# Patient Record
Sex: MALE | Race: WHITE | Employment: OTHER | URBAN - METROPOLITAN AREA
[De-identification: names, ages, dates, MRNs, and addresses within clinical notes are randomized per-mention and may not be internally consistent; named-entity substitution may affect disease eponyms.]

---

## 2017-01-09 ENCOUNTER — TELEPHONE (OUTPATIENT)
Dept: PALLIATIVE MEDICINE | Facility: CLINIC | Age: 52
End: 2017-01-09

## 2017-01-09 NOTE — TELEPHONE ENCOUNTER
Reminded patient that they need to be off steroids, antibiotics and blood thinners for 7 days prior to the procedure. Reminded patient that they also need a .    Syl Conde [facility] [place]

## 2017-01-10 ENCOUNTER — RADIOLOGY INJECTION OFFICE VISIT (OUTPATIENT)
Dept: PALLIATIVE MEDICINE | Facility: CLINIC | Age: 52
End: 2017-01-10
Payer: OTHER GOVERNMENT

## 2017-01-10 ENCOUNTER — RADIANT APPOINTMENT (OUTPATIENT)
Dept: GENERAL RADIOLOGY | Facility: CLINIC | Age: 52
End: 2017-01-10
Attending: PHYSICAL MEDICINE & REHABILITATION

## 2017-01-10 VITALS — DIASTOLIC BLOOD PRESSURE: 90 MMHG | SYSTOLIC BLOOD PRESSURE: 141 MMHG | OXYGEN SATURATION: 96 % | HEART RATE: 89 BPM

## 2017-01-10 DIAGNOSIS — M47.812 CERVICAL SPONDYLOSIS WITHOUT MYELOPATHY: ICD-10-CM

## 2017-01-10 DIAGNOSIS — R26.89 BALANCE PROBLEMS: Primary | ICD-10-CM

## 2017-01-10 DIAGNOSIS — M47.812 FACET ARTHROPATHY, CERVICAL: ICD-10-CM

## 2017-01-10 PROCEDURE — 64491 INJ PARAVERT F JNT C/T 2 LEV: CPT | Mod: RT | Performed by: PHYSICAL MEDICINE & REHABILITATION

## 2017-01-10 PROCEDURE — 64490 INJ PARAVERT F JNT C/T 1 LEV: CPT | Mod: RT | Performed by: PHYSICAL MEDICINE & REHABILITATION

## 2017-01-10 ASSESSMENT — PAIN SCALES - GENERAL: PAINLEVEL: MODERATE PAIN (5)

## 2017-01-10 NOTE — NURSING NOTE
Discharge Information    IV Discontiued Time:  NA    Amount of Fluid Infused:  NA    Discharge Criteria = When patient returns to baseline or as per MD order    Consciousness:  Pt is fully awake    Circulation:  BP +/- 20% of pre-procedure level    Respiration:  Patient is able to breathe deeply    O2 Sat:  Patient is able to maintain O2 Sat >92% on room air    Activity:  Moves 4 extremities on command    Ambulation:  Patient is able to stand and walk or stand and pivot into wheelchair    Dressing:  Clean/dry or No Dressing    Notes:   Discharge instructions and AVS given to patient    Patient meets criteria for discharge?  YES    Admitted to PCU?  No    Responsible adult present to accompany patient home?  Yes    Signature/Title:    Ynes Ramos  RN Care Coordinator  [facility] [place]

## 2017-01-10 NOTE — MR AVS SNAPSHOT
After Visit Summary   1/10/2017    Naif Howell Jr.    MRN: [id number]           Patient Information     Date Of Birth          1965        Visit Information        Provider Department      1/10/2017 7:45 AM Yue Mckeon DO [facility]        Today's Diagnoses     Balance problems    -  1       Care Instructions    [facility] [place]   Medial Branch Block Discharge Instructions  Nurse line:  [phone]  Appointment line:  [phone]    Your procedure was performed by:   Dr. Yue Mckeon    Medications used:  bupivacaine      You will need to complete the Pain Scale Log form and return it to us as soon as possible.  Once we have received the form, we will review it and call you to determine the next steps.     The form can be faxed to [phone] or mailed to:   [facility] - [address], [facility] [address], [place] [address]      You may resume your regular activity after the injection.    Be cautious with walking since you may have numbness and/or weakness in the lower extremities for up to 6-8 hours after the procedure due to the effects of the local anesthetic.    Avoid driving for 6 hours. The local anesthetic could slow your reflexes    You may shower, however no swimming or tub baths or hot tubs for 24 hours following your procedure.    Your pain will return after the numbing medications have worn off.  You may use your current pain medications as needed.    You may use anti-inflammatory medications (such as ibuprofen, aleve, or advil) or Tylenol for pain control if necessary.  Some people find it helpful to alternate ibuprofen and Tylenol every 3 hours for a couple of days.    You may use ice packs 10-15 minutes three to four times a day at the injection site for comfort.     Do not use heat to painful areas for 6 to 8 hours. This will give the local anesthetic time to wear off and prevent you  from accidentally burning your skin.     If you experience any of the following, call the pain center nursing line during work hours at [phone] or the on-call after hours physician line is [phone]:  -Fever over 100 degree F  -Swelling, bleeding, redness, drainage, warmth at the injection site  -Progressive weakness or numbness on your legs or arms  -If lumbar, call if you have a loss of bowel or bladder function  -If cervical, call if you have any unusual headache that is not relieved by Tylenol  -Unusual new onset of pain that is not improving            Follow-ups after your visit        Additional Services     NEUROLOGY ADULT REFERRAL       Your provider has referred you to: [facility] [place] [phone]   [email or web address]    Reason for Referral: Consult    Please be aware that coverage of these services is subject to the terms and limitations of your health insurance plan.  Call member services at your health plan with any benefit or coverage questions.      Please bring the following with you to your appointment:    (1) Any X-Rays, CTs or MRIs which have been performed.  Contact the facility where they were done to arrange for  prior to your scheduled appointment.    (2) List of current medications  (3) This referral request   (4) Any documents/labs given to you for this referral                  Who to contact     If you have questions or need follow up information about today's clinic visit or your schedule please contact [facility] directly at [phone].  Normal or non-critical lab and imaging results will be communicated to you by MyChart, letter or phone within 4 business days after the clinic has received the results. If you do not hear from us within 7 days, please contact the clinic through MyChart or phone. If you have a critical or abnormal lab result, we will notify you by phone as soon as possible.  Submit  "refill requests through [employer] or call your pharmacy and they will forward the refill request to us. Please allow 3 business days for your refill to be completed.          Additional Information About Your Visit        [employer] Information     P[employer]Tab lets you send messages to your doctor, view your test results, renew your prescriptions, schedule appointments and more. To sign up, go to [email or web address] . Click on \"Log in\" on the left side of the screen, which will take you to the Welcome page. Then click on \"Sign up Now\" on the right side of the page.     You will be asked to enter the access code listed below, as well as some personal information. Please follow the directions to create your username and password.     Your access code is: [email or web address]  Expires: 2017  3:33 PM     Your access code will  in 90 days. If you need help or a new code, please call your [facility] clinic or [phone].        Care EveryWhere ID     This is your Care EveryWhere ID. This could be used by other organizations to access your [facility] medical records  [id number]        Your Vitals Were     Pulse Pulse Oximetry                90 95%           Blood Pressure from Last 3 Encounters:   01/10/17 138/95   16 144/82   16 150/88    Weight from Last 3 Encounters:   16 89.812 kg (198 lb)   16 90.719 kg (200 lb)   16 90.719 kg (200 lb)              We Performed the Following     NEUROLOGY ADULT REFERRAL        Primary Care Provider Office Phone # Fax #    Josefina Rogers [phone]       [facility] [address] [address] [address]        Thank you!     Thank you for choosing [facility]  for your care. Our goal is always to provide you with excellent care. Hearing back from our patients is one way we can continue to improve our services. Please take a few minutes to complete the written survey that you may receive in the mail after " your visit with us. Thank you!             Your Updated Medication List - Protect others around you: Learn how to safely use, store and throw away your medicines at www.disposemymeds.org.          This list is accurate as of: 1/10/17  9:02 AM.  Always use your most recent med list.                   Brand Name Dispense Instructions for use    hydrochlorothiazide 25 MG tablet    HYDRODIURIL    90 tablet    Take 1 tablet (25 mg) by mouth daily       ibuprofen 600 MG tablet    ADVIL/MOTRIN    30 tablet    Take 1 tablet (600 mg) by mouth every 6 hours as needed for moderate pain       methocarbamol 500 MG tablet    ROBAXIN    20 tablet    Take 1 tablet (500 mg) by mouth 4 times daily as needed for muscle spasms       oxyCODONE 5 MG IR tablet    ROXICODONE    10 tablet    Take 1 tablet (5 mg) by mouth every 6 hours as needed for pain

## 2017-01-10 NOTE — PROGRESS NOTES
Injection intake:    If this procedure is requiring IV sedation has patient been NPO for 6  Hours? NA    Is patient on coumadin, plavix or other prescribed blood thinner?   No    If patient is on coumadin was it held for 5 days?   NA    If patient is on plavix was it held for 7 days?    NA     Does patient take aspirin?  No    If this is for a cervical procedure and patient is on aspirin has it been held for 6 days?   NA    Any allergies to contrast dye, iodine, steroid and/or numbing medications?  NO    Is patient currently taking antibiotics or have an active infection?  NO    Does patient have a ? Yes       Is patient pregnant or breastfeeding?  Not Applicable    Are the vital signs normal?  No: 147/101      Syl Conde [facility] [place]

## 2017-01-10 NOTE — PROGRESS NOTES
[facility] - Procedure Note    Date of Service: 1/10/2017    Procedure performed: right TON, C3, C4 medial branch blocks  Diagnosis: Cervical spondylosis; Cervical facet arthropathy  : Yue Mckeon DO     Indications: Naif Howell Jr. is a 51 year old male who is seen at the request of Dr. Mckeon for cervical medial branch blocks. The patient describes pain with neck extension/rotation. The patient reports minimal improvement with conservative treatment, including physical therapy and pain medication.    MRI was done on 4/28/16 which showed   FINDINGS: Sagittal images demonstrate minimal lower cervical kyphosis  centered on C5-C6, otherwise normal posterior alignment. There is no  evidence for craniovertebral or cervical medullary junction  abnormality. The cervical cord is normal in morphology and signal  characteristics. Disc space narrowing is present at C3-C4, C4-C5, and  C5-C6. Bone marrow signal intensity is normal.     C2-C3: There is a right-sided uncinate spur and right paramedian  osteophyte and mild right-sided facet hypertrophy causing mild  right-sided foraminal stenosis, but no central canal stenosis.     C3-C4: There is a small right paramedian posterior osteophyte and/or  disc protrusion along with some right-sided facet hypertrophy.  Findings are causing some mild-to-moderate right-sided foraminal  stenosis and borderline to mild central canal stenosis.     C4-C5: Normal.     C5-C6: There is a small to moderate size right paramedian  posterolateral disc protrusion causing mild-to-moderate central canal  stenosis and moderate right-sided foraminal stenosis. Mild right-sided  facet hypertrophy is also present.     C6-C7: Normal.     C7-T1: Normal.     Paraspinal soft tissues: Unremarkable as visualized.         IMPRESSION:  1. Small to moderate size right paramedian to posterolateral C5-C6  disc protrusion with secondary mild-to-moderate central canal stenosis  and moderate  right-sided foraminal stenosis.  2. Small right paramedian posterior osteophyte and disc protrusion at  C3-C4 with some right-sided facet hypertrophy resulting in  mild-to-moderate right-sided foraminal stenosis.    Allergies:      Allergies   Allergen Reactions     Omnicef [Cefdinir] Diarrhea     Cefuroxime Rash        Vitals:  /95 mmHg  Pulse 90  SpO2 95%    Review of Systems: The patient denies recent fever, chills, illness, use of antibiotics or anticoagulants. All other 10-point review of systems negative.     Procedure:   Options/alternatives, benefits and risks were discussed with the patient including bleeding, infection, tissue trauma, exposure to radiation, reaction to medications, spinal cord injury, weakness, numbness and paralysis.  Questions were answered to his satisfaction and he agrees to proceed. Voluntary informed consent was obtained and signed.     After getting informed consent, patient was brought into the procedure suite and was placed in a side-lying position opposite the side of the procedure on the procedure table. A Pause for the Cause was performed. Patient was prepped and draped in sterile fashion.     Under AP fluoroscopic guidance the Right  C2, C3, C4 articular pillars were identified. The C-arm was rotated to afford optimal visualization. Under intermittent fluoroscopy, a 22 gauge 1.5 inch needle was advanced slowly until it had contact on the mid portion of the articular pillar. Then, the two other needles of the same size and gauge were placed under fluorsoscopic guidance using the same technique. The needle positions were verified and optimized from the AP and lateral views.    The anatomic targets for the right TON and C3 and C4 medial nerves were the C2/C3 joint line, and the C3 and C4 articular pillars, resulting in blockade of the right C2/C3 and C3/C4 facet joints.    After negative aspiration, bupivacaine 0.5% 0.5 ml was injected at each location.  The needles were  removed. Hemostasis was achieved, the area was cleaned, and bandaids were placed when appropriate. The patient tolerated the procedure well, and was taken to the recovery room. Images were saved to PACS.    Assessment/Plan: Naif Howell Jr. is a 51 year old male s/p right TON, C3, C4 medial branch blocks today for cervical spondylosis, facet arthropathy.     Pre procecedure pain score: 5/10   Post procedure pain score: 4/10.   The patient will continue to monitor progress, and they were given a pain diary to complete at home.  They will either fax or mail this back to us or bring it to their next appointment. We will determine the treatment plan after we review the diary.      1. The patient was advised to contact the [facility] for any of the following:   Fever, chills, or night sweats   New onset of pain, numbness, or weakness   Any questions/concerns regarding the procedure  If unable to contact the Pain Center, the patient was instructed to go to a local Emergency Room for any complications.   2. The patient will receive a follow-up call in 1 week.  3. We will await the pain diary to determent next step of the treatment plan and call patient to schedule.    Yue Mckeon DO  [facility]  [facility]

## 2017-01-10 NOTE — PATIENT INSTRUCTIONS
[facility] [place]   Medial Branch Block Discharge Instructions  Nurse line:  [phone]  Appointment line:  [phone]    Your procedure was performed by:   Dr. Yue Mckeon    Medications used:  bupivacaine      You will need to complete the Pain Scale Log form and return it to us as soon as possible.  Once we have received the form, we will review it and call you to determine the next steps.     The form can be faxed to [phone] or mailed to:   [facility] - [facility]    [address] [facility] [address] [id number]      You may resume your regular activity after the injection.    Be cautious with walking since you may have numbness and/or weakness in the lower extremities for up to 6-8 hours after the procedure due to the effects of the local anesthetic.    Avoid driving for 6 hours. The local anesthetic could slow your reflexes    You may shower, however no swimming or tub baths or hot tubs for 24 hours following your procedure.    Your pain will return after the numbing medications have worn off.  You may use your current pain medications as needed.    You may use anti-inflammatory medications (such as ibuprofen, aleve, or advil) or Tylenol for pain control if necessary.  Some people find it helpful to alternate ibuprofen and Tylenol every 3 hours for a couple of days.    You may use ice packs 10-15 minutes three to four times a day at the injection site for comfort.     Do not use heat to painful areas for 6 to 8 hours. This will give the local anesthetic time to wear off and prevent you from accidentally burning your skin.     If you experience any of the following, call the pain center nursing line during work hours at [phone] or the on-call after hours physician line is [phone]:  -Fever over 100 degree F  -Swelling, bleeding, redness, drainage, warmth at the injection site  -Progressive weakness or numbness on your legs or  arms  -If lumbar, call if you have a loss of bowel or bladder function  -If cervical, call if you have any unusual headache that is not relieved by Tylenol  -Unusual new onset of pain that is not improving

## 2017-01-16 ENCOUNTER — TELEPHONE (OUTPATIENT)
Dept: PALLIATIVE MEDICINE | Facility: CLINIC | Age: 52
End: 2017-01-16

## 2017-01-16 DIAGNOSIS — M47.812 FACET ARTHROPATHY, CERVICAL: Primary | ICD-10-CM

## 2017-01-16 DIAGNOSIS — M47.812 CERVICAL SPONDYLOSIS WITHOUT MYELOPATHY: ICD-10-CM

## 2017-01-16 NOTE — TELEPHONE ENCOUNTER
Routed to  for scheduling.    Nany Arenas, MSN, RN-BC  Care Coordinator  [facility] [place]

## 2017-01-16 NOTE — TELEPHONE ENCOUNTER
Routing to provider for review. Patient had approximately 70% pain reduction on targeted right facet load.    Order for CMBB2 maria fernanda'd up for signature if indicated.    Nany Arenas, MSN, RN-BC  Care Coordinator  [facility] [place]

## 2017-01-16 NOTE — TELEPHONE ENCOUNTER
Received PAIN EVALUATION AFTER INJECTION form from [facility] MBB #1 performed on 1/10/17.    Post-procedure pain log data :    Pre-procedure: 5/10 rest, 6/10 left facet load, 7/10 right facet load, 4/10 activity    10 minutes: 4/10 rest, 4/10 left facet load, 4/10 right facet load, 4/10 activity    1 hour: 4/10 rest, 4/10 left facet load, 4/10 right facet load, 4/10 activity; comment -     2 hour: 3/10 rest, 3/10 left facet load, 3/10 right facet load, 4/10 activity; comment -     3 hour: 3/10 rest, 2/10 left facet load, 2/10 right facet load, 3/10 activity; comment -     4 hour: 2/10 rest, 2/10 left facet load, 2/10 right facet load, 3/10 activity; comment -     5 hour: 2/10 rest, 2/10 left facet load, 2/10 right facet load, 2/10 activity; comment -     6 hour: 2/10 rest, 2/10 left facet load, 2/10 right facet load, 3/10 activity; comment -     7 hour: 3/10 rest, 3/10 left facet load, 3/10 right facet load, 4/10 activity; comment -     8 hour: 3/10 rest, 4/10 left facet load, 4/10 right facet load, 4/10 activity; comment -     Day after: 3/10 rest, 5/10 left facet load, 5/10 right facet load, 4/10 activity; comment -     Does the patient want to proceed with MBB #2? YES    Does the patient was to proceed with an RFA? Not applicable    Route to nurse pool as well.   Nany Valerio, [facility]

## 2017-01-16 NOTE — TELEPHONE ENCOUNTER
Ordered signed for medial branch block #2.    Yue Mckeon [facility]

## 2017-01-17 ENCOUNTER — TELEPHONE (OUTPATIENT)
Dept: PALLIATIVE MEDICINE | Facility: CLINIC | Age: 52
End: 2017-01-17

## 2017-01-17 NOTE — TELEPHONE ENCOUNTER
Left VM for patient to call and schedule CMBB.        Lashell HERNDON    [facility]

## 2017-01-17 NOTE — TELEPHONE ENCOUNTER
Patient had a cervical medial branch block # 1 on 01/10/17.     Left message that we were calling for an update about how s/he was doing after the injection.  LM that if s/he has any problems or questions to call the nurse line at [phone].    [place] MELLO)

## 2017-01-20 NOTE — TELEPHONE ENCOUNTER
Pre-screening questions for Radiology Injections:    Injection to be done at which interventional clinic site? [facility]    Procedure ordered by Dr. OWEN    Procedure ordered? Cervical Medial Branch Block    What insurance would patient like us to bill for this procedure? [employer]        [employer]- Any injection DO NOT SCHEDULE and route to Lashell Ramos.        [employer] insurance - If scheduling an SI joint injection DO NOT SCHEDULE and route to Lashell Ramos.     [employer]- MBB's must be scheduled at LEAST two weeks apart        [employer] - Any injection besides hip/shoulder/knee joint DO NOT SCHEDULE and route to Lashell Ramos. She will obtain PA and call pt back to schedule procedure or notify pt of denial.    Is an  needed? No     Patient has a drive home? (mandatory) Yes     Is patient taking any blood thinners (plavix, coumadin, jantoven, warfarin, heparin, pradaxa or dabigatran )? No    (If so, do not schedule, contact RN and/or MD)     Is patient taking any aspirin products? No    (If more than 325mg/day do not schedule; Contact RN/MD. For all non-cervical interventional procedures if patient is taking MORE than 325mg/day, limit aspirin to 81-325mg/day x 1 week. No hold required day of procedure.  For CERVICAL procedures, hold all aspirin products for 6 days.)      Does the patient have a bleeding or clotting disorder? No    (If yes, okay to schedule, but contact RN/MD).  **For any patients with platelet count <100, must be forwarded to provider**    Is patient diabetic? no If YES, have them bring their glucometer.    Does patient have an active infection or treated for one within the past week? No    Is patient currently taking any antibiotics?  No  For patients on chronic, preventative, or prophylactic antibiotics, procedures can be scheduled.    For patients on antibiotics for active or recent infection:  Linda Colon, Juan-antibiotic course must have been  completed for 4 days  Marcelino Hawkins-antibiotic course must have been completed for 7 days    Is patient currently taking any steroid medications? (i.e. Prednisone, Medrol)  No   For patients on steroid medications:  Linda Colon [place]-steroid course must have been completed for 4 days  Marcelino Hawkins-steroid course must have been completed for 7 days    Review with patient:  If you are started on any steroids or antibiotics between now and your appointment, you must contact us because it may affect our ability to perform your procedure informed    Is patient actively being treated for cancer or immunocompromised, including the spleen having been removed? No  **For Dr. Story patients without spleens should have the chart sent to her**  (If YES, do NOT schedule and route to RN)    Are you able to get on and off an exam table with minimal or no assistance? Yes  (If NO, do NOT schedule and route to RN)  Are you able to roll over and lay on your stomach with minimal or no assistance? Yes  (If NO, do NOT schedule and route to RN)         Any allergies to contrast dye, iodine, shellfish, or numbing and steroid medications? No  (If so, inform nursing and note in scheduling comments.)    Allergies: Omnicef and Cefuroxime      Any chance of pregnancy? No    Has the patient had a flu shot or any other vaccinations within 7 days before or after the procedure.    No       Does patient have an MRI/CT? yes  (SI joint, hip injections, lumbar sympathetic blocks, and stellate ganglion blocks do not require an MRI)    If so, was it done at [place]? Yes       If not, where was it done? na    Was the MRI done w/in the last 3 years? Yes      If MRI was not done at [place], [facility] or [facility] do NOT schedule. Route to nursing.  (If pt has disc the injection can be scheduled but pt has to bring disc to appt. If they show up w/out disc the injection cannot be done)      **Must be scheduled with elapsed time  interval of at least 2 weeks and not more than 6 months between the First MBB and the Second MBB**       Medial Branch Block Pre-Procedure Instructions    It is okay to take long acting pain medications (if you are on them) the day of the procedure but try not to take any short acting medications unless absolutely necessary. informed        Long acting meds would include: Gabapentin (Neurontin), MS Contin, Oxycontin        Short acting meds would include:  Percocet, Oxycodone, Vicodin, Ibuprofen     The day of the procedure, you should try to do things that provoke your pain, since the injection is being done to see if it will relieve your pain . informed    If your pain level is a 4 out of 10 or less on the day of the procedure, please call [phone] to reschedule.  na        The latest appt available for cervical MBB is 2:30pm. Do Not schedule any later.       Reminders (please tell patient if applicable):          Instructed pt to arrive 30 minutes early for IV start if this is for a cervical procedure, ALL sympathetic (stellate ganglion, hypogastric, or lumbar sympathetic block) and all sedation procedures (RFA, spinal cord stimulation trials). na        -IVs are not routinely placed for Mckeon and [place] cervical cases       If NPO for sedation, it is okay to take medications with sips of water (except if they are to hold blood thinners). na              *DO take blood pressure medication if it is prescribed*        If this is for a cervical MBB aspirin needs to be held for 6 days.  na        Do not schedule procedures requiring IV placement in the first appointment after lunch na          For patients 85 or older we recommend having an adult stay w/ them for the remainder of the day.             Does the patient have any questions? no

## 2017-01-23 ENCOUNTER — HOSPITAL ENCOUNTER (EMERGENCY)
Facility: CLINIC | Age: 52
Discharge: HOME | End: 2017-01-23
Attending: EMERGENCY MEDICINE | Admitting: EMERGENCY MEDICINE
Payer: OTHER GOVERNMENT

## 2017-01-23 VITALS
OXYGEN SATURATION: 94 % | HEART RATE: 79 BPM | DIASTOLIC BLOOD PRESSURE: 105 MMHG | TEMPERATURE: 98.7 F | RESPIRATION RATE: 18 BRPM | SYSTOLIC BLOOD PRESSURE: 152 MMHG

## 2017-01-23 DIAGNOSIS — G89.29 CHRONIC MIDLINE THORACIC BACK PAIN: ICD-10-CM

## 2017-01-23 DIAGNOSIS — M54.6 CHRONIC MIDLINE THORACIC BACK PAIN: ICD-10-CM

## 2017-01-23 PROCEDURE — 99282 EMERGENCY DEPT VISIT SF MDM: CPT

## 2017-01-23 RX ORDER — METHOCARBAMOL 500 MG/1
1000 TABLET, FILM COATED ORAL 3 TIMES DAILY PRN
Qty: 30 TABLET | Refills: 1 | Status: ON HOLD | OUTPATIENT
Start: 2017-01-23 | End: 2017-03-29

## 2017-01-23 RX ORDER — OXYCODONE AND ACETAMINOPHEN 5; 325 MG/1; MG/1
1-2 TABLET ORAL EVERY 4 HOURS PRN
Qty: 15 TABLET | Refills: 0 | Status: ON HOLD | OUTPATIENT
Start: 2017-01-23 | End: 2017-03-29

## 2017-01-23 ASSESSMENT — ENCOUNTER SYMPTOMS
FREQUENCY: 0
BACK PAIN: 1
DIFFICULTY URINATING: 0
DYSURIA: 0

## 2017-01-23 NOTE — ED PROVIDER NOTES
History     Chief Complaint:  Back pain    HPI   Naif Howell Jr. is a 51 year old male who presents with back pain. The patient reports a long-standing history of mid-thoracic back pain flare ups, for which he has an appointment with Dr. Good in 2 weeks for a steroid injection. He notes having a herniated disc in his neck but that this is not bothering him. Today his back pain flared up, prompting him to come here for evaluation. He denies any urinary symptoms or having a pain contract with his pain clinic.    Allergies:  Omnicef - diarrhea  Cefuroxime - rash      Medications:    Ibuprofen  Hydrodiruil     Past Medical History:    Anxiety  PTSD  Hypertension  Basal cell carcinoma  Aortic regurgitation  GERD     Past Surgical History:    Resection of basal cell carcinoma    Family History:    Chest tumor - father  Hypertension - mother     Social History:  Smoking status: current every day - 1 pack/day  Alcohol use: no   Marital Status:  Legally      Review of Systems   Genitourinary: Negative for dysuria, urgency, frequency and difficulty urinating.   Musculoskeletal: Positive for back pain.   All other systems reviewed and are negative.      Physical Exam     Patient Vitals for the past 24 hrs:   BP Temp Temp src Pulse Heart Rate Resp SpO2   01/23/17 0420 - - - - - - 94 %   01/23/17 0412 - - - - - - 96 %   01/23/17 0402 - - - - - - 97 %   01/23/17 0401 (!) 152/105 mmHg - - - - - -   01/23/17 0303 (!) 135/105 mmHg - - - - - -   01/23/17 0301 - 98.7  F (37.1  C) Temporal 79 79 18 99 %        Physical Exam   Constitutional: He appears well-developed and well-nourished.   Cardiovascular: Normal rate, regular rhythm, normal heart sounds and intact distal pulses.  Exam reveals no gallop and no friction rub.    No murmur heard.  Pulmonary/Chest: Effort normal and breath sounds normal. No respiratory distress. He has no wheezes. He has no rales.   Abdominal: Soft. Bowel sounds are normal. He exhibits no  distension and no mass. There is no tenderness.   Musculoskeletal: He exhibits tenderness. He exhibits no edema.   Mid T spine TTP without evidence of trauma or swelling or redness   Neurological: He is alert.   Speech clear  5/5 strength x 4  No sensory deficits to light touch in extremities   Skin: Skin is warm and dry. No rash noted.   Psychiatric: He has a normal mood and affect.       Emergency Department Course     Emergency Department Course:  Past medical records, nursing notes, and vitals reviewed.  0408: I performed an exam of the patient and obtained history, as documented above.  0411: I rechecked the patient. Findings and plan explained to the Patient. Patient discharged home with instructions regarding supportive care, medications, and reasons to return. The importance of close follow-up was reviewed.        Impression & Plan      Medical Decision Making:  Naif Howell Jr. is a 51 year old male who presents with flare up of his midline thoracic pain. He has been followed by Dr. Good at [facility] and is due for an injection in about 2 weeks. He said for whatever reason it flared up tonight and for the last 2 days and is becoming unbearable. He denies any trauma. He tells him he does have a pain contract. I looked at the [facility] records and it appears he does not have any prescriptions recently. Patient does have a follow up with Dr. Mckeon that he can get into, so I did give him limited quantities of pain medication and muscle relaxant. He is then directed to follow up with her and to make sure she is aware he has been in the ER and there is no refills on these medications. He is given narcotics.    Diagnosis:    ICD-10-CM    1. Chronic midline thoracic back pain M54.6     G89.29        Disposition:  discharged to home    Discharge Medications:  Discharge Medication List as of 1/23/2017  4:18 AM      START taking these medications    Details   methocarbamol (ROBAXIN) 500 MG tablet Take 2  tablets (1,000 mg) by mouth 3 times daily as needed for muscle spasms, Disp-30 tablet, R-1, Local Print      oxyCODONE-acetaminophen (PERCOCET) 5-325 MG per tablet Take 1-2 tablets by mouth every 4 hours as needed for pain, Disp-15 tablet, R-0, Local Print               Yon Heredia  1/23/2017   [facility] EMERGENCY DEPARTMENT    I, Yon Heredia, am serving as a scribe at 4:21 AM on 1/23/2017 to document services personally performed by Roshni Dickson MD based on my observations and the provider's statements to me.       Roshni Dickson MD  01/23/17 0608

## 2017-01-23 NOTE — ED AVS SNAPSHOT
[facility] Emergency Department    [address]    [facility] [address]    Phone:  [phone]    Fax:  [phone]                                       Naif Howell Jr.   MRN: [id number]    Department:  [facility] Emergency Department   Date of Visit:  1/23/2017           Patient Information     Date Of Birth          1965        Your diagnoses for this visit were:     Chronic midline thoracic back pain        You were seen by Roshni Dickson MD.      Follow-up Information     Follow up with Josefina Rogers MD. Go in 2 days.    Specialty:  Family Practice    Contact information:    [facility]  [address]  [facility] [address] [phone]          Discharge Instructions       Opioid Medication Information    You have been given a prescription for an opioid (narcotic) pain medicine and/or have received a pain medicine while here in the Emergency Department. These medicines can make you drowsy or impaired. You must not drive, operate dangerous equipment, or engage in any other dangerous activities while taking these medications. If you drive while taking these medications, you could be arrested for DUI, or driving under the influence. Do not drink any alcohol while you are taking these medications.   Opioid pain medications can cause addiction. If you have a history of chemical dependency of any type, you are at a higher risk of becoming addicted to pain medications.  Only take these prescribed medications to treat your pain when all other options have been tried. Take it for as short a time and as few doses as possible. Store your pain pills in a secure place, as they are frequently stolen and provide a dangerous opportunity for children or visitors in your house to start abusing these powerful medications. We will not replace any lost or stolen medicine.  As soon as your pain is better, you should flush all your remaining medication.   Many  prescription pain medications contain Tylenol  (acetaminophen), including Vicodin , Tylenol #3 , Norco , Lortab , and Percocet .  You should not take any extra pills of Tylenol  if you are using these prescription medications or you can get very sick.  Do not ever take more than 4000 mg of acetaminophen in any 24 hour period.  All opioids tend to cause constipation. Drink plenty of water and eat foods that have a lot of fiber, such as fruits, vegetables, prune juice, apple juice and high fiber cereal.  Take a laxative if you don t move your bowels at least every other day. Miralax , Milk of Magnesia, Colace , or Senna  can be used to keep you regular.            Future Appointments        Provider Department Dept Phone Center    2/6/2017 7:45 AM Yue Mckeon DO [facility] [phone]  PAIN MGMT      24 Hour Appointment Hotline       To make an appointment at any [facility] [place], call [phone] ([phone]). If you don't have a family doctor or clinic, we will help you find one. [facility] clinics are conveniently located to serve the needs of you and your family.             Review of your medicines      START taking        Dose / Directions Last dose taken    methocarbamol 500 MG tablet   Commonly known as:  ROBAXIN   Dose:  1000 mg   Quantity:  30 tablet        Take 2 tablets (1,000 mg) by mouth 3 times daily as needed for muscle spasms   Refills:  1        oxyCODONE-acetaminophen 5-325 MG per tablet   Commonly known as:  PERCOCET   Dose:  1-2 tablet   Quantity:  15 tablet        Take 1-2 tablets by mouth every 4 hours as needed for pain   Refills:  0          Our records show that you are taking the medicines listed below. If these are incorrect, please call your family doctor or clinic.        Dose / Directions Last dose taken    hydrochlorothiazide 25 MG tablet   Commonly known as:  HYDRODIURIL   Dose:  25 mg   Quantity:  90 tablet        Take 1 tablet (25 mg) by mouth daily    Refills:  3        ibuprofen 600 MG tablet   Commonly known as:  ADVIL/MOTRIN   Dose:  600 mg   Quantity:  30 tablet        Take 1 tablet (600 mg) by mouth every 6 hours as needed for moderate pain   Refills:  1                Prescriptions were sent or printed at these locations (2 Prescriptions)                   Other Prescriptions                Printed at Department/Unit printer (2 of 2)         methocarbamol (ROBAXIN) 500 MG tablet               oxyCODONE-acetaminophen (PERCOCET) 5-325 MG per tablet                Orders Needing Specimen Collection     None      Pending Results     No orders found from 1/22/2017 to 1/24/2017.            Pending Culture Results     No orders found from 1/22/2017 to 1/24/2017.             Test Results from your hospital stay            Clinical Quality Measure: Blood Pressure Screening     Your blood pressure was checked while you were in the emergency department today. The last reading we obtained was  BP: (!) 135/105 mmHg . Please read the guidelines below about what these numbers mean and what you should do about them.  If your systolic blood pressure (the top number) is less than 120 and your diastolic blood pressure (the bottom number) is less than 80, then your blood pressure is normal. There is nothing more that you need to do about it.  If your systolic blood pressure (the top number) is 120-139 or your diastolic blood pressure (the bottom number) is 80-89, your blood pressure may be higher than it should be. You should have your blood pressure rechecked within a year by a primary care provider.  If your systolic blood pressure (the top number) is 140 or greater or your diastolic blood pressure (the bottom number) is 90 or greater, you may have high blood pressure. High blood pressure is treatable, but if left untreated over time it can put you at risk for heart attack, stroke, or kidney failure. You should have your blood pressure rechecked by a primary care provider  "within the next 4 weeks.  If your provider in the emergency department today gave you specific instructions to follow-up with your doctor or provider even sooner than that, you should follow that instruction and not wait for up to 4 weeks for your follow-up visit.        Thank you for choosing [facility]       Thank you for choosing [facility] for your care. Our goal is always to provide you with excellent care. Hearing back from our patients is one way we can continue to improve our services. Please take a few minutes to complete the written survey that you may receive in the mail after you visit with us. Thank you!        [employer] Information     [employer] lets you send messages to your doctor, view your test results, renew your prescriptions, schedule appointments and more. To sign up, go to [email or web address] . Click on \"Log in\" on the left side of the screen, which will take you to the Welcome page. Then click on \"Sign up Now\" on the right side of the page.     You will be asked to enter the access code listed below, as well as some personal information. Please follow the directions to create your username and password.     Your access code is: [email or web address]  Expires: 2017  3:33 PM     Your access code will  in 90 days. If you need help or a new code, please call your [facility] clinic or [phone].        Care EveryWhere ID     This is your Care EveryWhere ID. This could be used by other organizations to access your [facility] medical records  [id number]        After Visit Summary       This is your record. Keep this with you and show to your community pharmacist(s) and doctor(s) at your next visit.                  "

## 2017-01-23 NOTE — ED AVS SNAPSHOT
[facility] Emergency Department    [address]    [facility] [address]    Phone:  [phone]    Fax:  [phone]                                       Naif Howell Jr.   MRN: [id number]    Department:  [facility] Emergency Department   Date of Visit:  1/23/2017           After Visit Summary Signature Page     I have received my discharge instructions, and my questions have been answered. I have discussed any challenges I see with this plan with the nurse or doctor.    ..........................................................................................................................................  Patient/Patient Representative Signature      ..........................................................................................................................................  Patient Representative Print Name and Relationship to Patient    ..................................................               ................................................  Date                                            Time    ..........................................................................................................................................  Reviewed by Signature/Title    ...................................................              ..............................................  Date                                                            Time

## 2017-01-23 NOTE — DISCHARGE INSTRUCTIONS
Opioid Medication Information    You have been given a prescription for an opioid (narcotic) pain medicine and/or have received a pain medicine while here in the Emergency Department. These medicines can make you drowsy or impaired. You must not drive, operate dangerous equipment, or engage in any other dangerous activities while taking these medications. If you drive while taking these medications, you could be arrested for DUI, or driving under the influence. Do not drink any alcohol while you are taking these medications.   Opioid pain medications can cause addiction. If you have a history of chemical dependency of any type, you are at a higher risk of becoming addicted to pain medications.  Only take these prescribed medications to treat your pain when all other options have been tried. Take it for as short a time and as few doses as possible. Store your pain pills in a secure place, as they are frequently stolen and provide a dangerous opportunity for children or visitors in your house to start abusing these powerful medications. We will not replace any lost or stolen medicine.  As soon as your pain is better, you should flush all your remaining medication.   Many prescription pain medications contain Tylenol  (acetaminophen), including Vicodin , Tylenol #3 , Norco , Lortab , and Percocet .  You should not take any extra pills of Tylenol  if you are using these prescription medications or you can get very sick.  Do not ever take more than 4000 mg of acetaminophen in any 24 hour period.  All opioids tend to cause constipation. Drink plenty of water and eat foods that have a lot of fiber, such as fruits, vegetables, prune juice, apple juice and high fiber cereal.  Take a laxative if you don t move your bowels at least every other day. Miralax , Milk of Magnesia, Colace , or Senna  can be used to keep you regular.

## 2017-02-06 ENCOUNTER — RADIANT APPOINTMENT (OUTPATIENT)
Dept: GENERAL RADIOLOGY | Facility: CLINIC | Age: 52
End: 2017-02-06
Attending: PHYSICAL MEDICINE & REHABILITATION
Payer: OTHER GOVERNMENT

## 2017-02-06 ENCOUNTER — RADIOLOGY INJECTION OFFICE VISIT (OUTPATIENT)
Dept: PALLIATIVE MEDICINE | Facility: CLINIC | Age: 52
End: 2017-02-06

## 2017-02-06 VITALS — DIASTOLIC BLOOD PRESSURE: 95 MMHG | HEART RATE: 85 BPM | OXYGEN SATURATION: 99 % | SYSTOLIC BLOOD PRESSURE: 132 MMHG

## 2017-02-06 DIAGNOSIS — M47.812 CERVICAL SPONDYLOSIS WITHOUT MYELOPATHY: Primary | ICD-10-CM

## 2017-02-06 DIAGNOSIS — M47.812 FACET ARTHROPATHY, CERVICAL: ICD-10-CM

## 2017-02-06 PROCEDURE — 64490 INJ PARAVERT F JNT C/T 1 LEV: CPT | Mod: RT

## 2017-02-06 PROCEDURE — 64491 INJ PARAVERT F JNT C/T 2 LEV: CPT | Mod: RT

## 2017-02-06 NOTE — PROGRESS NOTES
[facility] - Procedure Note    Date of Service: 2/6/2017    Procedure performed: right TON, C3, C4 medial branch blocks  Diagnosis: Cervical spondylosis; Cervical facet arthropathy  : Yue Mckeon DO     Indications: Naif Howell Jr. is a 51 year old male who is seen at the request of Dr. Mckeon for cervical medial branch blocks. The patient describes pain with neck extension/rotation. The patient reports minimal improvement with conservative treatment, including physical therapy and pain medication.    MRI was done on 4/28/16 which showed   FINDINGS: Sagittal images demonstrate minimal lower cervical kyphosis  centered on C5-C6, otherwise normal posterior alignment. There is no  evidence for craniovertebral or cervical medullary junction  abnormality. The cervical cord is normal in morphology and signal  characteristics. Disc space narrowing is present at C3-C4, C4-C5, and  C5-C6. Bone marrow signal intensity is normal.     C2-C3: There is a right-sided uncinate spur and right paramedian  osteophyte and mild right-sided facet hypertrophy causing mild  right-sided foraminal stenosis, but no central canal stenosis.     C3-C4: There is a small right paramedian posterior osteophyte and/or  disc protrusion along with some right-sided facet hypertrophy.  Findings are causing some mild-to-moderate right-sided foraminal  stenosis and borderline to mild central canal stenosis.     C4-C5: Normal.     C5-C6: There is a small to moderate size right paramedian  posterolateral disc protrusion causing mild-to-moderate central canal  stenosis and moderate right-sided foraminal stenosis. Mild right-sided  facet hypertrophy is also present.     C6-C7: Normal.     C7-T1: Normal.     Paraspinal soft tissues: Unremarkable as visualized.         IMPRESSION:  1. Small to moderate size right paramedian to posterolateral C5-C6  disc protrusion with secondary mild-to-moderate central canal stenosis  and moderate  right-sided foraminal stenosis.  2. Small right paramedian posterior osteophyte and disc protrusion at  C3-C4 with some right-sided facet hypertrophy resulting in  mild-to-moderate right-sided foraminal stenosis.    Allergies:      Allergies   Allergen Reactions     Omnicef [Cefdinir] Diarrhea     Cefuroxime Rash        Vitals:  /90 mmHg  Pulse 83  SpO2 98%    Review of Systems: The patient denies recent fever, chills, illness, use of antibiotics or anticoagulants. All other 10-point review of systems negative.     Procedure:   Options/alternatives, benefits and risks were discussed with the patient including bleeding, infection, tissue trauma, exposure to radiation, reaction to medications, spinal cord injury, weakness, numbness and paralysis.  Questions were answered to his satisfaction and he agrees to proceed. Voluntary informed consent was obtained and signed.     After getting informed consent, patient was brought into the procedure suite and was placed in a side-lying position opposite the side of the procedure on the procedure table. A Pause for the Cause was performed. Patient was prepped and draped in sterile fashion.     Under AP fluoroscopic guidance the Right  C2, C3, C4 articular pillars were identified. The C-arm was rotated to afford optimal visualization. Under intermittent fluoroscopy, a 22 gauge 1.5 inch needle was advanced slowly until it had contact on the mid portion of the articular pillar. Then, the two other needles of the same size and gauge were placed under fluorsoscopic guidance using the same technique. The needle positions were verified and optimized from the AP and lateral views.    The anatomic targets for the right TON and C3 and C4 medial nerves were the C2/C3 joint line, and the C3 and C4 articular pillars, resulting in blockade of the right C2/C3 and C3/C4 facet joints.    After negative aspiration, lidocaine 2% 0.5 ml was injected at each location.  The needles were  removed. Hemostasis was achieved, the area was cleaned, and bandaids were placed when appropriate. The patient tolerated the procedure well, and was taken to the recovery room. Images were saved to PACS.    Assessment/Plan: Naif Howell Jr. is a 51 year old male s/p right TON, C3, C4 medial branch blocks today for cervical spondylosis, facet arthropathy.     Pre procecedure pain score: 4/10   Post procedure pain score: 2/10.   The patient will continue to monitor progress, and they were given a pain diary to complete at home.  They will either fax or mail this back to us or bring it to their next appointment. We will determine the treatment plan after we review the diary.      1. The patient was advised to contact the [facility] for any of the following:   Fever, chills, or night sweats   New onset of pain, numbness, or weakness   Any questions/concerns regarding the procedure  If unable to contact the Pain Center, the patient was instructed to go to a local Emergency Room for any complications.   2. The patient will receive a follow-up call in 1 week.  3. We will await the pain diary to determent next step of the treatment plan and call patient to schedule.    Yue Mckeon DO  [facility]  [facility]

## 2017-02-06 NOTE — NURSING NOTE
Injection intake:    If this procedure is requiring IV sedation has patient been NPO for 6  Hours? NA    Is patient on coumadin, plavix or other prescribed blood thinner?   No    If patient is on coumadin was it held for 5 days?   NA    If patient is on plavix was it held for 7 days?    NA     Does patient take aspirin?  No    If this is for a cervical procedure and patient is on aspirin has it been held for 6 days?   No doesnot take    Any allergies to contrast dye, iodine, steroid and/or numbing medications?  NO    Is patient currently taking antibiotics or have an active infection?  NO    Does patient have a ? Yes       Is patient pregnant or breastfeeding?  Not Applicable    Are the vital signs normal?  Yes

## 2017-02-06 NOTE — PATIENT INSTRUCTIONS
[facility]   Medial Branch Block Discharge Instructions  Nurse line:  [phone]  Appointment line:  [phone]    Your procedure was performed by:   Dr. Yue Mckeon    Medications used: lidocaine     You will need to complete the Pain Scale Log form and return it to us as soon as possible.  Once we have received the form, we will review it and call you to determine the next steps.     The form can be faxed to [phone] or mailed to:   [facility] - [facility]    [address] [address], [facility] [address] [id number]      You may resume your regular activity after the injection.    Be cautious with walking since you may have numbness and/or weakness in the lower extremities for up to 6-8 hours after the procedure due to the effects of the local anesthetic.    Avoid driving for 6 hours. The local anesthetic could slow your reflexes    You may shower, however no swimming or tub baths or hot tubs for 24 hours following your procedure.    Your pain will return after the numbing medications have worn off.  You may use your current pain medications as needed.    You may use anti-inflammatory medications (such as ibuprofen, aleve, or advil) or Tylenol for pain control if necessary.  Some people find it helpful to alternate ibuprofen and Tylenol every 3 hours for a couple of days.    You may use ice packs 10-15 minutes three to four times a day at the injection site for comfort.     Do not use heat to painful areas for 6 to 8 hours. This will give the local anesthetic time to wear off and prevent you from accidentally burning your skin.     If you experience any of the following, call the pain center nursing line during work hours at [phone] or the on-call after hours physician line is [phone]:  -Fever over 100 degree F  -Swelling, bleeding, redness, drainage, warmth at the injection site  -Progressive weakness or numbness on your legs or  arms  -If lumbar, call if you have a loss of bowel or bladder function  -If cervical, call if you have any unusual headache that is not relieved by Tylenol  -Unusual new onset of pain that is not improving

## 2017-02-06 NOTE — NURSING NOTE
Discharge Information    IV Discontiued Time:  NA    Amount of Fluid Infused:  NA    Discharge Criteria = When patient returns to baseline or as per MD order    Consciousness:  Pt is fully awake    Circulation:  BP +/- 20% of pre-procedure level    Respiration:  Patient is able to breathe deeply    O2 Sat:  Patient is able to maintain O2 Sat >92% on room air    Activity:  Moves 4 extremities on command    Ambulation:  Patient is able to stand and walk or stand and pivot into wheelchair    Dressing:  Clean/dry or No Dressing    Notes:   Discharge instructions and AVS given to patient    Patient meets criteria for discharge?  YES    Admitted to PCU?  No    Responsible adult present to accompany patient home?  Yes    Signature/Title:    Ynes Ramos  RN Care Coordinator  [facility]

## 2017-02-06 NOTE — MR AVS SNAPSHOT
After Visit Summary   2/6/2017    Naif Howell Jr.    MRN: [id number]           Patient Information     Date Of Birth          1965        Visit Information        Provider Department      2/6/2017 7:45 AM Yue Mckeon DO [facility]        Care Instructions    [facility]   Medial Branch Block Discharge Instructions  Nurse line:  [phone]  Appointment line:  [phone]    Your procedure was performed by:   Dr. Yue Mckeon    Medications used: lidocaine     You will need to complete the Pain Scale Log form and return it to us as soon as possible.  Once we have received the form, we will review it and call you to determine the next steps.     The form can be faxed to [phone] or mailed to:   [facility] - [address], [facility] [address], [place] [address]      You may resume your regular activity after the injection.    Be cautious with walking since you may have numbness and/or weakness in the lower extremities for up to 6-8 hours after the procedure due to the effects of the local anesthetic.    Avoid driving for 6 hours. The local anesthetic could slow your reflexes    You may shower, however no swimming or tub baths or hot tubs for 24 hours following your procedure.    Your pain will return after the numbing medications have worn off.  You may use your current pain medications as needed.    You may use anti-inflammatory medications (such as ibuprofen, aleve, or advil) or Tylenol for pain control if necessary.  Some people find it helpful to alternate ibuprofen and Tylenol every 3 hours for a couple of days.    You may use ice packs 10-15 minutes three to four times a day at the injection site for comfort.     Do not use heat to painful areas for 6 to 8 hours. This will give the local anesthetic time to wear off and prevent you from accidentally burning your skin.     If you experience  "any of the following, call the pain center nursing line during work hours at [phone] or the on-call after hours physician line is [phone]:  -Fever over 100 degree F  -Swelling, bleeding, redness, drainage, warmth at the injection site  -Progressive weakness or numbness on your legs or arms  -If lumbar, call if you have a loss of bowel or bladder function  -If cervical, call if you have any unusual headache that is not relieved by Tylenol  -Unusual new onset of pain that is not improving              Follow-ups after your visit        Who to contact     If you have questions or need follow up information about today's clinic visit or your schedule please contact [facility] directly at [phone].  Normal or non-critical lab and imaging results will be communicated to you by [employer], letter or phone within 4 business days after the clinic has received the results. If you do not hear from us within 7 days, please contact the clinic through [employer] or phone. If you have a critical or abnormal lab result, we will notify you by phone as soon as possible.  Submit refill requests through [facility] or call your pharmacy and they will forward the refill request to us. Please allow 3 business days for your refill to be completed.          Additional Information About Your Visit        [facility] Information     [facility] lets you send messages to your doctor, view your test results, renew your prescriptions, schedule appointments and more. To sign up, go to [email or web address] . Click on \"Log in\" on the left side of the screen, which will take you to the Welcome page. Then click on \"Sign up Now\" on the right side of the page.     You will be asked to enter the access code listed below, as well as some personal information. Please follow the directions to create your username and password.     Your access code is: [email or web address]  Expires: 2017  3:33 PM     Your access code will  in 90 days. If " you need help or a new code, please call your [facility] clinic or [phone].        Care EveryWhere ID     This is your Care EveryWhere ID. This could be used by other organizations to access your [facility] medical records  [id number]        Your Vitals Were     Pulse Pulse Oximetry                83 98%           Blood Pressure from Last 3 Encounters:   02/06/17 136/90   01/23/17 152/105   01/10/17 141/90    Weight from Last 3 Encounters:   11/23/16 89.812 kg (198 lb)   11/22/16 90.719 kg (200 lb)   11/06/16 90.719 kg (200 lb)              Today, you had the following     No orders found for display       Primary Care Provider Office Phone # Fax #    Josefina Lester Rogers [phone]       [facility] [address] [address]        Thank you!     Thank you for choosing [facility]  for your care. Our goal is always to provide you with excellent care. Hearing back from our patients is one way we can continue to improve our services. Please take a few minutes to complete the written survey that you may receive in the mail after your visit with us. Thank you!             Your Updated Medication List - Protect others around you: Learn how to safely use, store and throw away your medicines at www.disposemymeds.org.          This list is accurate as of: 2/6/17  8:24 AM.  Always use your most recent med list.                   Brand Name Dispense Instructions for use    hydrochlorothiazide 25 MG tablet    HYDRODIURIL    90 tablet    Take 1 tablet (25 mg) by mouth daily       ibuprofen 600 MG tablet    ADVIL/MOTRIN    30 tablet    Take 1 tablet (600 mg) by mouth every 6 hours as needed for moderate pain       methocarbamol 500 MG tablet    ROBAXIN    30 tablet    Take 2 tablets (1,000 mg) by mouth 3 times daily as needed for muscle spasms       oxyCODONE-acetaminophen 5-325 MG per tablet    PERCOCET    15 tablet    Take 1-2 tablets by mouth every 4 hours as  needed for pain

## 2017-02-10 ENCOUNTER — TRANSFERRED RECORDS (OUTPATIENT)
Dept: HEALTH INFORMATION MANAGEMENT | Facility: CLINIC | Age: 52
End: 2017-02-10

## 2017-02-13 ENCOUNTER — MEDICAL CORRESPONDENCE (OUTPATIENT)
Dept: HEALTH INFORMATION MANAGEMENT | Facility: CLINIC | Age: 52
End: 2017-02-13

## 2017-02-13 ENCOUNTER — TELEPHONE (OUTPATIENT)
Dept: PALLIATIVE MEDICINE | Facility: CLINIC | Age: 52
End: 2017-02-13

## 2017-02-13 DIAGNOSIS — M47.812 FACET ARTHROPATHY, CERVICAL: Primary | ICD-10-CM

## 2017-02-13 NOTE — TELEPHONE ENCOUNTER
Received PAIN EVALUATION AFTER INJECTION form from [facility] MBB #2 performed on 2/6/17.    Post-procedure pain log data :    Pre-procedure: 3/10 rest,  3/10 right facet load, 3/10 activity    10 minutes: 1/10 rest, 2/10 right facet load, 2/10 activity    1 hour: 1/10 rest, 2/10 right facet load, 1/10 activity; comment -     2 hour: 1/10 rest, 2/10 right facet load, 1/10 activity; comment -     3 hour: 1/10 rest,  2/10 right facet load, 1/10 activity; comment -     4 hour: 1/10 rest, 2/10 right facet load, 1/10 activity; comment -     5 hour: 1/10 rest, 3/10 right facet load, 1/10 activity; comment -     6 hour: 1/10 rest,  3/10 right facet load, 1/10 activity; comment -     7 hour: 2/10 rest, 3/10 right facet load, 3/10 activity; comment -     8 hour: 2/10 rest,  4/10 right facet load, 3/10 activity; comment -     Day after: 2/10 rest, 4/10 right facet load, 4/10 activity; comment -     Does the patient want to proceed with MBB #2? Not applicable    Does the patient was to proceed with an RFA? YES    Route to nurse pool as well.     Nany Valerio, [facility]

## 2017-02-13 NOTE — TELEPHONE ENCOUNTER
Routing to LANDY Ramos for PA processing to proceed with RFA.     Called patient to advise that PA process has begun and we will be contact to schedule when we hear back.     Ynes Ramos  BSN-RN Care Coordinator  [facility]

## 2017-02-13 NOTE — TELEPHONE ENCOUNTER
Prepping order for review. Routing to provider to review results below.     Ynes Ramos  BSN-RN Care Coordinator  [facility]

## 2017-02-13 NOTE — TELEPHONE ENCOUNTER
Patient had a cervical medial branch block # 2 on 02/06/17.     Left message that we were calling for an update about how s/he was doing after the injection.  LM that if s/he has any problems or questions to call the nurse line at [phone].    [place] MELLO)

## 2017-02-13 NOTE — TELEPHONE ENCOUNTER
Ok to proceed with radiofrequency ablation.    Yue Mckeon DO  [facility]

## 2017-02-15 NOTE — TELEPHONE ENCOUNTER
Faxed completed PA form and supporting documentation for CRFA to [facility]. Right fax confirmation 2/15 at 10:30 am.      Lashell HERNDON    [facility]

## 2017-02-16 ENCOUNTER — TRANSFERRED RECORDS (OUTPATIENT)
Dept: HEALTH INFORMATION MANAGEMENT | Facility: CLINIC | Age: 52
End: 2017-02-16

## 2017-02-21 ENCOUNTER — TRANSFERRED RECORDS (OUTPATIENT)
Dept: HEALTH INFORMATION MANAGEMENT | Facility: CLINIC | Age: 52
End: 2017-02-21

## 2017-02-21 NOTE — TELEPHONE ENCOUNTER
PA approved.  Effective date: 2/15/17  PA reference #: [id number]  Pt. notified:   Yes   Pt. informed directly.      Lashell HERNDON    [facility]

## 2017-02-21 NOTE — TELEPHONE ENCOUNTER
Pre-screening Questions for RFA Procedure      Procedure ordered? CRFA    What insurance are we billing for this procedure?      Has Pt had this injection before? NO  Is  Needed?: No  Will Pt have a ?  Yes if pt is given sedation meds, no driving for 24 hours.  Is pt taking a cab or transportation service?   No   If so will need to be accompanied by an adult too (friend/family member) in order for IV sedation to be given.    Per [facility] Policy:  Outpatients are to have responsible adult or family member to accompany them at discharge and drive them home. A service providing medically trained drivers or attendants would be acceptable. Public transportation would not be acceptable unless the patient is accompanied by a responsible adult or family member.    Is Pt aware to be NPO for 6 hours before procedure?  YES   Is patient taking any blood thinners (plavix, coumadin, jantoven, warfarin, heparin, pradaxa or dabigatran )? No   (If so, do not schedule, contact RN and/or MD)     Is patient taking any aspirin products? No   (If more than 325mg/day do not schedule; Contact RN/MD. For all non-cervical interventional procedures if patient is taking MORE than 325mg/day, limit aspirin to 81-325mg/day x 1 week. No hold required day of procedure.  For CERVICAL procedures, hold all aspirin products for 6 days.)      Take normal medications with sips of water, except for blood thinners, especially blood pressure medications.  Yes  Does Pt have an allergy to contrast dye, iodine or shellfish?  No  Does the patient have a bleeding or clotting disorder? No   (If yes, okay to schedule, but contact RN/MD).  **For any patients with platelet count <100, must be forwarded to provider**    Does patient have an active infection or treated for one within the past week? No  (If YES, do NOT schedule and route to RN)     Is patient currently taking any antibiotics?  No  (If YES, do NOT schedule and route to RN)  For  patients on chronic, preventative, or prophylactic antibiotics, procedures can be scheduled.   For patients on antibiotics for active or recent infection:  Linda Colon [place]-antibiotic course must have been completed for 4 days  Marcelino Hawkins-antibiotic course must have been completed for 7 days    Is patient currently taking any steroid medications? (i.e. Prednisone, Medrol)  No   For patients on steroid medications:  Linda Colon [place]-steroid course must have been completed for 4 days  Marcelino Hawkins-steroid course must have been completed for 7 days    Review with patient:  If you are started on any steroids or antibiotics between now and your appointment, you must contact us because it may affect our ability to perform your procedure     Is patient actively being treated for cancer or immunocompromised, including the spleen having been removed? No  **For Dr. Story patients without spleens should have the chart sent to her**  (If YES, do NOT schedule and route to RN)    Are you able to get on and off an exam table with minimal or no assistance? Yes  (If NO, do NOT schedule and route to RN)  Are you able to roll over and lay on your stomach with minimal or no assistance? Yes  (If NO, do NOT schedule and route to RN)        Any chance of Pregnancy?  N/A  Pt needs to arrive 30 minutes before procedure time to have an IV inserted.  reviewed  Do NOT schedule at 0745, 0815 or 1245.  reviewed   All radiofrequency ablations are in a 90 minute time slot except genicular radiofrequency ablation those are 60 minute time slot.  reviewed   Any hx of complications with sedation medications?   NO  Hx of sleep apnea?  NO  Cardiac hx? NO      Pt to expect increased pain for up to 2 weeks after procedure.    Relief from this procedure can take up to 6 weeks.      When you schedule an RFA for [place], e-mail Fernando Caballero the date, time, and who they are seeing. Also Cc Africa Cornell  [place], and the provider that they are scheduled with.     In [place] there are only 6 probes for each area (lumbar and cervical) with a 72 hour autoclave. Make sure that there are at least 3 days between LRFA s and 3 days between CRFA s.

## 2017-03-03 ENCOUNTER — TRANSFERRED RECORDS (OUTPATIENT)
Dept: HEALTH INFORMATION MANAGEMENT | Facility: CLINIC | Age: 52
End: 2017-03-03

## 2017-03-10 ENCOUNTER — TRANSFERRED RECORDS (OUTPATIENT)
Dept: HEALTH INFORMATION MANAGEMENT | Facility: CLINIC | Age: 52
End: 2017-03-10

## 2017-03-17 ENCOUNTER — TRANSFERRED RECORDS (OUTPATIENT)
Dept: HEALTH INFORMATION MANAGEMENT | Facility: CLINIC | Age: 52
End: 2017-03-17

## 2017-03-21 ENCOUNTER — TELEPHONE (OUTPATIENT)
Dept: FAMILY MEDICINE | Facility: CLINIC | Age: 52
End: 2017-03-21

## 2017-03-21 ENCOUNTER — RADIANT APPOINTMENT (OUTPATIENT)
Dept: GENERAL RADIOLOGY | Facility: CLINIC | Age: 52
End: 2017-03-21
Attending: PHYSICAL MEDICINE & REHABILITATION

## 2017-03-21 ENCOUNTER — RADIOLOGY INJECTION OFFICE VISIT (OUTPATIENT)
Dept: PALLIATIVE MEDICINE | Facility: CLINIC | Age: 52
End: 2017-03-21
Payer: OTHER GOVERNMENT

## 2017-03-21 VITALS
HEART RATE: 84 BPM | DIASTOLIC BLOOD PRESSURE: 86 MMHG | SYSTOLIC BLOOD PRESSURE: 138 MMHG | RESPIRATION RATE: 17 BRPM | OXYGEN SATURATION: 98 %

## 2017-03-21 DIAGNOSIS — M47.812 CERVICAL SPONDYLOSIS WITHOUT MYELOPATHY: ICD-10-CM

## 2017-03-21 DIAGNOSIS — M47.812 FACET ARTHROPATHY, CERVICAL: ICD-10-CM

## 2017-03-21 DIAGNOSIS — G89.18 POST PROCEDURE DISCOMFORT: Primary | ICD-10-CM

## 2017-03-21 PROCEDURE — 64634 DESTROY C/TH FACET JNT ADDL: CPT | Mod: RT | Performed by: PHYSICAL MEDICINE & REHABILITATION

## 2017-03-21 PROCEDURE — 64633 DESTROY CERV/THOR FACET JNT: CPT | Mod: RT | Performed by: PHYSICAL MEDICINE & REHABILITATION

## 2017-03-21 RX ORDER — HYDROCODONE BITARTRATE AND ACETAMINOPHEN 5; 325 MG/1; MG/1
1 TABLET ORAL EVERY 4 HOURS PRN
Qty: 28 TABLET | Refills: 0 | Status: SHIPPED | OUTPATIENT
Start: 2017-03-21 | End: 2017-04-20

## 2017-03-21 ASSESSMENT — PAIN SCALES - GENERAL: PAINLEVEL: SEVERE PAIN (6)

## 2017-03-21 NOTE — MR AVS SNAPSHOT
After Visit Summary   3/21/2017    Naif Howell Jr.    MRN: [id number]           Patient Information     Date Of Birth          1965        Visit Information        Provider Department      3/21/2017 9:15 AM Yue Mckeon DO [facility]        Today's Diagnoses     Post procedure discomfort    -  1      Care Instructions    [facility] Procedure Discharge Instructions       Today you saw:   Dr. Yue Mckeon           Your procedure:  Radiofrequency Nerve Ablation     Procedural Medications:  Lidocaine (anesthetic)     Dexamethasone (steroid)       Sedation medications:  Fentanyl  - pain     Versed - relaxation    Take home medication - - Norco, 1 tablet every 4 hours for post-procedure pain. Maximum 4 pills/day    You have received sedation during your procedure; for the next 24 hours you should not:   -Drive   -Operate machinery   -Drink alcohol   -Sign any legal documents     You may resume your normal diet and medications.   Avoid strenuous activity for the first 24 hours and resume regular activities after that.   Be cautious with walking as unsteadiness may occur in the lower extremities up to 6-8 hours   You may shower, however no swimming or tub baths or hot tubs for 24 hours following your procedure   Anticipate pain for up to 2 weeks after this procedure.  You may use ice packs 10-15 minutes three to four times a day at the injection site for comfort   You may use anti-inflammatory medications (such as Ibuprofen or Aleve or Advil) or Tylenol for pain control if necessary  It may take up to 4-6 weeks to receive relief from the radiofrequency ablation.    If you experience any of the following, call the pain center line during work hours at [phone] or on call physician after hours at [phone]:  -Fever over 100 degree F   -Swelling, bleeding, redness, drainage, warmth at the injection site   -Progressive weakness or numbness in your arms   -Unusual  "headache that is not relieved by Tylenol   -Unusual new onset of pain that is not improving    Nurse triage line for general questions: [phone]                Follow-ups after your visit        Who to contact     If you have questions or need follow up information about today's clinic visit or your schedule please contact [facility] directly at [phone].  Normal or non-critical lab and imaging results will be communicated to you by [employer], letter or phone within 4 business days after the clinic has received the results. If you do not hear from us within 7 days, please contact the clinic through [employer] or phone. If you have a critical or abnormal lab result, we will notify you by phone as soon as possible.  Submit refill requests through CSR or call your pharmacy and they will forward the refill request to us. Please allow 3 business days for your refill to be completed.          Additional Information About Your Visit        CSR Information     CSR lets you send messages to your doctor, view your test results, renew your prescriptions, schedule appointments and more. To sign up, go to [email or web address] . Click on \"Log in\" on the left side of the screen, which will take you to the Welcome page. Then click on \"Sign up Now\" on the right side of the page.     You will be asked to enter the access code listed below, as well as some personal information. Please follow the directions to create your username and password.     Your access code is: [email or web address]  Expires: 2017 11:11 AM     Your access code will  in 90 days. If you need help or a new code, please call your [place] clinic or [phone].        Care EveryWhere ID     This is your Care EveryWhere ID. This could be used by other organizations to access your [place] medical records  [id number]        Your Vitals Were     Pulse Respirations Pulse Oximetry             83 17 96%          Blood Pressure from " Last 3 Encounters:   03/21/17 (!) 135/101   02/06/17 (!) 132/95   01/23/17 (!) 152/105    Weight from Last 3 Encounters:   11/23/16 89.8 kg (198 lb)   11/22/16 90.7 kg (200 lb)   11/06/16 90.7 kg (200 lb)              Today, you had the following     No orders found for display         Today's Medication Changes          These changes are accurate as of: 3/21/17 11:11 AM.  If you have any questions, ask your nurse or doctor.               Start taking these medicines.        Dose/Directions    HYDROcodone-acetaminophen 5-325 MG per tablet   Commonly known as:  NORCO   Used for:  Post procedure discomfort   Started by:  Yue Mckeon,         Dose:  1 tablet   Take 1 tablet by mouth every 4 hours as needed for moderate to severe pain maximum 4 tablet(s) per day   Quantity:  28 tablet   Refills:  0            Where to get your medicines      Some of these will need a paper prescription and others can be bought over the counter.  Ask your nurse if you have questions.     Bring a paper prescription for each of these medications     HYDROcodone-acetaminophen 5-325 MG per tablet                Primary Care Provider Office Phone # Fax #    Josefina Lester Rogers [phone]       [facility] [address] [address] [facility] [address]        Thank you!     Thank you for choosing [facility]  for your care. Our goal is always to provide you with excellent care. Hearing back from our patients is one way we can continue to improve our services. Please take a few minutes to complete the written survey that you may receive in the mail after your visit with us. Thank you!             Your Updated Medication List - Protect others around you: Learn how to safely use, store and throw away your medicines at www.disposemymeds.org.          This list is accurate as of: 3/21/17 11:11 AM.  Always use your most recent med list.                   Brand Name Dispense Instructions for use     hydrochlorothiazide 25 MG tablet    HYDRODIURIL    90 tablet    Take 1 tablet (25 mg) by mouth daily       HYDROcodone-acetaminophen 5-325 MG per tablet    NORCO    28 tablet    Take 1 tablet by mouth every 4 hours as needed for moderate to severe pain maximum 4 tablet(s) per day       ibuprofen 600 MG tablet    ADVIL/MOTRIN    30 tablet    Take 1 tablet (600 mg) by mouth every 6 hours as needed for moderate pain       methocarbamol 500 MG tablet    ROBAXIN    30 tablet    Take 2 tablets (1,000 mg) by mouth 3 times daily as needed for muscle spasms       oxyCODONE-acetaminophen 5-325 MG per tablet    PERCOCET    15 tablet    Take 1-2 tablets by mouth every 4 hours as needed for pain

## 2017-03-21 NOTE — PROGRESS NOTES
Pre procedure Diagnosis: facet arthropathy   Post procedure Diagnosis: Same  Procedure performed: right TON, C3, C4 radiofrequency ablation   Anesthesia: moderate sedation with fentanyl 50 mcg and versed 0.25 mg  Complications: none  Operators: Yue Mckeon DO and Johnny Mederos MD    Indications:   Naif Howell Jr. is a 52 year old male was sent by Dr. Mckeon for radiofrequency ablation.  They have a history of right-sided neck pain.  Exam shows increased discomfort with extension and rotation and they have tried conservative treatment including physical therapy.    Naif Howell Jr. had medial branch blocks showing appropriate pain relief, therefore radiofrequency ablation will be done.     Options/alternatives, benefits and risks were discussed with the patient including bleeding, infection, no pain relief, tissue trauma, exposure to radiation, reaction to medications including seizure, spinal cord injury,increased pain after the procedure, weakness, numbness or sensory changes and headache.   We also discussed risks of sedation, including reaction to medications and cardiovascular collapse.    Questions were answered to his satisfaction and he agrees to proceed. Voluntary informed consent was obtained and signed.     Vitals were reviewed: Yes  Allergies were reviewed:  Yes   Medications were reviewed:  Yes   Pre-procedure pain score: 6/10    Procedure:  After getting informed consent, patient was brought into the procedure suite and was placed in a prone position on the procedure table.   A Pause for the Cause was performed.  Patient was prepped and draped in sterile fashion.     Naif Howell Jr. had an IV line placed prior the procedure.  The C-arm was positioned in the oblique view to afford optimal view of the L4-L5 vertebral bodies. Lidocaine 1% was used to anesthetize the skin at each level.  At each level, a 150mm, 20G curved radiofrequency cannula with a 10mm active tip was positioned, overlying the  intersection of the transverse process and pedicle at L4 & L5, and was advanced under intermittent fluoroscopy until it contacted the transverse process and notch, and the tip slightly overran that process, just lateral to the mamillary process.  The position of each cannula was verified and optimized in the oblique view and AP views.    In the AP view, another cannula was placed at the sacral alar notch.      Each position was tested for motor and sensory stimulation, and was positioned so that stimulation was negative for stimuli outside the immediate area of the desired lesion.  Sensory stimulation was completed at 50 Hz, with max stimulation up to 0.8V.  Motor stimulation was completed at 2Hz, up to 2.5V.  Lidocaine 1% 0.5 ml with 5 mg of dexamethasone was injected at each level, and a 90 second, 80 degree Centigrade lesion was generated.    The needles were then rotated within the pathway of the medial branch, and locations were evaluated with repeat imaging.  Motor testing was again completed, and showed appropriate stimulation.  A second lesion was then generated at each location.    The needles were withdrawn. Hemostasis was achieved, the area was cleaned, and bandaids were placed when appropriate.  The patient tolerated the procedure well, and was taken to the recovery room.    Images were saved to PACS.    Start sedation time: 0958  End sedation time: 1050    Post-procedure pain score: 2/10  Follow-up includes:   -f/u phone call in one week  -post-procedure pain medications: norco 5-325 mg po q4h prn x 7 days, max #4/day  -f/u with Dr. Mckeon in 4 weeks    Yue Mckeon DO  [facility]

## 2017-03-21 NOTE — TELEPHONE ENCOUNTER
Panel Management Review      Patient has the following on his problem list:     Hypertension   Last three blood pressure readings:  BP Readings from Last 3 Encounters:   03/21/17 138/86   02/06/17 (!) 132/95   01/23/17 (!) 152/105     Blood pressure: Passed    HTN Guidelines:  Age 18-59 BP range:  Less than 140/90  Age 60-85 with Diabetes:  Less than 140/90  Age 60-85 without Diabetes:  less than 150/90      Composite cancer screening  Chart review shows that this patient is due/due soon for the following None  Summary:    Patient is due/failing the following:   None      Action needed:   None at this time BP under goal    Type of outreach:    none    Questions for provider review:    None                                                                                                                                    Danis Sanders [facility]           Chart routed  .

## 2017-03-21 NOTE — NURSING NOTE
IV Start:    Size: 22 gauge     Location: right hand     Number of Attempts: 2    IV angiocath secured with Tegaderm and tape.    Nany Arenas MSN-RN  Care Coordinator  [facility]

## 2017-03-21 NOTE — PATIENT INSTRUCTIONS
[facility] Procedure Discharge Instructions       Today you saw:   Dr. Yue Mckeon           Your procedure:  Radiofrequency Nerve Ablation     Procedural Medications:  Lidocaine (anesthetic)     Dexamethasone (steroid)       Sedation medications:  Fentanyl  - pain     Versed - relaxation    Take home medication - - Norco, 1 tablet every 4 hours for post-procedure pain. Maximum 4 pills/day    You have received sedation during your procedure; for the next 24 hours you should not:   -Drive   -Operate machinery   -Drink alcohol   -Sign any legal documents     You may resume your normal diet and medications.   Avoid strenuous activity for the first 24 hours and resume regular activities after that.   Be cautious with walking as unsteadiness may occur in the lower extremities up to 6-8 hours   You may shower, however no swimming or tub baths or hot tubs for 24 hours following your procedure   Anticipate pain for up to 2 weeks after this procedure.  You may use ice packs 10-15 minutes three to four times a day at the injection site for comfort   You may use anti-inflammatory medications (such as Ibuprofen or Aleve or Advil) or Tylenol for pain control if necessary  It may take up to 4-6 weeks to receive relief from the radiofrequency ablation.    If you experience any of the following, call the pain center line during work hours at [phone] or on call physician after hours at [phone]:  -Fever over 100 degree F   -Swelling, bleeding, redness, drainage, warmth at the injection site   -Progressive weakness or numbness in your arms   -Unusual headache that is not relieved by Tylenol   -Unusual new onset of pain that is not improving    Nurse triage line for general questions: [phone]

## 2017-03-21 NOTE — NURSING NOTE
MD Time IN: 0955  Time out completed  0956  Sedation start time:  0958  Sedation end time: 1050  MD Time out of room: 1041    Medications given: fentanyl 50 mcg IV; versed .25 mg IV  Intravenous fluids were administered, normal saline 200 cc's.  Sedation Level Achieved:  Minimal sedation    JOHN Gordon, RN-BC  Care Coordinator  [facility] [place]

## 2017-03-21 NOTE — NURSING NOTE
Discharge Information    IV Discontiued Time:  1100    Amount of Fluid Infused:  200ml NaCl    Discharge Criteria = When patient returns to baseline or as per MD order    Consciousness:  Pt is fully awake    Circulation:  BP +/- 20% of pre-procedure level    Respiration:  Patient is able to breathe deeply    O2 Sat:  Patient is able to maintain O2 Sat >92% on room air    Activity:  Moves 4 extremities on command    Ambulation:  Patient is able to stand and walk     Dressing:  Clean/dry or No Dressing    Notes:   Discharge instructions and AVS given to patient    Patient meets criteria for discharge?  YES    Admitted to PCU?  No    Responsible adult present to accompany patient home?  Yes    Signature/Title:    Syl Arenas RN Care Coordinator  [facility] [place]

## 2017-03-21 NOTE — NURSING NOTE
Injection intake:    If this procedure is requiring IV sedation has patient been NPO for 6  Hours? Yes    Is patient on coumadin, plavix or other prescribed blood thinner?   No    If patient is on coumadin was it held for 5 days?   NA    If patient is on plavix was it held for 7 days?    NA     Does patient take aspirin?  No    If this is for a cervical procedure and patient is on aspirin has it been held for 6 days?   NA    Any allergies to contrast dye, iodine, steroid and/or numbing medications?  NO    Is patient currently taking antibiotics or have an active infection?  NO    Does patient have a ? Yes   Ubur    Is patient pregnant or breastfeeding?  Not Applicable    Are the vital signs normal?  Yes      Syl Conde [facility] [place]

## 2017-03-24 ENCOUNTER — TRANSFERRED RECORDS (OUTPATIENT)
Dept: HEALTH INFORMATION MANAGEMENT | Facility: CLINIC | Age: 52
End: 2017-03-24

## 2017-03-28 ENCOUNTER — HOSPITAL ENCOUNTER (OUTPATIENT)
Facility: CLINIC | Age: 52
Setting detail: OBSERVATION
Discharge: NURSING, REHAB OR LONG-TERM CARE | End: 2017-03-29
Attending: EMERGENCY MEDICINE | Admitting: INTERNAL MEDICINE
Payer: OTHER GOVERNMENT

## 2017-03-28 ENCOUNTER — TELEPHONE (OUTPATIENT)
Dept: PALLIATIVE MEDICINE | Facility: CLINIC | Age: 52
End: 2017-03-28

## 2017-03-28 DIAGNOSIS — R10.84 ABDOMINAL PAIN, GENERALIZED: ICD-10-CM

## 2017-03-28 DIAGNOSIS — R42 VERTIGO: ICD-10-CM

## 2017-03-28 DIAGNOSIS — G43.811 OTHER MIGRAINE WITH STATUS MIGRAINOSUS, INTRACTABLE: ICD-10-CM

## 2017-03-28 LAB
ALBUMIN SERPL-MCNC: 3.5 G/DL (ref 3.4–5)
ALP SERPL-CCNC: 114 U/L (ref 40–150)
ALT SERPL W P-5'-P-CCNC: 21 U/L (ref 0–70)
ANION GAP SERPL CALCULATED.3IONS-SCNC: 11 MMOL/L (ref 3–14)
AST SERPL W P-5'-P-CCNC: 14 U/L (ref 0–45)
BASOPHILS # BLD AUTO: 0.1 10E9/L (ref 0–0.2)
BASOPHILS NFR BLD AUTO: 0.8 %
BILIRUB SERPL-MCNC: 0.4 MG/DL (ref 0.2–1.3)
BUN SERPL-MCNC: 16 MG/DL (ref 7–30)
CALCIUM SERPL-MCNC: 8.8 MG/DL (ref 8.5–10.1)
CHLORIDE SERPL-SCNC: 103 MMOL/L (ref 94–109)
CO2 SERPL-SCNC: 27 MMOL/L (ref 20–32)
CREAT SERPL-MCNC: 0.94 MG/DL (ref 0.66–1.25)
DIFFERENTIAL METHOD BLD: ABNORMAL
EOSINOPHIL # BLD AUTO: 0.3 10E9/L (ref 0–0.7)
EOSINOPHIL NFR BLD AUTO: 3.2 %
ERYTHROCYTE [DISTWIDTH] IN BLOOD BY AUTOMATED COUNT: 12.5 % (ref 10–15)
GFR SERPL CREATININE-BSD FRML MDRD: 83 ML/MIN/1.7M2
GLUCOSE SERPL-MCNC: 137 MG/DL (ref 70–99)
HCT VFR BLD AUTO: 50.8 % (ref 40–53)
HGB BLD-MCNC: 17.4 G/DL (ref 13.3–17.7)
IMM GRANULOCYTES # BLD: 0 10E9/L (ref 0–0.4)
IMM GRANULOCYTES NFR BLD: 0.4 %
LYMPHOCYTES # BLD AUTO: 3 10E9/L (ref 0.8–5.3)
LYMPHOCYTES NFR BLD AUTO: 33 %
MCH RBC QN AUTO: 28.5 PG (ref 26.5–33)
MCHC RBC AUTO-ENTMCNC: 34.3 G/DL (ref 31.5–36.5)
MCV RBC AUTO: 83 FL (ref 78–100)
MONOCYTES # BLD AUTO: 0.5 10E9/L (ref 0–1.3)
MONOCYTES NFR BLD AUTO: 5.7 %
NEUTROPHILS # BLD AUTO: 5.2 10E9/L (ref 1.6–8.3)
NEUTROPHILS NFR BLD AUTO: 56.9 %
NRBC # BLD AUTO: 0 10*3/UL
NRBC BLD AUTO-RTO: 0 /100
PLATELET # BLD AUTO: 264 10E9/L (ref 150–450)
POTASSIUM SERPL-SCNC: 3.3 MMOL/L (ref 3.4–5.3)
PROT SERPL-MCNC: 6.7 G/DL (ref 6.8–8.8)
RBC # BLD AUTO: 6.11 10E12/L (ref 4.4–5.9)
SODIUM SERPL-SCNC: 141 MMOL/L (ref 133–144)
TROPONIN I SERPL-MCNC: NORMAL UG/L (ref 0–0.04)
WBC # BLD AUTO: 9.1 10E9/L (ref 4–11)

## 2017-03-28 PROCEDURE — 84484 ASSAY OF TROPONIN QUANT: CPT | Performed by: EMERGENCY MEDICINE

## 2017-03-28 PROCEDURE — 93005 ELECTROCARDIOGRAM TRACING: CPT

## 2017-03-28 PROCEDURE — 25000128 H RX IP 250 OP 636: Performed by: EMERGENCY MEDICINE

## 2017-03-28 PROCEDURE — 96375 TX/PRO/DX INJ NEW DRUG ADDON: CPT | Mod: 59

## 2017-03-28 PROCEDURE — 99285 EMERGENCY DEPT VISIT HI MDM: CPT | Mod: 25

## 2017-03-28 PROCEDURE — 85025 COMPLETE CBC W/AUTO DIFF WBC: CPT | Performed by: EMERGENCY MEDICINE

## 2017-03-28 PROCEDURE — 25000125 ZZHC RX 250: Performed by: EMERGENCY MEDICINE

## 2017-03-28 PROCEDURE — 80053 COMPREHEN METABOLIC PANEL: CPT | Performed by: EMERGENCY MEDICINE

## 2017-03-28 PROCEDURE — 96361 HYDRATE IV INFUSION ADD-ON: CPT

## 2017-03-28 PROCEDURE — 25900017 H RX MED GY IP 259 OP 259 PS 637: Performed by: EMERGENCY MEDICINE

## 2017-03-28 PROCEDURE — 96374 THER/PROPH/DIAG INJ IV PUSH: CPT

## 2017-03-28 PROCEDURE — 25000132 ZZH RX MED GY IP 250 OP 250 PS 637: Performed by: EMERGENCY MEDICINE

## 2017-03-28 RX ORDER — DEXAMETHASONE SODIUM PHOSPHATE 10 MG/ML
20 INJECTION, SOLUTION INTRAMUSCULAR; INTRAVENOUS ONCE
Status: COMPLETED | OUTPATIENT
Start: 2017-03-28 | End: 2017-03-28

## 2017-03-28 RX ORDER — DIPHENHYDRAMINE HYDROCHLORIDE 50 MG/ML
50 INJECTION INTRAMUSCULAR; INTRAVENOUS ONCE
Status: COMPLETED | OUTPATIENT
Start: 2017-03-28 | End: 2017-03-28

## 2017-03-28 RX ORDER — MECLIZINE HYDROCHLORIDE 25 MG/1
25 TABLET ORAL EVERY 6 HOURS PRN
Status: DISCONTINUED | OUTPATIENT
Start: 2017-03-28 | End: 2017-03-29 | Stop reason: HOSPADM

## 2017-03-28 RX ORDER — DIAZEPAM 5 MG
5 TABLET ORAL ONCE
Status: COMPLETED | OUTPATIENT
Start: 2017-03-28 | End: 2017-03-28

## 2017-03-28 RX ADMIN — SODIUM CHLORIDE 1000 ML: 9 INJECTION, SOLUTION INTRAVENOUS at 23:46

## 2017-03-28 RX ADMIN — PROCHLORPERAZINE EDISYLATE 10 MG: 5 INJECTION INTRAMUSCULAR; INTRAVENOUS at 23:37

## 2017-03-28 RX ADMIN — DEXAMETHASONE SODIUM PHOSPHATE 20 MG: 10 INJECTION, SOLUTION INTRAMUSCULAR; INTRAVENOUS at 23:37

## 2017-03-28 RX ADMIN — MECLIZINE HYDROCHLORIDE 25 MG: 25 TABLET ORAL at 23:37

## 2017-03-28 RX ADMIN — DIPHENHYDRAMINE HYDROCHLORIDE 50 MG: 50 INJECTION, SOLUTION INTRAMUSCULAR; INTRAVENOUS at 23:38

## 2017-03-28 RX ADMIN — DIAZEPAM 5 MG: 5 TABLET ORAL at 23:37

## 2017-03-28 NOTE — IP AVS SNAPSHOT
[facility] Observation Department    [address]    [facility] [address]    Phone:  [phone]                                       After Visit Summary   3/28/2017    Naif Howell Jr.    MRN: [id number]           After Visit Summary Signature Page     I have received my discharge instructions, and my questions have been answered. I have discussed any challenges I see with this plan with the nurse or doctor.    ..........................................................................................................................................  Patient/Patient Representative Signature      ..........................................................................................................................................  Patient Representative Print Name and Relationship to Patient    ..................................................               ................................................  Date                                            Time    ..........................................................................................................................................  Reviewed by Signature/Title    ...................................................              ..............................................  Date                                                            Time

## 2017-03-28 NOTE — IP AVS SNAPSHOT
MRN:[id number]                      After Visit Summary   3/28/2017    Naif Howell Jr.    MRN: [id number]           Thank you!     Thank you for choosing [facility] for your care. Our goal is always to provide you with excellent care. Hearing back from our patients is one way we can continue to improve our services. Please take a few minutes to complete the written survey that you may receive in the mail after you visit. If you would like to speak to someone directly about your visit please contact Patient Relations at [phone]. Thank you!          Patient Information     Date Of Birth          1965        About your hospital stay     You were admitted on:  March 29, 2017 You last received care in the:  [facility] Observation Department    You were discharged on:  March 29, 2017        Reason for your hospital stay       You were admitted due to concerns of migraine with associated vertigo. You received multimodal pain management in the ED and your symptoms have resolved. You were able to ambulate without recurrence of your symptoms. You are safe to discharge home.                  Who to Call     For medical emergencies, please call 911.  For non-urgent questions about your medical care, please call your primary care provider or clinic, [phone]          Attending Provider     Provider Specialty    Lei Novoa MD Emergency Medicine    Bekah Suggs MD Internal Medicine       Primary Care Provider Office Phone # Fax #    Josefina Lester Rogers [phone]       [facility] [address] [address] [facility] [address]        After Care Instructions     Activity       Your activity upon discharge: activity as tolerated            Diet       Follow this diet upon discharge: Orders Placed This Encounter      Regular Diet Adult            Discharge Instructions       1. Continue to take Meclizine as prescribed previously for  "prophylaxis of vertigo and discuss symptom treatment at follow up with [facility]  2. Complete scheduled appointments with outpatient vestibular PT.                  Follow-up Appointments     Follow-up and recommended labs and tests        Follow up with primary care provider, Josefina Rogers, within 7 days for hospital follow- up.  No follow up labs or test are needed.  Follow up with the [facility] about continuing treatment of vertigo.                  Your next 10 appointments already scheduled     May 17, 2017  8:30 AM CDT   Return Visit with Yue Mckeon DO   [facility] ([facility])    [address]  [address]  [facility] [address]   [phone]                         Pending Results     No orders found for last 3 day(s).            Statement of Approval     Ordered          03/29/17 1106  I have reviewed and agree with all the recommendations and orders detailed in this document.  EFFECTIVE NOW     Approved and electronically signed by:  Elda Adrian PA-C             Admission Information     Date & Time Department Dept. Phone    3/28/2017 [facility] Observation Department [phone]      Your Vitals Were     Blood Pressure Pulse Temperature Respirations Height Weight    121/78 (BP Location: Right arm) 93 95.6  F (35.3  C) (Oral) 16 1.88 m (6' 2\") 90.7 kg (200 lb)    Pulse Oximetry BMI (Body Mass Index)                94% 25.68 kg/m2          MyChart Information     [employer] lets you send messages to your doctor, view your test results, renew your prescriptions, schedule appointments and more. To sign up, go to [email or web address] . Click on \"Log in\" on the left side of the screen, which will take you to the Welcome page. Then click on \"Sign up Now\" on the right side of the page.     You will be asked to enter the access code listed below, as well as some personal " information. Please follow the directions to create your username and password.     Your access code is: [email or web address]  Expires: 2017 11:11 AM     Your access code will  in 90 days. If you need help or a new code, please call your [place] clinic or [phone].        Care EveryWhere ID     This is your Care EveryWhere ID. This could be used by other organizations to access your [place] medical records  [id number]           Review of your medicines      CONTINUE these medicines which have NOT CHANGED        Dose / Directions    hydrochlorothiazide 25 MG tablet   Commonly known as:  HYDRODIURIL   Used for:  Benign essential hypertension        Dose:  25 mg   Take 1 tablet (25 mg) by mouth daily   Quantity:  90 tablet   Refills:  3       HYDROcodone-acetaminophen 5-325 MG per tablet   Commonly known as:  NORCO   Used for:  Post procedure discomfort        Dose:  1 tablet   Take 1 tablet by mouth every 4 hours as needed for moderate to severe pain maximum 4 tablet(s) per day   Quantity:  28 tablet   Refills:  0       ibuprofen 600 MG tablet   Commonly known as:  ADVIL/MOTRIN        Dose:  600 mg   Take 1 tablet (600 mg) by mouth every 6 hours as needed for moderate pain   Quantity:  120 tablet   Refills:  0       methocarbamol 500 MG tablet   Commonly known as:  ROBAXIN        Dose:  1000 mg   Take 2 tablets (1,000 mg) by mouth 3 times daily as needed for muscle spasms   Quantity:  240 tablet   Refills:  0       multivitamin, therapeutic with minerals Tabs tablet        Dose:  1 tablet   Take 1 tablet by mouth daily   Refills:  0                Protect others around you: Learn how to safely use, store and throw away your medicines at www.disposemymeds.org.             Medication List: This is a list of all your medications and when to take them. Check marks below indicate your daily home schedule. Keep this list as a reference.      Medications           Morning Afternoon Evening Bedtime As Needed     hydrochlorothiazide 25 MG tablet   Commonly known as:  HYDRODIURIL   Take 1 tablet (25 mg) by mouth daily                                HYDROcodone-acetaminophen 5-325 MG per tablet   Commonly known as:  NORCO   Take 1 tablet by mouth every 4 hours as needed for moderate to severe pain maximum 4 tablet(s) per day                                ibuprofen 600 MG tablet   Commonly known as:  ADVIL/MOTRIN   Take 1 tablet (600 mg) by mouth every 6 hours as needed for moderate pain                                methocarbamol 500 MG tablet   Commonly known as:  ROBAXIN   Take 2 tablets (1,000 mg) by mouth 3 times daily as needed for muscle spasms                                multivitamin, therapeutic with minerals Tabs tablet   Take 1 tablet by mouth daily

## 2017-03-29 ENCOUNTER — APPOINTMENT (OUTPATIENT)
Dept: MRI IMAGING | Facility: CLINIC | Age: 52
End: 2017-03-29
Attending: EMERGENCY MEDICINE
Payer: OTHER GOVERNMENT

## 2017-03-29 ENCOUNTER — APPOINTMENT (OUTPATIENT)
Dept: GENERAL RADIOLOGY | Facility: CLINIC | Age: 52
End: 2017-03-29
Attending: EMERGENCY MEDICINE
Payer: OTHER GOVERNMENT

## 2017-03-29 VITALS
OXYGEN SATURATION: 95 % | HEART RATE: 93 BPM | SYSTOLIC BLOOD PRESSURE: 145 MMHG | BODY MASS INDEX: 25.67 KG/M2 | RESPIRATION RATE: 16 BRPM | HEIGHT: 74 IN | DIASTOLIC BLOOD PRESSURE: 88 MMHG | TEMPERATURE: 96 F | WEIGHT: 200 LBS

## 2017-03-29 PROBLEM — R42 VERTIGO: Status: RESOLVED | Noted: 2017-03-29 | Resolved: 2017-03-29

## 2017-03-29 PROBLEM — R42 VERTIGO: Status: ACTIVE | Noted: 2017-03-29

## 2017-03-29 LAB — INTERPRETATION ECG - MUSE: NORMAL

## 2017-03-29 PROCEDURE — G0378 HOSPITAL OBSERVATION PER HR: HCPCS

## 2017-03-29 PROCEDURE — 70553 MRI BRAIN STEM W/O & W/DYE: CPT

## 2017-03-29 PROCEDURE — 71020 XR CHEST 2 VW: CPT

## 2017-03-29 PROCEDURE — 70549 MR ANGIOGRAPH NECK W/O&W/DYE: CPT

## 2017-03-29 PROCEDURE — 70544 MR ANGIOGRAPHY HEAD W/O DYE: CPT | Mod: XS

## 2017-03-29 PROCEDURE — 25500064 ZZH RX 255 OP 636: Performed by: RADIOLOGY

## 2017-03-29 PROCEDURE — 99235 HOSP IP/OBS SAME DATE MOD 70: CPT

## 2017-03-29 PROCEDURE — A9585 GADOBUTROL INJECTION: HCPCS | Performed by: RADIOLOGY

## 2017-03-29 RX ORDER — ACETAMINOPHEN 325 MG/1
650 TABLET ORAL EVERY 4 HOURS PRN
Status: DISCONTINUED | OUTPATIENT
Start: 2017-03-29 | End: 2017-03-29 | Stop reason: HOSPADM

## 2017-03-29 RX ORDER — HYDROCODONE BITARTRATE AND ACETAMINOPHEN 5; 325 MG/1; MG/1
1 TABLET ORAL EVERY 4 HOURS PRN
Status: DISCONTINUED | OUTPATIENT
Start: 2017-03-29 | End: 2017-03-29 | Stop reason: HOSPADM

## 2017-03-29 RX ORDER — IBUPROFEN 600 MG/1
600 TABLET, FILM COATED ORAL EVERY 6 HOURS PRN
Status: DISCONTINUED | OUTPATIENT
Start: 2017-03-29 | End: 2017-03-29 | Stop reason: HOSPADM

## 2017-03-29 RX ORDER — GADOBUTROL 604.72 MG/ML
10 INJECTION INTRAVENOUS ONCE
Status: COMPLETED | OUTPATIENT
Start: 2017-03-29 | End: 2017-03-29

## 2017-03-29 RX ORDER — NALOXONE HYDROCHLORIDE 0.4 MG/ML
.1-.4 INJECTION, SOLUTION INTRAMUSCULAR; INTRAVENOUS; SUBCUTANEOUS
Status: DISCONTINUED | OUTPATIENT
Start: 2017-03-29 | End: 2017-03-29 | Stop reason: HOSPADM

## 2017-03-29 RX ORDER — METHOCARBAMOL 500 MG/1
1000 TABLET, FILM COATED ORAL 3 TIMES DAILY PRN
Qty: 240 TABLET | Refills: 0 | COMMUNITY
Start: 2017-03-29 | End: 2017-04-20

## 2017-03-29 RX ORDER — METHOCARBAMOL 500 MG/1
1000 TABLET, FILM COATED ORAL 3 TIMES DAILY PRN
Status: DISCONTINUED | OUTPATIENT
Start: 2017-03-29 | End: 2017-03-29 | Stop reason: HOSPADM

## 2017-03-29 RX ORDER — ONDANSETRON 4 MG/1
4 TABLET, ORALLY DISINTEGRATING ORAL EVERY 6 HOURS PRN
Status: DISCONTINUED | OUTPATIENT
Start: 2017-03-29 | End: 2017-03-29 | Stop reason: HOSPADM

## 2017-03-29 RX ORDER — IBUPROFEN 600 MG/1
600 TABLET, FILM COATED ORAL EVERY 6 HOURS PRN
Qty: 120 TABLET | Refills: 0 | COMMUNITY
Start: 2017-03-29 | End: 2017-10-03

## 2017-03-29 RX ORDER — ONDANSETRON 2 MG/ML
4 INJECTION INTRAMUSCULAR; INTRAVENOUS EVERY 6 HOURS PRN
Status: DISCONTINUED | OUTPATIENT
Start: 2017-03-29 | End: 2017-03-29 | Stop reason: HOSPADM

## 2017-03-29 RX ORDER — MULTIPLE VITAMINS W/ MINERALS TAB 9MG-400MCG
1 TAB ORAL DAILY
COMMUNITY
End: 2019-10-08

## 2017-03-29 RX ADMIN — GADOBUTROL 10 ML: 604.72 INJECTION INTRAVENOUS at 01:32

## 2017-03-29 ASSESSMENT — ENCOUNTER SYMPTOMS
NECK PAIN: 1
LIGHT-HEADEDNESS: 1
DIZZINESS: 1
PHOTOPHOBIA: 1
HEADACHES: 1

## 2017-03-29 NOTE — H&P
[facility]  Hospitalist Admission Note  Name: Naif Howell Jr.    MRN: [id number]  YOB: 1965    Age: 52 year old  Date of admission: 3/28/2017  Primary care provider: Josefina Rogers    Chief Complaint:  Vertigo, HA    Assessment and Plan:     Naif Howell Jr. is a 52 year old male with PMH including vestibular migraines, HTN and GERD who was admitted 03/29/17 with vertiginous symptoms and HA which was slightly different than his typical migraines.    1. Vertiginous Migraine: Developed HA and vertigo after work yesterday.  His typical HA are located in the back of the head and neck, today it was frontal and temporal.  He did have MRI brain/angiography (results pending at this time) due to change in his typical migraine symptoms.  Headache has almost resolved since treatment in the ER but he has persistent vertigo and was unable to discharge home.  He does already have PT set up to continue to work on vertigo through [facility].  - Meclizine PRN  - Continue outpatient pain regimen: PRN Norco, Robaxin, Ibuprofen  - Follow up MRI results    2. HTN: Hold HCTZ for now as this may be adding to his vertigo.    DVT Prophylaxis: Low Risk/Ambulatory with no VTE prophylaxis indicated  Code Status: Full Code  Discharge Dispo: Admit to observation status  Estimated Disch Date / # of Days until Disch: expect less than 24 hour stay      History of Present Illness:  Naif Howell Jr. is a 52 year old male with PMH including vestibular migraines, HTN and GERD who was admitted 03/29/17 with vertiginous symptoms and HA which was slightly different than his typical migraines.  History was obtained through patient interview, chart review and discussion with Dr. Novoa in the ER.    The patient states that he got off work around 6 PM last night and started to have a little dizziness and HA.  The symptoms progressed through the evening but were different than his typical migraines.   Normally he gets pain up the back of his neck and head but today the pain was located in the frontal and temporal regions.  He also had some central abdominal pain.  He denies fevers, chills, CP, SOB, nausea, emesis, diarrhea, constipation.  Has not had vision changes.      Since receiving medications in the ER (Benadryl, compazine, decadron, valium, IVF) his headache has nearly resolved but he still had significant balance issues when he tried to ambulate.  At rest he denies any dizziness or lightheadedness.  He was unable to safely discharge due to his balance.  He has been following with the [facility] and working with PT for his vertiginous migraines.     Past Medical History:  Past Medical History:   Diagnosis Date     anxiety     resolved     Aortic regurgitation      Basal cell carcinoma     left forehead     GERD (gastroesophageal reflux disease)      Hypertension      PTSD (post-traumatic stress disorder)      Vestibular migraine      Past Surgical History:  Past Surgical History:   Procedure Laterality Date     Resection of basal cell carcinoma      Left forehead     Social History:  Social History   Substance Use Topics     Smoking status: Current Every Day Smoker     Packs/day: 1.00     Types: Cigarettes     Smokeless tobacco: Former User      Comment: quit 2013     Alcohol use No     Social History     Social History Narrative     Family History:  Family History   Problem Relation Age of Onset     CANCER Father      chest tumor-not sure about details,  at age of 71     Hypertension Mother      alive     Family History Negative Sister      2     Family History Negative Brother      1     Family History Negative Maternal Grandmother      Family History Negative Maternal Grandfather      Family History Negative Paternal Grandmother      Family History Negative Paternal Grandfather      Cancer - colorectal No family hx of      Prostate Cancer No family hx of       Allergies:  Allergies   Allergen Reactions     Omnicef [Cefdinir] Diarrhea     Cefuroxime Rash     Medications:  Prescriptions Prior to Admission   Medication Sig Dispense Refill Last Dose     HYDROcodone-acetaminophen (NORCO) 5-325 MG per tablet Take 1 tablet by mouth every 4 hours as needed for moderate to severe pain maximum 4 tablet(s) per day 28 tablet 0      methocarbamol (ROBAXIN) 500 MG tablet Take 2 tablets (1,000 mg) by mouth 3 times daily as needed for muscle spasms 30 tablet 1 Not Taking     oxyCODONE-acetaminophen (PERCOCET) 5-325 MG per tablet Take 1-2 tablets by mouth every 4 hours as needed for pain 15 tablet 0 Taking     ibuprofen (ADVIL,MOTRIN) 600 MG tablet Take 1 tablet (600 mg) by mouth every 6 hours as needed for moderate pain 30 tablet 1 Taking     hydrochlorothiazide (HYDRODIURIL) 25 MG tablet Take 1 tablet (25 mg) by mouth daily 90 tablet 3 Taking     Review of Systems:  A Comprehensive greater than 10 system review of systems was carried out.  Pertinent positives and negatives are noted above.  Otherwise negative for contributory information.     Physical Exam:  Blood pressure 140/81, pulse 95, temperature 97.4  F (36.3  C), temperature source Temporal, resp. rate 18, weight 90.7 kg (200 lb), SpO2 96 %.  Wt Readings from Last 1 Encounters:   03/28/17 90.7 kg (200 lb)     Exam:   General: Alert, awake, no acute distress.  HEENT: NC/AT, eyes anicteric and without injection, EOMI, face symmetric.  Dentition WNL, MMM.  Cardiac: RRR, normal S1, S2.  No murmurs/g/r.  No LE edema  Pulmonary: Normal chest rise, normal work of breathing.  Lungs CTAB  Abdomen: soft, non-tender, non-distended.  Normoactive BS.  No guarding or rebound tenderness.  Extremities: no deformities.  Warm, well perfused.  Skin: no rashes or lesions noted.  Warm and Dry.  Neuro: No focal deficits noted.  Speech clear.  Coordination and strength grossly normal.  Psych: Appropriate affect. Alert and oriented x3    Data  Reviewed Today:  Imaging:  Results for orders placed or performed during the hospital encounter of 03/28/17   Chest XR,  PA & LAT    Narrative    CHEST 2 VIEWS  3/29/2017 1:44 AM     HISTORY: Chest pain.    COMPARISON: 6/16/2016.    FINDINGS: A small band-like opacity in the lateral aspect of the left  lung base. The lungs are otherwise clear. Normal-sized cardiac  silhouette. Tortuous or ectatic thoracic aorta.      Impression    IMPRESSION:   1. A small band-like opacity in the left lung base could represent  atelectasis or less likely pneumonia.  2. No other findings suspicious for active cardiopulmonary disease.    DIPTI GARCIA MD     Labs:    Recent Labs  Lab 03/28/17  2147   WBC 9.1   HGB 17.4   HCT 50.8   MCV 83          Recent Labs  Lab 03/28/17  2147      POTASSIUM 3.3*   CHLORIDE 103   CO2 27   ANIONGAP 11   *   BUN 16   CR 0.94   GFRESTIMATED 83   GFRESTBLACK >90African American GFR Calc   ADELSO 8.8   PROTTOTAL 6.7*   ALBUMIN 3.5   BILITOTAL 0.4   ALKPHOS 114   AST 14   ALT 21       Bekah Suggs MD  Hospitalist  [facility]

## 2017-03-29 NOTE — ED PROVIDER NOTES
"  History     Chief Complaint:  Dizziness    HPI   Naif Howell Jr. is a 52 year old male with a history of vestibular migraines and anxiety who presents to the emergency department today for evaluation of dizziness and a headache. The patient reports that since 1800 tonight he has been experiencing a headache consistent with his prior \"vestibular migraines\" and is having symptoms of dizziness and characterizes his headache as bilateral which is different from his usually occipitally localized migraines. Furthermore, he states that he is quite photophobic at this time and is also experiencing bilateral chest wall tenderness under his anterior lower ribs. He denies any abdominal pain, nausea, vomiting, syncope, vision changes, shortness of breath, or other symptoms at this time. He does admit to cigarette use, but no heavy alcohol use or elicit drug use. Of note, he did have a cervical ablation in his neck last week and notes some discomfort in his neck, but nothing escalating or worsening. No other concerns reported at this time.     Allergies:  Omnicef - Diarrhea  Cefuroxime - Rash    Medications:    Norco  Robaxin  Percocet  Hydrodiuril    Past Medical History:    Anxiety  Basal cell carcinoma  GERD  Hypertension  PTSD  Vestibular migraine    Past Surgical History:    Resection of basal cell carcinoma, left forehead  Cervical ablation    Family History:    History reviewed. No pertinent family history.     Social History:  The patient was unaccompanied to the ED,  Smoking Status: Current Every Day Smoker  Smokeless Tobacco: Former User  Alcohol Use: Negative  Marital Status:  Legally  [3]    Review of Systems   Eyes: Positive for photophobia.   Musculoskeletal: Positive for neck pain.   Neurological: Positive for dizziness, light-headedness and headaches. Negative for syncope.   All other systems reviewed and are negative.    Physical Exam   Vitals:  Patient Vitals for the past 24 hrs:   BP Temp Temp src " Pulse Heart Rate Resp SpO2 Weight   03/29/17 0300 134/89 - - - - - 96 % -   03/29/17 0245 144/87 - - - - - 95 % -   03/29/17 0230 140/81 - - - - - 96 % -   03/29/17 0215 130/87 - - - - - 96 % -   03/29/17 0202 - - - - - - 96 % -   03/29/17 0201 (!) 137/91 - - - - - - -   03/29/17 0030 121/81 - - - 74 - 96 % -   03/29/17 0015 130/82 - - - - - 96 % -   03/29/17 0000 (!) 144/94 - - - - - 96 % -   03/28/17 2330 140/85 - - - - - 96 % -   03/28/17 2315 137/86 - - - - - - -   03/28/17 2300 (!) 142/107 - - - - - 97 % -   03/28/17 2245 153/88 - - - - - 95 % -   03/28/17 2230 152/87 - - - - - 94 % -   03/28/17 2215 (!) 152/94 - - - - - - -   03/28/17 2201 - - - - - - 95 % -   03/28/17 2200 (!) 151/91 - - - - - - -   03/28/17 2134 (!) 160/102 97.4  F (36.3  C) Temporal 95 - 18 97 % -   03/28/17 2133 - - - - - - - 90.7 kg (200 lb)     Physical Exam  Constitutional:  Oriented to person, place, and time. Appears in mild distress.  HENT:    TMs clear bilaterally.  Head:    Normocephalic.   Mouth/Throat:   Oropharynx is clear and moist.   Eyes:    Pupils are equal, round, and reactive to light.      Vertical and horizontal nystagmus is noted with tracking.  Neck:    Neck supple.   Cardiovascular:  Normal rate, regular rhythm and normal heart sounds.      Exam reveals no gallop and no friction rub.       No murmur heard.  Pulmonary/Chest:  Effort normal and breath sounds normal.      No respiratory distress. No wheezes. No rales.      No reproducible chest wall pain.  Abdominal:   Soft. No distension. No tenderness. No rebound and no guarding.   Musculoskeletal:  Normal range of motion.   Neurological:   Alert and oriented to person, place, and time.           Moves all 4 extremities spontaneously       Cranial nerves 2-12 grossly normal. No meningeal signs. No ataxia. 5/5 strength and sensation intact to light touch in all 4 extremities.   Skin:    No rash noted. No pallor.     Emergency Department Course     ECG:  ECG taken at  2149, ECG read at 2149  Normal sinus rhythm  Cannot rule out inferior infarct, age undetermined  Abnormal ECG  Rate 86 bpm. RI interval 174. QRS duration 106. QT/QTc 370/442. P-R-T axes 54 -4 5.    Imaging:  Radiology findings were communicated with the patient who voiced understanding of the findings.    Head MRA w/o contrast:  Unremarkable head MRA  Reading per radiology    Neck MRA w/o & with contrast:  Unremarkable neck MRA  Reading per radiology    Brain MRI w/o & with contrast:  1. No recent infarct, intracranial mass, abnormal enhancement, or evidence of intracranial hemorrhage.  2. The intracranial contents are unremarkable for age.  3. Trace fluid or inflammation in the mastoid air cells.  Reading per radiology    Chest x-ray:   1. A small band-like opacity in the left lung base could represent  atelectasis or less likely pneumonia.  2. No other findings suspicious for active cardiopulmonary disease.  Reading per radiology    Laboratory:  Laboratory findings were communicated with the patient who voiced understanding of the findings.    CBC: WNL. (WBC 9.1, HGB 17.4, )   CMP: Potassium: 3.3 (L), Glucose: 137 (H) (Creatinine 0.94)  Troponin (Collected 2147): <0.015    Interventions:  2337 Compazine 10 mg IV  2337 Valium 5 mg PO  2337 Decadron 20 mg IV  2337 Antivert 25 mg PO  2338 Benadryl 50 mg IV   2346 ns 1000 mL IV    Emergency Department Course:  Nursing notes and vitals reviewed.  I performed an exam of the patient as documented above.   IV was inserted and blood was drawn for laboratory testing, results above.  The patient was sent for a head MRA w/o contrast, neck MRA w/o & with contrast, brain MRI w/o & with contrast, and a chest x-ray while in the emergency department, results above.   At 0240 the patient was rechecked and was updated on the results of his laboratory and imaging studies. The patient reports that he feels improved at rest, but patient is very dizzy with trial of ambulation and  "unsteady on his feet.   0247: I spoke with Dr. Suggs of the Hospitalist service regarding patient's presentation, findings, and plan of care.  I discussed the treatment plan with the patient. They expressed understanding of this plan and consented to admission. I discussed the patient with Dr. Suggs, who will admit the patient to a monitored bed for further evaluation and treatment.  I discussed the treatment plan with the patient. He expressed understanding of this plan and consented to placement in the observation unit. I discussed the patient with Dr. Suggs, who will admit the patient to a monitored bed for further evaluation and treatment.  I personally reviewed the laboratory and imaging results with the patient and answered all related questions prior to placement in the observation unit.    Impression & Plan      Medical Decision Making:  Naif Howell Jr. is a 52 year old male who presents to the emergency department today for evaluation of dizziness and a \"different kind of headache.\" Differential includes vertebral dissection, vertebral basilar insufficiency, mass, peripheral vertigo, or other causes. With his severity and different type of symptoms I did end up getting an MRI which shows minimal fluid in the mastoid cells bilaterally. He has no ear pain, no fever, to suggest acute mastoiditis. Otherwise, MRA of the head and neck are negative. With MRI ruling out stroke/dissection I do believe that this is likely migrainous. He was also complaining of pain just below his ribs, but with a completely benign abdomen I highly doubt surgical process. EKG, troponin, and chest x-ray are otherwise non-specific. I would doubt ACS equivalent and/or pulmonary embolism. He is PERC negative and this pain is resolved after interventions; I suspect that this is also a component of his migraine. Patient is feeling improved at rest, but with ambulation he is grossly symptomatic and has difficulty ambulating. I do not feel that he " is safe for discharge and will be admitted for observation.     Diagnosis:    ICD-10-CM    1. Vertigo R42    2. Other migraine with status migrainosus, intractable G43.811    3. Abdominal pain, generalized R10.84        Scribe Disclosure:  I, Ricky Ambriz, am serving as a scribe at 11:05 PM on 3/28/2017 to document services personally performed by Lei Novoa MD, based on my observations and the provider's statements to me.\    3/28/2017   [facility] EMERGENCY DEPARTMENT       Lei Novoa MD  03/29/17 0417

## 2017-03-29 NOTE — ED NOTES
Pt ambulated with standby assist x1. Pt appears very unsteady and off balance and he states he feels very dizzy and unable to drive or care for himself at home in this condition. MD notified

## 2017-03-29 NOTE — PLAN OF CARE
Problem: Discharge Planning  Goal: Discharge Planning (Adult, OB, Behavioral, Peds)  Outcome: Improving  PRIMARY DIAGNOSIS: VERTIGO  OUTPATIENT/OBSERVATION GOALS TO BE MET BEFORE DISCHARGE:     1. Tolerating PO medications Yes  2. Cleared for discharge by consultants (if involved) No  3. ADLs back to baseline?  No- SBA  4. Activity and level of assistance: Up with standby assistance.  5. Pain status: Improved-controlled with oral pain medications.  6. Barriers to discharge noted No     Patient complains of head pain of 1/10. Declines intervention since meds in ER and thinks lack of sleep has effect. No nausea. States he takes at home excedrin for migranes. 1 Home meds in lockbox. Also states he has a script for meclizine at home but never filled. States he is currently seeing PT for verstibular migranes. Is mildly dizzy in bed but denies dizziness getting worse when ambulating. MRI in process. Vitals stable.

## 2017-03-29 NOTE — DISCHARGE SUMMARY
[facility] / Observation Unit  Discharge Summary        Naif Howell Jr. MRN# [id number]   YOB: 1965 Age: 52 year old     Date of Admission:  3/28/2017  Date of Discharge:  3/29/2017  Admitting Physician:  Bekah Suggs MD  Discharge Physician: Elda Adrian PA-C  Discharging Service: Hospitalist      Primary Provider: Josefina Rogers  Primary Care Physician Phone Number: [phone]         Primary Discharge Diagnoses:    Naif Howell Jr is a 53 y/o male with PMH including vestibular migraines, HTN, and GERD was admitted on 3/28/2017 with complaints of vertiginous symptoms and headache that were slightly different from his typical migraines.     1. Vertiginous migraine: developed frontal and temporal region HA and vertigo after work the day before admission. Headache resolved after intervention in the ED and patient's vertigo symptoms improved with Meclizine. Recommended patient continue Meclizine (already has prescription) at home to prevent return of his vertiginous symptoms and continue his scheduled appointments with outpatient PT at the [facility].         Secondary Discharge Diagnoses:     Past Medical History:   Diagnosis Date     anxiety     resolved     Aortic regurgitation      Basal cell carcinoma     left forehead     GERD (gastroesophageal reflux disease)      Hypertension      PTSD (post-traumatic stress disorder)      Vestibular migraine             Code Status:      Full Code        Brief Hospital Summary:       Reason for your hospital stay      You were admitted due to concerns of migraine with associated vertigo. You received multimodal pain management in the ED and your symptoms have resolved. You were able to ambulate without recurrence of your symptoms. You are safe to discharge home.       Please refer to initial admission history and physical for further details.   Briefly, Naif Howell Jr. was admitted on 3/28/2017 for complaints of  vertiginous symptoms and headache. Initial work up in the ED revealed potassium of 3.3, otherwise remainder of BMP and CBC unremarkable. Imaging was performed and showed: MRI brain/angiography negative. EKG did not reveal evidence of significant cardiac arrhythmias or ischemia.  Pt was registered to the Observation Unit for further evaluation.     Pt was initiated on oral meclizine and IVF. Other supportive measures including anti-emetics, benzodiazepines were offered. On the day of discharge, patient symptoms improved, vitals remained stable and pt was deemed safe for discharge. Medications were reviewed and adjustments made as necessary. Pt is instructed to follow up as below.           Significant Lab During Hospitalization:        Recent Labs  Lab 03/28/17  2147   WBC 9.1   HGB 17.4   HCT 50.8   MCV 83          Recent Labs  Lab 03/28/17  2147      POTASSIUM 3.3*   CHLORIDE 103   CO2 27   ANIONGAP 11   *   BUN 16   CR 0.94   GFRESTIMATED 83   GFRESTBLACK >90African American GFR Calc   ADELSO 8.8              Significant Imaging During Hospitalization:      Recent Results (from the past 24 hour(s))   Chest XR,  PA & LAT    Narrative    CHEST 2 VIEWS  3/29/2017 1:44 AM     HISTORY: Chest pain.    COMPARISON: 6/16/2016.    FINDINGS: A small band-like opacity in the lateral aspect of the left  lung base. The lungs are otherwise clear. Normal-sized cardiac  silhouette. Tortuous or ectatic thoracic aorta.      Impression    IMPRESSION:   1. A small band-like opacity in the left lung base could represent  atelectasis or less likely pneumonia.  2. No other findings suspicious for active cardiopulmonary disease.    DIPTI GARCIA MD   MR Brain w/o & w Contrast    Narrative    MR BRAIN WITH AND WITHOUT CONTRAST March 29, 2017 1:56 AM    HISTORY: Vertigo and headache. Neck pain.    COMPARISON: None.    TECHNIQUE: Sagittal T1, axial T2, axial FLAIR, axial GRE, axial  diffusion scan, coronal FLAIR, axial  post Gd enhanced T1 weighted  images. 10 mL gadolinium.    FINDINGS: No intracranial hemorrhage, mass, or recent infarct.  Ventricles are normal in size and position. There is a tiny cyst in  the right maxillary antrum. There is a small amount of fluid in the  mastoid air cells bilaterally.      Impression    IMPRESSION:  1. Normal MR brain.  2. Minimal mastoid effusions.  3. I agree with the preliminary report that was communicated to the  referring physician.    MAT WISE MD   MR Head w/o Contrast Angiogram    Narrative    MRA ANGIOGRAM HEAD WITHOUT CONTRAST March 29, 2017 1:56 AM    HISTORY: Vertigo with headaches and neck pain.    COMPARISON: Head CT 7/22/2016.    TECHNIQUE: Routine [place] MRA.    FINDINGS: The [place] of [place] is normal. Large-caliber vessels are  patent. No evidence for aneurysm.      Impression    IMPRESSION:  1. Normal [place] MRA.  2. I agree with the preliminary report that was communicated to the  referring physician.    MAT WISE MD   MR Neck w/o & w Contrast Angiogram    Narrative    MRA ANGIOGRAM NECK WITH AND WITHOUT CONTRAST March 29, 2017 1:57 AM     HISTORY: Neck pain. Rule out dissection.    TECHNIQUE: 2D time-of-flight MR angiogram of the neck without contrast  and 3D MR angiogram of the neck with 10 mL gadolinium IV.  Estimates  of carotid stenoses are made relative to the distal internal carotid  artery diameters except as noted.    COMPARISON: None.    FINDINGS:    Right carotid: Normal.    Left carotid: Normal.    Vertebral and basilar: Normal.    Aortic arch and intrathoracic arteries: Normal.    No evidence for dissection.      Impression    IMPRESSION: Normal MR angiogram of the neck. I agree with the  preliminary report that was communicated to the referring physician.    MAT WISE MD              Pending Results:      None        Consultations This Hospital Stay:      No consultations were requested during this admission          "Discharge Instructions and Follow-Up:      Follow-up Appointments      Follow up with primary care provider, Josefina Rogers, within 7 days for hospital follow- up.  No follow up labs or test are needed.  Follow up with the [facility] about continuing treatment of vertigo.           Discharge Disposition:      Discharged to home         Discharge Medications:        Discharge Medication List as of 3/29/2017 11:13 AM      CONTINUE these medications which have CHANGED    Details   ibuprofen (ADVIL/MOTRIN) 600 MG tablet Take 1 tablet (600 mg) by mouth every 6 hours as needed for moderate pain, Disp-120 tablet, R-0, Historical      methocarbamol (ROBAXIN) 500 MG tablet Take 2 tablets (1,000 mg) by mouth 3 times daily as needed for muscle spasms, Disp-240 tablet, R-0, Historical         CONTINUE these medications which have NOT CHANGED    Details   multivitamin, therapeutic with minerals (THERA-VIT-M) TABS tablet Take 1 tablet by mouth daily, Historical      HYDROcodone-acetaminophen (NORCO) 5-325 MG per tablet Take 1 tablet by mouth every 4 hours as needed for moderate to severe pain maximum 4 tablet(s) per day, Disp-28 tablet, R-0, Local Print      hydrochlorothiazide (HYDRODIURIL) 25 MG tablet Take 1 tablet (25 mg) by mouth daily, Disp-90 tablet, R-3, E-Prescribe               Allergies:         Allergies   Allergen Reactions     Omnicef [Cefdinir] Diarrhea     Cefuroxime Rash         Condition and Physical on Discharge:      Discharge condition: Stable   Vitals: Blood pressure 145/88, pulse 93, temperature 96  F (35.6  C), temperature source Oral, resp. rate 16, height 1.88 m (6' 2\"), weight 90.7 kg (200 lb), SpO2 95 %.  200 lbs 0 oz      GENERAL:  Comfortable.  PSYCH: pleasant, oriented, no acute distress.  HEENT:  PERRLA. Normal conjunctiva, normal hearing, nasal mucosa and oropharynx are normal.  NECK:  Supple, no neck vein distention, adenopathy or bruits, normal thyroid.  HEART:  " Normal S1, S2 with no murmur, no pericardial rub, gallops or S3 or S4.  LUNGS:  Clear to auscultation, normal respiratory effort. No wheezing, rales or ronchi.  ABDOMEN:  Soft, no hepatosplenomegaly, normal bowel sounds. Non-tender, non distended.   EXTREMITIES:  No pedal edema, +2 radial pulses bilateral and equal.  SKIN:  Dry to touch, No rash, wound or ulcerations.  NEUROLOGIC:  CN 2-12 intact, BL 5/5 symmetric upper and lower extremity strength, sensation is intact with no focal deficits.     Elda Adrian PA-C

## 2017-03-29 NOTE — PLAN OF CARE
Patient's After Visit Summary was reviewed with patient.   Patient verbalized understanding of After Visit Summary, recommended follow up and was given an opportunity to ask questions.   Discharge medications sent home with patient/family: YES.   Discharged with other:self

## 2017-03-29 NOTE — PHARMACY-ADMISSION MEDICATION HISTORY
Admission medication history interview status for this patient is complete. See [facility] admission navigator for allergy information, prior to admission medications and immunization status.     Medication history interview source(s):Patient  Medication history resources (including written lists, pill bottles, clinic record):None  [facility]    Changes made to PTA medication list:  Added: Multivits  Deleted: Ibuprofen, Robaxin, Percocet  Changed: none    Actions taken by pharmacist (provider contacted, etc):None     Additional medication history information:None    Medication reconciliation/reorder completed by provider prior to medication history? Yes    For patients on insulin therapy: No     Prior to Admission medications    Medication Sig Last Dose Taking? Auth Provider   multivitamin, therapeutic with minerals (THERA-VIT-M) TABS tablet Take 1 tablet by mouth daily 3/28/2017 at Unknown time Yes Unknown, Entered By History   HYDROcodone-acetaminophen (NORCO) 5-325 MG per tablet Take 1 tablet by mouth every 4 hours as needed for moderate to severe pain maximum 4 tablet(s) per day 3/28/2017 at Unknown time Yes Yue Mckeon DO   hydrochlorothiazide (HYDRODIURIL) 25 MG tablet Take 1 tablet (25 mg) by mouth daily 3/28/2017 at Unknown time Yes Josefina Rogers MD

## 2017-03-29 NOTE — PLAN OF CARE
Problem: Discharge Planning  Goal: Discharge Planning (Adult, OB, Behavioral, Peds)  PRIMARY DIAGNOSIS: VERTIGO  OUTPATIENT/OBSERVATION GOALS TO BE MET BEFORE DISCHARGE:      1. Tolerating PO medications Yes  2. Cleared for discharge by consultants (if involved) No  3. ADLs back to baseline? No- SBA  4. Activity and level of assistance: Up with standby assistance.  5. Pain status: Improved-controlled with oral pain medications.  6. Barriers to discharge noted No      Patient states feeling much better this am. No nausea. States he takes at home excedrin for migranes. 1 Home meds in lockbox. Also states he has a script for meclizine at home but never filled. States he is currently seeing PT for vestibular migranes. MRI resulted. Vitals stable.

## 2017-03-29 NOTE — ED NOTES
Pt presents to ED reporting hx of vestibular migraines that he is currently being treated for, reports he is always dizzy but tonight at 6pm the dizziness worsened severely, pt reports he feels like he is going to fall over. ABCs intact. A/Ox3

## 2017-03-29 NOTE — PLAN OF CARE
Problem: Discharge Planning  Goal: Discharge Planning (Adult, OB, Behavioral, Peds)  Outcome: No Change  ROOM #227     Living Situation (if not independent, order SW consult): Ind   Facility name:     Activity level at baseline: Ind  Activity level on admit: SBA     Is patient a falls risk? Yes  Reason for falls risk:  Mobility  Falls armband on? YES  Within Arm's Reach? No.Reason not within arm's reach is: yes  Bed alarm turned on?   No  Personal alarm in place and turned on?   Not applicable- Alert and oriented     Patient registered to observation; given Patient Bill of Rights; given the opportunity to ask questions about observation status and their plan of care.  Patient has been oriented to the observation room, bathroom and call light is in place.     : Luc(Son)

## 2017-04-07 ENCOUNTER — TRANSFERRED RECORDS (OUTPATIENT)
Dept: HEALTH INFORMATION MANAGEMENT | Facility: CLINIC | Age: 52
End: 2017-04-07

## 2017-04-14 ENCOUNTER — TRANSFERRED RECORDS (OUTPATIENT)
Dept: HEALTH INFORMATION MANAGEMENT | Facility: CLINIC | Age: 52
End: 2017-04-14

## 2017-04-20 ENCOUNTER — OFFICE VISIT (OUTPATIENT)
Dept: FAMILY MEDICINE | Facility: CLINIC | Age: 52
End: 2017-04-20
Payer: OTHER GOVERNMENT

## 2017-04-20 VITALS
TEMPERATURE: 97.6 F | BODY MASS INDEX: 25.54 KG/M2 | WEIGHT: 199 LBS | HEIGHT: 74 IN | DIASTOLIC BLOOD PRESSURE: 82 MMHG | HEART RATE: 83 BPM | SYSTOLIC BLOOD PRESSURE: 134 MMHG

## 2017-04-20 DIAGNOSIS — I10 BENIGN ESSENTIAL HYPERTENSION: Primary | ICD-10-CM

## 2017-04-20 DIAGNOSIS — F17.200 SMOKER: ICD-10-CM

## 2017-04-20 DIAGNOSIS — G43.809 VESTIBULAR MIGRAINE: ICD-10-CM

## 2017-04-20 DIAGNOSIS — G89.29 CHRONIC MIDLINE THORACIC BACK PAIN: ICD-10-CM

## 2017-04-20 DIAGNOSIS — M54.6 CHRONIC MIDLINE THORACIC BACK PAIN: ICD-10-CM

## 2017-04-20 DIAGNOSIS — N52.2 DRUG-INDUCED IMPOTENCE: ICD-10-CM

## 2017-04-20 PROCEDURE — 99214 OFFICE O/P EST MOD 30 MIN: CPT | Performed by: FAMILY MEDICINE

## 2017-04-20 RX ORDER — TOPIRAMATE 50 MG/1
TABLET, FILM COATED ORAL
Refills: 1 | Status: ON HOLD | COMMUNITY
Start: 2017-04-14 | End: 2017-05-15

## 2017-04-20 RX ORDER — BUPROPION HYDROCHLORIDE 150 MG/1
TABLET, EXTENDED RELEASE ORAL
Qty: 60 TABLET | Refills: 2 | Status: SHIPPED | OUTPATIENT
Start: 2017-04-20 | End: 2017-11-30

## 2017-04-20 RX ORDER — TOPIRAMATE 25 MG/1
TABLET, FILM COATED ORAL
Refills: 1 | COMMUNITY
Start: 2017-04-14 | End: 2017-05-10

## 2017-04-20 RX ORDER — LOSARTAN POTASSIUM 50 MG/1
50 TABLET ORAL DAILY
Qty: 30 TABLET | Refills: 1 | Status: SHIPPED | OUTPATIENT
Start: 2017-04-20 | End: 2017-06-22

## 2017-04-20 NOTE — NURSING NOTE
"Chief Complaint   Patient presents with     Hospital F/U       Initial /82 (BP Location: Right arm, Patient Position: Chair, Cuff Size: Adult Large)  Pulse 83  Temp 97.6  F (36.4  C) (Oral)  Ht 6' 2\" (1.88 m)  Wt 199 lb (90.3 kg)  BMI 25.55 kg/m2 Estimated body mass index is 25.55 kg/(m^2) as calculated from the following:    Height as of this encounter: 6' 2\" (1.88 m).    Weight as of this encounter: 199 lb (90.3 kg).  Medication Reconciliation: complete     Danis Sanders CMA          "

## 2017-04-20 NOTE — PROGRESS NOTES
SUBJECTIVE:                                                    Naif Howell Jr. is a 52 year old male who presents to clinic today for the following health issues:    Hospital Follow-up Visit:    Hospital/Nursing Home/IP Rehab Facility: [facility]  Date of Admission: 3-28-17  Date of Discharge: 3-29-17  Reason(s) for Admission: vestibular migraine            Problems taking medications regularly:  None       Medication changes since discharge: dizziness center gave him topiramate to take       Problems adhering to non-medication therapy:  Not at this time  Summary of hospitalization:  [facility] discharge summary reviewed  Diagnostic Tests/Treatments reviewed.  Follow up needed: following with [facility]  Other Healthcare Providers Involved in Patient s Care:         Specialist appointment - see above  Update since discharge: improved    Post Discharge Medication Reconciliation: discharge medications reconciled, continue medications without change.  Plan of care communicated with patient     Coding guidelines for this visit:  Type of Medical   Decision Making Face-to-Face Visit       within 7 Days of discharge Face-to-Face Visit        within 14 days of discharge   Moderate Complexity [id number]   High Complexity [id number]            Was suggested he delay his vestibular rehab until he gets better control of his migraine. [facility] put him on topamax. He has not yet started because he didn't understand the instructions.    Notes no neck and back pain since cervical RFA, he is very pleased with this.     Would like to restart Wellbutrin to help with smoking cessation. Previously tolerated well.     Notes trouble maintaining and obtaining erections at times, seems to fall in line with when he started HCTZ. Prior to this, not an issue.       Problem list and histories reviewed & adjusted, as indicated.  Additional history: none    Patient Active Problem List   Diagnosis      "Smoker     PTSD (post-traumatic stress disorder)     Aortic root dilatation (H)     Costochondritis     GERD (gastroesophageal reflux disease)     Aortic regurgitation     Chronic pain     Dyslipidemia     Benign essential hypertension     HTN, goal below 140/90     Chronic midline thoracic back pain     Past Surgical History:   Procedure Laterality Date     Resection of basal cell carcinoma      Left forehead       Social History   Substance Use Topics     Smoking status: Current Every Day Smoker     Packs/day: 1.00     Types: Cigarettes     Smokeless tobacco: Former User      Comment: quit 2013     Alcohol use No     Family History   Problem Relation Age of Onset     Hypertension Mother      alive     CANCER Father      chest tumor-not sure about details,  at age of 71     Family History Negative Sister      2     Family History Negative Brother      1     Family History Negative Maternal Grandmother      Family History Negative Maternal Grandfather      Family History Negative Paternal Grandmother      Family History Negative Paternal Grandfather      Cancer - colorectal No family hx of      Prostate Cancer No family hx of            Reviewed and updated as needed this visit by clinical staff  Allergies       Reviewed and updated as needed this visit by Provider       ROS:  Constitutional, HEENT, cardiovascular, pulmonary, gi and gu systems are negative, except as otherwise noted.    OBJECTIVE:                                                    /82 (BP Location: Right arm, Patient Position: Chair, Cuff Size: Adult Large)  Pulse 83  Temp 97.6  F (36.4  C) (Oral)  Ht 6' 2\" (1.88 m)  Wt 199 lb (90.3 kg)  BMI 25.55 kg/m2  Body mass index is 25.55 kg/(m^2).  GENERAL: healthy, alert and no distress  RESP: lungs clear to auscultation - no rales, rhonchi or wheezes  CV: regular rate and rhythm, normal S1 S2, no S3 or S4, no murmur, click or rub, no peripheral edema and peripheral pulses strong  PSYCH: " mentation appears normal, affect normal/bright    Diagnostic Test Results:  none      ASSESSMENT/PLAN:                                                      1. Benign essential hypertension - discussed switching BP agents as he seems to have some new trouble with impotence   - losartan (COZAAR) 50 MG tablet; Take 1 tablet (50 mg) by mouth daily  Dispense: 30 tablet; Refill: 1    2. Smoker - will restart wellbutrin following initiation of new BP agent to reduce side effect burden  - buPROPion (WELLBUTRIN SR) 150 MG 12 hr tablet; Take 1 tablet once daily and increase to 1 tablet twice daily after 4 to 7 days  Dispense: 60 tablet; Refill: 2    3. Chronic midline thoracic back pain - resolved following RFA    4. Drug-induced impotence - discussed may be side effect of HCTZ, would be reasonable to switch agents    5. Vestibular migraine: taking topamax per [facility], has follow up upcoming with them    Josefina Rogers MD  [facility]

## 2017-04-20 NOTE — PATIENT INSTRUCTIONS
Start with 25 mg tablets ---    Take 1 tab in the PM for 3 days  Then  Take 1 tab twice daily for 3 days  Then  Take 1 in AM and 2 in PM for 3 days  Then take 2 tablets twice daily until run out    THEN, start 50 mg tablets    Following topamax at full dose, switch blood pressure medications    2 weeks into new BP medication, start Wellbutrin    1 month blood pressure follow up

## 2017-04-20 NOTE — MR AVS SNAPSHOT
After Visit Summary   4/20/2017    Naif Howell Jr.    MRN: [id number]           Patient Information     Date Of Birth          1965        Visit Information        Provider Department      4/20/2017 7:30 AM Josefina Rogers MD [facility]        Today's Diagnoses     Benign essential hypertension    -  1    Smoker          Care Instructions    Start with 25 mg tablets ---    Take 1 tab in the PM for 3 days  Then  Take 1 tab twice daily for 3 days  Then  Take 1 in AM and 2 in PM for 3 days  Then take 2 tablets twice daily until run out    THEN, start 50 mg tablets    Following topamax at full dose, switch blood pressure medications    2 weeks into new BP medication, start Wellbutrin    1 month blood pressure follow up        Follow-ups after your visit        Your next 10 appointments already scheduled     May 17, 2017  8:30 AM CDT   Return Visit with Yue Mckeon DO   [facility] ([facility])    [address] [facility]  [address] [place], [place] [id number]   [phone]              Who to contact     If you have questions or need follow up information about today's clinic visit or your schedule please contact [facility] directly at [phone].  Normal or non-critical lab and imaging results will be communicated to you by MyChart, letter or phone within 4 business days after the clinic has received the results. If you do not hear from us within 7 days, please contact the clinic through "[employer]hart or phone. If you have a critical or abnormal lab result, we will notify you by phone as soon as possible.  Submit refill requests through [email or web address] or call your pharmacy and they will forward the refill request to us. Please allow 3 business days for your refill to be completed.          Additional Information About Your Visit        "[employer] Information     [email or web address] lets you send messages to your doctor, view your test results, renew  "your prescriptions, schedule appointments and more. To sign up, go to [email or web address] . Click on \"Log in\" on the left side of the screen, which will take you to the Welcome page. Then click on \"Sign up Now\" on the right side of the page.     You will be asked to enter the access code listed below, as well as some personal information. Please follow the directions to create your username and password.     Your access code is: [email or web address]  Expires: 2017 11:11 AM     Your access code will  in 90 days. If you need help or a new code, please call your [place] clinic or [phone].        Care EveryWhere ID     This is your Care EveryWhere ID. This could be used by other organizations to access your [place] medical records  [id number]        Your Vitals Were     Pulse Temperature Height BMI (Body Mass Index)          83 97.6  F (36.4  C) (Oral) 6' 2\" (1.88 m) 25.55 kg/m2         Blood Pressure from Last 3 Encounters:   17 134/82   17 145/88   17 138/86    Weight from Last 3 Encounters:   17 199 lb (90.3 kg)   17 200 lb (90.7 kg)   16 198 lb (89.8 kg)              Today, you had the following     No orders found for display         Today's Medication Changes          These changes are accurate as of: 17  8:05 AM.  If you have any questions, ask your nurse or doctor.               Start taking these medicines.        Dose/Directions    buPROPion 150 MG 12 hr tablet   Commonly known as:  WELLBUTRIN SR   Used for:  Smoker   Started by:  Josefina Rogers MD        Take 1 tablet once daily and increase to 1 tablet twice daily after 4 to 7 days   Quantity:  60 tablet   Refills:  2       losartan 50 MG tablet   Commonly known as:  COZAAR   Used for:  Benign essential hypertension   Started by:  Josefina Rogers MD        Dose:  50 mg   Take 1 tablet (50 mg) by mouth daily   Quantity:  30 tablet   Refills:  1         Stop taking these medicines if you " haven't already. Please contact your care team if you have questions.     hydrochlorothiazide 25 MG tablet   Commonly known as:  HYDRODIURIL   Stopped by:  Josefina Rogers MD           HYDROcodone-acetaminophen 5-325 MG per tablet   Commonly known as:  NORCO   Stopped by:  Josefina Rogers MD           methocarbamol 500 MG tablet   Commonly known as:  ROBAXIN   Stopped by:  Josefina Rogers MD                Where to get your medicines      These medications were sent to [facility] [address] [place] [address] [address] AT [facility] [place] [address] [facility] [address]     Phone:  [phone]     buPROPion 150 MG 12 hr tablet    losartan 50 MG tablet                Primary Care Provider Office Phone # Fax #    Josefina Rogers [phone]       [facility] [address]  [facility] [address]        Thank you!     Thank you for choosing [facility]  for your care. Our goal is always to provide you with excellent care. Hearing back from our patients is one way we can continue to improve our services. Please take a few minutes to complete the written survey that you may receive in the mail after your visit with us. Thank you!             Your Updated Medication List - Protect others around you: Learn how to safely use, store and throw away your medicines at www.disposemymeds.org.          This list is accurate as of: 4/20/17  8:05 AM.  Always use your most recent med list.                   Brand Name Dispense Instructions for use    buPROPion 150 MG 12 hr tablet    WELLBUTRIN SR    60 tablet    Take 1 tablet once daily and increase to 1 tablet twice daily after 4 to 7 days       ibuprofen 600 MG tablet    ADVIL/MOTRIN    120 tablet    Take 1 tablet (600 mg) by mouth every 6 hours as needed for moderate pain       losartan 50 MG tablet    COZAAR    30 tablet    Take 1 tablet (50 mg) by mouth daily        multivitamin, therapeutic with minerals Tabs tablet      Take 1 tablet by mouth daily       * topiramate 25 MG tablet    TOPAMAX         * topiramate 50 MG tablet    TOPAMAX     TK 1 T PO BID       * Notice:  This list has 2 medication(s) that are the same as other medications prescribed for you. Read the directions carefully, and ask your doctor or other care provider to review them with you.

## 2017-04-25 ENCOUNTER — APPOINTMENT (OUTPATIENT)
Dept: ULTRASOUND IMAGING | Facility: CLINIC | Age: 52
End: 2017-04-25
Attending: EMERGENCY MEDICINE
Payer: OTHER GOVERNMENT

## 2017-04-25 ENCOUNTER — HOSPITAL ENCOUNTER (EMERGENCY)
Facility: CLINIC | Age: 52
Discharge: HOME | End: 2017-04-25
Attending: EMERGENCY MEDICINE | Admitting: EMERGENCY MEDICINE
Payer: OTHER GOVERNMENT

## 2017-04-25 VITALS
RESPIRATION RATE: 18 BRPM | HEART RATE: 76 BPM | OXYGEN SATURATION: 95 % | SYSTOLIC BLOOD PRESSURE: 148 MMHG | DIASTOLIC BLOOD PRESSURE: 93 MMHG | TEMPERATURE: 97 F

## 2017-04-25 DIAGNOSIS — K80.50 BILIARY COLIC: ICD-10-CM

## 2017-04-25 LAB
ALBUMIN SERPL-MCNC: 3.4 G/DL (ref 3.4–5)
ALP SERPL-CCNC: 107 U/L (ref 40–150)
ALT SERPL W P-5'-P-CCNC: 17 U/L (ref 0–70)
ANION GAP SERPL CALCULATED.3IONS-SCNC: 8 MMOL/L (ref 3–14)
AST SERPL W P-5'-P-CCNC: 10 U/L (ref 0–45)
BASOPHILS # BLD AUTO: 0.1 10E9/L (ref 0–0.2)
BASOPHILS NFR BLD AUTO: 0.8 %
BILIRUB SERPL-MCNC: 0.5 MG/DL (ref 0.2–1.3)
BUN SERPL-MCNC: 19 MG/DL (ref 7–30)
CALCIUM SERPL-MCNC: 8.1 MG/DL (ref 8.5–10.1)
CHLORIDE SERPL-SCNC: 105 MMOL/L (ref 94–109)
CO2 SERPL-SCNC: 25 MMOL/L (ref 20–32)
CREAT SERPL-MCNC: 0.82 MG/DL (ref 0.66–1.25)
DIFFERENTIAL METHOD BLD: NORMAL
EOSINOPHIL # BLD AUTO: 0.3 10E9/L (ref 0–0.7)
EOSINOPHIL NFR BLD AUTO: 3.3 %
ERYTHROCYTE [DISTWIDTH] IN BLOOD BY AUTOMATED COUNT: 12.5 % (ref 10–15)
GFR SERPL CREATININE-BSD FRML MDRD: ABNORMAL ML/MIN/1.7M2
GLUCOSE SERPL-MCNC: 116 MG/DL (ref 70–99)
HCT VFR BLD AUTO: 49 % (ref 40–53)
HGB BLD-MCNC: 16.5 G/DL (ref 13.3–17.7)
IMM GRANULOCYTES # BLD: 0 10E9/L (ref 0–0.4)
IMM GRANULOCYTES NFR BLD: 0.3 %
INTERPRETATION ECG - MUSE: NORMAL
LIPASE SERPL-CCNC: 135 U/L (ref 73–393)
LYMPHOCYTES # BLD AUTO: 1.5 10E9/L (ref 0.8–5.3)
LYMPHOCYTES NFR BLD AUTO: 20.4 %
MCH RBC QN AUTO: 28.1 PG (ref 26.5–33)
MCHC RBC AUTO-ENTMCNC: 33.7 G/DL (ref 31.5–36.5)
MCV RBC AUTO: 83 FL (ref 78–100)
MONOCYTES # BLD AUTO: 0.6 10E9/L (ref 0–1.3)
MONOCYTES NFR BLD AUTO: 7.5 %
NEUTROPHILS # BLD AUTO: 5.1 10E9/L (ref 1.6–8.3)
NEUTROPHILS NFR BLD AUTO: 67.7 %
NRBC # BLD AUTO: 0 10*3/UL
NRBC BLD AUTO-RTO: 0 /100
PLATELET # BLD AUTO: 240 10E9/L (ref 150–450)
POTASSIUM SERPL-SCNC: 3.5 MMOL/L (ref 3.4–5.3)
PROT SERPL-MCNC: 6.4 G/DL (ref 6.8–8.8)
RBC # BLD AUTO: 5.88 10E12/L (ref 4.4–5.9)
SODIUM SERPL-SCNC: 138 MMOL/L (ref 133–144)
WBC # BLD AUTO: 7.5 10E9/L (ref 4–11)

## 2017-04-25 PROCEDURE — 96375 TX/PRO/DX INJ NEW DRUG ADDON: CPT

## 2017-04-25 PROCEDURE — 96374 THER/PROPH/DIAG INJ IV PUSH: CPT

## 2017-04-25 PROCEDURE — 96361 HYDRATE IV INFUSION ADD-ON: CPT

## 2017-04-25 PROCEDURE — 25000125 ZZHC RX 250: Performed by: EMERGENCY MEDICINE

## 2017-04-25 PROCEDURE — 80053 COMPREHEN METABOLIC PANEL: CPT | Performed by: EMERGENCY MEDICINE

## 2017-04-25 PROCEDURE — 99285 EMERGENCY DEPT VISIT HI MDM: CPT | Mod: 25

## 2017-04-25 PROCEDURE — 93005 ELECTROCARDIOGRAM TRACING: CPT

## 2017-04-25 PROCEDURE — 25000128 H RX IP 250 OP 636: Performed by: EMERGENCY MEDICINE

## 2017-04-25 PROCEDURE — 76705 ECHO EXAM OF ABDOMEN: CPT

## 2017-04-25 PROCEDURE — 25000132 ZZH RX MED GY IP 250 OP 250 PS 637: Performed by: EMERGENCY MEDICINE

## 2017-04-25 PROCEDURE — S0028 INJECTION, FAMOTIDINE, 20 MG: HCPCS | Performed by: EMERGENCY MEDICINE

## 2017-04-25 PROCEDURE — 83690 ASSAY OF LIPASE: CPT | Performed by: EMERGENCY MEDICINE

## 2017-04-25 PROCEDURE — 85025 COMPLETE CBC W/AUTO DIFF WBC: CPT | Performed by: EMERGENCY MEDICINE

## 2017-04-25 RX ORDER — OXYCODONE AND ACETAMINOPHEN 5; 325 MG/1; MG/1
1-2 TABLET ORAL EVERY 4 HOURS PRN
Qty: 15 TABLET | Refills: 0 | Status: ON HOLD | OUTPATIENT
Start: 2017-04-25 | End: 2017-05-15

## 2017-04-25 RX ORDER — ONDANSETRON 2 MG/ML
4 INJECTION INTRAMUSCULAR; INTRAVENOUS EVERY 30 MIN PRN
Status: DISCONTINUED | OUTPATIENT
Start: 2017-04-25 | End: 2017-04-25 | Stop reason: HOSPADM

## 2017-04-25 RX ORDER — SODIUM CHLORIDE 9 MG/ML
1000 INJECTION, SOLUTION INTRAVENOUS CONTINUOUS
Status: DISCONTINUED | OUTPATIENT
Start: 2017-04-25 | End: 2017-04-25 | Stop reason: HOSPADM

## 2017-04-25 RX ORDER — ONDANSETRON 4 MG/1
4 TABLET, ORALLY DISINTEGRATING ORAL EVERY 8 HOURS PRN
Qty: 10 TABLET | Refills: 0 | Status: SHIPPED | OUTPATIENT
Start: 2017-04-25 | End: 2017-04-28

## 2017-04-25 RX ORDER — LIDOCAINE 40 MG/G
CREAM TOPICAL
Status: DISCONTINUED | OUTPATIENT
Start: 2017-04-25 | End: 2017-04-25 | Stop reason: HOSPADM

## 2017-04-25 RX ADMIN — ONDANSETRON 4 MG: 2 INJECTION INTRAMUSCULAR; INTRAVENOUS at 03:20

## 2017-04-25 RX ADMIN — LIDOCAINE HYDROCHLORIDE 30 ML: 20 SOLUTION ORAL; TOPICAL at 03:21

## 2017-04-25 RX ADMIN — FAMOTIDINE 20 MG: 10 INJECTION, SOLUTION INTRAVENOUS at 03:22

## 2017-04-25 RX ADMIN — SODIUM CHLORIDE 1000 ML: 9 INJECTION, SOLUTION INTRAVENOUS at 03:19

## 2017-04-25 ASSESSMENT — ENCOUNTER SYMPTOMS
ABDOMINAL PAIN: 1
DIARRHEA: 0
VOMITING: 0
HEADACHES: 0
NAUSEA: 1
DIZZINESS: 0

## 2017-04-25 NOTE — ED AVS SNAPSHOT
[facility] Emergency Department    [address]    [facility] [address]    Phone:  [phone]    Fax:  [phone]                                       Naif Howell Jr.   MRN: [id number]    Department:  [facility] Emergency Department   Date of Visit:  4/25/2017           After Visit Summary Signature Page     I have received my discharge instructions, and my questions have been answered. I have discussed any challenges I see with this plan with the nurse or doctor.    ..........................................................................................................................................  Patient/Patient Representative Signature      ..........................................................................................................................................  Patient Representative Print Name and Relationship to Patient    ..................................................               ................................................  Date                                            Time    ..........................................................................................................................................  Reviewed by Signature/Title    ...................................................              ..............................................  Date                                                            Time

## 2017-04-25 NOTE — ED PROVIDER NOTES
History     Chief Complaint:  Abdominal Pain      HPI   Naif Howell Jr. is a 52 year old male with a past medical history of vestibular migraine, hypertension who presents for evaluation of abdominal pain. The patient reports he had sudden onset of epigastric abdominal pain 2-3 hours ago and rates his pain 5/10. He states the pain is constant, but gets more intense intermittently. He also notes feeling nauseous, but denies vomiting, diarrhea, headache, dizziness or past abdominal surgery.    Allergies:  Omnicef  Cefuromine     Medications:    Topamax  Cozaar  Wellbutrin  Multivitamin  Ibuprofen    Past Medical History:    Anxiety  Aortic regurgitation  GERD  Basal cell carcinoma  Hypertension  PTSD  Vestibular migraine    Past Surgical History:    Resection of basal cell carcinoma    Family History:    Mother: Hypertension  Father: Chest tumor    Social History:  Marital Status:  Legally    Smoking status: Current every day smoker  Alcohol use: No      Review of Systems   Gastrointestinal: Positive for abdominal pain and nausea. Negative for diarrhea and vomiting.   Neurological: Negative for dizziness and headaches.   All other systems reviewed and are negative.    Physical Exam   First Vitals:  BP: (!) 164/109  Pulse: 76  Temp: 97  F (36.1  C)  Resp: 18  SpO2: 99 %    Physical Exam  Constitutional:  Oriented to person, place, and time. In distress due to pain.  HENT:   Head:    Normocephalic.   Mouth/Throat:   Oropharynx is clear and moist.   Eyes:    EOM are normal. Pupils are equal, round, and reactive to light.   Neck:    Neck supple.   Cardiovascular:  Normal rate, regular rhythm and normal heart sounds.      Exam reveals no gallop and no friction rub.       No murmur heard.  Pulmonary/Chest:  Effort normal and breath sounds normal.      No respiratory distress. No wheezes. No rales.      No reproducible chest wall pain.  Abdominal:   Soft. No distension.epigastric and RUQ tenderness. Negative  Metcalf's sign. No rebound and no guarding.   Musculoskeletal:  Normal range of motion.   Neurological:   Alert and oriented to person, place, and time.           Moves all 4 extremities spontaneously    Skin:    No rash noted. No pallor.     Emergency Department Course   ECG @ 0305  Indication: Epigastric abdominal pain  Rate 73 bpm.   RI interval 176 ms.   QRS duration 104 ms.   QT/QTc 396/436 ms.   P-R-T axes 11.  Notes: Normal sinus rhythm. STEMI? No.  Time read 0308    Imaging:  Radiographic findings were communicated with the patient who voiced understanding of the findings.    US Abdomen Limited  Preliminary Result  IMPRESSION:  1. Cholelithiasis and findings suspicious for acute cholecystitis.  2. No biliary dilatation.    Laboratory:  CBC: WBC 7.5 (WNL) HGB 16.5 (WNL)  (WNL)   CMP: Cr 0.82 (WNL) Glucose 116 (H) Calcium 8.1 (L) Protein total 6.4 (L) Rest WNL  Lipase: 135 (WNL)    Interventions:  0319: Normal saline, 1000 ml, IV  0320: Zofran, 4 mg, IV  0321: GI cocktail, 30 ml, Oral  0322: Pepcid, 20 mg, IV      ED Course:  Nursing notes and past medical history reviewed.   I performed a physical examination of the patient as documented above.  I explained the plan with the patient who consents to this.   The above workups were undertaken.   049: The patient was updated. He is feeling greatly improved.   I personally reviewed the laboratory and imaging results with the Patient and answered all related questions prior to discharge.   Findings and plan explained to the Patient. Patient discharged home with instructions regarding supportive care, medications, and reasons to return. The importance of close follow-up was reviewed.     Impression & Plan      Medical Decision Making:  Naif Howell Jr. is a 52 year old male who presented with abdominal pain and symptoms consistent with biliary colic.  Ultrasound has confirmed the presence of gallstones.  There is no clinical, laboratory, or ultrasound evidence  of cholecystitis, choledocholithiasis, gallstone pancreatitis, or ascending cholangitis.  There is no chest pain or ACS equivalent symptom to suggest the patient s symptoms are cardiac in nature. There is no lower abdominal tenderness to suggest appendicitis, diverticulitis, or other acute process. Despite ultrasound findings, he has no elevated white count. He is now significantly better withtout any narcotics and his symptoms are not consistent with acute cholelithiasis. The re-exam is benign, pain is controlled, and he is tolerating POs. I recommended avoiding fatty foods, and to return to the ED immediately for uncontrolled pain, vomiting, fever, or any other concerning symptoms. I discussed the natural history of symptomatic cholelithiasis, and I recommended outpatient follow up with general surgery in 3-5 days for further consultation and care.  Analgesics and anti-emetics have been prescribed.      Diagnosis:    ICD-10-CM    1. Biliary colic K80.50          Disposition:   Discharge to home with primary care follow up.     Discharge Medications:     New Prescriptions    ONDANSETRON (ZOFRAN ODT) 4 MG ODT TAB    Take 1 tablet (4 mg) by mouth every 8 hours as needed for nausea    OXYCODONE-ACETAMINOPHEN (PERCOCET) 5-325 MG PER TABLET    Take 1-2 tablets by mouth every 4 hours as needed for moderate to severe pain         ILeon, am serving as a scribe on 4/25/2017 at 3:12 AM to personally document services performed by Lei Novoa MD, based on my observations and the provider's statements to me.       Lei Novoa MD  04/25/17 0545

## 2017-04-25 NOTE — ED AVS SNAPSHOT
[facility] Emergency Department    [address] [place] [address]    Phone:  [phone]    Fax:  [phone]                                       Naif Howell Jr.   MRN: [id number]    Department:  [facility] Emergency Department   Date of Visit:  4/25/2017           Patient Information     Date Of Birth          1965        Your diagnoses for this visit were:     Biliary colic        You were seen by Lei Novoa MD.      Follow-up Information     Follow up with [facility]. Call in 1 day.    Contact information:    [address]  [place] [address]  [phone]          Discharge Instructions         Discharge Instructions  Biliary Colic    You have been seen today for biliary colic. Biliary colic is the pain that happens when gallstones block the normal flow of bile from the gallbladder.  It usually is a steady or crampy pain in the upper abdomen, most often under the right side of the rib cage where the gallbladder is. Sometimes you get pain from the gallbladder in your back or shoulder. It is common to have nausea and vomiting with biliary colic.    Bile is a liquid the body makes to help with digesting fat.  It is made by the liver and stored in the gallbladder and released from the gall bladder when you eat fatty foods. Gallstones can form for a variety of reasons. Risk factors for gallstones include being female, having a family history of gallstones, being older, being pregnant or having been pregnant, having diabetes, having rapid weight loss, and others.      Once gallstones form, surgeons usually tell you to have your gallbladder removed. There is medicine that can dissolve gallstones, but it can be unpleasant to take, and gallstones tend to come back when you quit taking the medicine. Your regular physician can help decide on the right treatment for you, and may refer you to a surgeon to discuss whether surgery  is right in your case.     Complications of gallstones include infection, jaundice, inflammation of the pancreas, and rupture of the gallbladder. One of these complications will happen to about one out of every four patients with gallstones over the next 10-20 years if they are not treated.       Return to the Emergency Department if you develop:    Fever greater than 100.5 degrees Fahrenheit, or 38.1 degrees Celsius.    Persistent nausea and vomiting.    Pain that won t go away with the medicines you were given here.    Yellow skin or eye color (jaundice).    Other new concerning symptoms.    What can I do to help myself?    Eat regular meals at least three times a day, to make the gallbladder empty before it gets too full.    Avoid fatty foods.    Drink plenty of clear fluids.    Take over the counter or prescribed pain medications.    See your doctor within 1 week (or as directed by your doctor today) for follow up.            If you were given a prescription for medicine here today, be sure to read all of the information (including the package insert) that comes with your prescription.  This will include important information about the medicine, its side effects, and any warnings that you need to know about.  The pharmacist who fills the prescription can provide more information and answer questions you may have about the medicine.  If you have questions or concerns that the pharmacist cannot address, please call or return to the Emergency Department.   Opioid Medication Information    Pain medications are among the most commonly prescribed medicines, so we are including this information for all our patients. If you did not receive pain medication or get a prescription for pain medicine, you can ignore it.     You may have been given a prescription for an opioid (narcotic) pain medicine and/or have received a pain medicine while here in the Emergency Department. These medicines can make you drowsy or impaired.  You must not drive, operate dangerous equipment, or engage in any other dangerous activities while taking these medications. If you drive while taking these medications, you could be arrested for DUI, or driving under the influence. Do not drink any alcohol while you are taking these medications.     Opioid pain medications can cause addiction. If you have a history of chemical dependency of any type, you are at a higher risk of becoming addicted to pain medications.  Only take these prescribed medications to treat your pain when all other options have been tried. Take it for as short a time and as few doses as possible. Store your pain pills in a secure place, as they are frequently stolen and provide a dangerous opportunity for children or visitors in your house to start abusing these powerful medications. We will not replace any lost or stolen medicine.  As soon as your pain is better, you should flush all your remaining medication.     Many prescription pain medications contain Tylenol  (acetaminophen), including Vicodin , Tylenol #3 , Norco , Lortab , and Percocet .  You should not take any extra pills of Tylenol  if you are using these prescription medications or you can get very sick.  Do not ever take more than 3000 mg of acetaminophen in any 24 hour period.    All opioids tend to cause constipation. Drink plenty of water and eat foods that have a lot of fiber, such as fruits, vegetables, prune juice, apple juice and high fiber cereal.  Take a laxative if you don t move your bowels at least every other day. Miralax , Milk of Magnesia, Colace , or Senna  can be used to keep you regular.      Remember that you can always come back to the Emergency Department if you are not able to see your regular doctor in the amount of time listed above, if you get any new symptoms, or if there is anything that worries you.        Future Appointments        Provider Department Dept Phone Center    5/17/2017 8:30 AM Yue  Blossom Mckeon DO [facility] [phone] [facility]    5/18/2017 7:30 AM Josefina Rogers MD [facility] [phone] [facility]      24 Hour Appointment Hotline       To make an appointment at any [facility], call [phone] ([phone]). If you don't have a family doctor or clinic, we will help you find one. [facility] are conveniently located to serve the needs of you and your family.             Review of your medicines      START taking        Dose / Directions Last dose taken    ondansetron 4 MG ODT tab   Commonly known as:  ZOFRAN ODT   Dose:  4 mg   Quantity:  10 tablet        Take 1 tablet (4 mg) by mouth every 8 hours as needed for nausea   Refills:  0        oxyCODONE-acetaminophen 5-325 MG per tablet   Commonly known as:  PERCOCET   Dose:  1-2 tablet   Quantity:  15 tablet        Take 1-2 tablets by mouth every 4 hours as needed for moderate to severe pain   Refills:  0          Our records show that you are taking the medicines listed below. If these are incorrect, please call your family doctor or clinic.        Dose / Directions Last dose taken    buPROPion 150 MG 12 hr tablet   Commonly known as:  WELLBUTRIN SR   Quantity:  60 tablet        Take 1 tablet once daily and increase to 1 tablet twice daily after 4 to 7 days   Refills:  2        ibuprofen 600 MG tablet   Commonly known as:  ADVIL/MOTRIN   Dose:  600 mg   Quantity:  120 tablet        Take 1 tablet (600 mg) by mouth every 6 hours as needed for moderate pain   Refills:  0        losartan 50 MG tablet   Commonly known as:  COZAAR   Dose:  50 mg   Quantity:  30 tablet        Take 1 tablet (50 mg) by mouth daily   Refills:  1        multivitamin, therapeutic with minerals Tabs tablet   Dose:  1 tablet        Take 1 tablet by mouth daily   Refills:  0        * topiramate 25 MG tablet   Commonly known as:  TOPAMAX        Refills:  1        * topiramate 50 MG tablet   Commonly known as:  TOPAMAX         TK 1 T PO BID   Refills:  1        * Notice:  This list has 2 medication(s) that are the same as other medications prescribed for you. Read the directions carefully, and ask your doctor or other care provider to review them with you.            Prescriptions were sent or printed at these locations (2 Prescriptions)                   Other Prescriptions                Printed at Department/Unit printer (2 of 2)         oxyCODONE-acetaminophen (PERCOCET) 5-325 MG per tablet               ondansetron (ZOFRAN ODT) 4 MG ODT tab                Procedures and tests performed during your visit     CBC with platelets differential    Comprehensive metabolic panel    EKG 12-lead, tracing only    Lipase    Peripheral IV: Standard    US Abdomen Limited      Orders Needing Specimen Collection     None      Pending Results     Date and Time Order Name Status Description    4/25/2017 0301 US Abdomen Limited Preliminary             Pending Culture Results     No orders found from 4/23/2017 to 4/26/2017.            Test Results From Your Hospital Stay        4/25/2017  3:30 AM      Component Results     Component Value Ref Range & Units Status    WBC 7.5 4.0 - 11.0 10e9/L Final    RBC Count 5.88 4.4 - 5.9 10e12/L Final    Hemoglobin 16.5 13.3 - 17.7 g/dL Final    Hematocrit 49.0 40.0 - 53.0 % Final    MCV 83 78 - 100 fl Final    MCH 28.1 26.5 - 33.0 pg Final    MCHC 33.7 31.5 - 36.5 g/dL Final    RDW 12.5 10.0 - 15.0 % Final    Platelet Count 240 150 - 450 10e9/L Final    Diff Method Automated Method  Final    % Neutrophils 67.7 % Final    % Lymphocytes 20.4 % Final    % Monocytes 7.5 % Final    % Eosinophils 3.3 % Final    % Basophils 0.8 % Final    % Immature Granulocytes 0.3 % Final    Nucleated RBCs 0 0 /100 Final    Absolute Neutrophil 5.1 1.6 - 8.3 10e9/L Final    Absolute Lymphocytes 1.5 0.8 - 5.3 10e9/L Final    Absolute Monocytes 0.6 0.0 - 1.3 10e9/L Final    Absolute Eosinophils 0.3 0.0 - 0.7 10e9/L Final    Absolute  Basophils 0.1 0.0 - 0.2 10e9/L Final    Abs Immature Granulocytes 0.0 0 - 0.4 10e9/L Final    Absolute Nucleated RBC 0.0  Final         4/25/2017  3:48 AM      Component Results     Component Value Ref Range & Units Status    Sodium 138 133 - 144 mmol/L Final    Potassium 3.5 3.4 - 5.3 mmol/L Final    Chloride 105 94 - 109 mmol/L Final    Carbon Dioxide 25 20 - 32 mmol/L Final    Anion Gap 8 3 - 14 mmol/L Final    Glucose 116 (H) 70 - 99 mg/dL Final    Urea Nitrogen 19 7 - 30 mg/dL Final    Creatinine 0.82 0.66 - 1.25 mg/dL Final    GFR Estimate >90  Non  GFR Calc   >60 mL/min/1.7m2 Final    GFR Estimate If Black >90   GFR Calc   >60 mL/min/1.7m2 Final    Calcium 8.1 (L) 8.5 - 10.1 mg/dL Final    Bilirubin Total 0.5 0.2 - 1.3 mg/dL Final    Albumin 3.4 3.4 - 5.0 g/dL Final    Protein Total 6.4 (L) 6.8 - 8.8 g/dL Final    Alkaline Phosphatase 107 40 - 150 U/L Final    ALT 17 0 - 70 U/L Final    AST 10 0 - 45 U/L Final         4/25/2017  3:48 AM      Component Results     Component Value Ref Range & Units Status    Lipase 135 73 - 393 U/L Final         4/25/2017  4:26 AM      Narrative     US ABDOMEN LIMITED  4/25/2017 3:57 AM      HISTORY: Right upper quadrant pain.     COMPARISON: None.    FINDINGS: The liver is normal in size and texture. There are several  cysts measuring up to 3.5 cm. There is no intra or extrahepatic  biliary dilatation. The common hepatic duct measures 0.5 cm. There are  stones in the gallbladder. The gallbladder wall is mildly thickened to  0.4 cm. There is a positive sonographic Metcalf's sign. The gallbladder  is distended measuring 11 cm in length. The pancreas is completely  obscured by bowel gas. The right kidney measures 11.1 cm and is normal  in appearance. The proximal abdominal aorta and IVC appear normal.         Impression     IMPRESSION:  1. Cholelithiasis and findings suspicious for acute cholecystitis.  2. No biliary dilatation.                 Clinical Quality Measure: Blood Pressure Screening     Your blood pressure was checked while you were in the emergency department today. The last reading we obtained was  BP: (!) 148/93 . Please read the guidelines below about what these numbers mean and what you should do about them.  If your systolic blood pressure (the top number) is less than 120 and your diastolic blood pressure (the bottom number) is less than 80, then your blood pressure is normal. There is nothing more that you need to do about it.  If your systolic blood pressure (the top number) is 120-139 or your diastolic blood pressure (the bottom number) is 80-89, your blood pressure may be higher than it should be. You should have your blood pressure rechecked within a year by a primary care provider.  If your systolic blood pressure (the top number) is 140 or greater or your diastolic blood pressure (the bottom number) is 90 or greater, you may have high blood pressure. High blood pressure is treatable, but if left untreated over time it can put you at risk for heart attack, stroke, or kidney failure. You should have your blood pressure rechecked by a primary care provider within the next 4 weeks.  If your provider in the emergency department today gave you specific instructions to follow-up with your doctor or provider even sooner than that, you should follow that instruction and not wait for up to 4 weeks for your follow-up visit.        Thank you for choosing [facility]       Thank you for choosing [facility] for your care. Our goal is always to provide you with excellent care. Hearing back from our patients is one way we can continue to improve our services. Please take a few minutes to complete the written survey that you may receive in the mail after you visit with us. Thank you!        [employer] Information     [employer] lets you send messages to your doctor, view your test results, renew your prescriptions, schedule appointments and more. To sign  "up, go to [email or web address] . Click on \"Log in\" on the left side of the screen, which will take you to the Welcome page. Then click on \"Sign up Now\" on the right side of the page.     You will be asked to enter the access code listed below, as well as some personal information. Please follow the directions to create your username and password.     Your access code is: [email or web address]  Expires: 2017 11:11 AM     Your access code will  in 90 days. If you need help or a new code, please call your [facility] clinic or [phone].        Care EveryWhere ID     This is your Care EveryWhere ID. This could be used by other organizations to access your [facility] medical records  [id number]        After Visit Summary       This is your record. Keep this with you and show to your community pharmacist(s) and doctor(s) at your next visit.                  "

## 2017-05-01 ENCOUNTER — OFFICE VISIT (OUTPATIENT)
Dept: SURGERY | Facility: CLINIC | Age: 52
End: 2017-05-01
Payer: OTHER GOVERNMENT

## 2017-05-01 VITALS
SYSTOLIC BLOOD PRESSURE: 148 MMHG | DIASTOLIC BLOOD PRESSURE: 84 MMHG | HEART RATE: 78 BPM | HEIGHT: 74 IN | BODY MASS INDEX: 25.67 KG/M2 | OXYGEN SATURATION: 98 % | WEIGHT: 200 LBS

## 2017-05-01 DIAGNOSIS — K81.9 CHOLECYSTITIS: Primary | ICD-10-CM

## 2017-05-01 PROCEDURE — 99204 OFFICE O/P NEW MOD 45 MIN: CPT | Performed by: SURGERY

## 2017-05-01 ASSESSMENT — ENCOUNTER SYMPTOMS: ABDOMINAL PAIN: 1

## 2017-05-01 NOTE — PROGRESS NOTES
Chief complaint:  Abdominal pain, epigastric    HPI:  This patient is a 52 year old  male who presents with epigastric region abdominal pain for the past 7 days.  The pain is intermittent.  It occurs with fatty foods.  He has had 4-5 attacks, the most recent being the worst, following a bowl of ice cream     Past Medical History:   has a past medical history of anxiety; Aortic regurgitation; Basal cell carcinoma; GERD (gastroesophageal reflux disease); Hypertension; PTSD (post-traumatic stress disorder); and Vestibular migraine.    Past Surgical History:  Past Surgical History:   Procedure Laterality Date     Resection of basal cell carcinoma      Left forehead        Social History:  Social History     Social History     Marital status: Legally      Spouse name: N/A     Number of children: N/A     Years of education: N/A     Occupational History     Not on file.     Social History Main Topics     Smoking status: Current Every Day Smoker     Packs/day: 1.00     Types: Cigarettes     Smokeless tobacco: Former User      Comment: quit 2013     Alcohol use No     Drug use: No     Sexual activity: Yes     Partners: Female     Birth control/ protection: Pill     Other Topics Concern     Parent/Sibling W/ Cabg, Mi Or Angioplasty Before 65f 55m? No     Social History Narrative        Family History:  Family History   Problem Relation Age of Onset     Hypertension Mother      alive     CANCER Father      chest tumor-not sure about details,  at age of 71     Family History Negative Sister      2     Family History Negative Brother      1     Family History Negative Maternal Grandmother      Family History Negative Maternal Grandfather      Family History Negative Paternal Grandmother      Family History Negative Paternal Grandfather      Cancer - colorectal No family hx of      Prostate Cancer No family hx of        Review of Systems:  The 10 point Review of Systems is negative other than noted in  the HPI and above.    Physical Exam:  General - This is a well developed, well nourished male in no apparent distress.  HEENT - Normocephalic, atraumatic.  NO scleral icterus.  Neck - supple without masses  Lungs - respirations regular and non-labored.    Abdomen:   soft, non-distended with no tenderness noted. no masses palpated. .  Extremities - warm without edema  Neurologic - nonfocal    Relevant labs:  Liver function panel- normal  WBC- normal  Lipase- normal    Imaging:  Ultrasound RUQ: positive cholelithiasis, positive gallbladder wall thickening (0.4 cm), negative ductal dilatation (0.5 cm), negative pericholecystic fluid, positive sonographic Metcalf's sign.    Assessment and Plan:  It is my impression that he has cholelithiasis and possible cholecystitis   I have offered him a Laparoscopic cholecystectomy with cholangiograms.  We have discussed the indication, risks and expected recovery.  Risks discussed included duct/ organ injury, conversion to open surgery with increased recovery, post cholecystectomy syndromes and their treatment.   We also discussed alternatives including dissolution, lithotripsy and low fat diet. Literature was given to review.  We will work on scheduling surgery at the patient s convenience.    Corey Vang MD  [facility] [place]    Please route or send letter to:  Primary Care Provider (PCP) and Include Progress Note

## 2017-05-01 NOTE — MR AVS SNAPSHOT
"              After Visit Summary   5/1/2017    Naif Howell Jr.    MRN: [id number]           Patient Information     Date Of Birth          1965        Visit Information        Provider Department      5/1/2017 10:30 AM Corey Vang MD [facility] General Surgery      Today's Diagnoses     Cholecystitis    -  1       Follow-ups after your visit        Your next 10 appointments already scheduled     May 17, 2017  8:30 AM CDT   Return Visit with Yue Mckeon DO   [facility] ([facility])    [address]  [address]  [facility] [place] [id number] [phone]            May 18, 2017  7:30 AM CDT   SHORT with Josefina Rogers MD   [facility]    [address] [address] [phone]              Who to contact     If you have questions or need follow up information about today's clinic visit or your schedule please contact [facility] directly at [phone].  Normal or non-critical lab and imaging results will be communicated to you by Motionboxhart, letter or phone within 4 business days after the clinic has received the results. If you do not hear from us within 7 days, please contact the clinic through Motionboxhart or phone. If you have a critical or abnormal lab result, we will notify you by phone as soon as possible.  Submit refill requests through Addvocate or call your pharmacy and they will forward the refill request to us. Please allow 3 business days for your refill to be completed.          Additional Information About Your Visit        MyChart Information     Addvocate lets you send messages to your doctor, view your test results, renew your prescriptions, schedule appointments and more. To sign up, go to [email or web address] . Click on \"Log in\" on the left side of the screen, which will take you to the Welcome page. Then click " "on \"Sign up Now\" on the right side of the page.     You will be asked to enter the access code listed below, as well as some personal information. Please follow the directions to create your username and password.     Your access code is: [email or web address]  Expires: 2017 11:11 AM     Your access code will  in 90 days. If you need help or a new code, please call your [facility] or [phone].        Care EveryWhere ID     This is your Care EveryWhere ID. This could be used by other organizations to access your [facility] medical records  [id number]        Your Vitals Were     Pulse Height Pulse Oximetry BMI (Body Mass Index)          78 6' 2\" (1.88 m) 98% 25.68 kg/m2         Blood Pressure from Last 3 Encounters:   17 148/84   17 (!) 148/93   17 134/82    Weight from Last 3 Encounters:   17 200 lb (90.7 kg)   17 199 lb (90.3 kg)   17 200 lb (90.7 kg)              Today, you had the following     No orders found for display       Primary Care Provider Office Phone # Fax #    Josefina Lester Rogers [phone]       [facility] [address] [facility] [address]        Thank you!     Thank you for choosing [facility]  for your care. Our goal is always to provide you with excellent care. Hearing back from our patients is one way we can continue to improve our services. Please take a few minutes to complete the written survey that you may receive in the mail after your visit with us. Thank you!             Your Updated Medication List - Protect others around you: Learn how to safely use, store and throw away your medicines at www.disposemymeds.org.          This list is accurate as of: 17 10:46 AM.  Always use your most recent med list.                   Brand Name Dispense Instructions for use    buPROPion 150 MG 12 hr tablet    WELLBUTRIN SR    60 tablet    Take 1 tablet once daily and increase to 1 tablet twice " daily after 4 to 7 days       ibuprofen 600 MG tablet    ADVIL/MOTRIN    120 tablet    Take 1 tablet (600 mg) by mouth every 6 hours as needed for moderate pain       losartan 50 MG tablet    COZAAR    30 tablet    Take 1 tablet (50 mg) by mouth daily       multivitamin, therapeutic with minerals Tabs tablet      Take 1 tablet by mouth daily       oxyCODONE-acetaminophen 5-325 MG per tablet    PERCOCET    15 tablet    Take 1-2 tablets by mouth every 4 hours as needed for moderate to severe pain       * topiramate 25 MG tablet    TOPAMAX         * topiramate 50 MG tablet    TOPAMAX     TK 1 T PO BID       * Notice:  This list has 2 medication(s) that are the same as other medications prescribed for you. Read the directions carefully, and ask your doctor or other care provider to review them with you.

## 2017-05-01 NOTE — PROGRESS NOTES
HPI      ROS (Review of Systems):     Cardiovascular: Positive for hypertension.   GASTROINTESTINAL: Positive for abdominal pain.   MUSCULOSKELETAL: Positive for back pain.          Physical Exam

## 2017-05-01 NOTE — LETTER
May 1, 2017    RE:  Naif NIXONGuerrero Howell Jr.-:  61    Chief complaint:  Abdominal pain, epigastric     HPI:  This patient is a 52 year old  male who presents with epigastric region abdominal pain for the past 7 days. The pain is intermittent. It occurs with fatty foods. He has had 4-5 attacks, the most recent being the worst, following a bowl of ice cream      Past Medical History:   has a past medical history of anxiety; Aortic regurgitation; Basal cell carcinoma; GERD (gastroesophageal reflux disease); Hypertension; PTSD (post-traumatic stress disorder); and Vestibular migraine.     Review of Systems:  The 10 point Review of Systems is negative other than noted in the HPI and above.     Physical Exam:  General - This is a well developed, well nourished male in no apparent distress.  HEENT - Normocephalic, atraumatic. NO scleral icterus.  Neck - supple without masses  Lungs - respirations regular and non-labored.   Abdomen:  soft, non-distended with no tenderness noted. no masses palpated. .  Extremities - warm without edema  Neurologic - nonfocal     Relevant labs:  Liver function panel- normal  WBC- normal  Lipase- normal     Imaging:  Ultrasound RUQ: positive cholelithiasis, positive gallbladder wall thickening (0.4 cm), negative ductal dilatation (0.5 cm), negative pericholecystic fluid, positive sonographic Metcalf's sign.     Assessment and Plan:  It is my impression that he has cholelithiasis and possible cholecystitis   I have offered him a Laparoscopic cholecystectomy with cholangiograms. We have discussed the indication, risks and expected recovery. Risks discussed included duct/ organ injury, conversion to open surgery with increased recovery, post cholecystectomy syndromes and their treatment.   We also discussed alternatives including dissolution, lithotripsy and low fat diet. Literature was given to review.  We will work on scheduling surgery at the patient s convenience.     Corey ARCHER  MD Taj  [facility] [place]

## 2017-05-11 NOTE — H&P (VIEW-ONLY)
History     Chief Complaint:  Abdominal Pain      HPI   Naif Howell Jr. is a 52 year old male with a past medical history of vestibular migraine, hypertension who presents for evaluation of abdominal pain. The patient reports he had sudden onset of epigastric abdominal pain 2-3 hours ago and rates his pain 5/10. He states the pain is constant, but gets more intense intermittently. He also notes feeling nauseous, but denies vomiting, diarrhea, headache, dizziness or past abdominal surgery.    Allergies:  Omnicef  Cefuromine     Medications:    Topamax  Cozaar  Wellbutrin  Multivitamin  Ibuprofen    Past Medical History:    Anxiety  Aortic regurgitation  GERD  Basal cell carcinoma  Hypertension  PTSD  Vestibular migraine    Past Surgical History:    Resection of basal cell carcinoma    Family History:    Mother: Hypertension  Father: Chest tumor    Social History:  Marital Status:  Legally    Smoking status: Current every day smoker  Alcohol use: No      Review of Systems   Gastrointestinal: Positive for abdominal pain and nausea. Negative for diarrhea and vomiting.   Neurological: Negative for dizziness and headaches.   All other systems reviewed and are negative.    Physical Exam   First Vitals:  BP: (!) 164/109  Pulse: 76  Temp: 97  F (36.1  C)  Resp: 18  SpO2: 99 %    Physical Exam  Constitutional:  Oriented to person, place, and time. In distress due to pain.  HENT:   Head:    Normocephalic.   Mouth/Throat:   Oropharynx is clear and moist.   Eyes:    EOM are normal. Pupils are equal, round, and reactive to light.   Neck:    Neck supple.   Cardiovascular:  Normal rate, regular rhythm and normal heart sounds.      Exam reveals no gallop and no friction rub.       No murmur heard.  Pulmonary/Chest:  Effort normal and breath sounds normal.      No respiratory distress. No wheezes. No rales.      No reproducible chest wall pain.  Abdominal:   Soft. No distension.epigastric and RUQ tenderness. Negative  Metcalf's sign. No rebound and no guarding.   Musculoskeletal:  Normal range of motion.   Neurological:   Alert and oriented to person, place, and time.           Moves all 4 extremities spontaneously    Skin:    No rash noted. No pallor.     Emergency Department Course   ECG @ 0305  Indication: Epigastric abdominal pain  Rate 73 bpm.   UT interval 176 ms.   QRS duration 104 ms.   QT/QTc 396/436 ms.   P-R-T axes 11.  Notes: Normal sinus rhythm. STEMI? No.  Time read 0308    Imaging:  Radiographic findings were communicated with the patient who voiced understanding of the findings.    US Abdomen Limited  Preliminary Result  IMPRESSION:  1. Cholelithiasis and findings suspicious for acute cholecystitis.  2. No biliary dilatation.    Laboratory:  CBC: WBC 7.5 (WNL) HGB 16.5 (WNL)  (WNL)   CMP: Cr 0.82 (WNL) Glucose 116 (H) Calcium 8.1 (L) Protein total 6.4 (L) Rest WNL  Lipase: 135 (WNL)    Interventions:  0319: Normal saline, 1000 ml, IV  0320: Zofran, 4 mg, IV  0321: GI cocktail, 30 ml, Oral  0322: Pepcid, 20 mg, IV      ED Course:  Nursing notes and past medical history reviewed.   I performed a physical examination of the patient as documented above.  I explained the plan with the patient who consents to this.   The above workups were undertaken.   049: The patient was updated. He is feeling greatly improved.   I personally reviewed the laboratory and imaging results with the Patient and answered all related questions prior to discharge.   Findings and plan explained to the Patient. Patient discharged home with instructions regarding supportive care, medications, and reasons to return. The importance of close follow-up was reviewed.     Impression & Plan      Medical Decision Making:  Naif Howell Jr. is a 52 year old male who presented with abdominal pain and symptoms consistent with biliary colic.  Ultrasound has confirmed the presence of gallstones.  There is no clinical, laboratory, or ultrasound evidence  of cholecystitis, choledocholithiasis, gallstone pancreatitis, or ascending cholangitis.  There is no chest pain or ACS equivalent symptom to suggest the patient s symptoms are cardiac in nature. There is no lower abdominal tenderness to suggest appendicitis, diverticulitis, or other acute process. Despite ultrasound findings, he has no elevated white count. He is now significantly better withtout any narcotics and his symptoms are not consistent with acute cholelithiasis. The re-exam is benign, pain is controlled, and he is tolerating POs. I recommended avoiding fatty foods, and to return to the ED immediately for uncontrolled pain, vomiting, fever, or any other concerning symptoms. I discussed the natural history of symptomatic cholelithiasis, and I recommended outpatient follow up with general surgery in 3-5 days for further consultation and care.  Analgesics and anti-emetics have been prescribed.      Diagnosis:    ICD-10-CM    1. Biliary colic K80.50          Disposition:   Discharge to home with primary care follow up.     Discharge Medications:     New Prescriptions    ONDANSETRON (ZOFRAN ODT) 4 MG ODT TAB    Take 1 tablet (4 mg) by mouth every 8 hours as needed for nausea    OXYCODONE-ACETAMINOPHEN (PERCOCET) 5-325 MG PER TABLET    Take 1-2 tablets by mouth every 4 hours as needed for moderate to severe pain         ILeon, am serving as a scribe on 4/25/2017 at 3:12 AM to personally document services performed by Lei Novoa MD, based on my observations and the provider's statements to me.       Lei Novoa MD  04/25/17 0559

## 2017-05-12 ENCOUNTER — HOSPITAL ENCOUNTER (OUTPATIENT)
Facility: CLINIC | Age: 52
Discharge: HOME | End: 2017-05-12
Attending: SURGERY | Admitting: SURGERY
Payer: OTHER GOVERNMENT

## 2017-05-12 ENCOUNTER — APPOINTMENT (OUTPATIENT)
Dept: GENERAL RADIOLOGY | Facility: CLINIC | Age: 52
End: 2017-05-12
Attending: SURGERY
Payer: OTHER GOVERNMENT

## 2017-05-12 ENCOUNTER — APPOINTMENT (OUTPATIENT)
Dept: SURGERY | Facility: PHYSICIAN GROUP | Age: 52
End: 2017-05-12
Payer: OTHER GOVERNMENT

## 2017-05-12 ENCOUNTER — ANESTHESIA EVENT (OUTPATIENT)
Dept: SURGERY | Facility: CLINIC | Age: 52
End: 2017-05-12
Payer: OTHER GOVERNMENT

## 2017-05-12 ENCOUNTER — ANESTHESIA (OUTPATIENT)
Dept: SURGERY | Facility: CLINIC | Age: 52
End: 2017-05-12
Payer: OTHER GOVERNMENT

## 2017-05-12 VITALS
BODY MASS INDEX: 25.67 KG/M2 | WEIGHT: 200 LBS | RESPIRATION RATE: 16 BRPM | SYSTOLIC BLOOD PRESSURE: 145 MMHG | HEIGHT: 74 IN | TEMPERATURE: 95.5 F | DIASTOLIC BLOOD PRESSURE: 88 MMHG | OXYGEN SATURATION: 94 % | HEART RATE: 64 BPM

## 2017-05-12 DIAGNOSIS — K81.9 CHOLECYSTITIS: Primary | ICD-10-CM

## 2017-05-12 PROCEDURE — 25000125 ZZHC RX 250: Performed by: SURGERY

## 2017-05-12 PROCEDURE — 36000060 ZZH SURGERY LEVEL 3 W FLUORO 1ST 30 MIN: Performed by: SURGERY

## 2017-05-12 PROCEDURE — 25000125 ZZHC RX 250: Performed by: NURSE ANESTHETIST, CERTIFIED REGISTERED

## 2017-05-12 PROCEDURE — 27210995 ZZH RX 272: Performed by: SURGERY

## 2017-05-12 PROCEDURE — 71000012 ZZH RECOVERY PHASE 1 LEVEL 1 FIRST HR: Performed by: SURGERY

## 2017-05-12 PROCEDURE — 40000277 XR SURGERY CARM FLUORO LESS THAN 5 MIN W STILLS

## 2017-05-12 PROCEDURE — 40000306 ZZH STATISTIC PRE PROC ASSESS II: Performed by: SURGERY

## 2017-05-12 PROCEDURE — 25000132 ZZH RX MED GY IP 250 OP 250 PS 637: Performed by: SURGERY

## 2017-05-12 PROCEDURE — 25500064 ZZH RX 255 OP 636: Performed by: SURGERY

## 2017-05-12 PROCEDURE — 25000128 H RX IP 250 OP 636: Performed by: SURGERY

## 2017-05-12 PROCEDURE — 88304 TISSUE EXAM BY PATHOLOGIST: CPT | Mod: 26 | Performed by: SURGERY

## 2017-05-12 PROCEDURE — 37000008 ZZH ANESTHESIA TECHNICAL FEE, 1ST 30 MIN: Performed by: SURGERY

## 2017-05-12 PROCEDURE — 47563 LAPARO CHOLECYSTECTOMY/GRAPH: CPT | Mod: AS | Performed by: PHYSICIAN ASSISTANT

## 2017-05-12 PROCEDURE — 25000566 ZZH SEVOFLURANE, EA 15 MIN: Performed by: SURGERY

## 2017-05-12 PROCEDURE — 88304 TISSUE EXAM BY PATHOLOGIST: CPT | Performed by: SURGERY

## 2017-05-12 PROCEDURE — 25000125 ZZHC RX 250: Performed by: ANESTHESIOLOGY

## 2017-05-12 PROCEDURE — 37000009 ZZH ANESTHESIA TECHNICAL FEE, EACH ADDTL 15 MIN: Performed by: SURGERY

## 2017-05-12 PROCEDURE — 25800025 ZZH RX 258: Performed by: ANESTHESIOLOGY

## 2017-05-12 PROCEDURE — 71000027 ZZH RECOVERY PHASE 2 EACH 15 MINS: Performed by: SURGERY

## 2017-05-12 PROCEDURE — 47563 LAPARO CHOLECYSTECTOMY/GRAPH: CPT | Performed by: SURGERY

## 2017-05-12 PROCEDURE — 27210794 ZZH OR GENERAL SUPPLY STERILE: Performed by: SURGERY

## 2017-05-12 PROCEDURE — 25000128 H RX IP 250 OP 636: Performed by: NURSE ANESTHETIST, CERTIFIED REGISTERED

## 2017-05-12 PROCEDURE — 25000128 H RX IP 250 OP 636: Performed by: ANESTHESIOLOGY

## 2017-05-12 PROCEDURE — 36000058 ZZH SURGERY LEVEL 3 EA 15 ADDTL MIN: Performed by: SURGERY

## 2017-05-12 RX ORDER — OXYCODONE HYDROCHLORIDE 5 MG/1
5-10 TABLET ORAL
Qty: 30 TABLET | Refills: 0 | Status: ON HOLD | OUTPATIENT
Start: 2017-05-12 | End: 2017-05-24

## 2017-05-12 RX ORDER — FENTANYL CITRATE 50 UG/ML
25-50 INJECTION, SOLUTION INTRAMUSCULAR; INTRAVENOUS
Status: DISCONTINUED | OUTPATIENT
Start: 2017-05-12 | End: 2017-05-12 | Stop reason: HOSPADM

## 2017-05-12 RX ORDER — DROPERIDOL 2.5 MG/ML
0.62 INJECTION, SOLUTION INTRAMUSCULAR; INTRAVENOUS
Status: DISCONTINUED | OUTPATIENT
Start: 2017-05-12 | End: 2017-05-12 | Stop reason: RX

## 2017-05-12 RX ORDER — DEXAMETHASONE SODIUM PHOSPHATE 4 MG/ML
INJECTION, SOLUTION INTRA-ARTICULAR; INTRALESIONAL; INTRAMUSCULAR; INTRAVENOUS; SOFT TISSUE PRN
Status: DISCONTINUED | OUTPATIENT
Start: 2017-05-12 | End: 2017-05-12

## 2017-05-12 RX ORDER — BUPIVACAINE HYDROCHLORIDE AND EPINEPHRINE 2.5; 5 MG/ML; UG/ML
INJECTION, SOLUTION EPIDURAL; INFILTRATION; INTRACAUDAL; PERINEURAL PRN
Status: DISCONTINUED | OUTPATIENT
Start: 2017-05-12 | End: 2017-05-12 | Stop reason: HOSPADM

## 2017-05-12 RX ORDER — PROPOFOL 10 MG/ML
INJECTION, EMULSION INTRAVENOUS PRN
Status: DISCONTINUED | OUTPATIENT
Start: 2017-05-12 | End: 2017-05-12

## 2017-05-12 RX ORDER — KETOROLAC TROMETHAMINE 30 MG/ML
30 INJECTION, SOLUTION INTRAMUSCULAR; INTRAVENOUS ONCE
Status: COMPLETED | OUTPATIENT
Start: 2017-05-12 | End: 2017-05-12

## 2017-05-12 RX ORDER — ALBUTEROL SULFATE 0.83 MG/ML
2.5 SOLUTION RESPIRATORY (INHALATION) EVERY 4 HOURS PRN
Status: DISCONTINUED | OUTPATIENT
Start: 2017-05-12 | End: 2017-05-12 | Stop reason: HOSPADM

## 2017-05-12 RX ORDER — LEVOFLOXACIN 5 MG/ML
500 INJECTION, SOLUTION INTRAVENOUS EVERY 24 HOURS
Status: DISCONTINUED | OUTPATIENT
Start: 2017-05-12 | End: 2017-05-12 | Stop reason: HOSPADM

## 2017-05-12 RX ORDER — DIAZEPAM 10 MG/2ML
2.5 INJECTION, SOLUTION INTRAMUSCULAR; INTRAVENOUS
Status: DISCONTINUED | OUTPATIENT
Start: 2017-05-12 | End: 2017-05-12 | Stop reason: HOSPADM

## 2017-05-12 RX ORDER — DIMENHYDRINATE 50 MG/ML
25 INJECTION, SOLUTION INTRAMUSCULAR; INTRAVENOUS
Status: DISCONTINUED | OUTPATIENT
Start: 2017-05-12 | End: 2017-05-12 | Stop reason: HOSPADM

## 2017-05-12 RX ORDER — LABETALOL HYDROCHLORIDE 5 MG/ML
INJECTION, SOLUTION INTRAVENOUS PRN
Status: DISCONTINUED | OUTPATIENT
Start: 2017-05-12 | End: 2017-05-12

## 2017-05-12 RX ORDER — ONDANSETRON 2 MG/ML
4 INJECTION INTRAMUSCULAR; INTRAVENOUS EVERY 30 MIN PRN
Status: DISCONTINUED | OUTPATIENT
Start: 2017-05-12 | End: 2017-05-12 | Stop reason: HOSPADM

## 2017-05-12 RX ORDER — FENTANYL CITRATE 50 UG/ML
INJECTION, SOLUTION INTRAMUSCULAR; INTRAVENOUS PRN
Status: DISCONTINUED | OUTPATIENT
Start: 2017-05-12 | End: 2017-05-12

## 2017-05-12 RX ORDER — NEOSTIGMINE METHYLSULFATE 1 MG/ML
VIAL (ML) INJECTION PRN
Status: DISCONTINUED | OUTPATIENT
Start: 2017-05-12 | End: 2017-05-12

## 2017-05-12 RX ORDER — ACETAMINOPHEN 10 MG/ML
1000 INJECTION, SOLUTION INTRAVENOUS ONCE
Status: DISCONTINUED | OUTPATIENT
Start: 2017-05-12 | End: 2017-05-12 | Stop reason: HOSPADM

## 2017-05-12 RX ORDER — LABETALOL HYDROCHLORIDE 5 MG/ML
10 INJECTION, SOLUTION INTRAVENOUS
Status: DISCONTINUED | OUTPATIENT
Start: 2017-05-12 | End: 2017-05-12 | Stop reason: HOSPADM

## 2017-05-12 RX ORDER — MEPERIDINE HYDROCHLORIDE 25 MG/ML
12.5 INJECTION INTRAMUSCULAR; INTRAVENOUS; SUBCUTANEOUS EVERY 5 MIN PRN
Status: DISCONTINUED | OUTPATIENT
Start: 2017-05-12 | End: 2017-05-12 | Stop reason: HOSPADM

## 2017-05-12 RX ORDER — SODIUM CHLORIDE, SODIUM LACTATE, POTASSIUM CHLORIDE, CALCIUM CHLORIDE 600; 310; 30; 20 MG/100ML; MG/100ML; MG/100ML; MG/100ML
INJECTION, SOLUTION INTRAVENOUS CONTINUOUS
Status: DISCONTINUED | OUTPATIENT
Start: 2017-05-12 | End: 2017-05-12 | Stop reason: HOSPADM

## 2017-05-12 RX ORDER — ONDANSETRON 2 MG/ML
INJECTION INTRAMUSCULAR; INTRAVENOUS PRN
Status: DISCONTINUED | OUTPATIENT
Start: 2017-05-12 | End: 2017-05-12

## 2017-05-12 RX ORDER — GLYCOPYRROLATE 0.2 MG/ML
INJECTION, SOLUTION INTRAMUSCULAR; INTRAVENOUS PRN
Status: DISCONTINUED | OUTPATIENT
Start: 2017-05-12 | End: 2017-05-12

## 2017-05-12 RX ORDER — LIDOCAINE HYDROCHLORIDE 10 MG/ML
INJECTION, SOLUTION INFILTRATION; PERINEURAL PRN
Status: DISCONTINUED | OUTPATIENT
Start: 2017-05-12 | End: 2017-05-12

## 2017-05-12 RX ORDER — FENTANYL CITRATE 50 UG/ML
50 INJECTION, SOLUTION INTRAMUSCULAR; INTRAVENOUS ONCE
Status: COMPLETED | OUTPATIENT
Start: 2017-05-12 | End: 2017-05-12

## 2017-05-12 RX ORDER — ONDANSETRON 4 MG/1
4 TABLET, ORALLY DISINTEGRATING ORAL EVERY 30 MIN PRN
Status: DISCONTINUED | OUTPATIENT
Start: 2017-05-12 | End: 2017-05-12 | Stop reason: HOSPADM

## 2017-05-12 RX ORDER — OXYCODONE HYDROCHLORIDE 5 MG/1
5-10 TABLET ORAL
Status: COMPLETED | OUTPATIENT
Start: 2017-05-12 | End: 2017-05-12

## 2017-05-12 RX ADMIN — FENTANYL CITRATE 50 MCG: 50 INJECTION, SOLUTION INTRAMUSCULAR; INTRAVENOUS at 13:59

## 2017-05-12 RX ADMIN — Medication 5 MG: at 14:25

## 2017-05-12 RX ADMIN — PROCHLORPERAZINE EDISYLATE 10 MG: 5 INJECTION INTRAMUSCULAR; INTRAVENOUS at 15:39

## 2017-05-12 RX ADMIN — MIDAZOLAM HYDROCHLORIDE 2 MG: 1 INJECTION, SOLUTION INTRAMUSCULAR; INTRAVENOUS at 13:18

## 2017-05-12 RX ADMIN — SODIUM CHLORIDE, POTASSIUM CHLORIDE, SODIUM LACTATE AND CALCIUM CHLORIDE: 600; 310; 30; 20 INJECTION, SOLUTION INTRAVENOUS at 13:18

## 2017-05-12 RX ADMIN — HYDROMORPHONE HYDROCHLORIDE 1 MG: 1 INJECTION, SOLUTION INTRAMUSCULAR; INTRAVENOUS; SUBCUTANEOUS at 13:25

## 2017-05-12 RX ADMIN — KETOROLAC TROMETHAMINE 30 MG: 30 INJECTION, SOLUTION INTRAMUSCULAR at 13:09

## 2017-05-12 RX ADMIN — ONDANSETRON 4 MG: 2 INJECTION INTRAMUSCULAR; INTRAVENOUS at 15:15

## 2017-05-12 RX ADMIN — LIDOCAINE HYDROCHLORIDE 30 MG: 10 INJECTION, SOLUTION INFILTRATION; PERINEURAL at 13:25

## 2017-05-12 RX ADMIN — PHENYLEPHRINE HYDROCHLORIDE 100 MCG: 10 INJECTION, SOLUTION INTRAMUSCULAR; INTRAVENOUS; SUBCUTANEOUS at 13:40

## 2017-05-12 RX ADMIN — PROPOFOL 200 MG: 10 INJECTION, EMULSION INTRAVENOUS at 13:25

## 2017-05-12 RX ADMIN — ONDANSETRON 4 MG: 2 INJECTION INTRAMUSCULAR; INTRAVENOUS at 13:25

## 2017-05-12 RX ADMIN — FENTANYL CITRATE 50 MCG: 50 INJECTION INTRAMUSCULAR; INTRAVENOUS at 13:16

## 2017-05-12 RX ADMIN — ROCURONIUM BROMIDE 50 MG: 10 INJECTION INTRAVENOUS at 13:25

## 2017-05-12 RX ADMIN — LABETALOL HYDROCHLORIDE 20 MG: 5 INJECTION, SOLUTION INTRAVENOUS at 13:34

## 2017-05-12 RX ADMIN — DEXAMETHASONE SODIUM PHOSPHATE 8 MG: 4 INJECTION, SOLUTION INTRA-ARTICULAR; INTRALESIONAL; INTRAMUSCULAR; INTRAVENOUS; SOFT TISSUE at 13:25

## 2017-05-12 RX ADMIN — FENTANYL CITRATE 100 MCG: 50 INJECTION, SOLUTION INTRAMUSCULAR; INTRAVENOUS at 13:25

## 2017-05-12 RX ADMIN — OXYCODONE HYDROCHLORIDE 5 MG: 5 TABLET ORAL at 15:17

## 2017-05-12 RX ADMIN — LEVOFLOXACIN 500 MG: 5 INJECTION, SOLUTION INTRAVENOUS at 13:18

## 2017-05-12 RX ADMIN — GLYCOPYRROLATE 0.5 MG: 0.2 INJECTION, SOLUTION INTRAMUSCULAR; INTRAVENOUS at 14:25

## 2017-05-12 RX ADMIN — FENTANYL CITRATE 100 MCG: 50 INJECTION INTRAMUSCULAR; INTRAVENOUS at 13:10

## 2017-05-12 RX ADMIN — GLYCOPYRROLATE 0.2 MG: 0.2 INJECTION, SOLUTION INTRAMUSCULAR; INTRAVENOUS at 13:25

## 2017-05-12 NOTE — IP AVS SNAPSHOT
MRN:[id number]                      After Visit Summary   5/12/2017    Naif Howell Jr.    MRN: [id number]           Thank you!     Thank you for choosing [facility] for your care. Our goal is always to provide you with excellent care. Hearing back from our patients is one way we can continue to improve our services. Please take a few minutes to complete the written survey that you may receive in the mail after you visit. If you would like to speak to someone directly about your visit please contact Patient Relations at [phone]. Thank you!          Patient Information     Date Of Birth          1965        About your hospital stay     You were admitted on:  May 12, 2017 You last received care in the:  [facility] PreOP/PostOP    You were discharged on:  May 12, 2017       Who to Call     For medical emergencies, please call 911.  For non-urgent questions about your medical care, please call your primary care provider or clinic, [phone]  For questions related to your surgery, please call your surgery clinic        Attending Provider     Provider Specialty    Corey Vang MD General Surgery       Primary Care Provider Office Phone # Fax #    Josefina Rogers [phone]       [facility] [id number] [place] [id number]        Your next 10 appointments already scheduled     May 17, 2017  8:30 AM CDT   Return Visit with Yue Mckeon DO   [facility] ([facility])    [address] [address] [address] [phone]            May 18, 2017  7:30 AM CDT   SHORT with Josefina Rogers MD   [facility]    [address] [facility] [address] [phone]              Further instructions from your care team           HOME CARE FOLLOWING LAPAROSCOPIC CHOLECYSTECTOMY  LAMIN Mccall E. Gavin, N.  LAMIN Erwin. MISSAEL Melendez      INCISIONAL CARE:  Replace the bandage over your incisions until all drainage stops, or if more comfortable to have in place.  If present, leave the steri-strips (white paper tapes) in place until they fall off.  If Dermabond (a type of skin glue) is present, leave in place until it wears/flakes off.     BATHING:  Avoid baths for 1 week after surgery.  Showers are okay.  You may wash your hair at any time.  Gently pat your incisions dry after bathing.    ACTIVITY:  Light Activity -- you may immediately be up and about as tolerated.  Driving -- you may drive when comfortable and off narcotic pain medications.  Light Work -- resume when comfortable off pain medications.  (If you can drive, you probably can work.)  Strenuous Work/Activity -- limit lifting to 20 pounds for 1 week.  Progressively increase with time.  Active Sports (running, biking, etc.) -- cautiously resume after 2 weeks.    DISCOMFORT:  Use pain medications as prescribed by your surgeon.  Take the pain medication with some food, when possible, to minimize side effects.  Intermittent use of ice packs at the incision sites may help during the first 48 hours.  Expect gradual improvement.  You may experience shoulder pain, which is due to the air placed within your abdomen during the procedure.  This is temporary and usually passes within 2 days.    DIET:  Drink plenty of fluids.  While taking pain medications, increase dietary fiber or add a fiber supplementation like Metamucil or Citrucel to help prevent constipation - a possible side effect of pain medications.  If taking Metamucil or Citrucel, take with plenty of fluids as instructed.  It is not uncommon to experience some bowel changes (loose stools or constipation) after surgery.  Your body has to adapt to you no longer having a gall bladder.  To help minimize this side effect, avoid fatty foods for the first week after surgery.  You may then  slowly increase the amount of fatty foods in your diet.      NAUSEA:  If nauseated from the anesthetic/pain meds; rest in bed, get up cautiously with assistance, and drink clear liquids (juice, tea, broth).    RETURN APPOINTMENT:  Schedule a follow-up visit 1-3 weeks post-op (you may do this any time after surgery is scheduled).  Office Phone:  [phone]              CONTACT US IF THE FOLLOWING DEVELOPS:  1.  A fever that is above 101    2.  If there is a large amount of drainage, bleeding, or swelling.  3.  Severe pain that is not relieved by your prescription.  4.  Drainage that is thick, cloudy, yellow, green or white.  5.  Any other questions not answered by  Frequently Asked Questions  sheet.             GENERAL ANESTHESIA OR SEDATION ADULT DISCHARGE INSTRUCTIONS   SPECIAL PRECAUTIONS FOR 24 HOURS AFTER SURGERY    IT IS NOT UNUSUAL TO FEEL LIGHT-HEADED OR FAINT, UP TO 24 HOURS AFTER SURGERY OR WHILE TAKING PAIN MEDICATION.  IF YOU HAVE THESE SYMPTOMS; SIT FOR A FEW MINUTES BEFORE STANDING AND HAVE SOMEONE ASSIST YOU WHEN YOU GET UP TO WALK OR USE THE BATHROOM.    YOU SHOULD REST AND RELAX FOR THE NEXT 24 HOURS AND YOU MUST MAKE ARRANGEMENTS TO HAVE SOMEONE STAY WITH YOU FOR AT LEAST 24 HOURS AFTER YOUR DISCHARGE.  AVOID HAZARDOUS AND STRENUOUS ACTIVITIES.  DO NOT MAKE IMPORTANT DECISIONS FOR 24 HOURS.    DO NOT DRIVE ANY VEHICLE OR OPERATE MECHANICAL EQUIPMENT FOR 24 HOURS FOLLOWING THE END OF YOUR SURGERY.  EVEN THOUGH YOU MAY FEEL NORMAL, YOUR REACTIONS MAY BE AFFECTED BY THE MEDICATION YOU HAVE RECEIVED.    DO NOT DRINK ALCOHOLIC BEVERAGES FOR 24 HOURS FOLLOWING YOUR SURGERY.    DRINK CLEAR LIQUIDS (APPLE JUICE, GINGER ALE, 7-UP, BROTH, ETC.).  PROGRESS TO YOUR REGULAR DIET AS YOU FEEL ABLE.    YOU MAY HAVE A DRY MOUTH, A SORE THROAT, MUSCLES ACHES OR TROUBLE SLEEPING.  THESE SHOULD GO AWAY AFTER 24 HOURS.    CALL YOUR DOCTOR FOR ANY OF THE FOLLOWING:  SIGNS OF INFECTION (FEVER, GROWING TENDERNESS AT THE  "SURGERY SITE, A LARGE AMOUNT OF DRAINAGE OR BLEEDING, SEVERE PAIN, FOUL-SMELLING DRAINAGE, REDNESS OR SWELLING.    IT HAS BEEN OVER 8 TO 10 HOURS SINCE SURGERY AND YOU ARE STILL NOT ABLE TO URINATE (PASS WATER).       You had one oxyCODONE 5 MG IR tablet at 3:30 PM.   You had a second pain pill at 4:00 pm, you can have one or two more at 7:00 pm.                                Pending Results     Date and Time Order Name Status Description    2017 1411 Surgical pathology exam In process             Admission Information     Date & Time Provider Department Dept. Phone    2017 Corey Vang MD [facility] PreOP/PostOP [phone]      Your Vitals Were     Blood Pressure Pulse Temperature Respirations Height Weight    135/90 (BP Location: Right arm) 64 95.5  F (35.3  C) (Temporal) 20 1.88 m (6' 2\") 90.7 kg (200 lb)    Pulse Oximetry BMI (Body Mass Index)                94% 25.68 kg/m2          [employer] Information     [employer] lets you send messages to your doctor, view your test results, renew your prescriptions, schedule appointments and more. To sign up, go to [email or web address] . Click on \"Log in\" on the left side of the screen, which will take you to the Welcome page. Then click on \"Sign up Now\" on the right side of the page.     You will be asked to enter the access code listed below, as well as some personal information. Please follow the directions to create your username and password.     Your access code is: [email or web address]  Expires: 2017 11:11 AM     Your access code will  in 90 days. If you need help or a new code, please call your [facility] clinic or [phone].        Care EveryWhere ID     This is your Care EveryWhere ID. This could be used by other organizations to access your [facility] medical records  [id number]           Review of your medicines      START taking        Dose / Directions    oxyCODONE 5 MG IR tablet   Commonly known as:  ROXICODONE   Used for:  " Cholecystitis        Dose:  5-10 mg   Take 1-2 tablets (5-10 mg) by mouth every 3 hours as needed for pain or other (Moderate to Severe)   Quantity:  30 tablet   Refills:  0         CONTINUE these medicines which have NOT CHANGED        Dose / Directions    buPROPion 150 MG 12 hr tablet   Commonly known as:  WELLBUTRIN SR   Used for:  Smoker        Take 1 tablet once daily and increase to 1 tablet twice daily after 4 to 7 days   Quantity:  60 tablet   Refills:  2       ibuprofen 600 MG tablet   Commonly known as:  ADVIL/MOTRIN        Dose:  600 mg   Take 1 tablet (600 mg) by mouth every 6 hours as needed for moderate pain   Quantity:  120 tablet   Refills:  0       losartan 50 MG tablet   Commonly known as:  COZAAR   Used for:  Benign essential hypertension        Dose:  50 mg   Take 1 tablet (50 mg) by mouth daily   Quantity:  30 tablet   Refills:  1       multivitamin, therapeutic with minerals Tabs tablet        Dose:  1 tablet   Take 1 tablet by mouth daily   Refills:  0       oxyCODONE-acetaminophen 5-325 MG per tablet   Commonly known as:  PERCOCET        Dose:  1-2 tablet   Take 1-2 tablets by mouth every 4 hours as needed for moderate to severe pain   Quantity:  15 tablet   Refills:  0       topiramate 50 MG tablet   Commonly known as:  TOPAMAX   Used for:  Benign essential hypertension        TK 1 T PO BID   Refills:  1            Where to get your medicines      Some of these will need a paper prescription and others can be bought over the counter. Ask your nurse if you have questions.     Bring a paper prescription for each of these medications     oxyCODONE 5 MG IR tablet                Protect others around you: Learn how to safely use, store and throw away your medicines at www.disposemymeds.org.             Medication List: This is a list of all your medications and when to take them. Check marks below indicate your daily home schedule. Keep this list as a reference.      Medications           Morning  Afternoon Evening Bedtime As Needed    buPROPion 150 MG 12 hr tablet   Commonly known as:  WELLBUTRIN SR   Take 1 tablet once daily and increase to 1 tablet twice daily after 4 to 7 days                                ibuprofen 600 MG tablet   Commonly known as:  ADVIL/MOTRIN   Take 1 tablet (600 mg) by mouth every 6 hours as needed for moderate pain                                losartan 50 MG tablet   Commonly known as:  COZAAR   Take 1 tablet (50 mg) by mouth daily                                multivitamin, therapeutic with minerals Tabs tablet   Take 1 tablet by mouth daily                                oxyCODONE 5 MG IR tablet   Commonly known as:  ROXICODONE   Take 1-2 tablets (5-10 mg) by mouth every 3 hours as needed for pain or other (Moderate to Severe)   Last time this was given:  5 mg on 5/12/2017  3:17 PM                                oxyCODONE-acetaminophen 5-325 MG per tablet   Commonly known as:  PERCOCET   Take 1-2 tablets by mouth every 4 hours as needed for moderate to severe pain                                topiramate 50 MG tablet   Commonly known as:  TOPAMAX   TK 1 T PO BID

## 2017-05-12 NOTE — ANESTHESIA CARE TRANSFER NOTE
Patient: Naif Howell Jr.    Procedure(s):  LAPAROSCOPIC CHOLECYSTECTOMY WITH CHOLANGIOGRAMS  - Wound Class: II-Clean Contaminated    Diagnosis: cholecystitis  Diagnosis Additional Information: No value filed.    Anesthesia Type:   General, ETT     Note:  Airway :Face Mask  Patient transferred to:PACU  Comments: Awake, good spontaneous respirations, vital signs stable, follows commands,suctioned, then extubated with balloon down.  Good Respirations.      Vitals: (Last set prior to Anesthesia Care Transfer)    CRNA VITALS  5/12/2017 1404 - 5/12/2017 1442      5/12/2017             Pulse: 59    SpO2: 99 %    Resp Rate (observed): 9                Electronically Signed By: REBECA Johnson CRNA  May 12, 2017  2:42 PM

## 2017-05-12 NOTE — IP AVS SNAPSHOT
[facility] PreOP/PostOP    [address]    [facility] [address]    Phone:  [phone]    Fax:  [phone]                                       After Visit Summary   5/12/2017    Naif Howell Jr.    MRN: [id number]           After Visit Summary Signature Page     I have received my discharge instructions, and my questions have been answered. I have discussed any challenges I see with this plan with the nurse or doctor.    ..........................................................................................................................................  Patient/Patient Representative Signature      ..........................................................................................................................................  Patient Representative Print Name and Relationship to Patient    ..................................................               ................................................  Date                                            Time    ..........................................................................................................................................  Reviewed by Signature/Title    ...................................................              ..............................................  Date                                                            Time

## 2017-05-12 NOTE — DISCHARGE INSTRUCTIONS
HOME CARE FOLLOWING LAPAROSCOPIC CHOLECYSTECTOMY  ALMIN Mccall, KEVIN Calderón, LAMIN Garcia. MISSAEL Melendez      INCISIONAL CARE:  Replace the bandage over your incisions until all drainage stops, or if more comfortable to have in place.  If present, leave the steri-strips (white paper tapes) in place until they fall off.  If Dermabond (a type of skin glue) is present, leave in place until it wears/flakes off.     BATHING:  Avoid baths for 1 week after surgery.  Showers are okay.  You may wash your hair at any time.  Gently pat your incisions dry after bathing.    ACTIVITY:  Light Activity -- you may immediately be up and about as tolerated.  Driving -- you may drive when comfortable and off narcotic pain medications.  Light Work -- resume when comfortable off pain medications.  (If you can drive, you probably can work.)  Strenuous Work/Activity -- limit lifting to 20 pounds for 1 week.  Progressively increase with time.  Active Sports (running, biking, etc.) -- cautiously resume after 2 weeks.    DISCOMFORT:  Use pain medications as prescribed by your surgeon.  Take the pain medication with some food, when possible, to minimize side effects.  Intermittent use of ice packs at the incision sites may help during the first 48 hours.  Expect gradual improvement.  You may experience shoulder pain, which is due to the air placed within your abdomen during the procedure.  This is temporary and usually passes within 2 days.    DIET:  Drink plenty of fluids.  While taking pain medications, increase dietary fiber or add a fiber supplementation like Metamucil or Citrucel to help prevent constipation - a possible side effect of pain medications.  If taking Metamucil or Citrucel, take with plenty of fluids as instructed.  It is not uncommon to experience some bowel changes (loose stools or constipation) after surgery.  Your body has to adapt to you no longer having a gall bladder.  To help  minimize this side effect, avoid fatty foods for the first week after surgery.  You may then slowly increase the amount of fatty foods in your diet.      NAUSEA:  If nauseated from the anesthetic/pain meds; rest in bed, get up cautiously with assistance, and drink clear liquids (juice, tea, broth).    RETURN APPOINTMENT:  Schedule a follow-up visit 1-3 weeks post-op (you may do this any time after surgery is scheduled).  Office Phone:  [phone]              CONTACT US IF THE FOLLOWING DEVELOPS:  1.  A fever that is above 101    2.  If there is a large amount of drainage, bleeding, or swelling.  3.  Severe pain that is not relieved by your prescription.  4.  Drainage that is thick, cloudy, yellow, green or white.  5.  Any other questions not answered by  Frequently Asked Questions  sheet.             GENERAL ANESTHESIA OR SEDATION ADULT DISCHARGE INSTRUCTIONS   SPECIAL PRECAUTIONS FOR 24 HOURS AFTER SURGERY    IT IS NOT UNUSUAL TO FEEL LIGHT-HEADED OR FAINT, UP TO 24 HOURS AFTER SURGERY OR WHILE TAKING PAIN MEDICATION.  IF YOU HAVE THESE SYMPTOMS; SIT FOR A FEW MINUTES BEFORE STANDING AND HAVE SOMEONE ASSIST YOU WHEN YOU GET UP TO WALK OR USE THE BATHROOM.    YOU SHOULD REST AND RELAX FOR THE NEXT 24 HOURS AND YOU MUST MAKE ARRANGEMENTS TO HAVE SOMEONE STAY WITH YOU FOR AT LEAST 24 HOURS AFTER YOUR DISCHARGE.  AVOID HAZARDOUS AND STRENUOUS ACTIVITIES.  DO NOT MAKE IMPORTANT DECISIONS FOR 24 HOURS.    DO NOT DRIVE ANY VEHICLE OR OPERATE MECHANICAL EQUIPMENT FOR 24 HOURS FOLLOWING THE END OF YOUR SURGERY.  EVEN THOUGH YOU MAY FEEL NORMAL, YOUR REACTIONS MAY BE AFFECTED BY THE MEDICATION YOU HAVE RECEIVED.    DO NOT DRINK ALCOHOLIC BEVERAGES FOR 24 HOURS FOLLOWING YOUR SURGERY.    DRINK CLEAR LIQUIDS (APPLE JUICE, GINGER ALE, 7-UP, BROTH, ETC.).  PROGRESS TO YOUR REGULAR DIET AS YOU FEEL ABLE.    YOU MAY HAVE A DRY MOUTH, A SORE THROAT, MUSCLES ACHES OR TROUBLE SLEEPING.  THESE SHOULD GO AWAY AFTER 24 HOURS.    CALL  YOUR DOCTOR FOR ANY OF THE FOLLOWING:  SIGNS OF INFECTION (FEVER, GROWING TENDERNESS AT THE SURGERY SITE, A LARGE AMOUNT OF DRAINAGE OR BLEEDING, SEVERE PAIN, FOUL-SMELLING DRAINAGE, REDNESS OR SWELLING.    IT HAS BEEN OVER 8 TO 10 HOURS SINCE SURGERY AND YOU ARE STILL NOT ABLE TO URINATE (PASS WATER).       You had one oxyCODONE 5 MG IR tablet at 3:30 PM.   You had a second pain pill at 4:00 pm, you can have one or two more at 7:00 pm.

## 2017-05-12 NOTE — BRIEF OP NOTE
[facility] Brief Operative Note    Pre-operative diagnosis: cholecystitis   Post-operative diagnosis same   Procedure: Procedure(s):  LAPAROSCOPIC CHOLECYSTECTOMY WITH CHOLANGIOGRAMS  - Wound Class: II-Clean Contaminated   Surgeon(s): Surgeon(s) and Role:     * Corey Vang MD - Primary     * Shantell Benson PA-C - Assisting   Estimated blood loss: 5 mL    Specimens:   ID Type Source Tests Collected by Time Destination   A :  Tissue Gallbladder and Contents SURGICAL PATHOLOGY EXAM Corey Vang MD 5/12/2017  2:11 PM       Findings: Nl cholangiogram

## 2017-05-12 NOTE — ANESTHESIA PREPROCEDURE EVALUATION
Anesthesia Evaluation     . Pt has had prior anesthetic. Type: General    No history of anesthetic complications          ROS/MED HX    ENT/Pulmonary:  - neg pulmonary ROS     Neurologic:     (+)migraines,     Cardiovascular:     (+) hypertension----. : . . . :. .       METS/Exercise Tolerance:     Hematologic:  - neg hematologic  ROS       Musculoskeletal:  - neg musculoskeletal ROS       GI/Hepatic:     (+) cholecystitis/cholelithiasis,       Renal/Genitourinary:  - ROS Renal section negative       Endo:  - neg endo ROS       Psychiatric:     (+) psychiatric history other (comment) (PTSD)      Infectious Disease:  - neg infectious disease ROS       Malignancy:      - no malignancy   Other:    - neg other ROS                 Physical Exam  Normal systems: cardiovascular, pulmonary and dental    Airway   Mallampati: II  TM distance: >3 FB  Neck ROM: full    Dental     Cardiovascular   Rhythm and rate: regular and normal      Pulmonary    breath sounds clear to auscultation    Other findings: Lab Test        04/25/17 03/28/17 11/22/16      --          09/09/11                       0307          2147          1357           --           1150          WBC          7.5          9.1          10.5           < >        7.4           HGB          16.5         17.4         17.5           < >        15.9          MCV          83           83           84             < >        84            PLT          240          264          253            < >        222           INR           --           --           --           --          1.01           < > = values in this interval not displayed.                  Lab Test        04/25/17 03/28/17 11/22/16                       0307          2147          1357          NA           138          141          137           POTASSIUM    3.5          3.3*         3.6           CHLORIDE     105          103          103           CO2          25           27            27            BUN          19           16           17            CR           0.82         0.94         0.81          ANIONGAP     8            11           7             ADELSO          8.1*         8.8          8.9           GLC          116*         137*         96                   EKG Interpretation:     Rate 73 bpm.   WI interval 176 ms.   QRS duration 104 ms.   QT/QTc 396/436 ms.   P-R-T axes 11.  Notes: Normal sinus rhythm.                Anesthesia Plan      History & Physical Review  History and physical reviewed and following examination; no interval change.    ASA Status:  2 .    NPO Status:  > 8 hours    Plan for General and ETT with Intravenous induction. Maintenance will be Balanced.    PONV prophylaxis:  Ondansetron (or other 5HT-3) and Dexamethasone or Solumedrol       Postoperative Care  Postoperative pain management:  IV analgesics, Oral pain medications and Multi-modal analgesia.      Consents  Anesthetic plan, risks, benefits and alternatives discussed with:  Patient.  Use of blood products discussed: No .   .                          .

## 2017-05-12 NOTE — ANESTHESIA POSTPROCEDURE EVALUATION
Patient: Naif Howell Jr.    Procedure(s):  LAPAROSCOPIC CHOLECYSTECTOMY WITH CHOLANGIOGRAMS  - Wound Class: II-Clean Contaminated    Diagnosis:cholecystitis  Diagnosis Additional Information: No value filed.    Anesthesia Type:  General, ETT    Note:  Anesthesia Post Evaluation    Patient location during evaluation: PACU  Patient participation: Able to fully participate in evaluation  Level of consciousness: awake and sleepy but conscious  Pain management: adequate  Airway patency: patent  Cardiovascular status: acceptable  Respiratory status: acceptable  Hydration status: acceptable  PONV: none             Last vitals:  Vitals:    05/12/17 1239   BP: (!) 144/95   Pulse: 92   Temp: 98.6  F (37  C)   SpO2: 99%         Electronically Signed By: Aj Bryant MD  May 12, 2017  2:38 PM

## 2017-05-13 ENCOUNTER — HOSPITAL ENCOUNTER (EMERGENCY)
Facility: CLINIC | Age: 52
Discharge: HOME | End: 2017-05-13
Attending: EMERGENCY MEDICINE | Admitting: EMERGENCY MEDICINE
Payer: OTHER GOVERNMENT

## 2017-05-13 VITALS
DIASTOLIC BLOOD PRESSURE: 120 MMHG | RESPIRATION RATE: 16 BRPM | OXYGEN SATURATION: 95 % | SYSTOLIC BLOOD PRESSURE: 175 MMHG | TEMPERATURE: 97.7 F

## 2017-05-13 DIAGNOSIS — I10 ESSENTIAL HYPERTENSION: ICD-10-CM

## 2017-05-13 DIAGNOSIS — R11.2 NON-INTRACTABLE VOMITING WITH NAUSEA, UNSPECIFIED VOMITING TYPE: ICD-10-CM

## 2017-05-13 DIAGNOSIS — R73.9 HYPERGLYCEMIA: ICD-10-CM

## 2017-05-13 DIAGNOSIS — E87.6 HYPOKALEMIA: ICD-10-CM

## 2017-05-13 DIAGNOSIS — G89.18 ACUTE POST-OPERATIVE PAIN: ICD-10-CM

## 2017-05-13 LAB
ANION GAP SERPL CALCULATED.3IONS-SCNC: 10 MMOL/L (ref 3–14)
BUN SERPL-MCNC: 20 MG/DL (ref 7–30)
CALCIUM SERPL-MCNC: 9.6 MG/DL (ref 8.5–10.1)
CHLORIDE SERPL-SCNC: 101 MMOL/L (ref 94–109)
CO2 SERPL-SCNC: 24 MMOL/L (ref 20–32)
CREAT SERPL-MCNC: 0.92 MG/DL (ref 0.66–1.25)
GFR SERPL CREATININE-BSD FRML MDRD: 86 ML/MIN/1.7M2
GLUCOSE SERPL-MCNC: 161 MG/DL (ref 70–99)
POTASSIUM SERPL-SCNC: 3.3 MMOL/L (ref 3.4–5.3)
SODIUM SERPL-SCNC: 135 MMOL/L (ref 133–144)

## 2017-05-13 PROCEDURE — 96374 THER/PROPH/DIAG INJ IV PUSH: CPT

## 2017-05-13 PROCEDURE — 25000128 H RX IP 250 OP 636

## 2017-05-13 PROCEDURE — 80048 BASIC METABOLIC PNL TOTAL CA: CPT | Performed by: EMERGENCY MEDICINE

## 2017-05-13 PROCEDURE — 25000132 ZZH RX MED GY IP 250 OP 250 PS 637: Performed by: EMERGENCY MEDICINE

## 2017-05-13 PROCEDURE — 25000128 H RX IP 250 OP 636: Performed by: EMERGENCY MEDICINE

## 2017-05-13 PROCEDURE — 99285 EMERGENCY DEPT VISIT HI MDM: CPT | Mod: 25

## 2017-05-13 PROCEDURE — 96361 HYDRATE IV INFUSION ADD-ON: CPT

## 2017-05-13 PROCEDURE — 96375 TX/PRO/DX INJ NEW DRUG ADDON: CPT

## 2017-05-13 RX ORDER — POTASSIUM CHLORIDE 1500 MG/1
20 TABLET, EXTENDED RELEASE ORAL ONCE
Status: COMPLETED | OUTPATIENT
Start: 2017-05-13 | End: 2017-05-13

## 2017-05-13 RX ORDER — MORPHINE SULFATE 4 MG/ML
4 INJECTION, SOLUTION INTRAMUSCULAR; INTRAVENOUS
Status: DISCONTINUED | OUTPATIENT
Start: 2017-05-13 | End: 2017-05-13 | Stop reason: HOSPADM

## 2017-05-13 RX ORDER — SODIUM CHLORIDE 9 MG/ML
1000 INJECTION, SOLUTION INTRAVENOUS CONTINUOUS
Status: DISCONTINUED | OUTPATIENT
Start: 2017-05-13 | End: 2017-05-13 | Stop reason: HOSPADM

## 2017-05-13 RX ORDER — LIDOCAINE 40 MG/G
CREAM TOPICAL
Status: DISCONTINUED | OUTPATIENT
Start: 2017-05-13 | End: 2017-05-13 | Stop reason: HOSPADM

## 2017-05-13 RX ORDER — OXYCODONE HYDROCHLORIDE 5 MG/1
10 TABLET ORAL ONCE
Status: COMPLETED | OUTPATIENT
Start: 2017-05-13 | End: 2017-05-13

## 2017-05-13 RX ORDER — ONDANSETRON 2 MG/ML
4 INJECTION INTRAMUSCULAR; INTRAVENOUS EVERY 30 MIN PRN
Status: DISCONTINUED | OUTPATIENT
Start: 2017-05-13 | End: 2017-05-13 | Stop reason: HOSPADM

## 2017-05-13 RX ORDER — ONDANSETRON 4 MG/1
4 TABLET, ORALLY DISINTEGRATING ORAL EVERY 8 HOURS PRN
Qty: 10 TABLET | Refills: 0 | Status: ON HOLD | OUTPATIENT
Start: 2017-05-13 | End: 2017-05-24

## 2017-05-13 RX ORDER — MORPHINE SULFATE 4 MG/ML
INJECTION, SOLUTION INTRAMUSCULAR; INTRAVENOUS
Status: COMPLETED
Start: 2017-05-13 | End: 2017-05-13

## 2017-05-13 RX ORDER — LOSARTAN POTASSIUM 50 MG/1
50 TABLET ORAL ONCE
Status: COMPLETED | OUTPATIENT
Start: 2017-05-13 | End: 2017-05-13

## 2017-05-13 RX ADMIN — MORPHINE SULFATE 4 MG: 4 INJECTION, SOLUTION INTRAMUSCULAR; INTRAVENOUS at 14:51

## 2017-05-13 RX ADMIN — ONDANSETRON 4 MG: 2 INJECTION INTRAMUSCULAR; INTRAVENOUS at 14:50

## 2017-05-13 RX ADMIN — SODIUM CHLORIDE 1000 ML: 9 INJECTION, SOLUTION INTRAVENOUS at 14:50

## 2017-05-13 RX ADMIN — OXYCODONE HYDROCHLORIDE 10 MG: 5 TABLET ORAL at 15:41

## 2017-05-13 RX ADMIN — LOSARTAN POTASSIUM 50 MG: 50 TABLET ORAL at 16:22

## 2017-05-13 RX ADMIN — SODIUM CHLORIDE 1000 ML: 9 INJECTION, SOLUTION INTRAVENOUS at 15:47

## 2017-05-13 RX ADMIN — POTASSIUM CHLORIDE 20 MEQ: 1500 TABLET, EXTENDED RELEASE ORAL at 15:41

## 2017-05-13 ASSESSMENT — ENCOUNTER SYMPTOMS
FEVER: 0
VOMITING: 1
NAUSEA: 1
DIARRHEA: 0

## 2017-05-13 NOTE — ED AVS SNAPSHOT
[facility] Emergency Department    [address]    [facility] [address]    Phone:  [phone]    Fax:  [phone]                                       Naif Howell Jr.   MRN: [id number]    Department:  [facility] Emergency Department   Date of Visit:  5/13/2017           Patient Information     Date Of Birth          1965        Your diagnoses for this visit were:     Non-intractable vomiting with nausea, unspecified vomiting type     Acute post-operative pain     Essential hypertension     Hypokalemia     Hyperglycemia        You were seen by Aleena Stovall MD.      Follow-up Information     Follow up with Your surgeon.    Why:  Monday as needed        Follow up with [facility] Emergency Department.    Specialty:  EMERGENCY MEDICINE    Why:  As needed, If symptoms worsen    Contact information:    [address]  [facility] [id number] [phone]        Follow up with Josefina Rogers MD.    Specialty:  Family Practice    Why:  3-5 days for reassessment, recheck of potassium and fasting blood sugar    Contact information:    [facility]  [address] [address] [facility] [address]  [phone]          Discharge Instructions         Discharge Instructions  Vomiting    You have been seen today for vomiting. This may be due to your recent surgery and pain. At this time your doctor does not find that your vomiting is a sign of anything dangerous or life-threatening. However, sometimes the signs of serious illness do not show up right away. If you have new or worse symptoms, you may need to be seen again in the emergency department or by your primary doctor. Remember that serious problems like appendicitis can start as vomiting.     Return to the Emergency Department if:    You keep throwing up and you are not able to keep liquids down.     You feel you are getting dehydrated, such as being very thirsty, not urinating  at least every 8-12 hours, or feeling faint or lightheaded.     You develop a new fever, or your fever continues for more than 2 days.     You have belly pain that seems worse than cramps, is in one spot, or is getting worse over time.     You have blood in your vomit or stools.     You feel very weak.    You are not starting to improve within 24 hours of your visit here.     What can I do to help myself?    The most important thing to do is to drink clear liquids. If you have been vomiting a lot, it is best to have only small, frequent sips of liquids. Drinking too much at once may cause more vomiting. If you are vomiting often, you must replace minerals, sodium and potassium lost with your illness. Pedialyte  and sports drinks can help you replace these minerals. You can also drink clear liquids such as water, weak tea, apple juice, and 7-Up . Avoid acid liquids (orange), caffeine (coffee) or alcohol. Do not drink milk until you no longer have diarrhea.     After liquids are staying down, you may start eating mild foods. Soda crackers, toast, plain noodles, gelatin, applesauce and bananas are good first choices. Avoid foods that have acid, are spicy, fatty or have a lot of fiber (such as meats, coarse grains, vegetables). You may start eating these foods again in about 3 days when you are better.     Sometimes treatment includes prescription medicine to prevent nausea and vomiting. If your doctor prescribes these for you, take them as directed.     Don t take ibuprofen, or other nonsteroidal anti-inflammatory medicines without checking with your healthcare provider.   If you were given a prescription for medicine here today, be sure to read all of the information (including the package insert) that comes with your prescription.  This will include important information about the medicine, its side effects, and any warnings that you need to know about.  The pharmacist who fills the prescription can provide more  information and answer questions you may have about the medicine.  If you have questions or concerns that the pharmacist cannot address, please call or return to the Emergency Department.       Opioid Medication Information    Pain medications are among the most commonly prescribed medicines, so we are including this information for all our patients. If you did not receive pain medication or get a prescription for pain medicine, you can ignore it.     You may have been given a prescription for an opioid (narcotic) pain medicine and/or have received a pain medicine while here in the Emergency Department. These medicines can make you drowsy or impaired. You must not drive, operate dangerous equipment, or engage in any other dangerous activities while taking these medications. If you drive while taking these medications, you could be arrested for DUI, or driving under the influence. Do not drink any alcohol while you are taking these medications.     Opioid pain medications can cause addiction. If you have a history of chemical dependency of any type, you are at a higher risk of becoming addicted to pain medications.  Only take these prescribed medications to treat your pain when all other options have been tried. Take it for as short a time and as few doses as possible. Store your pain pills in a secure place, as they are frequently stolen and provide a dangerous opportunity for children or visitors in your house to start abusing these powerful medications. We will not replace any lost or stolen medicine.  As soon as your pain is better, you should flush all your remaining medication.     Many prescription pain medications contain Tylenol  (acetaminophen), including Vicodin , Tylenol #3 , Norco , Lortab , and Percocet .  You should not take any extra pills of Tylenol  if you are using these prescription medications or you can get very sick.  Do not ever take more than 3000 mg of acetaminophen in any 24 hour  period.    All opioids tend to cause constipation. Drink plenty of water and eat foods that have a lot of fiber, such as fruits, vegetables, prune juice, apple juice and high fiber cereal.  Take a laxative if you don t move your bowels at least every other day. Miralax , Milk of Magnesia, Colace , or Senna  can be used to keep you regular.      Remember that you can always come back to the Emergency Department if you are not able to see your regular doctor in the amount of time listed above, if you get any new symptoms, or if there is anything that worries you.        Hypokalemia  Hypokalemia means a low level of potassium in the blood. This most often occurs in patients who take diuretics (water pills). It can also occur due to severe vomiting or diarrhea.  It is also seen in people who take laxatives for long periods of time.  A mild case usually causes no symptoms. It is only found with blood testing. More severe potassium loss causes generalized weakness, muscle or abdominal cramping, heart palpitations (rapid or irregular heartbeats) and low blood pressure.  Home care    Take any potassium supplements prescribed.    Eat foods rich in potassium. The highest amount is found in artichoke, baked potatoes, spinach, cantaloupe, honeydew melon, cod, halibut, salmon, and scallops. White, red, or roberts beans are also very good sources. A modest amount is found in orange juice, bananas, carrots, and tomato juice.    If you take certain types of diuretics, you will also need to take potassium supplements. If take a diuretic, discuss potassium supplements with your doctor.  Follow-up care  Follow up with your healthcare provider for a repeat blood test within the next week or as advised by our staff.  When to seek medical advice  Call your healthcare provider if any of the following occur:    Increased weakness, fatigue, muscle cramps    Dizziness  Call 911  Call 911 if any of the following occur:    Irregular heartbeat,  extra beats or very fast heart rate    Loss of consciousness    2208-0722 [employer]. [address] [place] [address], [place] [address]. All rights reserved. This information is not intended as a substitute for professional medical care. Always follow your healthcare professional's instructions.      Discharge Instructions  Hypertension - High Blood Pressure    During you visit to the Emergency Department, your blood pressure was higher than the recommended blood pressure.  This may be related to stress, pain, medication or other temporary conditions. In these cases, your blood pressure may return to normal on its own. If you have a history of high blood pressure, you may need to have your doctor adjust your medications. Sometimes, your high measurement here may indicate that you have developed high blood pressure that will stay high unless it is treated. Sudden very high blood pressure can cause problems, but usually high blood pressure causes problems over months to years.      Blood pressure is almost never lowered in the Emergency Department, because studies have shown that lowering blood pressure too quickly is much more dangerous than leaving it alone.    You need to follow up with your doctor in 1-3 days to get your blood pressure rechecked.     Return to the Emergency Department if you start to have:    A severe headache.    Chest pain.    Shortness of breath.    Weakness or numbness that affects one part of the body.    Confusion.    Vision changes.    Significant swelling of legs and/or eyes.    A reaction to any medication started in the Emergency Department.    What can I do to help myself?    Avoid alcohol.    Take any blood pressure medicine that you are prescribed.    Get a good night s sleep.    Lower your salt intake.    Exercise.    Lose weight.    Manage stress.    If blood pressure medication was started in the Emergency Department:    The medicine may not have an immediate effect. The  body and brain determine what blood pressure you have. The medicine s job is to retrain the body s  thermostat  to a lower blood pressure.    You will need to follow up with your doctor to see how this medicine is working for you.  If you were given a prescription for medicine here today, be sure to read all of the information (including the package insert) that comes with your prescription.  This will include important information about the medicine, its side effects, and any warnings that you need to know about.  The pharmacist who fills the prescription can provide more information and answer questions you may have about the medicine.  If you have questions or concerns that the pharmacist cannot address, please call or return to the Emergency Department.   Remember that you can always come back to the Emergency Department if you are not able to see your regular doctor in the amount of time listed above, if you get any new symptoms, or if there is anything that worries you.  Discharge Instructions  Hyperglycemia, High Blood Sugar    Today we found your blood sugar (glucose) was high. It may be due to stress from recent surgery and pain. This also may mean that you have developed diabetes, or if you already know that you have diabetes, it may mean that your diabetes is not as well controlled as it should be. Signs of elevated blood sugar include increased thirst, frequent urination, blurred vision, fatigue, unexplained weight loss, poor wound healing, and frequent infections.     We sometimes give medicine in the Emergency Department to lower the blood sugar. We may also prescribe medicine for you to use at home, or increase the medicine that you already take. While we don t like to see your blood sugar high, it is much more dangerous to let your blood sugar get too low, so it is reasonable to take time to bring it down, or to wait and watch to see if it comes down on its own.     It is very important that you go  to see your regular doctor to have additional tests to see what the high blood sugar test means in your case. See your doctor as directed today, or within 1 week.    Return to the Emergency Department if you develop:    Nausea and vomiting.    Confusion, disorientation, or being unable to wake up.    Fever greater than 101.5.    Blood sugar greater than 400.    Abdominal pain.    What can I do to help myself?    Check your blood sugar as instructed by your doctor.    Take medications prescribed by your doctor.    Follow a diabetic diet (low fat, low concentrated sweets, high fiber).    Exercise regularly.    Moderate or eliminate alcohol use.    Stop smoking.    Diabetes mellitus is a disease in which the body cannot regulate the amount of sugar (glucose) in the blood. Insulin allows glucose to move out of the blood into cells throughout the body where it is used for fuel. People with diabetes do not produce enough insulin (type 1 diabetes), or cannot use insulin properly (type 2 diabetes), or both. This starves the cells that need the glucose for fuel, and also harms certain organs and tissues exposed to the high glucose levels.  Over a long period of time, uncontrolled diabetes can lead to heart and blood vessel disease, blindness, kidney failure, foot ulcers and many other problems.    About 17 million Americans (6.2% of adults) have diabetes. We think that about one third of adults with diabetes do not know they have diabetes.  The incidence of diabetes is increasing rapidly. This increase is due to many factors, but the most significant are our increasing weight and decreased activity levels.     Diabetes can be a very serious and life-threatening illness if not treated.  If you were given a prescription for medicine here today, be sure to read all of the information (including the package insert) that comes with your prescription.  This will include important information about the medicine, its side effects,  and any warnings that you need to know about.  The pharmacist who fills the prescription can provide more information and answer questions you may have about the medicine.  If you have questions or concerns that the pharmacist cannot address, please call or return to the Emergency Department.   Opioid Medication Information    Pain medications are among the most commonly prescribed medicines, so we are including this information for all our patients. If you did not receive pain medication or get a prescription for pain medicine, you can ignore it.     You may have been given a prescription for an opioid (narcotic) pain medicine and/or have received a pain medicine while here in the Emergency Department. These medicines can make you drowsy or impaired. You must not drive, operate dangerous equipment, or engage in any other dangerous activities while taking these medications. If you drive while taking these medications, you could be arrested for DUI, or driving under the influence. Do not drink any alcohol while you are taking these medications.     Opioid pain medications can cause addiction. If you have a history of chemical dependency of any type, you are at a higher risk of becoming addicted to pain medications.  Only take these prescribed medications to treat your pain when all other options have been tried. Take it for as short a time and as few doses as possible. Store your pain pills in a secure place, as they are frequently stolen and provide a dangerous opportunity for children or visitors in your house to start abusing these powerful medications. We will not replace any lost or stolen medicine.  As soon as your pain is better, you should flush all your remaining medication.     Many prescription pain medications contain Tylenol  (acetaminophen), including Vicodin , Tylenol #3 , Norco , Lortab , and Percocet .  You should not take any extra pills of Tylenol  if you are using these prescription medications or  you can get very sick.  Do not ever take more than 3000 mg of acetaminophen in any 24 hour period.    All opioids tend to cause constipation. Drink plenty of water and eat foods that have a lot of fiber, such as fruits, vegetables, prune juice, apple juice and high fiber cereal.  Take a laxative if you don t move your bowels at least every other day. Miralax , Milk of Magnesia, Colace , or Senna  can be used to keep you regular.      Remember that you can always come back to the Emergency Department if you are not able to see your regular doctor in the amount of time listed above, if you get any new symptoms, or if there is anything that worries you.        Future Appointments        Provider Department Dept Phone Center    5/17/2017 8:30 AM Yue Mckeon DO [facility] [phone]  PAIN MGMT    5/18/2017 7:30 AM Josefina Rogers MD [facility] [phone] [facility]      24 Hour Appointment Hotline       To make an appointment at any [facility], call [phone] ([phone]). If you don't have a family doctor or clinic, we will help you find one. [facility] are conveniently located to serve the needs of you and your family.             Review of your medicines      START taking        Dose / Directions Last dose taken    ondansetron 4 MG ODT tab   Commonly known as:  ZOFRAN ODT   Dose:  4 mg   Quantity:  10 tablet        Take 1 tablet (4 mg) by mouth every 8 hours as needed for nausea   Refills:  0          Our records show that you are taking the medicines listed below. If these are incorrect, please call your family doctor or clinic.        Dose / Directions Last dose taken    buPROPion 150 MG 12 hr tablet   Commonly known as:  WELLBUTRIN SR   Quantity:  60 tablet        Take 1 tablet once daily and increase to 1 tablet twice daily after 4 to 7 days   Refills:  2        ibuprofen 600 MG tablet   Commonly known as:  ADVIL/MOTRIN   Dose:  600 mg    Quantity:  120 tablet        Take 1 tablet (600 mg) by mouth every 6 hours as needed for moderate pain   Refills:  0        losartan 50 MG tablet   Commonly known as:  COZAAR   Dose:  50 mg   Quantity:  30 tablet        Take 1 tablet (50 mg) by mouth daily   Refills:  1        multivitamin, therapeutic with minerals Tabs tablet   Dose:  1 tablet        Take 1 tablet by mouth daily   Refills:  0        oxyCODONE 5 MG IR tablet   Commonly known as:  ROXICODONE   Dose:  5-10 mg   Quantity:  30 tablet        Take 1-2 tablets (5-10 mg) by mouth every 3 hours as needed for pain or other (Moderate to Severe)   Refills:  0        oxyCODONE-acetaminophen 5-325 MG per tablet   Commonly known as:  PERCOCET   Dose:  1-2 tablet   Quantity:  15 tablet        Take 1-2 tablets by mouth every 4 hours as needed for moderate to severe pain   Refills:  0        topiramate 50 MG tablet   Commonly known as:  TOPAMAX        TK 1 T PO BID   Refills:  1                Prescriptions were sent or printed at these locations (1 Prescription)                   Other Prescriptions                Printed at Department/Unit printer (1 of 1)         ondansetron (ZOFRAN ODT) 4 MG ODT tab                Procedures and tests performed during your visit     Basic metabolic panel      Orders Needing Specimen Collection     None      Pending Results     Date and Time Order Name Status Description    5/12/2017 1411 Surgical pathology exam In process             Pending Culture Results     Date and Time Order Name Status Description    5/12/2017 1411 Surgical pathology exam In process             Pending Results Instructions     If you had any lab results that were not finalized at the time of your Discharge, you can call the ED Lab Result RN at [phone]. You will be contacted by this team for any positive Lab results or changes in treatment. The nurses are available 7 days a week from 10A to 6:30P.  You can leave a message 24 hours per day and they  will return your call.        Test Results From Your Hospital Stay        5/13/2017  3:18 PM      Component Results     Component Value Ref Range & Units Status    Sodium 135 133 - 144 mmol/L Final    Potassium 3.3 (L) 3.4 - 5.3 mmol/L Final    Chloride 101 94 - 109 mmol/L Final    Carbon Dioxide 24 20 - 32 mmol/L Final    Anion Gap 10 3 - 14 mmol/L Final    Glucose 161 (H) 70 - 99 mg/dL Final    Urea Nitrogen 20 7 - 30 mg/dL Final    Creatinine 0.92 0.66 - 1.25 mg/dL Final    GFR Estimate 86 >60 mL/min/1.7m2 Final    Non  GFR Calc    GFR Estimate If Black >90   GFR Calc   >60 mL/min/1.7m2 Final    Calcium 9.6 8.5 - 10.1 mg/dL Final                Clinical Quality Measure: Blood Pressure Screening     Your blood pressure was checked while you were in the emergency department today. The last reading we obtained was  BP: (!) 175/120 . Please read the guidelines below about what these numbers mean and what you should do about them.  If your systolic blood pressure (the top number) is less than 120 and your diastolic blood pressure (the bottom number) is less than 80, then your blood pressure is normal. There is nothing more that you need to do about it.  If your systolic blood pressure (the top number) is 120-139 or your diastolic blood pressure (the bottom number) is 80-89, your blood pressure may be higher than it should be. You should have your blood pressure rechecked within a year by a primary care provider.  If your systolic blood pressure (the top number) is 140 or greater or your diastolic blood pressure (the bottom number) is 90 or greater, you may have high blood pressure. High blood pressure is treatable, but if left untreated over time it can put you at risk for heart attack, stroke, or kidney failure. You should have your blood pressure rechecked by a primary care provider within the next 4 weeks.  If your provider in the emergency department today gave you specific  "instructions to follow-up with your doctor or provider even sooner than that, you should follow that instruction and not wait for up to 4 weeks for your follow-up visit.        Thank you for choosing [facility]       Thank you for choosing [facility] for your care. Our goal is always to provide you with excellent care. Hearing back from our patients is one way we can continue to improve our services. Please take a few minutes to complete the written survey that you may receive in the mail after you visit with us. Thank you!        [facility] Information     [employer] lets you send messages to your doctor, view your test results, renew your prescriptions, schedule appointments and more. To sign up, go to [email or web address] . Click on \"Log in\" on the left side of the screen, which will take you to the Welcome page. Then click on \"Sign up Now\" on the right side of the page.     You will be asked to enter the access code listed below, as well as some personal information. Please follow the directions to create your username and password.     Your access code is: [email or web address]  Expires: 2017 11:11 AM     Your access code will  in 90 days. If you need help or a new code, please call your [facility] clinic or [phone].        Care EveryWhere ID     This is your Care EveryWhere ID. This could be used by other organizations to access your [facility] medical records  [id number]        After Visit Summary       This is your record. Keep this with you and show to your community pharmacist(s) and doctor(s) at your next visit.                  "

## 2017-05-13 NOTE — ED NOTES
"Pt reports feeling \"much better than when I came in\". Pt denies nausea and pain. ABCs intact.   "

## 2017-05-13 NOTE — ED PROVIDER NOTES
"  History     Chief Complaint:  Nausea & Vomiting    HPI   Naif Howell Jr. is a 52 year old male who presents to the emergency department today for evaluation of nausea and vomiting. Yesterday, 05/12/2017, the patient had a laparoscopic cholecystectomy performed by Dr. Corey Vang. The patient was doing well initially but this morning at 1000 the patient began vomiting and was unable to keep his pain medication down. This vomiting continued and became more frequent so he decided to come to the emergency department. His notes pain in the upper abdomen \"that I would expect from surgery\" but is uncomfortable currently due to not being able to take his pain medication. The patient denies any diarrhea, fevers, or hematemesis.      Allergies:  Omnicef [Cefdinir]  Cefuroxime    Medications:    Roxicodone  Topamax  Cozaar  Ibuprofen     Past Medical History:    Anxiety  Aortic regurgitation  Back pain  Basal cell carcinoma  GERD  Hypertension  PTSD  Vestibular migraine    Past Surgical History:    Resection of basal cell carcinoma, left forehead    Family History:    Mother: Hypertension  Father: Cancer  Sister: Hypertension     Social History:  The patient was accompanied to the ED by his wife.  Smoking Status: Current Every Day Smoker -- 1.00 Packs Per Day -- Cigarettes  Smokeless Tobacco: Former User  Alcohol Use: Negative   Marital Status:  Legally  [3]     Review of Systems   Constitutional: Negative for fever.   Gastrointestinal: Positive for nausea and vomiting (No hematemesis). Negative for diarrhea.   All other systems reviewed and are negative.    Physical Exam   Vitals:  Patient Vitals for the past 24 hrs:   BP Temp Temp src Heart Rate Resp SpO2   05/13/17 1600 (!) 157/114 - - - - 94 %   05/13/17 1545 (!) 165/108 - - - - 95 %   05/13/17 1530 (!) 172/108 - - - - 95 %   05/13/17 1515 (!) 168/114 - - - - 95 %   05/13/17 1500 (!) 175/116 - - - - 94 %   05/13/17 1431 (!) 153/113 97.7  F (36.5  C) Temporal " 129 16 99 %     Physical Exam  General: Adult male sitting upright.   Eyes: PERRL, Conjunctive within normal limits, no scleral icterus.  ENT: Moist mucous membranes, oropharynx clear.   CV: Normal S1S2, no murmur, rub or gallop. Tachycardic rate.   Resp: Clear to auscultation bilaterally, no wheezes, rales or rhonchi. Normal respiratory effort.  GI: Abdomen is soft, mild tenderness in right upper quadrant, nondistended. No palpable masses. No rebound or guarding.  MSK: No edema. Nontender. Normal active range of motion.  Skin: Warm and dry. No rashes or lesions or ecchymoses on visible skin. Post surgical sites appear to be healing appropriately. No drainage.   Neuro: Alert and oriented. Responds appropriately to all questions and commands. No focal findings appreciated. Normal muscle tone.  Psych: Normal mood and affect. Pleasant.    Emergency Department Course     Laboratory:  Laboratory findings were communicated with the patient who voiced understanding of the findings.    BMP: Potassium 3.3 (L), Glucose 161 (H) o/w WNL (Creatinine 0.92)    Interventions:  1450 NS 1000 ml IV   1450 Zofran 4 mg IV  1451 Morphine 4 mg IV  1541 Potassium Chloride 20 mEq PO  1541 Roxicodone 10 mg PO  1547 NS 1000 ml IV     Emergency Department Course:  Nursing notes and vitals reviewed.  I performed an exam of the patient as documented above.   IV was inserted and blood was drawn for laboratory testing, results above.  Patient reassessed. Is feeling much better.   Patient tolerates oral challenge and feels ready to go home.  I discussed the treatment plan with the patient. They expressed understanding of this plan and consented to discharge. They will be discharged home with instructions for care and follow up. In addition, the patient will return to the emergency department if their symptoms persist, worsen, if new symptoms arise or if there is any concern.  All questions were answered.  I personally reviewed the lab results with  the patient and answered all related questions prior to discharge.  Impression & Plan      Medical Decision Making:  Naif Howell Jr. is a 52 year old male, one day status post laparoscopic cholecystectomy, who presents to the emergency department today for evaluation of vomiting. Due to the vomiting, he is unable to take his pain medicines but notes that his pain seems to be appropriate for his post surgical status. He had resolution of his symptoms here in the emergency department. When he presented he was tachycardic and hypertensive. The tachycardia improved with IV fluids. The blood pressure elevation in the setting of underlying hypertension is likely secondary to vomiting up his blood pressure medication. He was given that back here as well as two liters of fluid. He improved and I feel comfortable discharging him home. I recommended follow up as need with surgery. Return immediately to the emergency department if his symptoms worsen. All questions answered prior to discharge.     Diagnosis:    ICD-10-CM    1. Non-intractable vomiting with nausea, unspecified vomiting type R11.2    2. Acute post-operative pain G89.18    3. Essential hypertension I10    4. Hypokalemia E87.6      Disposition:   The patient is discharged to home.    Discharge Medications:  New Prescriptions    ONDANSETRON (ZOFRAN ODT) 4 MG ODT TAB    Take 1 tablet (4 mg) by mouth every 8 hours as needed for nausea     Scribe Disclosure:  I, Naif Basurto, am serving as a scribe at 2:41 PM on 5/13/2017 to document services personally performed by Aleena Stovall MD, based on my observations and the provider's statements to me.    [facility] EMERGENCY DEPARTMENT       Aleena Stovall MD  05/14/17 0838

## 2017-05-13 NOTE — ED NOTES
Galbladder removed here yesterday afternoon. Vomiting that started this am. Vomited 9 times. Pt was placed on oxycodone. Not able to take with the vomiting. No nausea medication prescribed.

## 2017-05-13 NOTE — ED AVS SNAPSHOT
[facility] Emergency Department    [address]    [facility] [address]    Phone:  [phone]    Fax:  [phone]                                       Naif Howell Jr.   MRN: [id number]    Department:  [facility] Emergency Department   Date of Visit:  5/13/2017           After Visit Summary Signature Page     I have received my discharge instructions, and my questions have been answered. I have discussed any challenges I see with this plan with the nurse or doctor.    ..........................................................................................................................................  Patient/Patient Representative Signature      ..........................................................................................................................................  Patient Representative Print Name and Relationship to Patient    ..................................................               ................................................  Date                                            Time    ..........................................................................................................................................  Reviewed by Signature/Title    ...................................................              ..............................................  Date                                                            Time

## 2017-05-13 NOTE — OP NOTE
"PROCEDURE/SERVICE DATE:  05/12/2017.      PREOPERATIVE DIAGNOSIS:  Chronic cholecystitis.      POSTOPERATIVE DIAGNOSIS:  Chronic cholecystitis.      PROCEDURE:  Laparoscopic cholecystectomy with fluoroscopic intraoperative cholangiogram.      SURGEON:  Corey Vang MD.      ASSISTANT:  Shantell Benson PA-C.      ESTIMATED BLOOD LOSS:  Minimal.      INDICATION:  Naif Howell is a 52-year-old male who presents with right upper quadrant abdominal pain consistent with a biliary source.  Gallbladder ultrasound reveals stones.  He requests cholecystectomy after discussion of the procedure, its risks, benefits, complications, convalescence, postop limitations, bleeding, infection, conversion to open surgery, injury to ducts and adjacent organs, post-cholecystectomy syndromes and their treatment as well as expected recovery.  All his questions have been answered.  He has reviewed literature on the subject, and he wishes to proceed.      DESCRIPTION OF PROCEDURE:  The patient was brought to the operating room and placed supine on the table.  After the induction of anesthetic, the abdomen was prepped and draped in a sterile manner.  A pause was performed.  A transverse incision was made above the umbilicus and extended to the midline fascia, which was then opened.  The Jarred trocar was inserted, gas was insufflated and the 10 mm straight-viewing laparoscope was advanced.  Secondary trocars were placed with laparoscopic guidance.  There was no underlying bowel or tissue injury.  The gallbladder was grasped at its fundus and retracted cephalad.  The \"free\" wall was cleared of omental adhesions and overlying fat and dissected down to the junction of the infundibulum and the cystic duct.  The cystic duct was then dissected circumferentially, and the region behind the infundibulum was completely dissected, identifying the cystic artery and no aberrant ducts or other structures.  The cystic duct was milked toward the gallbladder to " retrieve any stones that might be within, it but none were noted.  Stones were noted in the infundibulum.  The Sheehan clamp was placed across the lower infundibulum.  The Sheehan catheter was passed through the clamp to puncture the infundibulum, and fluoroscopic cholangiogram was obtained.  Static images were obtained and sent to Radiology for their review.  Images showed an overall normal biliary tree with no dilatation, filling defects or other abnormalities to suggest stones.  There was excellent drainage into the duodenum.  Radiology reviewed the static images and concurred with our interpretation.  With the cholangiogram completed, 2 clips were placed on the cystic duct at its junction with the gallbladder.  One was placed on the infundibulum, and the cystic duct was cut from the gallbladder.  Similarly, the cystic artery was triply clipped and divided.  One clip had previously been placed on the cystic artery prior to cholangiogram.  The gallbladder was removed from its hepatic fossa with cautery, placed into an Endocatch pouch and then removed through the supraumbilical incision without spillage of bile or stones.  The Jarred trocar was reinserted, gas reinsufflated and the operative site was inspected.  Hemostasis was complete, and all clips were in place.  Marcaine was placed at each of the trocar sites, and they were sequentially removed and viewed from within to be sure no bleeding was present.  None was seen.  The final (supraumbilical) trocar was then removed after aspirating as much gas as possible from the abdomen.  The fascia was specifically closed under direct vision with heavy Vicryl suture.  Subcuticular skin closure was performed, followed by Steri-Strips and dressings.  He was returned to the Recovery Room in excellent condition with all sponge and needle counts correct, having tolerated the procedure well.      Intraoperative findings include a normal-appearing anterior wall of the stomach and  surface of the liver.  Gallbladder showed changes of chronic cholecystitis and fibrosis with adhesions of omentum.  There appeared to be a cyst in the posterior aspect of the left lobe of the liver.  The appendix appeared normal.  The ascending colon is adherent to the anterior abdominal wall along with the omentum.  This appears to be a chronic adhesion of unclear etiology.  There is no evidence for inguinal hernia on internal exam.  All other visible large and small bowel was unremarkable.     The physicians assistant was medically necessary for their expertise in camera management, suctioning, suturing, and retraction.          DAVIS SHEFFIELD MD             D: 2017 14:27   T: 2017 15:22   MT: [id number]      Name:     ALEXA ELLIOTT   MRN:      [id number]        Account:        [id number]   :      1965           Procedure Date: 2017      Document: [id number]

## 2017-05-13 NOTE — DISCHARGE INSTRUCTIONS
Discharge Instructions  Vomiting    You have been seen today for vomiting. This may be due to your recent surgery and pain. At this time your doctor does not find that your vomiting is a sign of anything dangerous or life-threatening. However, sometimes the signs of serious illness do not show up right away. If you have new or worse symptoms, you may need to be seen again in the emergency department or by your primary doctor. Remember that serious problems like appendicitis can start as vomiting.     Return to the Emergency Department if:    You keep throwing up and you are not able to keep liquids down.     You feel you are getting dehydrated, such as being very thirsty, not urinating at least every 8-12 hours, or feeling faint or lightheaded.     You develop a new fever, or your fever continues for more than 2 days.     You have belly pain that seems worse than cramps, is in one spot, or is getting worse over time.     You have blood in your vomit or stools.     You feel very weak.    You are not starting to improve within 24 hours of your visit here.     What can I do to help myself?    The most important thing to do is to drink clear liquids. If you have been vomiting a lot, it is best to have only small, frequent sips of liquids. Drinking too much at once may cause more vomiting. If you are vomiting often, you must replace minerals, sodium and potassium lost with your illness. Pedialyte  and sports drinks can help you replace these minerals. You can also drink clear liquids such as water, weak tea, apple juice, and 7-Up . Avoid acid liquids (orange), caffeine (coffee) or alcohol. Do not drink milk until you no longer have diarrhea.     After liquids are staying down, you may start eating mild foods. Soda crackers, toast, plain noodles, gelatin, applesauce and bananas are good first choices. Avoid foods that have acid, are spicy, fatty or have a lot of fiber (such as meats, coarse grains, vegetables). You may  start eating these foods again in about 3 days when you are better.     Sometimes treatment includes prescription medicine to prevent nausea and vomiting. If your doctor prescribes these for you, take them as directed.     Don t take ibuprofen, or other nonsteroidal anti-inflammatory medicines without checking with your healthcare provider.   If you were given a prescription for medicine here today, be sure to read all of the information (including the package insert) that comes with your prescription.  This will include important information about the medicine, its side effects, and any warnings that you need to know about.  The pharmacist who fills the prescription can provide more information and answer questions you may have about the medicine.  If you have questions or concerns that the pharmacist cannot address, please call or return to the Emergency Department.       Opioid Medication Information    Pain medications are among the most commonly prescribed medicines, so we are including this information for all our patients. If you did not receive pain medication or get a prescription for pain medicine, you can ignore it.     You may have been given a prescription for an opioid (narcotic) pain medicine and/or have received a pain medicine while here in the Emergency Department. These medicines can make you drowsy or impaired. You must not drive, operate dangerous equipment, or engage in any other dangerous activities while taking these medications. If you drive while taking these medications, you could be arrested for DUI, or driving under the influence. Do not drink any alcohol while you are taking these medications.     Opioid pain medications can cause addiction. If you have a history of chemical dependency of any type, you are at a higher risk of becoming addicted to pain medications.  Only take these prescribed medications to treat your pain when all other options have been tried. Take it for as short a  time and as few doses as possible. Store your pain pills in a secure place, as they are frequently stolen and provide a dangerous opportunity for children or visitors in your house to start abusing these powerful medications. We will not replace any lost or stolen medicine.  As soon as your pain is better, you should flush all your remaining medication.     Many prescription pain medications contain Tylenol  (acetaminophen), including Vicodin , Tylenol #3 , Norco , Lortab , and Percocet .  You should not take any extra pills of Tylenol  if you are using these prescription medications or you can get very sick.  Do not ever take more than 3000 mg of acetaminophen in any 24 hour period.    All opioids tend to cause constipation. Drink plenty of water and eat foods that have a lot of fiber, such as fruits, vegetables, prune juice, apple juice and high fiber cereal.  Take a laxative if you don t move your bowels at least every other day. Miralax , Milk of Magnesia, Colace , or Senna  can be used to keep you regular.      Remember that you can always come back to the Emergency Department if you are not able to see your regular doctor in the amount of time listed above, if you get any new symptoms, or if there is anything that worries you.        Hypokalemia  Hypokalemia means a low level of potassium in the blood. This most often occurs in patients who take diuretics (water pills). It can also occur due to severe vomiting or diarrhea.  It is also seen in people who take laxatives for long periods of time.  A mild case usually causes no symptoms. It is only found with blood testing. More severe potassium loss causes generalized weakness, muscle or abdominal cramping, heart palpitations (rapid or irregular heartbeats) and low blood pressure.  Home care    Take any potassium supplements prescribed.    Eat foods rich in potassium. The highest amount is found in artichoke, baked potatoes, spinach, cantaloupe, honeydew melon,  cod, halibut, salmon, and scallops. White, red, or roberts beans are also very good sources. A modest amount is found in orange juice, bananas, carrots, and tomato juice.    If you take certain types of diuretics, you will also need to take potassium supplements. If take a diuretic, discuss potassium supplements with your doctor.  Follow-up care  Follow up with your healthcare provider for a repeat blood test within the next week or as advised by our staff.  When to seek medical advice  Call your healthcare provider if any of the following occur:    Increased weakness, fatigue, muscle cramps    Dizziness  Call 911  Call 911 if any of the following occur:    Irregular heartbeat, extra beats or very fast heart rate    Loss of consciousness    9973-0057 [employer] [address] [place] [id number]. All rights reserved. This information is not intended as a substitute for professional medical care. Always follow your healthcare professional's instructions.      Discharge Instructions  Hypertension - High Blood Pressure    During you visit to the Emergency Department, your blood pressure was higher than the recommended blood pressure.  This may be related to stress, pain, medication or other temporary conditions. In these cases, your blood pressure may return to normal on its own. If you have a history of high blood pressure, you may need to have your doctor adjust your medications. Sometimes, your high measurement here may indicate that you have developed high blood pressure that will stay high unless it is treated. Sudden very high blood pressure can cause problems, but usually high blood pressure causes problems over months to years.      Blood pressure is almost never lowered in the Emergency Department, because studies have shown that lowering blood pressure too quickly is much more dangerous than leaving it alone.    You need to follow up with your doctor in 1-3 days to get your blood pressure  rechecked.     Return to the Emergency Department if you start to have:    A severe headache.    Chest pain.    Shortness of breath.    Weakness or numbness that affects one part of the body.    Confusion.    Vision changes.    Significant swelling of legs and/or eyes.    A reaction to any medication started in the Emergency Department.    What can I do to help myself?    Avoid alcohol.    Take any blood pressure medicine that you are prescribed.    Get a good night s sleep.    Lower your salt intake.    Exercise.    Lose weight.    Manage stress.    If blood pressure medication was started in the Emergency Department:    The medicine may not have an immediate effect. The body and brain determine what blood pressure you have. The medicine s job is to retrain the body s  thermostat  to a lower blood pressure.    You will need to follow up with your doctor to see how this medicine is working for you.  If you were given a prescription for medicine here today, be sure to read all of the information (including the package insert) that comes with your prescription.  This will include important information about the medicine, its side effects, and any warnings that you need to know about.  The pharmacist who fills the prescription can provide more information and answer questions you may have about the medicine.  If you have questions or concerns that the pharmacist cannot address, please call or return to the Emergency Department.   Remember that you can always come back to the Emergency Department if you are not able to see your regular doctor in the amount of time listed above, if you get any new symptoms, or if there is anything that worries you.  Discharge Instructions  Hyperglycemia, High Blood Sugar    Today we found your blood sugar (glucose) was high. It may be due to stress from recent surgery and pain. This also may mean that you have developed diabetes, or if you already know that you have diabetes, it may mean  that your diabetes is not as well controlled as it should be. Signs of elevated blood sugar include increased thirst, frequent urination, blurred vision, fatigue, unexplained weight loss, poor wound healing, and frequent infections.     We sometimes give medicine in the Emergency Department to lower the blood sugar. We may also prescribe medicine for you to use at home, or increase the medicine that you already take. While we don t like to see your blood sugar high, it is much more dangerous to let your blood sugar get too low, so it is reasonable to take time to bring it down, or to wait and watch to see if it comes down on its own.     It is very important that you go to see your regular doctor to have additional tests to see what the high blood sugar test means in your case. See your doctor as directed today, or within 1 week.    Return to the Emergency Department if you develop:    Nausea and vomiting.    Confusion, disorientation, or being unable to wake up.    Fever greater than 101.5.    Blood sugar greater than 400.    Abdominal pain.    What can I do to help myself?    Check your blood sugar as instructed by your doctor.    Take medications prescribed by your doctor.    Follow a diabetic diet (low fat, low concentrated sweets, high fiber).    Exercise regularly.    Moderate or eliminate alcohol use.    Stop smoking.    Diabetes mellitus is a disease in which the body cannot regulate the amount of sugar (glucose) in the blood. Insulin allows glucose to move out of the blood into cells throughout the body where it is used for fuel. People with diabetes do not produce enough insulin (type 1 diabetes), or cannot use insulin properly (type 2 diabetes), or both. This starves the cells that need the glucose for fuel, and also harms certain organs and tissues exposed to the high glucose levels.  Over a long period of time, uncontrolled diabetes can lead to heart and blood vessel disease, blindness, kidney failure,  foot ulcers and many other problems.    About 17 million Americans (6.2% of adults) have diabetes. We think that about one third of adults with diabetes do not know they have diabetes.  The incidence of diabetes is increasing rapidly. This increase is due to many factors, but the most significant are our increasing weight and decreased activity levels.     Diabetes can be a very serious and life-threatening illness if not treated.  If you were given a prescription for medicine here today, be sure to read all of the information (including the package insert) that comes with your prescription.  This will include important information about the medicine, its side effects, and any warnings that you need to know about.  The pharmacist who fills the prescription can provide more information and answer questions you may have about the medicine.  If you have questions or concerns that the pharmacist cannot address, please call or return to the Emergency Department.   Opioid Medication Information    Pain medications are among the most commonly prescribed medicines, so we are including this information for all our patients. If you did not receive pain medication or get a prescription for pain medicine, you can ignore it.     You may have been given a prescription for an opioid (narcotic) pain medicine and/or have received a pain medicine while here in the Emergency Department. These medicines can make you drowsy or impaired. You must not drive, operate dangerous equipment, or engage in any other dangerous activities while taking these medications. If you drive while taking these medications, you could be arrested for DUI, or driving under the influence. Do not drink any alcohol while you are taking these medications.     Opioid pain medications can cause addiction. If you have a history of chemical dependency of any type, you are at a higher risk of becoming addicted to pain medications.  Only take these prescribed  medications to treat your pain when all other options have been tried. Take it for as short a time and as few doses as possible. Store your pain pills in a secure place, as they are frequently stolen and provide a dangerous opportunity for children or visitors in your house to start abusing these powerful medications. We will not replace any lost or stolen medicine.  As soon as your pain is better, you should flush all your remaining medication.     Many prescription pain medications contain Tylenol  (acetaminophen), including Vicodin , Tylenol #3 , Norco , Lortab , and Percocet .  You should not take any extra pills of Tylenol  if you are using these prescription medications or you can get very sick.  Do not ever take more than 3000 mg of acetaminophen in any 24 hour period.    All opioids tend to cause constipation. Drink plenty of water and eat foods that have a lot of fiber, such as fruits, vegetables, prune juice, apple juice and high fiber cereal.  Take a laxative if you don t move your bowels at least every other day. Miralax , Milk of Magnesia, Colace , or Senna  can be used to keep you regular.      Remember that you can always come back to the Emergency Department if you are not able to see your regular doctor in the amount of time listed above, if you get any new symptoms, or if there is anything that worries you.

## 2017-05-14 ENCOUNTER — HOSPITAL ENCOUNTER (INPATIENT)
Facility: CLINIC | Age: 52
LOS: 9 days | Discharge: HOME | DRG: 335 | End: 2017-05-24
Attending: EMERGENCY MEDICINE | Admitting: INTERNAL MEDICINE
Payer: OTHER GOVERNMENT

## 2017-05-14 ENCOUNTER — APPOINTMENT (OUTPATIENT)
Dept: GENERAL RADIOLOGY | Facility: CLINIC | Age: 52
DRG: 335 | End: 2017-05-14
Attending: EMERGENCY MEDICINE
Payer: OTHER GOVERNMENT

## 2017-05-14 DIAGNOSIS — R11.2 NON-INTRACTABLE VOMITING WITH NAUSEA, UNSPECIFIED VOMITING TYPE: ICD-10-CM

## 2017-05-14 DIAGNOSIS — K65.0 ACUTE SUPPURATIVE PERITONITIS (H): Primary | ICD-10-CM

## 2017-05-14 DIAGNOSIS — K91.89 POSTOPERATIVE ILEUS (H): ICD-10-CM

## 2017-05-14 DIAGNOSIS — K81.9 CHOLECYSTITIS: ICD-10-CM

## 2017-05-14 DIAGNOSIS — E86.0 DEHYDRATION: ICD-10-CM

## 2017-05-14 DIAGNOSIS — K56.7 POSTOPERATIVE ILEUS (H): ICD-10-CM

## 2017-05-14 LAB
BASOPHILS # BLD AUTO: 0.1 10E9/L (ref 0–0.2)
BASOPHILS NFR BLD AUTO: 0.4 %
DIFFERENTIAL METHOD BLD: ABNORMAL
EOSINOPHIL # BLD AUTO: 0 10E9/L (ref 0–0.7)
EOSINOPHIL NFR BLD AUTO: 0.1 %
ERYTHROCYTE [DISTWIDTH] IN BLOOD BY AUTOMATED COUNT: 12.7 % (ref 10–15)
HCT VFR BLD AUTO: 53.2 % (ref 40–53)
HGB BLD-MCNC: 18.4 G/DL (ref 13.3–17.7)
IMM GRANULOCYTES # BLD: 0.1 10E9/L (ref 0–0.4)
IMM GRANULOCYTES NFR BLD: 0.4 %
LACTATE BLD-SCNC: 1.1 MMOL/L (ref 0.7–2.1)
LYMPHOCYTES # BLD AUTO: 1.7 10E9/L (ref 0.8–5.3)
LYMPHOCYTES NFR BLD AUTO: 13 %
MCH RBC QN AUTO: 28.6 PG (ref 26.5–33)
MCHC RBC AUTO-ENTMCNC: 34.6 G/DL (ref 31.5–36.5)
MCV RBC AUTO: 83 FL (ref 78–100)
MONOCYTES # BLD AUTO: 0.8 10E9/L (ref 0–1.3)
MONOCYTES NFR BLD AUTO: 6.5 %
NEUTROPHILS # BLD AUTO: 10.2 10E9/L (ref 1.6–8.3)
NEUTROPHILS NFR BLD AUTO: 79.6 %
NRBC # BLD AUTO: 0 10*3/UL
NRBC BLD AUTO-RTO: 0 /100
PLATELET # BLD AUTO: 281 10E9/L (ref 150–450)
RBC # BLD AUTO: 6.43 10E12/L (ref 4.4–5.9)
WBC # BLD AUTO: 12.9 10E9/L (ref 4–11)

## 2017-05-14 PROCEDURE — 85025 COMPLETE CBC W/AUTO DIFF WBC: CPT | Performed by: EMERGENCY MEDICINE

## 2017-05-14 PROCEDURE — 83605 ASSAY OF LACTIC ACID: CPT | Performed by: EMERGENCY MEDICINE

## 2017-05-14 PROCEDURE — 25000128 H RX IP 250 OP 636: Performed by: EMERGENCY MEDICINE

## 2017-05-14 PROCEDURE — 99285 EMERGENCY DEPT VISIT HI MDM: CPT | Mod: 25

## 2017-05-14 PROCEDURE — 96360 HYDRATION IV INFUSION INIT: CPT

## 2017-05-14 PROCEDURE — 83690 ASSAY OF LIPASE: CPT | Performed by: EMERGENCY MEDICINE

## 2017-05-14 PROCEDURE — 96374 THER/PROPH/DIAG INJ IV PUSH: CPT

## 2017-05-14 PROCEDURE — 74020 XR ABDOMEN 2 VW: CPT

## 2017-05-14 PROCEDURE — 80053 COMPREHEN METABOLIC PANEL: CPT | Performed by: EMERGENCY MEDICINE

## 2017-05-14 PROCEDURE — 96375 TX/PRO/DX INJ NEW DRUG ADDON: CPT

## 2017-05-14 RX ORDER — MORPHINE SULFATE 4 MG/ML
4 INJECTION, SOLUTION INTRAMUSCULAR; INTRAVENOUS
Status: DISCONTINUED | OUTPATIENT
Start: 2017-05-14 | End: 2017-05-15

## 2017-05-14 RX ORDER — LIDOCAINE 40 MG/G
CREAM TOPICAL
Status: DISCONTINUED | OUTPATIENT
Start: 2017-05-14 | End: 2017-05-15

## 2017-05-14 RX ORDER — ONDANSETRON 2 MG/ML
4 INJECTION INTRAMUSCULAR; INTRAVENOUS
Status: COMPLETED | OUTPATIENT
Start: 2017-05-14 | End: 2017-05-14

## 2017-05-14 RX ADMIN — ONDANSETRON 4 MG: 2 INJECTION INTRAMUSCULAR; INTRAVENOUS at 23:34

## 2017-05-14 RX ADMIN — MORPHINE SULFATE 4 MG: 4 INJECTION, SOLUTION INTRAMUSCULAR; INTRAVENOUS at 23:34

## 2017-05-14 RX ADMIN — SODIUM CHLORIDE 2000 ML: 9 INJECTION, SOLUTION INTRAVENOUS at 23:34

## 2017-05-14 ASSESSMENT — ENCOUNTER SYMPTOMS
ABDOMINAL DISTENTION: 1
VOMITING: 1
ABDOMINAL PAIN: 1
FEVER: 0

## 2017-05-14 NOTE — IP AVS SNAPSHOT
MRN:[id number]                      After Visit Summary   5/14/2017    Naif Howell Jr.    MRN: [id number]           Thank you!     Thank you for choosing [facility] for your care. Our goal is always to provide you with excellent care. Hearing back from our patients is one way we can continue to improve our services. Please take a few minutes to complete the written survey that you may receive in the mail after you visit. If you would like to speak to someone directly about your visit please contact Patient Relations at [phone]. Thank you!          Patient Information     Date Of Birth          1965        Designated Caregiver       Most Recent Value    Caregiver    Will someone help with your care after discharge? yes    Name of designated caregiver Nelly    Phone number of caregiver [phone]    Caregiver address [address] [place], [place] [id number]      About your hospital stay     You were admitted on:  May 15, 2017 You last received care in the:  [facility]    You were discharged on:  May 24, 2017       Who to Call     For medical emergencies, please call 911.  For non-urgent questions about your medical care, please call your primary care provider or clinic, [phone]  For questions related to your surgery, please call your surgery clinic        Attending Provider     Provider Specialty    Naif Atkinson MD Emergency Medicine    Margarita Sena MD Internal Medicine    Corey Vang MD General Surgery       Primary Care Provider Office Phone # Fax #    Josefina Lester Rogers [phone]       [place] [address] [facility] [address]        After Care Instructions     Discharge Instructions                 Further instructions from your care team       HOME CARE FOLLOWING ABDOMINAL SURGERY  LAMIN Mccall E. Gavin, N. Guttormson, D. Maurer, R. O Donnell, L. Thomas     INCISIONAL  CARE:  Replace the bandage over your incision (or incisions) until all drainage stops, or if more comfortable to have in place.  If present, leave the steri-strips (white paper tapes) in place for 14 days after surgery.  If you have staples in your incision at the time of discharge, they will be removed at your follow-up appointment.  If Dermabond (a type of skin glue) is present, leave in place until it wears/flakes off.     BATHING:  Avoid baths for 1 week after surgery.  Showers are okay.  You may wash your hair at any time.  Gently pat your incision dry after bathing.    ACTIVITY:  Light Activity -- you may immediately be up and about as tolerated.  Driving -- you may drive when comfortable and off narcotic pain medications.  Light Work -- resume when comfortable off pain medications.  (If you can drive, you probably can work.)  Strenuous Work/Activity -- limit lifting to 20 pounds for 4 weeks.  Then, progressively increase with time.  Active Sports (running, biking, etc.) -- cautiously resume after 6 weeks.    DISCOMFORT:  Use pain medications as prescribed by your surgeon.  Take the pain medication with some food, when possible, to minimize side effects.  Expect gradual improvement.    DIET:  Return to diet you were on before surgery, unless you are given specific diet instructions.  Drink plenty of fluids.  While taking pain medications, increase dietary fiber or add a fiber supplementation like Metamucil or Citrucel to help prevent constipation - a possible side effect of pain medications.    NAUSEA:  If nauseated from the anesthetic/pain meds; rest in bed, get up cautiously with assistance, and drink clear liquids (juice, tea, broth).    RETURN APPOINTMENT:  Schedule a follow-up visit 2-3 weeks after discharge from the hospital.  Office Phone:  [phone]     CONTACT US IF THE FOLLOWING DEVELOPS:   1. A fever that is above 101     2. If there is a large amount of drainage, bleeding, or  swelling.   3. Severe pain that is not relieved by your prescription.   4. Drainage that is thick, cloudy, yellow, green or white.   5. Any other questions not answered by  Frequently Asked Questions  sheet.      FREQUENTLY ASKED QUESTIONS:    Q:  How should my incision look?    A:  Normally your incision will appear slightly swollen with light redness directly along the incision itself as it heals.  It may feel like a bump or ridge as the healing/scarring happens, and over time (3-4 months) this bump or ridge feeling should slowly go away.  In general, clear or pink watery drainage can be normal at first as your incision heals, but should decrease over time.    Q:  How do I know if my incision is infected?  A:  Look at your incision for signs of infection, like redness around the incision spreading to surrounding skin, or drainage of cloudy or foul-smelling drainage.  If you feel warm, check your temperature to see if you are running a fever.    **If any of these things occur, please notify the nurse at our office.  We may need you to come into the office for an incision check.      Q:  How do I take care of my incision?  A:  If you have a dressing in place - Starting the day after surgery, replace the dressing 1-2 times a day until there is no further drainage from the incision.  At that time, a dressing is no longer needed.  Try to minimize tape on the skin if irritation is occurring at the tape sites.  If you have significant irritation from tape on the skin, please call the office to discuss other method of dressing your incision.    Small pieces of tape called  steri-strips  may be present directly overlying your incision; these may be removed 10 days after surgery unless otherwise specified by your surgeon.  If these tapes start to loosen at the ends, you may trim them back until they fall off or are removed.    A:  If you had  Dermabond  tissue glue used as a dressing (this causes your incision to look shiny  with a clear covering over it) - This type of dressing wears off with time and does not require more dressings over the top unless it is draining around the glue as it wears off.  Do not apply ointments or lotions over the incisions until the glue has completely worn off.    Q:  There is a piece of tape or a sticky  lead  still on my skin.  Can I remove this?  A:  Sometimes the sticky  leads  used for monitoring during surgery or for evaluation in the emergency department are not all removed while you are in the hospital.  These sometimes have a tab or metal dot on them.  You can easily remove these on your own, like taking off a band-aid.  If there is a gel substance under the  lead , simply wipe/clean it off with a washcloth or paper towel.      Q:  What can I do to minimize constipation (very hard stools, or lack of stools)?  A:  Stay well hydrated.  Increase your dietary fiber intake or take a fiber supplement -with plenty of water.  Walk around frequently.  You may consider an over-the-counter stool-softener.  Your Pharmacist can assist you with choosing one that is stocked at your pharmacy.  Constipation is also one of the most common side effects of pain medication.  If you are using pain medication, be pro-active and try to PREVENT problems with constipation by taking the steps above BEFORE constipation becomes a problem.    Q:  What do I do if I need more pain medications?  A:  Call the office to receive refills.  Be aware that certain pain meds cannot be called into a pharmacy and actually require a paper prescription.  A change may be made in your pain med as you progress thru your recovery period or if you have side effects to certain meds.    --Pain meds are NOT refilled after 5pm on weekdays, and NOT AT ALL on the weekends, so please look ahead to prevent problems.      Q:  Why am I having a hard time sleeping now that I am at home?  A:  Many medications you receive while you are in the hospital can  impact your sleep for a number of days after your surgery/hospitalization.  Decreased level of activity and naps during the day may also make sleeping at night difficult.  Try to minimize day-time naps, and get up frequently during the day to walk around your home during your recovery time.  Sleep aides may be of some help, but are not recommended for long-term use.      Q:  I am having some back discomfort.  What should I do?  A:  This may be related to certain positioning that was required for your surgery, extended periods of time in bed, or other changes in your overall activity level.  You may try ice, heat, acetaminophen, or ibuprofen to treat this temporarily.  Note that many pain medications have acetaminophen in them and would state this on the prescription bottle.  Be sure not to exceed the maximum of 4000mg per day of acetaminophen.     **If the pain you are having does not resolve, is severe, or is a flare of back pain you have had on other occasions prior to surgery, please contact your primary physician for further recommendations or for an appointment to be examined at their office.    Q:  Why am I having headaches?  A:  Headaches can be caused by many things:  caffeine withdrawal, use of pain meds, dehydration, high blood pressure, lack of sleep, over-activity/exhaustion, flare-up of usual migraine headaches.  If you feel this is related to muscle tension (a band-like feeling around the head, or a pressure at the low-back of the head) you may try ice or heat to this area.  You may need to drink more fluids (try electrolyte drink like Gatorade), rest, or take your usual migraine medications.   **If your headaches do not resolve, worsen, are accompanied by other symptoms, or if your blood pressure is high, please call your primary physician for recommendation and/or examination.    Q:  I am unable to urinate.  What do I do?  A:  A small percentage of people can have difficulty urinating initially after  surgery.  This includes being able to urinate only a very small amount at a time and feeling discomfort or pressure in the very low abdomen.  This is called  urinary retention , and is actually an urgent situation.  Proceed to your nearest Emergency department for evaluation (not an Urgent Care Center).  Sometimes the bladder does not work correctly after certain medications you receive during surgery, or related to certain procedures.  You may need to have a catheter placed until your bladder recovers.  When planning to go to an Emergency department, it may help to call the ER to let them know you are coming in for this problem after a surgery.  This may help you get in quicker to be evaluated.  **If you have symptoms of a urinary tract infection, please contact your primary physician for the proper evaluation and treatment.          If you have other questions, please call the office Monday thru Friday between 8am and 5pm to discuss with the nurse or physician assistant.  #[phone]    There is a surgeon ON CALL on weekday evenings and over the weekend in case of urgent need only, and may be contacted at the same number.    If you are having an emergency, call 911 or proceed to your nearest emergency department.        Pending Results     Date and Time Order Name Status Description    5/23/2017 0000 Acid phosphatase total In process     5/19/2017 1157 Blood culture Preliminary     5/19/2017 1157 Blood culture Preliminary     5/16/2017 1606 Anaerobic bacterial culture Preliminary             Statement of Approval     Ordered          05/24/17 1246  I have reviewed and agree with all the recommendations and orders detailed in this document.  EFFECTIVE NOW     Approved and electronically signed by:  Elías Pugh PA-C           05/24/17 4444  I have reviewed and agree with all the recommendations and orders detailed in this document.     Approved and electronically signed by:  Ihsan Biswas MD   "           Admission Information     Date & Time Provider Department Dept. Phone    2017 Corey Vang MD [facility] [phone]      Your Vitals Were     Blood Pressure Pulse Temperature Respirations Height Weight    142/84 (BP Location: Left arm) 81 97.6  F (36.4  C) (Oral) 16 1.88 m (6' 2\") 89.2 kg (196 lb 9.6 oz)    Pulse Oximetry BMI (Body Mass Index)                96% 25.24 kg/m2          My[employer]Page[employer] Information     [employer] lets you send messages to your doctor, view your test results, renew your prescriptions, schedule appointments and more. To sign up, go to [email or web address] . Click on \"Log in\" on the left side of the screen, which will take you to the Welcome page. Then click on \"Sign up Now\" on the right side of the page.     You will be asked to enter the access code listed below, as well as some personal information. Please follow the directions to create your username and password.     Your access code is: [email or web address]  Expires: 2017 11:11 AM     Your access code will  in 90 days. If you need help or a new code, please call your [facility] clinic or [phone].        Care EveryWhere ID     This is your Care EveryWhere ID. This could be used by other organizations to access your [facility] medical records  [id number]           Review of your medicines      UNREVIEWED medicines. Ask your doctor about these medicines        Dose / Directions    buPROPion 150 MG 12 hr tablet   Commonly known as:  WELLBUTRIN SR   Used for:  Smoker        Take 1 tablet once daily and increase to 1 tablet twice daily after 4 to 7 days   Quantity:  60 tablet   Refills:  2       HYDROCHLOROTHIAZIDE PO        Dose:  25 mg   Take 25 mg by mouth daily   Refills:  0       losartan 50 MG tablet   Commonly known as:  COZAAR   Used for:  Benign essential hypertension        Dose:  50 mg   Take 1 tablet (50 mg) by mouth daily   Quantity:  30 tablet   Refills:  1       * TOPAMAX PO        Dose:  " 25 mg   Take 25 mg by mouth every morning   Refills:  0       * TOPAMAX PO        Dose:  50 mg   Take 50 mg by mouth every evening   Refills:  0       * Notice:  This list has 2 medication(s) that are the same as other medications prescribed for you. Read the directions carefully, and ask your doctor or other care provider to review them with you.      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   Used for:  Acute suppurative peritonitis (H)   Notes to Patient:  May be taken with or without food        Dose:  200 mg   Take 1 tablet (200 mg) by mouth daily for 10 days   Quantity:  10 tablet   Refills:  0         CONTINUE these medicines which have NOT CHANGED        Dose / Directions    ibuprofen 600 MG tablet   Commonly known as:  ADVIL/MOTRIN        Dose:  600 mg   Take 1 tablet (600 mg) by mouth every 6 hours as needed for moderate pain   Quantity:  120 tablet   Refills:  0       multivitamin, therapeutic with minerals Tabs tablet        Dose:  1 tablet   Take 1 tablet by mouth daily   Refills:  0       oxyCODONE 5 MG IR tablet   Commonly known as:  ROXICODONE   Used for:  Cholecystitis        Dose:  5-10 mg   Take 1-2 tablets (5-10 mg) by mouth every 3 hours as needed for pain or other (Moderate to Severe)   Quantity:  10 tablet   Refills:  0         STOP taking     ondansetron 4 MG ODT tab   Commonly known as:  ZOFRAN ODT                Where to get your medicines      These medications were sent to [facility] [place], [place] - [address] [facility]  [address] [address] [address]     Phone:  [phone]      ciprofloxacin 500 MG tablet    fluconazole 200 MG tablet         Some of these will need a paper prescription and others can be bought over the counter. Ask your nurse if you have questions.     Bring a paper prescription for each of these medications     oxyCODONE 5 MG IR tablet                Protect others around you: Learn how to safely use, store and throw away your medicines at www.disposemymeds.org.             Medication List: This is a list of all your medications and when to take them. Check marks below indicate your daily home schedule. Keep this list as a reference.      Medications           Morning Afternoon Evening Bedtime As Needed    buPROPion 150 MG 12 hr tablet   Commonly known as:  WELLBUTRIN SR   Take 1 tablet once daily and increase to 1 tablet twice daily after 4 to 7 days                                ciprofloxacin 500 MG tablet   Commonly known as:  CIPRO   Take 1 tablet (500 mg) by mouth 2 times daily for 5 days   Notes to Patient:  May be taken with or without food            8:00 AM           8:00 PM               fluconazole 200 MG tablet   Commonly known as:  DIFLUCAN   Take 1 tablet (200 mg) by mouth daily for 10 days   Last time this was given:  200 mg on 5/24/2017  4:36 PM   Next Dose Due:  5/25/17   Notes to Patient:  May be taken with or without food            8:00 AM                       HYDROCHLOROTHIAZIDE PO   Take 25 mg by mouth daily                                ibuprofen 600 MG tablet   Commonly known as:  ADVIL/MOTRIN   Take 1 tablet (600 mg) by mouth every 6 hours as needed for moderate pain   Last time this was given:  600 mg on 5/24/2017  2:17 PM   Next Dose Due:  5/24/17 at 8:17 PM if needed                                   losartan 50 MG tablet   Commonly known as:  COZAAR   Take 1 tablet (50 mg) by mouth daily   Last time this was given:  50 mg on 5/24/2017  8:09 AM                                multivitamin, therapeutic with minerals Tabs tablet   Take 1 tablet  by mouth daily                                oxyCODONE 5 MG IR tablet   Commonly known as:  ROXICODONE   Take 1-2 tablets (5-10 mg) by mouth every 3 hours as needed for pain or other (Moderate to Severe)                                   * TOPAMAX PO   Take 25 mg by mouth every morning   Last time this was given:  25 mg on 5/23/2017  8:51 AM                                * TOPAMAX PO   Take 50 mg by mouth every evening   Last time this was given:  25 mg on 5/23/2017  8:51 AM                                * Notice:  This list has 2 medication(s) that are the same as other medications prescribed for you. Read the directions carefully, and ask your doctor or other care provider to review them with you.

## 2017-05-14 NOTE — IP AVS SNAPSHOT
[facility]    [address]    [facility] [address]    Phone:  [phone]    Fax:  [phone]                                       After Visit Summary   5/14/2017    Naif Howell Jr.    MRN: [id number]           After Visit Summary Signature Page     I have received my discharge instructions, and my questions have been answered. I have discussed any challenges I see with this plan with the nurse or doctor.    ..........................................................................................................................................  Patient/Patient Representative Signature      ..........................................................................................................................................  Patient Representative Print Name and Relationship to Patient    ..................................................               ................................................  Date                                            Time    ..........................................................................................................................................  Reviewed by Signature/Title    ...................................................              ..............................................  Date                                                            Time

## 2017-05-14 NOTE — LETTER
Hand-off for Care Transitions to Next Level of Care Provider  Name: Naif Howell JrGuerrero  MRN #: [id number]  Reason for Hospitalization:  Dehydration [E86.0]  Postoperative ileus [K91.3]  Non-intractable vomiting with nausea, unspecified vomiting type [R11.2]  Admit Date/Time: 5/14/2017 11:03 PM  Discharge Date:5/24/2016    Reason for Communication Hand-off Referral: Other ELEVATED URSZULA     Discharge Plan:  Discharged to: Home-independent                   Patient agreeable to post-hospital support suggestions:  Yes  Discharge Plan:             Patient is on new medications:   Yes           MTM follow up recommended: No             Follow-up specialty is recommended:   Schedule a follow-up visit 2-3 weeks after discharge from the hospital.  Office Phone:  [phone]    Follow-up plan:  No future appointments.      NO PCP Follow up indicated per Hospitalitiist on discharge.  Plan will be for pt to follow up with general surgery         Key Recommendations:  Handoff secondary to URSZULA elevation. Pt stable during hospitalization  He was followed by Surgery, and ID was sent home on PO abx, no follow up indicated with ID.   Katie Corbin      AVS/Discharge Summary is the source of truth; this is a helpful guide for improved communication of patient story

## 2017-05-14 NOTE — LETTER
[facility]  [address]  [facility] [address]  [phone]          May 18, 2017    RE:  Naif Howell Jr.                                                                                                                                                       [address] [facility] [address]            To whom it may concern:    Naif Howell Jr. is under my professional care following surgery on 5/16/17. He is currently recovering at the hospital and will not be able to return to work for at least 2-3 weeks. When he does return to work he will need to be on a 20 pound maximum lifting restriction for an additional 2 weeks. Please call our office if you have any questions or concerns ([phone])        Sincerely,             Elías Pugh PA-C

## 2017-05-15 ENCOUNTER — APPOINTMENT (OUTPATIENT)
Dept: CT IMAGING | Facility: CLINIC | Age: 52
DRG: 335 | End: 2017-05-15
Attending: SURGERY
Payer: OTHER GOVERNMENT

## 2017-05-15 PROBLEM — K56.609 SBO (SMALL BOWEL OBSTRUCTION) (H): Status: ACTIVE | Noted: 2017-05-15

## 2017-05-15 LAB
ALBUMIN SERPL-MCNC: 3.4 G/DL (ref 3.4–5)
ALP SERPL-CCNC: 98 U/L (ref 40–150)
ALT SERPL W P-5'-P-CCNC: 21 U/L (ref 0–70)
ANION GAP SERPL CALCULATED.3IONS-SCNC: 7 MMOL/L (ref 3–14)
AST SERPL W P-5'-P-CCNC: 12 U/L (ref 0–45)
BILIRUB SERPL-MCNC: 0.7 MG/DL (ref 0.2–1.3)
BUN SERPL-MCNC: 21 MG/DL (ref 7–30)
CALCIUM SERPL-MCNC: 8.9 MG/DL (ref 8.5–10.1)
CHLORIDE SERPL-SCNC: 103 MMOL/L (ref 94–109)
CO2 SERPL-SCNC: 26 MMOL/L (ref 20–32)
COPATH REPORT: NORMAL
CREAT SERPL-MCNC: 0.86 MG/DL (ref 0.66–1.25)
ERYTHROCYTE [DISTWIDTH] IN BLOOD BY AUTOMATED COUNT: 12.7 % (ref 10–15)
GFR SERPL CREATININE-BSD FRML MDRD: ABNORMAL ML/MIN/1.7M2
GLUCOSE SERPL-MCNC: 124 MG/DL (ref 70–99)
HCT VFR BLD AUTO: 48.5 % (ref 40–53)
HGB BLD-MCNC: 16.5 G/DL (ref 13.3–17.7)
LIPASE SERPL-CCNC: 140 U/L (ref 73–393)
MAGNESIUM SERPL-MCNC: 2.1 MG/DL (ref 1.6–2.3)
MCH RBC QN AUTO: 28.5 PG (ref 26.5–33)
MCHC RBC AUTO-ENTMCNC: 34 G/DL (ref 31.5–36.5)
MCV RBC AUTO: 84 FL (ref 78–100)
PHOSPHATE SERPL-MCNC: 2.5 MG/DL (ref 2.5–4.5)
PLATELET # BLD AUTO: 228 10E9/L (ref 150–450)
POTASSIUM SERPL-SCNC: 3.4 MMOL/L (ref 3.4–5.3)
PROT SERPL-MCNC: 6.7 G/DL (ref 6.8–8.8)
RBC # BLD AUTO: 5.78 10E12/L (ref 4.4–5.9)
SODIUM SERPL-SCNC: 136 MMOL/L (ref 133–144)
WBC # BLD AUTO: 10.4 10E9/L (ref 4–11)

## 2017-05-15 PROCEDURE — 99223 1ST HOSP IP/OBS HIGH 75: CPT | Mod: AI | Performed by: INTERNAL MEDICINE

## 2017-05-15 PROCEDURE — 74177 CT ABD & PELVIS W/CONTRAST: CPT

## 2017-05-15 PROCEDURE — 85027 COMPLETE CBC AUTOMATED: CPT | Performed by: INTERNAL MEDICINE

## 2017-05-15 PROCEDURE — 25000128 H RX IP 250 OP 636: Performed by: EMERGENCY MEDICINE

## 2017-05-15 PROCEDURE — 25000125 ZZHC RX 250: Performed by: INTERNAL MEDICINE

## 2017-05-15 PROCEDURE — 12000000 ZZH R&B MED SURG/OB

## 2017-05-15 PROCEDURE — 25500064 ZZH RX 255 OP 636: Performed by: INTERNAL MEDICINE

## 2017-05-15 PROCEDURE — 84100 ASSAY OF PHOSPHORUS: CPT | Performed by: INTERNAL MEDICINE

## 2017-05-15 PROCEDURE — 83735 ASSAY OF MAGNESIUM: CPT | Performed by: INTERNAL MEDICINE

## 2017-05-15 PROCEDURE — 36415 COLL VENOUS BLD VENIPUNCTURE: CPT | Performed by: INTERNAL MEDICINE

## 2017-05-15 PROCEDURE — 25000128 H RX IP 250 OP 636: Performed by: INTERNAL MEDICINE

## 2017-05-15 PROCEDURE — 25000132 ZZH RX MED GY IP 250 OP 250 PS 637: Performed by: INTERNAL MEDICINE

## 2017-05-15 PROCEDURE — 27210995 ZZH RX 272: Performed by: INTERNAL MEDICINE

## 2017-05-15 PROCEDURE — 25800025 ZZH RX 258: Performed by: INTERNAL MEDICINE

## 2017-05-15 PROCEDURE — 99221 1ST HOSP IP/OBS SF/LOW 40: CPT | Mod: 24 | Performed by: SURGERY

## 2017-05-15 RX ORDER — BISACODYL 5 MG
5-15 TABLET, DELAYED RELEASE (ENTERIC COATED) ORAL DAILY PRN
Status: DISCONTINUED | OUTPATIENT
Start: 2017-05-15 | End: 2017-05-16

## 2017-05-15 RX ORDER — ONDANSETRON 2 MG/ML
4 INJECTION INTRAMUSCULAR; INTRAVENOUS EVERY 6 HOURS PRN
Status: DISCONTINUED | OUTPATIENT
Start: 2017-05-15 | End: 2017-05-16

## 2017-05-15 RX ORDER — POTASSIUM CHLORIDE 1.5 G/1.58G
20-40 POWDER, FOR SOLUTION ORAL
Status: DISCONTINUED | OUTPATIENT
Start: 2017-05-15 | End: 2017-05-16

## 2017-05-15 RX ORDER — BACLOFEN 10 MG/1
10 TABLET ORAL 3 TIMES DAILY PRN
Status: DISCONTINUED | OUTPATIENT
Start: 2017-05-15 | End: 2017-05-16

## 2017-05-15 RX ORDER — MAGNESIUM SULFATE HEPTAHYDRATE 40 MG/ML
4 INJECTION, SOLUTION INTRAVENOUS EVERY 4 HOURS PRN
Status: DISCONTINUED | OUTPATIENT
Start: 2017-05-15 | End: 2017-05-16

## 2017-05-15 RX ORDER — LOSARTAN POTASSIUM 25 MG/1
50 TABLET ORAL DAILY
Status: DISCONTINUED | OUTPATIENT
Start: 2017-05-15 | End: 2017-05-16

## 2017-05-15 RX ORDER — AMOXICILLIN 250 MG
1-2 CAPSULE ORAL 2 TIMES DAILY
Status: DISCONTINUED | OUTPATIENT
Start: 2017-05-15 | End: 2017-05-16

## 2017-05-15 RX ORDER — DEXTROSE MONOHYDRATE, SODIUM CHLORIDE, AND POTASSIUM CHLORIDE 50; 1.49; 4.5 G/1000ML; G/1000ML; G/1000ML
INJECTION, SOLUTION INTRAVENOUS CONTINUOUS
Status: DISCONTINUED | OUTPATIENT
Start: 2017-05-15 | End: 2017-05-16

## 2017-05-15 RX ORDER — NALOXONE HYDROCHLORIDE 0.4 MG/ML
.1-.4 INJECTION, SOLUTION INTRAMUSCULAR; INTRAVENOUS; SUBCUTANEOUS
Status: DISCONTINUED | OUTPATIENT
Start: 2017-05-15 | End: 2017-05-16

## 2017-05-15 RX ORDER — ONDANSETRON 4 MG/1
4 TABLET, ORALLY DISINTEGRATING ORAL EVERY 6 HOURS PRN
Status: DISCONTINUED | OUTPATIENT
Start: 2017-05-15 | End: 2017-05-16

## 2017-05-15 RX ORDER — POTASSIUM CHLORIDE 29.8 MG/ML
20 INJECTION INTRAVENOUS
Status: DISCONTINUED | OUTPATIENT
Start: 2017-05-15 | End: 2017-05-16

## 2017-05-15 RX ORDER — PROCHLORPERAZINE MALEATE 5 MG
5-10 TABLET ORAL EVERY 6 HOURS PRN
Status: DISCONTINUED | OUTPATIENT
Start: 2017-05-15 | End: 2017-05-16

## 2017-05-15 RX ORDER — PROCHLORPERAZINE 25 MG
25 SUPPOSITORY, RECTAL RECTAL EVERY 12 HOURS PRN
Status: DISCONTINUED | OUTPATIENT
Start: 2017-05-15 | End: 2017-05-16

## 2017-05-15 RX ORDER — BISACODYL 10 MG
10 SUPPOSITORY, RECTAL RECTAL DAILY PRN
Status: DISCONTINUED | OUTPATIENT
Start: 2017-05-15 | End: 2017-05-16

## 2017-05-15 RX ORDER — ACETAMINOPHEN 325 MG/1
650 TABLET ORAL EVERY 4 HOURS PRN
Status: DISCONTINUED | OUTPATIENT
Start: 2017-05-15 | End: 2017-05-16

## 2017-05-15 RX ORDER — POTASSIUM CHLORIDE 7.45 MG/ML
10 INJECTION INTRAVENOUS
Status: DISCONTINUED | OUTPATIENT
Start: 2017-05-15 | End: 2017-05-16

## 2017-05-15 RX ORDER — MORPHINE SULFATE 2 MG/ML
2-4 INJECTION, SOLUTION INTRAMUSCULAR; INTRAVENOUS
Status: DISCONTINUED | OUTPATIENT
Start: 2017-05-15 | End: 2017-05-24 | Stop reason: HOSPADM

## 2017-05-15 RX ORDER — POTASSIUM CL/LIDO/0.9 % NACL 10MEQ/0.1L
10 INTRAVENOUS SOLUTION, PIGGYBACK (ML) INTRAVENOUS
Status: DISCONTINUED | OUTPATIENT
Start: 2017-05-15 | End: 2017-05-16

## 2017-05-15 RX ORDER — POTASSIUM CHLORIDE 1500 MG/1
20-40 TABLET, EXTENDED RELEASE ORAL
Status: DISCONTINUED | OUTPATIENT
Start: 2017-05-15 | End: 2017-05-16

## 2017-05-15 RX ORDER — TOPIRAMATE 25 MG/1
50 TABLET, FILM COATED ORAL 2 TIMES DAILY
Status: DISCONTINUED | OUTPATIENT
Start: 2017-05-15 | End: 2017-05-16

## 2017-05-15 RX ORDER — IOPAMIDOL 755 MG/ML
500 INJECTION, SOLUTION INTRAVASCULAR ONCE
Status: COMPLETED | OUTPATIENT
Start: 2017-05-15 | End: 2017-05-15

## 2017-05-15 RX ORDER — SODIUM CHLORIDE 450 MG/100ML
INJECTION, SOLUTION INTRAVENOUS CONTINUOUS
Status: DISCONTINUED | OUTPATIENT
Start: 2017-05-15 | End: 2017-05-15

## 2017-05-15 RX ADMIN — SODIUM CHLORIDE: 4.5 INJECTION, SOLUTION INTRAVENOUS at 01:26

## 2017-05-15 RX ADMIN — IOPAMIDOL 98 ML: 755 INJECTION, SOLUTION INTRAVENOUS at 11:14

## 2017-05-15 RX ADMIN — POTASSIUM CHLORIDE, DEXTROSE MONOHYDRATE AND SODIUM CHLORIDE: 150; 5; 450 INJECTION, SOLUTION INTRAVENOUS at 21:43

## 2017-05-15 RX ADMIN — POTASSIUM CHLORIDE, DEXTROSE MONOHYDRATE AND SODIUM CHLORIDE: 150; 5; 450 INJECTION, SOLUTION INTRAVENOUS at 09:07

## 2017-05-15 RX ADMIN — ACETAMINOPHEN 650 MG: 325 TABLET, FILM COATED ORAL at 18:52

## 2017-05-15 RX ADMIN — ONDANSETRON 4 MG: 2 INJECTION INTRAMUSCULAR; INTRAVENOUS at 09:03

## 2017-05-15 RX ADMIN — ONDANSETRON 4 MG: 4 TABLET, ORALLY DISINTEGRATING ORAL at 18:55

## 2017-05-15 RX ADMIN — CHLORPROMAZINE HYDROCHLORIDE 25 MG: 25 INJECTION INTRAMUSCULAR at 14:03

## 2017-05-15 RX ADMIN — SENNOSIDES AND DOCUSATE SODIUM 1 TABLET: 8.6; 5 TABLET ORAL at 18:53

## 2017-05-15 RX ADMIN — MORPHINE SULFATE 4 MG: 4 INJECTION, SOLUTION INTRAMUSCULAR; INTRAVENOUS at 00:37

## 2017-05-15 RX ADMIN — SODIUM CHLORIDE 65 ML: 9 INJECTION, SOLUTION INTRAVENOUS at 11:14

## 2017-05-15 NOTE — ED NOTES
"[facility]  ED Nurse Handoff Report    Naif Howell Jr. is a 52 year old male   ED Chief complaint: Post-op Problem  . ED Diagnosis:   Final diagnoses:   Postoperative ileus   Dehydration   Non-intractable vomiting with nausea, unspecified vomiting type     Allergies:   Allergies   Allergen Reactions     Omnicef [Cefdinir] Diarrhea     Cefuroxime Rash       Code Status: Full Code  Activity level - Baseline/Home:  Independent. Activity Level - Current:   Independent. Lift room needed: No. Bariatric: No   Needed: No   Isolation: No. Infection: Not Applicable.     Vital Signs:   Vitals:    05/14/17 2302   BP: (!) 132/99   Pulse: 125   Resp: 18   Temp: 98  F (36.7  C)   TempSrc: Temporal   SpO2: 94%   Weight: 90.7 kg (200 lb)   Height: 1.88 m (6' 2\")     Cardiac Rhythm:  ,      Pain level: 0-10 Pain Scale: 3  Patient confused: No. Patient Falls Risk: No.   Elimination Status: Has voided   Patient Report - Initial Complaint: abd pain and not passing gas. Pt was seen in the ER for the same thing on 5/14/17. Focused Assessment: bowel sounds hypoactive. abd pain. Nausea and vomited prior to arrival.    Tests Performed: labs, xray. Abnormal Results: illeus, see epic.   Treatments provided: Zofran 4mg, Morphine, NS, IV  Family Comments: SO at bedside.   OBS brochure/video discussed/provided to patient:  N/A  ED Medications:   Medications   lidocaine 1 % 1 mL (not administered)   lidocaine (LMX4) kit (not administered)   sodium chloride (PF) 0.9% PF flush 3 mL (not administered)   sodium chloride (PF) 0.9% PF flush 3 mL (not administered)   0.9% sodium chloride BOLUS (2,000 mLs Intravenous New Bag 5/14/17 2334)   morphine (PF) injection 4 mg (4 mg Intravenous Given 5/14/17 2334)   ondansetron (ZOFRAN) injection 4 mg (4 mg Intravenous Given 5/14/17 2334)     Drips infusing:  Yes     ED Nurse Name/Phone Number: Ynes LARSONGeurrero Parish,   12:16 AM  RECEIVING UNIT ED HANDOFF REVIEW    Above ED Nurse Handoff Report " was reviewed: yes  Reviewed by: Audrey Pettit on May 15, 2017 at 12:40 AM

## 2017-05-15 NOTE — PHARMACY-ADMISSION MEDICATION HISTORY
Admission medication history interview status for this patient is complete. See [facility] admission navigator for allergy information, prior to admission medications and immunization status.     Medication history interview source(s):Patient  Medication history resources (including written lists, pill bottles, clinic record):None  [facility]    Changes made to PTA medication list:  Added: HCTZ  Deleted: perococet  Changed: Topamax    Actions taken by pharmacist (provider contacted, etc):sticky note to md/paged     Additional medication history information:NOTE-  Per patient plan is to use up current supply of hctz and then start Losartan.    Medication reconciliation/reorder completed by provider prior to medication history? No    For patients on insulin therapy: NO (Yes/No)  Lantus/levemir/NPH/Mix 70/30 dose:  _____   in AM/PM  or twice daily   Sliding scale Novolog Y/N  If Yes, do you have a baseline novolog pre-meal dose:  ______units with meals   Patients eat three meals a day:   Y/N     Any Barriers to therapy:  cost of medications/comfortable with giving injections (if applicable)/ comfortable and confident with current diabetes regimen       Prior to Admission medications    Medication Sig Last Dose Taking? Auth Provider   Topiramate (TOPAMAX PO) Take 25 mg by mouth every morning 5/12/2017 Yes Unknown, Entered By History   Topiramate (TOPAMAX PO) Take 50 mg by mouth every evening 5/12/2017 Yes Unknown, Entered By History   HYDROCHLOROTHIAZIDE PO Take 25 mg by mouth daily 5/13/2017 Yes Unknown, Entered By History   oxyCODONE (ROXICODONE) 5 MG IR tablet Take 1-2 tablets (5-10 mg) by mouth every 3 hours as needed for pain or other (Moderate to Severe) 5/14/2017 at Unknown time Yes Corey Vang MD   ondansetron (ZOFRAN ODT) 4 MG ODT tab Take 1 tablet (4 mg) by mouth every 8 hours as needed for nausea 5/13/2017  Aleena Stovall MD   losartan (COZAAR) 50 MG tablet Take 1 tablet (50  mg) by mouth daily not started yet  Josefina Rogers MD   buPROPion (WELLBUTRIN SR) 150 MG 12 hr tablet Take 1 tablet once daily and increase to 1 tablet twice daily after 4 to 7 days hasn't started yet  Josefina Rogers MD   multivitamin, therapeutic with minerals (THERA-VIT-M) TABS tablet Take 1 tablet by mouth daily 5/13/2017  Unknown, Entered By History   ibuprofen (ADVIL/MOTRIN) 600 MG tablet Take 1 tablet (600 mg) by mouth every 6 hours as needed for moderate pain not recent  Elda Adrian PA-C

## 2017-05-15 NOTE — ED NOTES
Agree with triage note. No acute distress noted at this time. Will cont to monitor. SO at bedside.

## 2017-05-15 NOTE — CONSULTS
[facility] Surgery Consultation    Naif Howell Jr. MRN# [id number]   Age: 52 year old YOB: 1965     Date of Admission:  5/14/2017    Reason for consult: SBO       Requesting physician: Nathen       Level of consult: Consult, follow and place orders           Assessment and Plan:   Assessment:     Patient Active Problem List    Diagnosis Date Noted     SBO (small bowel obstruction) (H) 05/15/2017     Priority: Medium     Vestibular migraine 04/20/2017     Priority: Medium     Chronic midline thoracic back pain 07/26/2016     Priority: Medium     Benign essential hypertension 06/02/2016     Priority: Medium     HTN, goal below 140/90 06/02/2016     Priority: Medium     Dyslipidemia 04/02/2012     Aortic regurgitation 12/23/2011     Chronic pain 12/23/2011     Patient is followed by ANTONINA ROSS for ongoing prescription of pain medication.  All refills should be approved by this provider, or covering partner.    Medication(s): .   Maximum quantity per month:   Clinic visit frequency required:      Controlled substance agreement:  Encounter-Level CSA:     There are no encounter-level csa.        Pain Clinic evaluation in the past:     DIRE Total Score(s):  No flowsheet data found.    Last [facility] website verification:     [email or web address]         GERD (gastroesophageal reflux disease) 11/28/2011     Aortic root dilatation (H) 09/14/2011     PTSD (post-traumatic stress disorder) 06/09/2011     Smoker            Plan:   Abdominal CT to try to distinguish ileus vs SBO and identify transition, discussed with radiology  Adhesion lysis could be required  Need to consider SBO on the basis of incisional scarring or Lubin's hernia (altheough no hernia clinically apparent at any incision). Pt was also noted to have significant omental adhesions in the right abdomen (not involving the cecum or appendix- which were normal) of unknown cause. They seemed to be confluent but possibility of SB  "becoming entrapped around them needs to be considered as well.              Chief Complaint:   Nausea and vomiting     History is obtained from the patient and electronic health record         History of Present Illness:   This patient is a 52 year old  male with a significant past medical history of recent cholecystectomy who presents with the following condition requiring a hospital admission: SBO vs ileus. He had cholecystectomy on Friday and presented to ER twice over the weekend for nausea and vomiting. The last visit included an abdominal x-ray with a SBO appearance.   He now reports feeling better and passing \"a little \"gas. No problems with incisions          Past Medical History:     Past Medical History:   Diagnosis Date     Anxiety     resolved     Aortic regurgitation      Back pain      Basal cell carcinoma     left forehead     GERD (gastroesophageal reflux disease)      Hypertension      PTSD (post-traumatic stress disorder)      Vestibular migraine              Past Surgical History:     Past Surgical History:   Procedure Laterality Date     Resection of basal cell carcinoma      Left forehead             Social History:     Social History   Substance Use Topics     Smoking status: Current Every Day Smoker     Packs/day: 1.00     Types: Cigarettes     Smokeless tobacco: Former User     Alcohol use No             Family History:     Family History   Problem Relation Age of Onset     Hypertension Mother      alive     CANCER Father      chest tumor-not sure about details,  at age of 71     Family History Negative Sister      2     Hypertension Sister      Family History Negative Brother      1     Family History Negative Maternal Grandmother      Hypertension Maternal Grandmother      Family History Negative Maternal Grandfather      Family History Negative Paternal Grandmother      Family History Negative Paternal Grandfather      Cancer - colorectal No family hx of      Prostate Cancer " No family hx of              Immunizations:     VACCINE/DOSE   Diptheria   DPT   DTAP   HBIG   Hepatitis A   Hepatitis B   HIB   Influenza   Measles   Meningococcal   MMR   Mumps   Pneumococcal   Polio   Rubella   Small Pox   TDAP   Varicella   Zoster             Allergies:     Allergies   Allergen Reactions     Omnicef [Cefdinir] Diarrhea     Cefuroxime Rash             Medications:     Current Facility-Administered Medications   Medication     losartan (COZAAR) tablet 50 mg     topiramate (TOPAMAX) tablet 50 mg     naloxone (NARCAN) injection 0.1-0.4 mg     acetaminophen (TYLENOL) tablet 650 mg     morphine (PF) injection 2-4 mg     senna-docusate (SENOKOT-S;PERICOLACE) 8.6-50 MG per tablet 1-2 tablet     bisacodyl (DULCOLAX) EC tablet 5-15 mg     magnesium hydroxide (MILK OF MAGNESIA) suspension 30 mL     bisacodyl (DULCOLAX) Suppository 10 mg     ondansetron (ZOFRAN-ODT) ODT tab 4 mg    Or     ondansetron (ZOFRAN) injection 4 mg     prochlorperazine (COMPAZINE) injection 5-10 mg    Or     prochlorperazine (COMPAZINE) tablet 5-10 mg    Or     prochlorperazine (COMPAZINE) Suppository 25 mg     baclofen (LIORESAL) tablet 10 mg     chlorproMAZINE (THORAZINE) 25 mg in NaCl 0.9 % intermittent infusion     dextrose 5% and 0.45% NaCl + KCl 20 mEq/L infusion     potassium chloride SA (K-DUR/KLOR-CON M) CR tablet 20-40 mEq     potassium chloride (KLOR-CON) Packet 20-40 mEq     potassium chloride 10 mEq in 100 mL intermittent infusion     potassium chloride 10 mEq in 100 mL intermittent infusion with 10 mg lidocaine     potassium chloride 20 mEq in 50 mL intermittent infusion     magnesium sulfate 4 g in 100 mL sterile water (premade)     potassium phosphate 15 mmol in D5W 250 mL intermittent infusion     potassium phosphate 20 mmol in D5W 500 mL intermittent infusion     potassium phosphate 20 mmol in D5W 250 mL intermittent infusion     potassium phosphate 25 mmol in D5W 500 mL intermittent infusion             Review of  "Systems:   C: NEGATIVE for fever, chills, change in weight  E/M: NEGATIVE for ear, mouth and throat problems  R: NEGATIVE for significant cough or SOB  CV: NEGATIVE for chest pain, palpitations or peripheral edema          Physical Exam:   All vitals have been reviewed  Patient Vitals for the past 24 hrs:   BP Temp Temp src Pulse Resp SpO2 Height Weight   05/15/17 0809 129/88 98.1  F (36.7  C) Oral 82 16 96 % - -   05/15/17 0112 156/87 97.3  F (36.3  C) Oral 103 16 96 % - 88.5 kg (195 lb)   05/14/17 2302 (!) 132/99 98  F (36.7  C) Temporal 125 18 94 % 1.88 m (6' 2\") 90.7 kg (200 lb)       Intake/Output Summary (Last 24 hours) at 05/15/17 0947  Last data filed at 05/15/17 0547   Gross per 24 hour   Intake              543 ml   Output                0 ml   Net              543 ml   Sclera - no jaundice  Neck:   supple, symmetrical, trachea midline, skin normal and no stridor     Chest:   Nl resp effort     Abdomen:   incisions with nl induration, no sign of infection, soft, mils/mod distended, tenderness noted at incisions, voluntary guarding absent, no masses palpated and hernia absent             Data:   All laboratory data reviewed  Results for orders placed or performed during the hospital encounter of 05/14/17   XR Abdomen 2 Views    Narrative    XR ABDOMEN 2 VIEWS   5/14/2017 11:51 PM     INDICATION: Abdominal pain, vomiting.  Recent cholecystectomy.   Concern for postoperative ileus.    COMPARISON: None.      Impression    IMPRESSION: Multiple dilated small bowel loops with air-fluid levels  suggesting either mechanical small bowel obstruction or localized  ileus. Colonic loops are decompressed. There is some residual contrast  in the colon. Surgical clips in the right upper quadrant. Small amount  of free air in the upper abdomen may relate to the recent  cholecystectomy.    Findings discussed with ER physician at 12:18 AM. Cholecystectomy was  two days ago and likely accounts for the small amount of free " air.    SARI MENDOSA MD   CBC with platelets differential   Result Value Ref Range    WBC 12.9 (H) 4.0 - 11.0 10e9/L    RBC Count 6.43 (H) 4.4 - 5.9 10e12/L    Hemoglobin 18.4 (H) 13.3 - 17.7 g/dL    Hematocrit 53.2 (H) 40.0 - 53.0 %    MCV 83 78 - 100 fl    MCH 28.6 26.5 - 33.0 pg    MCHC 34.6 31.5 - 36.5 g/dL    RDW 12.7 10.0 - 15.0 %    Platelet Count 281 150 - 450 10e9/L    Diff Method Automated Method     % Neutrophils 79.6 %    % Lymphocytes 13.0 %    % Monocytes 6.5 %    % Eosinophils 0.1 %    % Basophils 0.4 %    % Immature Granulocytes 0.4 %    Nucleated RBCs 0 0 /100    Absolute Neutrophil 10.2 (H) 1.6 - 8.3 10e9/L    Absolute Lymphocytes 1.7 0.8 - 5.3 10e9/L    Absolute Monocytes 0.8 0.0 - 1.3 10e9/L    Absolute Eosinophils 0.0 0.0 - 0.7 10e9/L    Absolute Basophils 0.1 0.0 - 0.2 10e9/L    Abs Immature Granulocytes 0.1 0 - 0.4 10e9/L    Absolute Nucleated RBC 0.0    Comprehensive metabolic panel   Result Value Ref Range    Sodium 136 133 - 144 mmol/L    Potassium 3.4 3.4 - 5.3 mmol/L    Chloride 103 94 - 109 mmol/L    Carbon Dioxide 26 20 - 32 mmol/L    Anion Gap 7 3 - 14 mmol/L    Glucose 124 (H) 70 - 99 mg/dL    Urea Nitrogen 21 7 - 30 mg/dL    Creatinine 0.86 0.66 - 1.25 mg/dL    GFR Estimate >90  Non  GFR Calc   >60 mL/min/1.7m2    GFR Estimate If Black >90   GFR Calc   >60 mL/min/1.7m2    Calcium 8.9 8.5 - 10.1 mg/dL    Bilirubin Total 0.7 0.2 - 1.3 mg/dL    Albumin 3.4 3.4 - 5.0 g/dL    Protein Total 6.7 (L) 6.8 - 8.8 g/dL    Alkaline Phosphatase 98 40 - 150 U/L    ALT 21 0 - 70 U/L    AST 12 0 - 45 U/L   Lipase   Result Value Ref Range    Lipase 140 73 - 393 U/L   Lactic acid whole blood   Result Value Ref Range    Lactic Acid 1.1 0.7 - 2.1 mmol/L   CBC with platelets   Result Value Ref Range    WBC 10.4 4.0 - 11.0 10e9/L    RBC Count 5.78 4.4 - 5.9 10e12/L    Hemoglobin 16.5 13.3 - 17.7 g/dL    Hematocrit 48.5 40.0 - 53.0 %    MCV 84 78 - 100 fl    MCH 28.5  26.5 - 33.0 pg    MCHC 34.0 31.5 - 36.5 g/dL    RDW 12.7 10.0 - 15.0 %    Platelet Count 228 150 - 450 10e9/L   Magnesium   Result Value Ref Range    Magnesium 2.1 1.6 - 2.3 mg/dL   Phosphorus   Result Value Ref Range    Phosphorus 2.5 2.5 - 4.5 mg/dL     Abdominal films:   SBO vs ileus, contrast in right colon (from intra-op cholangiogram on Friday        Attestation:  I have reviewed today's vital signs, notes, medications, labs and imaging.  Amount of time performed on this consult: 45 minutes.    Corey Vang MD     Discussed CT with radiology and patient. Question of inflammation in LUQ/ prox jejunum but dilated SB distally (and relatively decompressed stomach, duodenum and TI. Pattern does not suggest a retroperitoneal hernia. Pt has minimal pain and feels he is improving.   Discussed option of laparoscopically running his SB. Since he is improved will hold off on surgery for now. Will get AXR and CBC tomorrow. If worsening - to OR. Heis in agreement with this plan.  Corey Vang MD  5/15/2017 4:47 PM

## 2017-05-15 NOTE — H&P
PRESENTING COMPLAINT:  Nausea, vomiting, hiccupping, and abdominal pain.      HISTORY OF PRESENT ILLNESS:  Mr. Naif Howell is a 52-year-old gentleman who has a past medical history significant for hypertension and migraines, who underwent elective cholecystectomy on 05/12/2017 for chronic cholecystitis.  He appeared to have tolerated the procedure well and was discharged home.  Since that time, this is his second trip to the emergency room for intractable nausea, vomiting, and associated hiccups with abdominal pain.  They came to the emergency room on Saturday, 05/13, and evaluation consisted of a basic metabolic panel, glucose, and his panel was only notable for some mild hypokalemia and mild hyperglycemia.  He was given some morphine and ondansetron, and his symptoms resolved.  He was able to take in small drinks of water, so he was discharged home.  He did okay.  He has not really been eating or drinking very much because he just then vomits it right up.  This afternoon, these same symptoms started to come back again.  He got hiccups.  He started having intractable nausea and vomiting, and he had some associated abdominal pain.  He describes his abdominal pain as like it is inside, underneath his diaphragm.  He has had no associated fevers, chills or sweats, but he spontaneously tells me that he has not passed any gas nor has he had a bowel movement since the surgery.      In the emergency room, laboratory evaluation is notable for a normal comprehensive metabolic panel, normal LFTs, a lactate of 1.1, lipase of 140, mildly elevated white count of 12.9 with neutrophilia, and abdominal x-ray which looks consistent with a small bowel obstruction or ileus.  There is also a small amount of free air which I suspect is related to the recent surgery that he had.      He will be admitted inpatient for postoperative ileus versus small bowel obstruction.     The history is obtained in direct discussion with the ER MD as well  as the patient and his wife with good reliability      PAST MEDICAL HISTORY:   1.  Vestibular migraines.   2.  Posttraumatic stress disorder.   3.  Hypertension.   4.  GERD.   5.  History of a basal cell carcinoma over the left forehead.   6.  Chronic back pain.   7.  Aortic regurgitation.      PAST SURGICAL HISTORY:   1.  Basal cell carcinoma resection.   2.  Cholecystectomy, 2017.      MEDICATIONS:  Have not been formally reconciled, but per Epic appear to be:   1.  Topiramate 50 mg b.i.d.   2.  Losartan 50 mg p.o. q. day.   3.  Bupropion 150 mg twice daily.   4.  MVI 1 p.o. q. day.   5.  Ibuprofen 600 mg every 6 hours p.r.n.      New medications at discharge were oxycodone 5-10 mg every 3 hours p.r.n., and then ondansetron was given at his first ER visit at 4 mg q. 8 hours p.r.n. nausea.      ALLERGIES TO MEDICATIONS:  Cephalosporins lead to diarrhea and rash.      SOCIAL HISTORY:  He is .  They live in a house.  He works for the [employer].  He does not drink alcohol.  He does smoke one pack of cigarettes per day.      FAMILY HISTORY:  His mother is , she  at the age of 77 from a stroke.  His father is alive, has a history of hypertension and pacemaker placement.      REVIEW OF SYSTEMS:  A 10-point review of systems is completed and is otherwise negative except as listed in the HPI.      PHYSICAL EXAMINATION:   VITAL SIGNS:  Blood pressures are in the 130s/90s, heart rates in the 120s, temperature is 98.0 degrees, respiratory rate is 18, and sats are 94% on room air.   GENERAL:  This is a very pleasant male.  He looks his stated age.   HEENT:  Head is normocephalic, atraumatic.  Sclerae clear.  Pupils are small but symmetric.  Oropharynx shows dry mucous membranes.  There is no posterior pharyngeal erythema or exudate.   NECK:  Supple.   LUNGS:  Clear to auscultation.  He exhibits normal respiratory effort.   HEART:  Regular rate and rhythm without murmurs, rubs or gallops.   ABDOMEN:  There  are no bowel sounds when I am listening, but his belly is moderately distended, but it is not particularly tender to palpation.  It is a reassuring exam.   EXTREMITIES:  Lower extremities are without edema.   SKIN:  Warm and dry.  There is no cyanosis or clubbing of the extremities.   NEUROLOGICAL:  Cranial nerves II-XII are grossly intact.  Strength and sensation are intact in bilateral upper and lower extremities.  He is alert and oriented.  His affect is appropriate.  During my interview and exam, though, he had one bout of hiccupping where he looked like he was very uncomfortable and his face became very red, which apparently is normal.  As it subsided, his color started to normalize.      LABORATORY EVALUATION:  Comprehensive metabolic panel is notable just for a mildly low protein of 6.7.  BUN and creatinine are 21 and 0.86, respectively.  LFTs are normal.  Lactic acid is 1.1.  Lipase is 140.  Glucose is 124.  CBC shows a white count of 12.9 with mild neutrophilia, hemoglobin of 18.4, and platelet count of 281,000.      Abdominal x-ray, to my personal read, clearly shows a small bowel obstruction or an ileus, ir fluid levels present, and dilated loops of bowel, and that is also per Radiology read.      ASSESSMENT AND PLAN:  Mr. Howell is a very pleasant 52-year-old gentleman who has a past medical history significant for hypertension and migraines, who is two days postop from a laparoscopic cholecystectomy for chronic cholecystitis, who presents with a small bowel obstruction versus postoperative ileus.   1.  Small bowel obstruction versus ileus:  We will use conservative management right now.  I will have an NG available p.r.n. if he has another round of this intractable nausea and vomiting.  We will ask for a formal surgical consultation.  I will keep him n.p.o.  We will treat with IV fluids, IV pain medications, and IV antiemetics.   2.  Diaphragmatic irritation with hiccupping:  I wonder if it is due to the  air that we are seeing underneath the diaphragm.  I suspect that air is from his recent cholecystectomy.  I do not believe that there is perforation of any kind.  I will order some p.r.n. Thorazine and Baclofen, and I did discuss both of these agents with him and his wife.  I am going to see if we can get him lying on his side with the left side up, and maybe we can get the air pocket to move.   3.  Hypertension:  I do not think he is going to be successful in taking his blood pressure medications at this point in time.  He is typically on losartan at 50 mg a day.  I will just hold it.  I will have p.r.n. hydralazine available as needed.   4.  PTSD:  He is on chronic bupropion.  We will just order it so that if he is able to take p.o., we can start him back on that.   5.  Migraine therapy:  He is on topiramate, which does not have an IV form available, so we will just order that until he gets back on board as soon as he is able to take p.o.   6.  Prophylaxis:  We will use PCDs.   7.  CODE STATUS:  FULL.   8.  Disposition:  Admit inpatient for greater than expected 2-midnight stay.   9.  Dehydration:  He is clearly dehydrated with contraction of his hemoglobin, so we will aggressively hydrate overnight, and reassess the CBC in the morning.         CAIN JENSEN MD             D: 05/15/2017 01:07   T: 05/15/2017 04:42   MT: [id number]      Name:     ALEXA ELLIOTT   MRN:      [id number]        Account:      [id number]   :      1965           Admitted:     [id number]      Document: [id number]       cc: Corey Rogers MD

## 2017-05-15 NOTE — PLAN OF CARE
Problem: Bowel Obstruction (Adult)  Goal: Signs and Symptoms of Listed Potential Problems Will be Absent or Manageable (Bowel Obstruction)  Signs and symptoms of listed potential problems will be absent or manageable by discharge/transition of care (reference Bowel Obstruction (Adult) CPG).   Outcome: Improving  Arrived on unit at 0100 from ER via stretcher and walked to bed with SBA. Oriented to room and call light. Given and reviewed welcome packet. A&O x4. Slight tachycardia of 103, all other VSS. BS hypoactive, epigastric tenderness and pain, nausea has subsided with IV zofran. Paincontrolled with IV morphine. NPO. Has surgical consult today. Will continue to monitor.

## 2017-05-15 NOTE — PLAN OF CARE
Problem: Goal Outcome Summary  Goal: Goal Outcome Summary  Outcome: Improving  VS stable, afebrile. Started to pass flatus, bs hypoactive on the left side of the abdomen and very faint on the right. Abdomen soft and nondistended. Intermittent nausea, given Zofran. Tried ice chips, that gave him hiccups. Thorazine iv given . CT done this shift, message is left with the Dr Vang office with the results. Continue to monitor. Family at the bedside.

## 2017-05-15 NOTE — ED NOTES
Has gall bladder surg on Friday  Seen here yesterday  For n/v  Pain   Felt better after being seen but everything came back this afternoon   abd pain  Not passing gas this afternoon  Rates pain  3/10   At this time   Feels better after he vomits then gets bad again and vomits   Abc intact

## 2017-05-15 NOTE — ED NOTES
Patient having the hiccups at this time which states the pain gets worse with these. Dr. Sena in room with patient at this time.

## 2017-05-15 NOTE — ED PROVIDER NOTES
History     Chief Complaint:  Vomiting      HPI   Naif Howell Jr. is a 52 year old male with a history of recent cholecystectomy, HTN, hyperlipidemia, and GERD who presents to the emergency department for evaluation of vomiting.Of note, the patient underwent an uncomplicated laparoscopic cholecystectomy on 5/12. Following this procedure, he was seen in the emergency department yesterday for vomiting, at which time he was given pain medication, 2 L of IV fluids, and antiemetics. He felt improved during this visit and was discharged home with antiemetics. However, following this visit, the patient states that he has continued to vomit, despite taking antiemetics, with abdominal pain, which is temporally relieved with vomiting.  His continued vomiting prompted his ED visit today. The patient notes that he is not passing gas at this time and additionally feels that his abdomen may be somewhat distended. There are no measured fevers and the patient notes that his surgical sites have been healing well.     Allergies:  Omnicef [Cefdinir]  Cefuroxime    Medications:    Zofran  Oxycodone  Topamax  Percocet  Losartan  Wellbutrin    Past Medical History:    Anxiety  Aortic regurgitation  Back pain  Basal cell carcinoma  GERD  HTN  hyperlipidemia  PTSD  Vestibular migraine  Aortic root dilatation   Chronic pain  Chronic midline thoracic back pain    Past Surgical History:    Basal cell carcinoma   Cholecystectomy - 5/12    Family History:    HTN  Lung cancer    Social History:  Presents with his significant other.   Current Every day smoker, 1.00 ppd.   Negative for alcohol use.  Marital Status:  Legally      Review of Systems   Constitutional: Negative for fever.   Gastrointestinal: Positive for abdominal distention, abdominal pain and vomiting.   All other systems reviewed and are negative.    Physical Exam     Patient Vitals for the past 24 hrs:   BP Temp Temp src Pulse Resp SpO2 Height Weight   05/14/17 2302  "132/99 98  F (36.7  C) Temporal 125 18 94 % 1.88 m (6' 2\") 90.7 kg (200 lb)        Physical Exam  Nursing note and vitals reviewed.  Constitutional: Cooperative.   HENT:   Mouth/Throat: Mucous membranes are very dry.   Cardiovascular: Tachycardiac rate, regular rhythm and normal heart sounds.  No murmur.  Pulmonary/Chest: Effort normal and breath sounds normal. No respiratory distress. No wheezes. No rales.   Abdominal: Soft. Bowel sounds are quiet. Laparoscopic surgical sites are healing well. Steristrips in place, C/D/I. Distended, Diffuse nonlocalizing tenderness.   Neurological: Alert.   Skin: Skin is warm and dry. No rash noted.   Psychiatric: Normal mood and affect.     Emergency Department Course   Imaging:  Radiographic findings were communicated with the patient who voiced understanding of the findings.    XR Abdomen 2 views:   Multiple dilated small bowel loops with air-fluid levels suggesting either mechanical small bowel obstruction or localized ileus. Colonic loops are decompressed. There is some residual contrast  in the colon. Surgical clips in the right upper quadrant. Small amount of free air in the upper abdomen may relate to the recent cholecystectomy. As per radiology.     Laboratory:  CBC: WBC: 12.9 (H), HGB: 18.4 (H), PLT: 281  CMP: Glucose 124 (H), Protein total 6.7 (L), o/w WNL (Creatinine: 0.86)  Lipase: 140    Lactic Acid: 1.1    Interventions:  2334 NS 2 L IV   Morphine, 4 mg, IV injection   Zofran, 4 mg, IV injection  0037 Morphine, 4 mg, IV injection    Emergency Department Course:  Nursing notes and vitals reviewed. I performed an exam of the patient as documented above. The patient was placed on continuous pulse oximetry.     IV inserted. Medicine administered as documented above. Blood drawn. This was sent to the lab for further testing, results above.    The patient was sent for an Abdominal XR while in the emergency department, findings above.     6908 I reevaluated the patient and " provided an update in regards to his ED course.      0001  I consulted with Dr. Sena of the hospitalist services. She is recommending speaking with general surgery regarding consultation, though accepted the patient for admission to her service.     0006 I consulted with Dr. Peter, general surgery, regarding the patient's history and presentation here in the emergency department.    0012 I spoke with Dr. Lopez, radiology, regarding the results of the patient's imaging here in the ED.     Findings and plan explained to the Patient who consents to admission. Discussed the patient with Dr. Sena, who will admit the patient to a medical surgical bed for further monitoring, evaluation, and treatment.    Impression & Plan    Medical Decision Making:  Naif Howell Jr. is a 52 year old male post operative day 2 status post laparoscopic cholecystectomy with intraoperative cholangiogram who presents with vomiting and abdominal distention. Findings are consistent with a postoperative ileus. I did consider bowel obstruction, but he has a nonsurgical abdomen. The free air is likely postoperative with a reassuring exam.  Lactate is normal. XR is consistent with ileus with multiple air fluid levels. He will be made NPO and we will start fluids and IV pain and nausea medicine. He will be admitted in stable condition to the hospitalist service with general surgery consulting.     Diagnosis:    ICD-10-CM   1. Postoperative ileus K91.3   2. Dehydration E86.0   3. Non-intractable vomiting with nausea, unspecified vomiting type R11.2       Disposition:  Admitted to Dr. Sena of the hospitalist service    I, Bekah Degroot, am serving as a scribe on 5/14/2017 at 11:14 PM to personally document services performed by Naif Atkinson MD based on my observations and the provider's statements to me.     Bekah Degroot  5/14/2017   [facility] EMERGENCY DEPARTMENT       Naif Atkinson MD  05/15/17 0116

## 2017-05-16 ENCOUNTER — APPOINTMENT (OUTPATIENT)
Dept: GENERAL RADIOLOGY | Facility: CLINIC | Age: 52
DRG: 335 | End: 2017-05-16
Attending: INTERNAL MEDICINE
Payer: OTHER GOVERNMENT

## 2017-05-16 ENCOUNTER — APPOINTMENT (OUTPATIENT)
Dept: SURGERY | Facility: PHYSICIAN GROUP | Age: 52
End: 2017-05-16
Payer: OTHER GOVERNMENT

## 2017-05-16 ENCOUNTER — ANESTHESIA EVENT (OUTPATIENT)
Dept: SURGERY | Facility: CLINIC | Age: 52
DRG: 335 | End: 2017-05-16
Payer: OTHER GOVERNMENT

## 2017-05-16 ENCOUNTER — ANESTHESIA (OUTPATIENT)
Dept: SURGERY | Facility: CLINIC | Age: 52
DRG: 335 | End: 2017-05-16
Payer: OTHER GOVERNMENT

## 2017-05-16 ENCOUNTER — APPOINTMENT (OUTPATIENT)
Dept: GENERAL RADIOLOGY | Facility: CLINIC | Age: 52
DRG: 335 | End: 2017-05-16
Attending: SURGERY
Payer: OTHER GOVERNMENT

## 2017-05-16 LAB
ANION GAP SERPL CALCULATED.3IONS-SCNC: 8 MMOL/L (ref 3–14)
BASOPHILS # BLD AUTO: 0.1 10E9/L (ref 0–0.2)
BASOPHILS NFR BLD AUTO: 0.6 %
BUN SERPL-MCNC: 13 MG/DL (ref 7–30)
CALCIUM SERPL-MCNC: 8.3 MG/DL (ref 8.5–10.1)
CHLORIDE SERPL-SCNC: 106 MMOL/L (ref 94–109)
CO2 SERPL-SCNC: 23 MMOL/L (ref 20–32)
CREAT SERPL-MCNC: 0.76 MG/DL (ref 0.66–1.25)
DIFFERENTIAL METHOD BLD: NORMAL
EOSINOPHIL # BLD AUTO: 0.2 10E9/L (ref 0–0.7)
EOSINOPHIL NFR BLD AUTO: 1.8 %
ERYTHROCYTE [DISTWIDTH] IN BLOOD BY AUTOMATED COUNT: 12.5 % (ref 10–15)
GFR SERPL CREATININE-BSD FRML MDRD: ABNORMAL ML/MIN/1.7M2
GLUCOSE SERPL-MCNC: 109 MG/DL (ref 70–99)
HCT VFR BLD AUTO: 49.1 % (ref 40–53)
HGB BLD-MCNC: 16.7 G/DL (ref 13.3–17.7)
IMM GRANULOCYTES # BLD: 0.1 10E9/L (ref 0–0.4)
IMM GRANULOCYTES NFR BLD: 0.6 %
LYMPHOCYTES # BLD AUTO: 1.3 10E9/L (ref 0.8–5.3)
LYMPHOCYTES NFR BLD AUTO: 14.1 %
MAGNESIUM SERPL-MCNC: 2.4 MG/DL (ref 1.6–2.3)
MCH RBC QN AUTO: 28.6 PG (ref 26.5–33)
MCHC RBC AUTO-ENTMCNC: 34 G/DL (ref 31.5–36.5)
MCV RBC AUTO: 84 FL (ref 78–100)
MONOCYTES # BLD AUTO: 0.7 10E9/L (ref 0–1.3)
MONOCYTES NFR BLD AUTO: 7.6 %
NEUTROPHILS # BLD AUTO: 7 10E9/L (ref 1.6–8.3)
NEUTROPHILS NFR BLD AUTO: 75.3 %
NRBC # BLD AUTO: 0 10*3/UL
NRBC BLD AUTO-RTO: 0 /100
PHOSPHATE SERPL-MCNC: 2 MG/DL (ref 2.5–4.5)
PLATELET # BLD AUTO: 224 10E9/L (ref 150–450)
PLATELET # BLD AUTO: 253 10E9/L (ref 150–450)
POTASSIUM SERPL-SCNC: 3.4 MMOL/L (ref 3.4–5.3)
RBC # BLD AUTO: 5.84 10E12/L (ref 4.4–5.9)
SODIUM SERPL-SCNC: 137 MMOL/L (ref 133–144)
WBC # BLD AUTO: 9.3 10E9/L (ref 4–11)

## 2017-05-16 PROCEDURE — 44202 LAP ENTERECTOMY: CPT | Mod: 79 | Performed by: SURGERY

## 2017-05-16 PROCEDURE — 84100 ASSAY OF PHOSPHORUS: CPT | Performed by: INTERNAL MEDICINE

## 2017-05-16 PROCEDURE — 25000128 H RX IP 250 OP 636: Performed by: INTERNAL MEDICINE

## 2017-05-16 PROCEDURE — 71000012 ZZH RECOVERY PHASE 1 LEVEL 1 FIRST HR: Performed by: SURGERY

## 2017-05-16 PROCEDURE — 25000125 ZZHC RX 250: Performed by: ANESTHESIOLOGY

## 2017-05-16 PROCEDURE — 85025 COMPLETE CBC W/AUTO DIFF WBC: CPT | Performed by: INTERNAL MEDICINE

## 2017-05-16 PROCEDURE — 44202 LAP ENTERECTOMY: CPT | Mod: AS | Performed by: PHYSICIAN ASSISTANT

## 2017-05-16 PROCEDURE — 37000009 ZZH ANESTHESIA TECHNICAL FEE, EACH ADDTL 15 MIN: Performed by: SURGERY

## 2017-05-16 PROCEDURE — 25000125 ZZHC RX 250: Performed by: SURGERY

## 2017-05-16 PROCEDURE — 25000128 H RX IP 250 OP 636: Performed by: NURSE ANESTHETIST, CERTIFIED REGISTERED

## 2017-05-16 PROCEDURE — 36000093 ZZH SURGERY LEVEL 4 1ST 30 MIN: Performed by: SURGERY

## 2017-05-16 PROCEDURE — 99232 SBSQ HOSP IP/OBS MODERATE 35: CPT | Performed by: INTERNAL MEDICINE

## 2017-05-16 PROCEDURE — 25000128 H RX IP 250 OP 636: Performed by: SURGERY

## 2017-05-16 PROCEDURE — 25000566 ZZH SEVOFLURANE, EA 15 MIN: Performed by: SURGERY

## 2017-05-16 PROCEDURE — 88307 TISSUE EXAM BY PATHOLOGIST: CPT | Mod: 26 | Performed by: SURGERY

## 2017-05-16 PROCEDURE — 36000063 ZZH SURGERY LEVEL 4 EA 15 ADDTL MIN: Performed by: SURGERY

## 2017-05-16 PROCEDURE — 25000125 ZZHC RX 250: Performed by: INTERNAL MEDICINE

## 2017-05-16 PROCEDURE — 0DNS4ZZ RELEASE GREATER OMENTUM, PERCUTANEOUS ENDOSCOPIC APPROACH: ICD-10-PCS | Performed by: SURGERY

## 2017-05-16 PROCEDURE — 12000007 ZZH R&B INTERMEDIATE

## 2017-05-16 PROCEDURE — 88307 TISSUE EXAM BY PATHOLOGIST: CPT | Performed by: SURGERY

## 2017-05-16 PROCEDURE — 36415 COLL VENOUS BLD VENIPUNCTURE: CPT | Performed by: INTERNAL MEDICINE

## 2017-05-16 PROCEDURE — 25000128 H RX IP 250 OP 636: Performed by: ANESTHESIOLOGY

## 2017-05-16 PROCEDURE — 74020 XR ABDOMEN 2 VW: CPT

## 2017-05-16 PROCEDURE — 27210995 ZZH RX 272: Performed by: SURGERY

## 2017-05-16 PROCEDURE — 99232 SBSQ HOSP IP/OBS MODERATE 35: CPT | Mod: 24 | Performed by: SURGERY

## 2017-05-16 PROCEDURE — 40000305 ZZH STATISTIC PRE PROC ASSESS I: Performed by: SURGERY

## 2017-05-16 PROCEDURE — 80048 BASIC METABOLIC PNL TOTAL CA: CPT | Performed by: INTERNAL MEDICINE

## 2017-05-16 PROCEDURE — 37000008 ZZH ANESTHESIA TECHNICAL FEE, 1ST 30 MIN: Performed by: SURGERY

## 2017-05-16 PROCEDURE — 40000985 XR ABDOMEN PORT F1 VW

## 2017-05-16 PROCEDURE — 85049 AUTOMATED PLATELET COUNT: CPT | Performed by: SURGERY

## 2017-05-16 PROCEDURE — 25000125 ZZHC RX 250: Performed by: NURSE ANESTHETIST, CERTIFIED REGISTERED

## 2017-05-16 PROCEDURE — 83735 ASSAY OF MAGNESIUM: CPT | Performed by: INTERNAL MEDICINE

## 2017-05-16 PROCEDURE — 36415 COLL VENOUS BLD VENIPUNCTURE: CPT | Performed by: SURGERY

## 2017-05-16 PROCEDURE — 25000132 ZZH RX MED GY IP 250 OP 250 PS 637: Performed by: SURGERY

## 2017-05-16 PROCEDURE — 0DB80ZX EXCISION OF SMALL INTESTINE, OPEN APPROACH, DIAGNOSTIC: ICD-10-PCS | Performed by: SURGERY

## 2017-05-16 PROCEDURE — 27210794 ZZH OR GENERAL SUPPLY STERILE: Performed by: SURGERY

## 2017-05-16 PROCEDURE — 71000013 ZZH RECOVERY PHASE 1 LEVEL 1 EA ADDTL HR: Performed by: SURGERY

## 2017-05-16 RX ORDER — LABETALOL HYDROCHLORIDE 5 MG/ML
10 INJECTION, SOLUTION INTRAVENOUS
Status: DISCONTINUED | OUTPATIENT
Start: 2017-05-16 | End: 2017-05-16 | Stop reason: HOSPADM

## 2017-05-16 RX ORDER — POTASSIUM CHLORIDE 29.8 MG/ML
20 INJECTION INTRAVENOUS
Status: DISCONTINUED | OUTPATIENT
Start: 2017-05-16 | End: 2017-05-24 | Stop reason: HOSPADM

## 2017-05-16 RX ORDER — LIDOCAINE 40 MG/G
CREAM TOPICAL
Status: DISCONTINUED | OUTPATIENT
Start: 2017-05-16 | End: 2017-05-16 | Stop reason: HOSPADM

## 2017-05-16 RX ORDER — DIAZEPAM 10 MG/2ML
2.5 INJECTION, SOLUTION INTRAMUSCULAR; INTRAVENOUS
Status: DISCONTINUED | OUTPATIENT
Start: 2017-05-16 | End: 2017-05-16 | Stop reason: HOSPADM

## 2017-05-16 RX ORDER — KETOROLAC TROMETHAMINE 30 MG/ML
INJECTION, SOLUTION INTRAMUSCULAR; INTRAVENOUS PRN
Status: DISCONTINUED | OUTPATIENT
Start: 2017-05-16 | End: 2017-05-16

## 2017-05-16 RX ORDER — POTASSIUM CL/LIDO/0.9 % NACL 10MEQ/0.1L
10 INTRAVENOUS SOLUTION, PIGGYBACK (ML) INTRAVENOUS
Status: DISCONTINUED | OUTPATIENT
Start: 2017-05-16 | End: 2017-05-24 | Stop reason: HOSPADM

## 2017-05-16 RX ORDER — CIPROFLOXACIN 2 MG/ML
400 INJECTION, SOLUTION INTRAVENOUS EVERY 12 HOURS
Status: COMPLETED | OUTPATIENT
Start: 2017-05-17 | End: 2017-05-17

## 2017-05-16 RX ORDER — DIPHENHYDRAMINE HCL 25 MG
25 CAPSULE ORAL EVERY 6 HOURS PRN
Status: DISCONTINUED | OUTPATIENT
Start: 2017-05-16 | End: 2017-05-24 | Stop reason: HOSPADM

## 2017-05-16 RX ORDER — LEVOFLOXACIN 5 MG/ML
500 INJECTION, SOLUTION INTRAVENOUS
Status: COMPLETED | OUTPATIENT
Start: 2017-05-16 | End: 2017-05-16

## 2017-05-16 RX ORDER — BENZOCAINE/MENTHOL 6 MG-10 MG
LOZENGE MUCOUS MEMBRANE EVERY 4 HOURS PRN
Status: DISCONTINUED | OUTPATIENT
Start: 2017-05-16 | End: 2017-05-16

## 2017-05-16 RX ORDER — TOPIRAMATE 25 MG/1
50 TABLET, FILM COATED ORAL EVERY EVENING
Status: DISCONTINUED | OUTPATIENT
Start: 2017-05-16 | End: 2017-05-24 | Stop reason: HOSPADM

## 2017-05-16 RX ORDER — FENTANYL CITRATE 50 UG/ML
25-50 INJECTION, SOLUTION INTRAMUSCULAR; INTRAVENOUS
Status: DISCONTINUED | OUTPATIENT
Start: 2017-05-16 | End: 2017-05-16 | Stop reason: HOSPADM

## 2017-05-16 RX ORDER — DIPHENHYDRAMINE HYDROCHLORIDE 50 MG/ML
25 INJECTION INTRAMUSCULAR; INTRAVENOUS EVERY 6 HOURS PRN
Status: DISCONTINUED | OUTPATIENT
Start: 2017-05-16 | End: 2017-05-24 | Stop reason: HOSPADM

## 2017-05-16 RX ORDER — LIDOCAINE HYDROCHLORIDE 10 MG/ML
INJECTION, SOLUTION INFILTRATION; PERINEURAL PRN
Status: DISCONTINUED | OUTPATIENT
Start: 2017-05-16 | End: 2017-05-16

## 2017-05-16 RX ORDER — SODIUM CHLORIDE 9 MG/ML
INJECTION, SOLUTION INTRAVENOUS CONTINUOUS
Status: DISCONTINUED | OUTPATIENT
Start: 2017-05-16 | End: 2017-05-23

## 2017-05-16 RX ORDER — DROPERIDOL 2.5 MG/ML
0.62 INJECTION, SOLUTION INTRAMUSCULAR; INTRAVENOUS
Status: DISCONTINUED | OUTPATIENT
Start: 2017-05-16 | End: 2017-05-16 | Stop reason: RX

## 2017-05-16 RX ORDER — ACETAMINOPHEN 10 MG/ML
1000 INJECTION, SOLUTION INTRAVENOUS EVERY 6 HOURS PRN
Status: DISCONTINUED | OUTPATIENT
Start: 2017-05-16 | End: 2017-05-18

## 2017-05-16 RX ORDER — DEXAMETHASONE SODIUM PHOSPHATE 4 MG/ML
INJECTION, SOLUTION INTRA-ARTICULAR; INTRALESIONAL; INTRAMUSCULAR; INTRAVENOUS; SOFT TISSUE PRN
Status: DISCONTINUED | OUTPATIENT
Start: 2017-05-16 | End: 2017-05-16

## 2017-05-16 RX ORDER — SODIUM CHLORIDE, SODIUM LACTATE, POTASSIUM CHLORIDE, CALCIUM CHLORIDE 600; 310; 30; 20 MG/100ML; MG/100ML; MG/100ML; MG/100ML
INJECTION, SOLUTION INTRAVENOUS CONTINUOUS
Status: DISCONTINUED | OUTPATIENT
Start: 2017-05-16 | End: 2017-05-16 | Stop reason: HOSPADM

## 2017-05-16 RX ORDER — PROPOFOL 10 MG/ML
INJECTION, EMULSION INTRAVENOUS PRN
Status: DISCONTINUED | OUTPATIENT
Start: 2017-05-16 | End: 2017-05-16

## 2017-05-16 RX ORDER — FENTANYL CITRATE 50 UG/ML
100 INJECTION, SOLUTION INTRAMUSCULAR; INTRAVENOUS ONCE
Status: COMPLETED | OUTPATIENT
Start: 2017-05-16 | End: 2017-05-16

## 2017-05-16 RX ORDER — ALBUTEROL SULFATE 0.83 MG/ML
2.5 SOLUTION RESPIRATORY (INHALATION) EVERY 4 HOURS PRN
Status: DISCONTINUED | OUTPATIENT
Start: 2017-05-16 | End: 2017-05-16 | Stop reason: HOSPADM

## 2017-05-16 RX ORDER — NALOXONE HYDROCHLORIDE 0.4 MG/ML
.1-.4 INJECTION, SOLUTION INTRAMUSCULAR; INTRAVENOUS; SUBCUTANEOUS
Status: DISCONTINUED | OUTPATIENT
Start: 2017-05-16 | End: 2017-05-24 | Stop reason: HOSPADM

## 2017-05-16 RX ORDER — ACETAMINOPHEN 10 MG/ML
1000 INJECTION, SOLUTION INTRAVENOUS ONCE
Status: DISCONTINUED | OUTPATIENT
Start: 2017-05-16 | End: 2017-05-16 | Stop reason: HOSPADM

## 2017-05-16 RX ORDER — NEOSTIGMINE METHYLSULFATE 1 MG/ML
VIAL (ML) INJECTION PRN
Status: DISCONTINUED | OUTPATIENT
Start: 2017-05-16 | End: 2017-05-16

## 2017-05-16 RX ORDER — HYDROMORPHONE HYDROCHLORIDE 1 MG/ML
.3-.5 INJECTION, SOLUTION INTRAMUSCULAR; INTRAVENOUS; SUBCUTANEOUS EVERY 5 MIN PRN
Status: DISCONTINUED | OUTPATIENT
Start: 2017-05-16 | End: 2017-05-16 | Stop reason: HOSPADM

## 2017-05-16 RX ORDER — FENTANYL CITRATE 50 UG/ML
INJECTION, SOLUTION INTRAMUSCULAR; INTRAVENOUS PRN
Status: DISCONTINUED | OUTPATIENT
Start: 2017-05-16 | End: 2017-05-16

## 2017-05-16 RX ORDER — LIDOCAINE 40 MG/G
CREAM TOPICAL
Status: DISCONTINUED | OUTPATIENT
Start: 2017-05-16 | End: 2017-05-24 | Stop reason: HOSPADM

## 2017-05-16 RX ORDER — ONDANSETRON 2 MG/ML
4 INJECTION INTRAMUSCULAR; INTRAVENOUS EVERY 30 MIN PRN
Status: DISCONTINUED | OUTPATIENT
Start: 2017-05-16 | End: 2017-05-16 | Stop reason: HOSPADM

## 2017-05-16 RX ORDER — POTASSIUM CHLORIDE 7.45 MG/ML
10 INJECTION INTRAVENOUS
Status: DISCONTINUED | OUTPATIENT
Start: 2017-05-16 | End: 2017-05-24 | Stop reason: HOSPADM

## 2017-05-16 RX ORDER — DIMENHYDRINATE 50 MG/ML
25 INJECTION, SOLUTION INTRAMUSCULAR; INTRAVENOUS
Status: DISCONTINUED | OUTPATIENT
Start: 2017-05-16 | End: 2017-05-16 | Stop reason: HOSPADM

## 2017-05-16 RX ORDER — METOPROLOL TARTRATE 1 MG/ML
5 INJECTION, SOLUTION INTRAVENOUS EVERY 6 HOURS
Status: DISCONTINUED | OUTPATIENT
Start: 2017-05-17 | End: 2017-05-22

## 2017-05-16 RX ORDER — GLYCOPYRROLATE 0.2 MG/ML
INJECTION, SOLUTION INTRAMUSCULAR; INTRAVENOUS PRN
Status: DISCONTINUED | OUTPATIENT
Start: 2017-05-16 | End: 2017-05-16

## 2017-05-16 RX ORDER — MAGNESIUM SULFATE HEPTAHYDRATE 40 MG/ML
4 INJECTION, SOLUTION INTRAVENOUS EVERY 4 HOURS PRN
Status: DISCONTINUED | OUTPATIENT
Start: 2017-05-16 | End: 2017-05-24 | Stop reason: HOSPADM

## 2017-05-16 RX ORDER — MEPERIDINE HYDROCHLORIDE 25 MG/ML
12.5 INJECTION INTRAMUSCULAR; INTRAVENOUS; SUBCUTANEOUS EVERY 5 MIN PRN
Status: DISCONTINUED | OUTPATIENT
Start: 2017-05-16 | End: 2017-05-16 | Stop reason: HOSPADM

## 2017-05-16 RX ORDER — HEPARIN SODIUM 5000 [USP'U]/.5ML
5000 INJECTION, SOLUTION INTRAVENOUS; SUBCUTANEOUS EVERY 12 HOURS
Status: DISCONTINUED | OUTPATIENT
Start: 2017-05-17 | End: 2017-05-24 | Stop reason: HOSPADM

## 2017-05-16 RX ORDER — ONDANSETRON 2 MG/ML
4 INJECTION INTRAMUSCULAR; INTRAVENOUS EVERY 6 HOURS PRN
Status: DISCONTINUED | OUTPATIENT
Start: 2017-05-16 | End: 2017-05-24 | Stop reason: HOSPADM

## 2017-05-16 RX ORDER — DIPHENHYDRAMINE HYDROCHLORIDE 50 MG/ML
INJECTION INTRAMUSCULAR; INTRAVENOUS
Status: DISPENSED
Start: 2017-05-16 | End: 2017-05-17

## 2017-05-16 RX ORDER — KETOROLAC TROMETHAMINE 30 MG/ML
30 INJECTION, SOLUTION INTRAMUSCULAR; INTRAVENOUS ONCE
Status: COMPLETED | OUTPATIENT
Start: 2017-05-16 | End: 2017-05-16

## 2017-05-16 RX ORDER — POTASSIUM CHLORIDE 1.5 G/1.58G
20-40 POWDER, FOR SOLUTION ORAL
Status: DISCONTINUED | OUTPATIENT
Start: 2017-05-16 | End: 2017-05-24 | Stop reason: HOSPADM

## 2017-05-16 RX ORDER — MAGNESIUM HYDROXIDE 1200 MG/15ML
LIQUID ORAL PRN
Status: DISCONTINUED | OUTPATIENT
Start: 2017-05-16 | End: 2017-05-16 | Stop reason: HOSPADM

## 2017-05-16 RX ORDER — ENALAPRILAT 1.25 MG/ML
1.25 INJECTION INTRAVENOUS ONCE
Status: COMPLETED | OUTPATIENT
Start: 2017-05-16 | End: 2017-05-16

## 2017-05-16 RX ORDER — ONDANSETRON 4 MG/1
4 TABLET, ORALLY DISINTEGRATING ORAL EVERY 30 MIN PRN
Status: DISCONTINUED | OUTPATIENT
Start: 2017-05-16 | End: 2017-05-16 | Stop reason: HOSPADM

## 2017-05-16 RX ORDER — LABETALOL HYDROCHLORIDE 5 MG/ML
INJECTION, SOLUTION INTRAVENOUS PRN
Status: DISCONTINUED | OUTPATIENT
Start: 2017-05-16 | End: 2017-05-16

## 2017-05-16 RX ORDER — TOPIRAMATE 25 MG/1
25 TABLET, FILM COATED ORAL EVERY MORNING
Status: DISCONTINUED | OUTPATIENT
Start: 2017-05-17 | End: 2017-05-24 | Stop reason: HOSPADM

## 2017-05-16 RX ORDER — POTASSIUM CHLORIDE 1500 MG/1
20-40 TABLET, EXTENDED RELEASE ORAL
Status: DISCONTINUED | OUTPATIENT
Start: 2017-05-16 | End: 2017-05-24 | Stop reason: HOSPADM

## 2017-05-16 RX ADMIN — SODIUM CHLORIDE, POTASSIUM CHLORIDE, SODIUM LACTATE AND CALCIUM CHLORIDE: 600; 310; 30; 20 INJECTION, SOLUTION INTRAVENOUS at 15:29

## 2017-05-16 RX ADMIN — HYDROMORPHONE HYDROCHLORIDE 0.5 MG: 1 INJECTION, SOLUTION INTRAMUSCULAR; INTRAVENOUS; SUBCUTANEOUS at 13:07

## 2017-05-16 RX ADMIN — GLYCOPYRROLATE 0.4 MG: 0.2 INJECTION, SOLUTION INTRAMUSCULAR; INTRAVENOUS at 13:39

## 2017-05-16 RX ADMIN — GLYCOPYRROLATE 0.3 MG: 0.2 INJECTION, SOLUTION INTRAMUSCULAR; INTRAVENOUS at 11:48

## 2017-05-16 RX ADMIN — FENTANYL CITRATE 100 MCG: 50 INJECTION INTRAMUSCULAR; INTRAVENOUS at 11:04

## 2017-05-16 RX ADMIN — HYDROMORPHONE HYDROCHLORIDE 0.5 MG: 1 INJECTION, SOLUTION INTRAMUSCULAR; INTRAVENOUS; SUBCUTANEOUS at 12:49

## 2017-05-16 RX ADMIN — LEVOFLOXACIN 500 MG: 5 INJECTION, SOLUTION INTRAVENOUS at 11:41

## 2017-05-16 RX ADMIN — FENTANYL CITRATE 100 MCG: 50 INJECTION, SOLUTION INTRAMUSCULAR; INTRAVENOUS at 12:24

## 2017-05-16 RX ADMIN — FENTANYL CITRATE 50 MCG: 50 INJECTION INTRAMUSCULAR; INTRAVENOUS at 14:59

## 2017-05-16 RX ADMIN — SODIUM CHLORIDE, POTASSIUM CHLORIDE, SODIUM LACTATE AND CALCIUM CHLORIDE: 600; 310; 30; 20 INJECTION, SOLUTION INTRAVENOUS at 11:05

## 2017-05-16 RX ADMIN — Medication 3 MG: at 13:39

## 2017-05-16 RX ADMIN — ROCURONIUM BROMIDE 50 MG: 10 INJECTION INTRAVENOUS at 11:48

## 2017-05-16 RX ADMIN — FENTANYL CITRATE 50 MCG: 50 INJECTION INTRAMUSCULAR; INTRAVENOUS at 14:53

## 2017-05-16 RX ADMIN — LABETALOL HYDROCHLORIDE 5 MG: 5 INJECTION, SOLUTION INTRAVENOUS at 13:48

## 2017-05-16 RX ADMIN — ACETAMINOPHEN 1000 MG: 10 INJECTION, SOLUTION INTRAVENOUS at 20:21

## 2017-05-16 RX ADMIN — DEXAMETHASONE SODIUM PHOSPHATE 8 MG: 4 INJECTION, SOLUTION INTRA-ARTICULAR; INTRALESIONAL; INTRAMUSCULAR; INTRAVENOUS; SOFT TISSUE at 11:48

## 2017-05-16 RX ADMIN — Medication 1 LOZENGE: at 18:14

## 2017-05-16 RX ADMIN — LABETALOL HYDROCHLORIDE 10 MG: 5 INJECTION, SOLUTION INTRAVENOUS at 12:27

## 2017-05-16 RX ADMIN — ROCURONIUM BROMIDE 10 MG: 10 INJECTION INTRAVENOUS at 13:08

## 2017-05-16 RX ADMIN — HYDROMORPHONE HYDROCHLORIDE 0.5 MG: 1 INJECTION, SOLUTION INTRAMUSCULAR; INTRAVENOUS; SUBCUTANEOUS at 12:18

## 2017-05-16 RX ADMIN — ENALAPRILAT 1.25 MG: 1.25 INJECTION INTRAVENOUS at 14:13

## 2017-05-16 RX ADMIN — SODIUM CHLORIDE, POTASSIUM CHLORIDE, SODIUM LACTATE AND CALCIUM CHLORIDE: 600; 310; 30; 20 INJECTION, SOLUTION INTRAVENOUS at 11:06

## 2017-05-16 RX ADMIN — PHENYLEPHRINE HYDROCHLORIDE 100 MCG: 10 INJECTION, SOLUTION INTRAMUSCULAR; INTRAVENOUS; SUBCUTANEOUS at 13:06

## 2017-05-16 RX ADMIN — SODIUM CHLORIDE: 9 INJECTION, SOLUTION INTRAVENOUS at 16:36

## 2017-05-16 RX ADMIN — METRONIDAZOLE 500 MG: 500 INJECTION, SOLUTION INTRAVENOUS at 11:54

## 2017-05-16 RX ADMIN — Medication 0.5 MG: at 15:28

## 2017-05-16 RX ADMIN — PANTOPRAZOLE SODIUM 40 MG: 40 INJECTION, POWDER, FOR SOLUTION INTRAVENOUS at 17:42

## 2017-05-16 RX ADMIN — PROPOFOL 200 MG: 10 INJECTION, EMULSION INTRAVENOUS at 11:48

## 2017-05-16 RX ADMIN — ONDANSETRON 4 MG: 2 INJECTION INTRAMUSCULAR; INTRAVENOUS at 11:48

## 2017-05-16 RX ADMIN — DIPHENHYDRAMINE HYDROCHLORIDE 25 MG: 50 INJECTION, SOLUTION INTRAMUSCULAR; INTRAVENOUS at 22:00

## 2017-05-16 RX ADMIN — LIDOCAINE HYDROCHLORIDE 30 MG: 10 INJECTION, SOLUTION INFILTRATION; PERINEURAL at 11:48

## 2017-05-16 RX ADMIN — MIDAZOLAM HYDROCHLORIDE 2 MG: 1 INJECTION, SOLUTION INTRAMUSCULAR; INTRAVENOUS at 11:42

## 2017-05-16 RX ADMIN — FENTANYL CITRATE 50 MCG: 50 INJECTION, SOLUTION INTRAMUSCULAR; INTRAVENOUS at 12:20

## 2017-05-16 RX ADMIN — POTASSIUM PHOSPHATE, MONOBASIC AND POTASSIUM PHOSPHATE, DIBASIC 15 MMOL: 224; 236 INJECTION, SOLUTION INTRAVENOUS at 20:13

## 2017-05-16 RX ADMIN — FENTANYL CITRATE 100 MCG: 50 INJECTION, SOLUTION INTRAMUSCULAR; INTRAVENOUS at 11:48

## 2017-05-16 RX ADMIN — HYDROMORPHONE HYDROCHLORIDE: 10 INJECTION, SOLUTION INTRAMUSCULAR; INTRAVENOUS; SUBCUTANEOUS at 15:07

## 2017-05-16 RX ADMIN — KETOROLAC TROMETHAMINE 30 MG: 30 INJECTION, SOLUTION INTRAMUSCULAR at 11:02

## 2017-05-16 RX ADMIN — METRONIDAZOLE 500 MG: 500 INJECTION, SOLUTION INTRAVENOUS at 21:09

## 2017-05-16 RX ADMIN — KETOROLAC TROMETHAMINE 30 MG: 30 INJECTION, SOLUTION INTRAMUSCULAR at 11:48

## 2017-05-16 RX ADMIN — SODIUM CHLORIDE, POTASSIUM CHLORIDE, SODIUM LACTATE AND CALCIUM CHLORIDE: 600; 310; 30; 20 INJECTION, SOLUTION INTRAVENOUS at 12:19

## 2017-05-16 RX ADMIN — HYDROMORPHONE HYDROCHLORIDE 0.5 MG: 1 INJECTION, SOLUTION INTRAMUSCULAR; INTRAVENOUS; SUBCUTANEOUS at 11:48

## 2017-05-16 NOTE — PROGRESS NOTES
[facility]    Hospitalist Progress Note  Name: Naif Howell Jr.    MRN: [id number]  Provider: Kristy Reddy MD  Date of Service: 05/16/2017    Assessment & Plan   Summary of Stay: Naif Howell Jr. is a 52 year old male who was admitted on 5/14/2017 with nausea and vomiting 2 days after laparoscopic cholecystectomy for chronic cholecystitis, Admitted for small bowel obstruction versus postoperative ileus.       Small bowel obstruction versus ileus  -- NPO  -- ?passing flatus  -- appreciate surgery consult patient might need adhesionlysis  -- IV fluids  -- Prn pain    Diaphragmatic irritation with hiccupping    Hypertension  -- prn hydralazine    PTSD:  -- restart PTA meds when able to take PO    Migraine therapy  -- prn pain meds    DVT Prophylaxis: Pneumatic Compression Devices  Code Status: Full Code    Disposition: Expected discharge TBD when clinically improves      Interval History   Pt to OR today for  Adhesionlysis. Seen in OR c/o NG causing discomfort    -Data reviewed today: I reviewed all new labs and imaging reports over the last 24 hours. I personally reviewed no images or EKG's today.    Physical Exam   Temp: 97.8  F (36.6  C) Temp src: Oral BP: 125/89 Pulse: 94   Resp: 16 SpO2: 96 % O2 Device: None (Room air)    Vitals:    05/14/17 2302 05/15/17 0112 05/16/17 0549   Weight: 90.7 kg (200 lb) 88.5 kg (195 lb) 89.2 kg (196 lb 9.6 oz)     Vital Signs with Ranges  Temp:  [97.6  F (36.4  C)-99.7  F (37.6  C)] 97.8  F (36.6  C)  Pulse:  [] 94  Resp:  [16] 16  BP: (120-126)/(77-89) 125/89  SpO2:  [93 %-98 %] 96 %  I/O last 3 completed shifts:  In: 2601 [P.O.:820; I.V.:1781]  Out: -       GEN:  Alert, oriented x 3, appears comfortable, NAD.  HEENT:  Normocephalic/atraumatic, no scleral icterus, no nasal discharge, mouth moist.  CV:  Regular rate and rhythm, no murmur or JVD.  S1 + S2 noted, no S3 or S4.  LUNGS:  Clear to auscultation bilaterally without rales/rhonchi/wheezing/retractions.   Symmetric chest rise on inhalation noted.  ABD: post op decreased  bowel sounds, soft, minimal tenderness.  No rebound/guarding/rigidity.  EXT:  No edema.  No cyanosis.  No joint synovitis noted.  SKIN:  Dry to touch, no exanthems noted in the visualized areas.    Medications     dextrose 5% and 0.45% NaCl + KCl 20 mEq/L 100 mL/hr at 05/16/17 0222       losartan  50 mg Oral Daily     topiramate  50 mg Oral BID     senna-docusate  1-2 tablet Oral BID     Data       Recent Labs  Lab 05/15/17  0546 05/14/17  2337   WBC 10.4 12.9*   HGB 16.5 18.4*   HCT 48.5 53.2*   MCV 84 83    281       Recent Labs  Lab 05/14/17  2337 05/13/17  1445    135   POTASSIUM 3.4 3.3*   CHLORIDE 103 101   CO2 26 24   ANIONGAP 7 10   * 161*   BUN 21 20   CR 0.86 0.92   GFRESTIMATED >90Non  GFR Calc 86   GFRESTBLACK >90African American GFR Calc >90African American GFR Calc   ADELSO 8.9 9.6     No results for input(s): CULT in the last 168 hours.    Recent Labs  Lab 05/14/17  2337   AST 12   ALT 21   ALKPHOS 98   BILITOTAL 0.7     No results for input(s): INR in the last 168 hours.    Recent Labs  Lab 05/14/17  2337   LACT 1.1       Recent Labs  Lab 05/14/17  2337   LIPASE 140     No results for input(s): TSH in the last 168 hours.  No results for input(s): COLOR, APPEARANCE, URINEGLC, URINEBILI, URINEKETONE, SG, UBLD, URINEPH, PROTEIN, UROBILINOGEN, NITRITE, LEUKEST, RBCU, WBCU in the last 168 hours.    Recent Results (from the past 24 hour(s))   CT Abdomen Pelvis w Contrast    Narrative    CT ABDOMEN AND PELVIS WITH CONTRAST  5/15/2017 11:28 AM     HISTORY: Small bowel obstruction. Recent cholecystectomy.     COMPARISON: CT chest, abdomen and pelvis 11/6/2016.    TECHNIQUE: Axial images from the lung bases to the symphysis are  performed with additional coronal reformatted images. 98 mL of Isovue  370 are given intravenously.  Radiation dose for this scan was reduced  using automated exposure control,  adjustment of the mA and/or kV  according to patient size, or iterative reconstruction technique.    FINDINGS: Atelectasis is noted at the lung bases.    Abdomen: Free intraperitoneal air is present, likely coinciding with  patient's recent laparoscopic cholecystectomy. Scattered probable  cysts are noted in the liver. Cholecystectomy changes are noted. No  fluid is noted near the gallbladder fossa. Medial segment left hepatic  lobe cyst is adjacent to the gallbladder fossa on series 2, image 21,  measuring 4.2 cm in diameter. This was present on the prior chest,  abdomen, pelvis CT from 11/6/2016. The spleen, pancreas, adrenal  glands and kidneys are unremarkable. No hydronephrosis or obvious  renal calculi. Aorta demonstrates scattered calcified and noncalcified  plaque. No aneurysm or dissection. Dilated loops of small bowel are  present in the upper abdomen. Contrast is noted in the colon probably  from recent intraoperative cholangiogram. There appears to be a  transition point in the left upper quadrant in the region of series 2,  image 44 with dilated loops of more distal small bowel and  decompression of the more distal small bowel. There is mesenteric  edema in this area suggesting an internal hernia.    Pelvis: The bladder, prostate and rectum are unremarkable. No enlarged  pelvic lymph nodes. Moderate amount of free pelvic fluid is present  likely from the mesenteric edema. Bone window examination is  unremarkable.      Impression    IMPRESSION:  1. Small bowel obstruction with decompressed proximal jejunum and  decompressed ileum. Intervening small bowel is dilated suggesting  closed loop obstruction or internal hernia. Mesenteric edema raises  suspicion for bowel ischemia.  2. Free intraperitoneal air likely related to recent laparoscopic  cholecystectomy.  3. Multiple liver cysts unchanged since prior CT.    KAY OLVERA MD

## 2017-05-16 NOTE — BRIEF OP NOTE
[facility] Brief Operative Note    Pre-operative diagnosis: bowel obstruction   Post-operative diagnosis Same, small mid SB perforation   Procedure: Procedure(s):  DIAGNOSTIC LAPAROSCOPY, LAPAROTOMY,  SMALL BOWEL RESECTION, LAPAROSCOPIC LYSIS OF ADHESIONS - Wound Class: I-Clean   - Wound Class: I-Clean   Surgeon(s): Surgeon(s) and Role:     * Corey Vang MD - Primary     * Shannan Goldberg PA-C - Assisting   Estimated blood loss: 25 mL    Specimens:   ID Type Source Tests Collected by Time Destination   A : PORTION OF SMALL BOWEL Tissue Other SURGICAL PATHOLOGY EXAM Corey Vang MD 5/16/2017  1:02 PM       Findings:

## 2017-05-16 NOTE — ANESTHESIA POSTPROCEDURE EVALUATION
Patient: Naif Howell Jr.    Procedure(s):  DIAGNOSTIC LAPAROSCOPY, LAPAROTOMY,  SMALL BOWEL RESECTION, LAPAROSCOPIC LYSIS OF ADHESIONS - Wound Class: I-Clean   - Wound Class: I-Clean    Diagnosis:bowel obstruction  Diagnosis Additional Information: SBO with mid small bowel perforation  Dr. Vang    Anesthesia Type:  General, RSI, ETT    Note:  Anesthesia Post Evaluation    Patient location during evaluation: PACU  Patient participation: Able to fully participate in evaluation  Level of consciousness: awake  Pain management: adequate  Airway patency: patent  Cardiovascular status: acceptable  Respiratory status: acceptable  Hydration status: acceptable  PONV: none             Last vitals:  Vitals:    05/16/17 0219 05/16/17 0853 05/16/17 1027   BP: 126/77 125/89 135/89   Pulse: 85 94    Resp: 16 16 16   Temp: 98.6  F (37  C) 97.8  F (36.6  C) 97.9  F (36.6  C)   SpO2: 93% 96% 98%         Electronically Signed By: Aj Bryant MD  May 16, 2017  2:01 PM

## 2017-05-16 NOTE — ANESTHESIA PREPROCEDURE EVALUATION
PAC NOTE:       ANESTHESIA PRE EVALUATION:  Anesthesia Evaluation     . Pt has had prior anesthetic. Type: General    No history of anesthetic complications          ROS/MED HX    ENT/Pulmonary:  - neg pulmonary ROS     Neurologic:     (+)migraines,     Cardiovascular:     (+) hypertension----. : . . . :. .       METS/Exercise Tolerance:     Hematologic:  - neg hematologic  ROS       Musculoskeletal:  - neg musculoskeletal ROS       GI/Hepatic:     (+) cholecystitis/cholelithiasis, Other GI/Hepatic recent cholecystectomy with ileus for exploration      Renal/Genitourinary:  - ROS Renal section negative       Endo:  - neg endo ROS       Psychiatric:  - neg psychiatric ROS       Infectious Disease:  - neg infectious disease ROS       Malignancy:      - no malignancy   Other:    - neg other ROS                 Physical Exam  Normal systems: cardiovascular, pulmonary and dental    Airway   Mallampati: II  TM distance: >3 FB  Neck ROM: full    Dental     Cardiovascular   Rhythm and rate: regular and normal      Pulmonary    breath sounds clear to auscultation    Other findings: Lab Test        05/15/17     05/14/17     04/25/17      --          09/09/11                       0546          2337          0307           --           1150          WBC          10.4         12.9*        7.5            < >        7.4           HGB          16.5         18.4*        16.5           < >        15.9          MCV          84           83           83             < >        84            PLT          228          281          240            < >        222           INR           --           --           --           --          1.01           < > = values in this interval not displayed.                  Lab Test        05/14/17 05/13/17 04/25/17                       2337          1445          0307          NA           136          135          138           POTASSIUM    3.4          3.3*         3.5           CHLORIDE      103          101          105           CO2          26           24           25            BUN          21           20           19            CR           0.86         0.92         0.82          ANIONGAP     7            10           8             ADELSO          8.9          9.6          8.1*          GLC          124*         161*         116*                   ECG  Sinus rhythm  Normal ECG  When compared with ECG of 28-MAR-2017 21:49,  No significant change was found         Anesthesia Plan      History & Physical Review  History and physical reviewed and following examination; no interval change.    ASA Status:  2 .    NPO Status:  > 8 hours    Plan for General, RSI and ETT with Intravenous induction. Maintenance will be Balanced.    PONV prophylaxis:  Dexamethasone or Solumedrol       Postoperative Care  Postoperative pain management:  IV analgesics, Oral pain medications and Multi-modal analgesia.      Consents  Anesthetic plan, risks, benefits and alternatives discussed with:  Patient..                            .

## 2017-05-16 NOTE — PROGRESS NOTES
I also met with patient today. He indicated that he was passing a scant amount of flatus and that he feels less bloated than before.  I agree with the plan as laid out by Dr. Sena this am.     MD Flor   Internal Medicine/Pediatrics

## 2017-05-16 NOTE — PLAN OF CARE
Problem: Goal Outcome Summary  Goal: Goal Outcome Summary  Outcome: Improving  To 604 from pacu 1610. A/O. Mild tacycardia NG to suction. Gutierrez in place output adequate but dark. YURIDIA in place and draining. Rx to flagyl see other note from this writer.       Paged Dr La: surgeon discontinued electrolyte protocols and tele monitoring would you like these continued.   Response via telephone: no tele needed. Add standard replacement protocols for K, Mag, Phos. See chart    Spoke with Dr teague about oral meds, can give Topamax but clamp NG for a while to promote absorption.

## 2017-05-16 NOTE — ANESTHESIA CARE TRANSFER NOTE
Patient: Naif Howell Jr.    Procedure(s):  DIAGNOSTIC LAPAROSCOPY, LAPAROTOMY,  SMALL BOWEL RESECTION, LAPAROSCOPIC LYSIS OF ADHESIONS - Wound Class: I-Clean   - Wound Class: I-Clean    Diagnosis: bowel obstruction  Diagnosis Additional Information: SBO with mid small bowel perforation  Dr. Vang    Anesthesia Type:   General, RSI, ETT     Note:  Airway :Face Mask  Patient transferred to:PACU  Comments: Patient oral suctioned. Patient with spontaneous respirations and adequate tidal volumes. Patient awake and responsive. Extubated in OR to 8 L face tent. To PACU ventilating well. VSS. Report given.      Vitals: (Last set prior to Anesthesia Care Transfer)    CRNA VITALS  5/16/2017 1326 - 5/16/2017 1405      5/16/2017             Pulse: 97    SpO2: 92 %                Electronically Signed By: REBECA French CRNA  May 16, 2017  2:05 PM

## 2017-05-16 NOTE — PROGRESS NOTES
"[facility]  General Surgery Progress Note           Assessment and Plan:   Assessment:   Persistent SBO, etiology unclear      Plan:   -discussed laparoscopy, laparotomy SB resection - he would like to go ahead. Discussed conrad STEELE, discharge after bowel function returns         Interval History:   no new complaints, less pain, nausea persists         Physical Exam:   Blood pressure 135/89, pulse 94, temperature 97.9  F (36.6  C), temperature source Temporal, resp. rate 16, height 1.88 m (6' 2\"), weight 89.2 kg (196 lb 9.6 oz), SpO2 98 %.    I/O last 3 completed shifts:  In: 2601 [P.O.:820; I.V.:1781]  Out: -     Abdomen:   Mildly/moderately distended (no change)             Data:     Recent Labs   Lab Test  05/16/17   0745  05/15/17   0546  05/14/17   2337   HGB  16.7  16.5  18.4*   WBC  9.3  10.4  12.9*         Recent Labs  Lab 05/16/17  0745   WBC 9.3   HGB 16.7   HCT 49.1   MCV 84        Abdominal films:   SBO pattern persists, minimal improvement, reviewed with radiologist       Corey Vang MD     "

## 2017-05-16 NOTE — PLAN OF CARE
Problem: Goal Outcome Summary  Goal: Goal Outcome Summary  Outcome: Improving  Evening RN  VSS, low grade temp (99.7, given tylenol).  Complaints of minimal abdominal pain this shift.  Passing more gas, last BM 5/12.  Denies nausea.  Given PO zofran prior to receiving oral medications.  Tolerating clears.  IV infusing. Up with SBA ambulating in halls.  Voiding adequately.  Possible OR tomorrow pending x-ray results in AM.  Will continue to monitor.

## 2017-05-17 LAB
ALBUMIN SERPL-MCNC: 2.5 G/DL (ref 3.4–5)
ALP SERPL-CCNC: 71 U/L (ref 40–150)
ALT SERPL W P-5'-P-CCNC: 49 U/L (ref 0–70)
ANION GAP SERPL CALCULATED.3IONS-SCNC: 8 MMOL/L (ref 3–14)
AST SERPL W P-5'-P-CCNC: 21 U/L (ref 0–45)
BILIRUB SERPL-MCNC: 1.1 MG/DL (ref 0.2–1.3)
BUN SERPL-MCNC: 15 MG/DL (ref 7–30)
CALCIUM SERPL-MCNC: 7.7 MG/DL (ref 8.5–10.1)
CHLORIDE SERPL-SCNC: 107 MMOL/L (ref 94–109)
CO2 SERPL-SCNC: 23 MMOL/L (ref 20–32)
COPATH REPORT: NORMAL
CREAT SERPL-MCNC: 0.71 MG/DL (ref 0.66–1.25)
ERYTHROCYTE [DISTWIDTH] IN BLOOD BY AUTOMATED COUNT: 12.8 % (ref 10–15)
GFR SERPL CREATININE-BSD FRML MDRD: ABNORMAL ML/MIN/1.7M2
GLUCOSE SERPL-MCNC: 123 MG/DL (ref 70–99)
HCT VFR BLD AUTO: 51.8 % (ref 40–53)
HGB BLD-MCNC: 17.8 G/DL (ref 13.3–17.7)
MAGNESIUM SERPL-MCNC: 2 MG/DL (ref 1.6–2.3)
MCH RBC QN AUTO: 28.7 PG (ref 26.5–33)
MCHC RBC AUTO-ENTMCNC: 34.4 G/DL (ref 31.5–36.5)
MCV RBC AUTO: 84 FL (ref 78–100)
PHOSPHATE SERPL-MCNC: 3 MG/DL (ref 2.5–4.5)
PLATELET # BLD AUTO: 247 10E9/L (ref 150–450)
POTASSIUM SERPL-SCNC: 3.5 MMOL/L (ref 3.4–5.3)
PROT SERPL-MCNC: 5.2 G/DL (ref 6.8–8.8)
RBC # BLD AUTO: 6.2 10E12/L (ref 4.4–5.9)
SODIUM SERPL-SCNC: 138 MMOL/L (ref 133–144)
WBC # BLD AUTO: 11.7 10E9/L (ref 4–11)

## 2017-05-17 PROCEDURE — 25000128 H RX IP 250 OP 636: Performed by: SURGERY

## 2017-05-17 PROCEDURE — 83735 ASSAY OF MAGNESIUM: CPT | Performed by: SURGERY

## 2017-05-17 PROCEDURE — 25000132 ZZH RX MED GY IP 250 OP 250 PS 637: Performed by: SURGERY

## 2017-05-17 PROCEDURE — 99232 SBSQ HOSP IP/OBS MODERATE 35: CPT | Performed by: INTERNAL MEDICINE

## 2017-05-17 PROCEDURE — 80053 COMPREHEN METABOLIC PANEL: CPT | Performed by: SURGERY

## 2017-05-17 PROCEDURE — 36415 COLL VENOUS BLD VENIPUNCTURE: CPT | Performed by: SURGERY

## 2017-05-17 PROCEDURE — S0077 INJECTION, CLINDAMYCIN PHOSP: HCPCS | Performed by: SURGERY

## 2017-05-17 PROCEDURE — 85027 COMPLETE CBC AUTOMATED: CPT | Performed by: SURGERY

## 2017-05-17 PROCEDURE — 12000007 ZZH R&B INTERMEDIATE

## 2017-05-17 PROCEDURE — 25000125 ZZHC RX 250: Performed by: SURGERY

## 2017-05-17 PROCEDURE — 99207 ZZC CDG-MDM COMPONENT: MEETS LOW - DOWN CODED: CPT | Performed by: INTERNAL MEDICINE

## 2017-05-17 PROCEDURE — 84100 ASSAY OF PHOSPHORUS: CPT | Performed by: SURGERY

## 2017-05-17 RX ORDER — CLINDAMYCIN PHOSPHATE 600 MG/50ML
600 INJECTION, SOLUTION INTRAVENOUS EVERY 8 HOURS
Status: DISCONTINUED | OUTPATIENT
Start: 2017-05-17 | End: 2017-05-24

## 2017-05-17 RX ADMIN — METOPROLOL TARTRATE 5 MG: 5 INJECTION INTRAVENOUS at 14:48

## 2017-05-17 RX ADMIN — HYDROMORPHONE HYDROCHLORIDE: 10 INJECTION, SOLUTION INTRAMUSCULAR; INTRAVENOUS; SUBCUTANEOUS at 00:15

## 2017-05-17 RX ADMIN — SODIUM CHLORIDE: 9 INJECTION, SOLUTION INTRAVENOUS at 19:41

## 2017-05-17 RX ADMIN — Medication 2 LOZENGE: at 01:27

## 2017-05-17 RX ADMIN — METOPROLOL TARTRATE 5 MG: 5 INJECTION INTRAVENOUS at 19:32

## 2017-05-17 RX ADMIN — CLINDAMYCIN PHOSPHATE 600 MG: 12 INJECTION, SOLUTION INTRAVENOUS at 07:11

## 2017-05-17 RX ADMIN — CLINDAMYCIN PHOSPHATE 600 MG: 12 INJECTION, SOLUTION INTRAVENOUS at 14:48

## 2017-05-17 RX ADMIN — METOPROLOL TARTRATE 5 MG: 5 INJECTION INTRAVENOUS at 08:37

## 2017-05-17 RX ADMIN — CIPROFLOXACIN 400 MG: 2 INJECTION, SOLUTION INTRAVENOUS at 12:17

## 2017-05-17 RX ADMIN — HYDROMORPHONE HYDROCHLORIDE: 10 INJECTION, SOLUTION INTRAMUSCULAR; INTRAVENOUS; SUBCUTANEOUS at 05:27

## 2017-05-17 RX ADMIN — PHENOL 1 ML: 1.5 LIQUID ORAL at 19:22

## 2017-05-17 RX ADMIN — HEPARIN SODIUM 5000 UNITS: 10000 INJECTION, SOLUTION INTRAVENOUS; SUBCUTANEOUS at 19:32

## 2017-05-17 RX ADMIN — CIPROFLOXACIN 400 MG: 2 INJECTION, SOLUTION INTRAVENOUS at 01:12

## 2017-05-17 RX ADMIN — HEPARIN SODIUM 5000 UNITS: 10000 INJECTION, SOLUTION INTRAVENOUS; SUBCUTANEOUS at 08:38

## 2017-05-17 RX ADMIN — Medication 2 LOZENGE: at 15:59

## 2017-05-17 RX ADMIN — PANTOPRAZOLE SODIUM 40 MG: 40 INJECTION, POWDER, FOR SOLUTION INTRAVENOUS at 16:00

## 2017-05-17 RX ADMIN — PHENOL 1 ML: 1.5 LIQUID ORAL at 21:54

## 2017-05-17 RX ADMIN — SODIUM CHLORIDE: 9 INJECTION, SOLUTION INTRAVENOUS at 11:00

## 2017-05-17 RX ADMIN — CLINDAMYCIN PHOSPHATE 600 MG: 12 INJECTION, SOLUTION INTRAVENOUS at 21:55

## 2017-05-17 ASSESSMENT — PAIN DESCRIPTION - DESCRIPTORS
DESCRIPTORS: TIGHTNESS

## 2017-05-17 NOTE — PLAN OF CARE
Problem: Goal Outcome Summary  Goal: Goal Outcome Summary  Outcome: Improving     A/O x4. Up sitting edge of bed and ambulated in halls x1 with A1. VSS ex requiring 2L O2, 89% on RA - IS and activity encouraged. Pain managed with PCA. IVF infusing. IV cipro and cleocin. -gas, -bs, -bm. YURIDIA and martines in place. NG to low int suction with good output. Denies nausea. Will continue to monitor.

## 2017-05-17 NOTE — SIGNIFICANT EVENT
"Pt developed pink rash to both arms most likely related to a delayed rx to flagyl (pt was on 2nd dose), no itch, not raised, face flushed accompanied with \"possible tight throat\" paged admitting MD to inform and ask for benedryl. Pt states he does feel better post benadryl, flushing in face improving.     Paged Dr. Vang and spoke to on the phone. Will d/c flagyl and he will address replacement options in AM.       "

## 2017-05-17 NOTE — PLAN OF CARE
Problem: Goal Outcome Summary  Goal: Goal Outcome Summary  Outcome: No Change  Pt alert and oriented. Vs  96.5, 12, 105, 148/80, 94% 2lt/nc. Lung sounds clear.   Bowel sounds faint, no flatus at this time. Ng tube to Lis draining clear brown bile.  abd  Soft, pt noted pain at 2. Dressing gauze clean and intact. Will continue to monitor.

## 2017-05-17 NOTE — PROGRESS NOTES
"[facility]   General Surgery Progress Note           Assessment and Plan:   Assessment:   POD#1 s/p Procedure(s):  DIAGNOSTIC LAPAROSCOPY, LAPAROTOMY,  SMALL BOWEL RESECTION, LAPAROSCOPIC LYSIS OF ADHESIONS - Wound Class: IV-Dirty or Infected   - Wound Class: IV-Dirty or Infected   - Wound Class: IV-Dirty or Infected        Plan:   -IV fluids continued  -stress ulcer prophylaxis and prophylaxis against venous thromboembolism  -Keep NPO for now, await return of bowel function         Interval History:   Pain mild, well controlled, possible reaction to flagyl so discontinued         Physical Exam:   Blood pressure 138/79, pulse 104, temperature 96.3  F (35.7  C), temperature source Axillary, resp. rate 14, height 1.88 m (6' 2\"), weight 89.2 kg (196 lb 9.6 oz), SpO2 94 %.    I/O last 3 completed shifts:  In: 3602 [P.O.:100; I.V.:3502]  Out: 1430 [Urine:300; Emesis/NG output:800; Drains:305; Blood:25]    Abdomen:   Soft, mildly distended  Inc(s) - clean, dry, intact      Duog - serous and serosanguinous          Data:     Recent Labs   Lab Test  05/16/17   0745  05/15/17   0546  05/14/17   2337   HGB  16.7  16.5  18.4*   WBC  9.3  10.4  12.9*       Corey Vang MD     "

## 2017-05-17 NOTE — PROGRESS NOTES
[facility]    Hospitalist Progress Note  Name: Naif Howell Jr.    MRN: [id number]  Provider: Kristy Reddy MD  Date of Service: 05/17/2017    Assessment & Plan   Summary of Stay: Naif Howell Jr. is a 52 year old male who was admitted on 5/14/2017 with nausea and vomiting 2 days after laparoscopic cholecystectomy for chronic cholecystitis, Admitted for small bowel obstruction versus postoperative ileus.       Small bowel obstruction versus ileus s/p laparoscopy post op day # 1  -- NPO  -- await return of bowel function  --  NG still with sig output  -- IV fluids  -- Prn pain    Diaphragmatic irritation with hiccupping    Hypertension  -- prn hydralazine    PTSD:  -- restart PTA meds when able to take PO    Migraine therapy  -- prn pain meds    DVT Prophylaxis: Pneumatic Compression Devices  Code Status: Full Code    Disposition: Expected discharge TBD when clinically improves      Interval History    S/p laparoscopy 5/16, still has NG c/o discomfort, nausea and bitter taste. No cp, no sob. Review of all systems negative      -Data reviewed today: I reviewed all new labs and imaging reports over the last 24 hours. I personally reviewed no images or EKG's today.    Physical Exam   Temp: 95.8  F (35.4  C) Temp src: Oral BP: 102/73 Pulse: 104 Heart Rate: 97 Resp: 16 SpO2: 93 % O2 Device: Nasal cannula Oxygen Delivery: 2 LPM  Vitals:    05/14/17 2302 05/15/17 0112 05/16/17 0549   Weight: 90.7 kg (200 lb) 88.5 kg (195 lb) 89.2 kg (196 lb 9.6 oz)     Vital Signs with Ranges  Temp:  [95.8  F (35.4  C)-98.7  F (37.1  C)] 95.8  F (35.4  C)  Pulse:  [] 104  Heart Rate:  [] 97  Resp:  [10-16] 16  BP: (102-163)/() 102/73  FiO2 (%):  [100 %] 100 %  SpO2:  [93 %-98 %] 93 %  I/O last 3 completed shifts:  In: 3739 [I.V.:3739]  Out: 2200 [Urine:675; Emesis/NG output:1125; Drains:375; Blood:25]      GEN:  Alert, oriented x 3, appears comfortable, NAD.  HEENT:  Normocephalic/atraumatic, no scleral  icterus, no nasal discharge, mouth moist.  CV:  Regular rate and rhythm, no murmur or JVD.  S1 + S2 noted, no S3 or S4.  LUNGS:  Clear to auscultation bilaterally without rales/rhonchi/wheezing/retractions.  Symmetric chest rise on inhalation noted.  ABD: post op  NO bowel sounds, soft, minimal tenderness.  No rebound/guarding/rigidity.  EXT:  No edema.  No cyanosis.  No joint synovitis noted.  SKIN:  Dry to touch, no exanthems noted in the visualized areas.    Medications     NaCl 125 mL/hr at 05/17/17 0118       clindamycin  600 mg Intravenous Q8H     topiramate  50 mg Oral QPM     topiramate  25 mg Oral QAM     sodium chloride (PF)  3 mL Intracatheter Q8H     heparin  5,000 Units Subcutaneous Q12H     metoprolol  5 mg Intravenous Q6H     ciprofloxacin  400 mg Intravenous Q12H     pantoprazole  40 mg Intravenous Q24H     HYDROmorphone   Intravenous PCA     diphenhydrAMINE         Data       Recent Labs  Lab 05/17/17  0654 05/16/17  1814 05/16/17  0745 05/15/17  0546   WBC 11.7*  --  9.3 10.4   HGB 17.8*  --  16.7 16.5   HCT 51.8  --  49.1 48.5   MCV 84  --  84 84    224 253 228       Recent Labs  Lab 05/17/17  0654 05/16/17  0745 05/14/17  2337    137 136   POTASSIUM 3.5 3.4 3.4   CHLORIDE 107 106 103   CO2 23 23 26   ANIONGAP 8 8 7   * 109* 124*   BUN 15 13 21   CR 0.71 0.76 0.86   GFRESTIMATED >90Non  GFR Calc >90Non  GFR Calc >90Non  GFR Calc   GFRESTBLACK >90African American GFR Calc >90African American GFR Calc >90African American GFR Calc   ADELSO 7.7* 8.3* 8.9     No results for input(s): CULT in the last 168 hours.    Recent Labs  Lab 05/17/17  0654 05/14/17  2337   AST 21 12   ALT 49 21   ALKPHOS 71 98   BILITOTAL 1.1 0.7     No results for input(s): INR in the last 168 hours.    Recent Labs  Lab 05/14/17  2337   LACT 1.1       Recent Labs  Lab 05/14/17  2337   LIPASE 140     No results for input(s): TSH in the last 168 hours.  No results  for input(s): COLOR, APPEARANCE, URINEGLC, URINEBILI, URINEKETONE, SG, UBLD, URINEPH, PROTEIN, UROBILINOGEN, NITRITE, LEUKEST, RBCU, WBCU in the last 168 hours.    Recent Results (from the past 24 hour(s))   XR Abdomen Port 1 View    Narrative    XR ABDOMEN PORT F1 VW 5/16/2017 1:51 PM    HISTORY: Did not have time to do instrument count, looking for FB.    COMPARISON: None.    FINDINGS: Suture material in the right hemiabdomen. Surgical staples  and drain tubing. No other radiopaque foreign body. Bowel gas pattern  is nonobstructive.      Impression    IMPRESSION: No retained surgical instruments.    ONESIMO GAMEZ MD

## 2017-05-18 LAB
ANION GAP SERPL CALCULATED.3IONS-SCNC: 8 MMOL/L (ref 3–14)
BACTERIA SPEC CULT: NORMAL
BASOPHILS # BLD AUTO: 0.1 10E9/L (ref 0–0.2)
BASOPHILS NFR BLD AUTO: 0.4 %
BUN SERPL-MCNC: 16 MG/DL (ref 7–30)
CALCIUM SERPL-MCNC: 7.8 MG/DL (ref 8.5–10.1)
CHLORIDE SERPL-SCNC: 107 MMOL/L (ref 94–109)
CO2 SERPL-SCNC: 25 MMOL/L (ref 20–32)
CREAT SERPL-MCNC: 0.74 MG/DL (ref 0.66–1.25)
DIFFERENTIAL METHOD BLD: ABNORMAL
EOSINOPHIL # BLD AUTO: 0.2 10E9/L (ref 0–0.7)
EOSINOPHIL NFR BLD AUTO: 1.4 %
ERYTHROCYTE [DISTWIDTH] IN BLOOD BY AUTOMATED COUNT: 13 % (ref 10–15)
GFR SERPL CREATININE-BSD FRML MDRD: ABNORMAL ML/MIN/1.7M2
GLUCOSE SERPL-MCNC: 107 MG/DL (ref 70–99)
HCT VFR BLD AUTO: 46.4 % (ref 40–53)
HGB BLD-MCNC: 15.7 G/DL (ref 13.3–17.7)
IMM GRANULOCYTES # BLD: 0.1 10E9/L (ref 0–0.4)
IMM GRANULOCYTES NFR BLD: 0.7 %
LACTATE BLD-SCNC: 1.1 MMOL/L (ref 0.7–2.1)
LYMPHOCYTES # BLD AUTO: 0.7 10E9/L (ref 0.8–5.3)
LYMPHOCYTES NFR BLD AUTO: 3.9 %
MAGNESIUM SERPL-MCNC: 2.2 MG/DL (ref 1.6–2.3)
MCH RBC QN AUTO: 28.4 PG (ref 26.5–33)
MCHC RBC AUTO-ENTMCNC: 33.8 G/DL (ref 31.5–36.5)
MCV RBC AUTO: 84 FL (ref 78–100)
MICRO REPORT STATUS: NORMAL
MONOCYTES # BLD AUTO: 1 10E9/L (ref 0–1.3)
MONOCYTES NFR BLD AUTO: 5.9 %
NEUTROPHILS # BLD AUTO: 14.5 10E9/L (ref 1.6–8.3)
NEUTROPHILS NFR BLD AUTO: 87.7 %
NRBC # BLD AUTO: 0 10*3/UL
NRBC BLD AUTO-RTO: 0 /100
PHOSPHATE SERPL-MCNC: 1.7 MG/DL (ref 2.5–4.5)
PLATELET # BLD AUTO: 254 10E9/L (ref 150–450)
POTASSIUM SERPL-SCNC: 3.4 MMOL/L (ref 3.4–5.3)
RBC # BLD AUTO: 5.52 10E12/L (ref 4.4–5.9)
SODIUM SERPL-SCNC: 140 MMOL/L (ref 133–144)
SPECIMEN SOURCE: NORMAL
WBC # BLD AUTO: 16.6 10E9/L (ref 4–11)

## 2017-05-18 PROCEDURE — 99232 SBSQ HOSP IP/OBS MODERATE 35: CPT | Performed by: INTERNAL MEDICINE

## 2017-05-18 PROCEDURE — 25000125 ZZHC RX 250: Performed by: PHYSICIAN ASSISTANT

## 2017-05-18 PROCEDURE — 87106 FUNGI IDENTIFICATION YEAST: CPT | Performed by: SURGERY

## 2017-05-18 PROCEDURE — 87075 CULTR BACTERIA EXCEPT BLOOD: CPT | Performed by: SURGERY

## 2017-05-18 PROCEDURE — 87181 SC STD AGAR DILUTION PER AGT: CPT | Performed by: SURGERY

## 2017-05-18 PROCEDURE — 85025 COMPLETE CBC W/AUTO DIFF WBC: CPT | Performed by: INTERNAL MEDICINE

## 2017-05-18 PROCEDURE — 36415 COLL VENOUS BLD VENIPUNCTURE: CPT | Performed by: INTERNAL MEDICINE

## 2017-05-18 PROCEDURE — 12000007 ZZH R&B INTERMEDIATE

## 2017-05-18 PROCEDURE — 87070 CULTURE OTHR SPECIMN AEROBIC: CPT | Performed by: SURGERY

## 2017-05-18 PROCEDURE — 25000128 H RX IP 250 OP 636: Performed by: SURGERY

## 2017-05-18 PROCEDURE — 25000125 ZZHC RX 250: Performed by: SURGERY

## 2017-05-18 PROCEDURE — 25000132 ZZH RX MED GY IP 250 OP 250 PS 637

## 2017-05-18 PROCEDURE — 80048 BASIC METABOLIC PNL TOTAL CA: CPT | Performed by: INTERNAL MEDICINE

## 2017-05-18 PROCEDURE — 83735 ASSAY OF MAGNESIUM: CPT | Performed by: INTERNAL MEDICINE

## 2017-05-18 PROCEDURE — S0077 INJECTION, CLINDAMYCIN PHOSP: HCPCS | Performed by: SURGERY

## 2017-05-18 PROCEDURE — 25000128 H RX IP 250 OP 636: Performed by: INTERNAL MEDICINE

## 2017-05-18 PROCEDURE — 83605 ASSAY OF LACTIC ACID: CPT | Performed by: INTERNAL MEDICINE

## 2017-05-18 PROCEDURE — 84100 ASSAY OF PHOSPHORUS: CPT | Performed by: INTERNAL MEDICINE

## 2017-05-18 PROCEDURE — 99207 ZZC CDG-MDM COMPONENT: MEETS LOW - DOWN CODED: CPT | Performed by: INTERNAL MEDICINE

## 2017-05-18 PROCEDURE — 25000125 ZZHC RX 250: Performed by: INTERNAL MEDICINE

## 2017-05-18 RX ORDER — CIPROFLOXACIN 2 MG/ML
400 INJECTION, SOLUTION INTRAVENOUS EVERY 12 HOURS
Status: DISCONTINUED | OUTPATIENT
Start: 2017-05-18 | End: 2017-05-24

## 2017-05-18 RX ORDER — ACETAMINOPHEN 10 MG/ML
1000 INJECTION, SOLUTION INTRAVENOUS EVERY 8 HOURS
Status: DISCONTINUED | OUTPATIENT
Start: 2017-05-18 | End: 2017-05-21

## 2017-05-18 RX ADMIN — CLINDAMYCIN PHOSPHATE 600 MG: 12 INJECTION, SOLUTION INTRAVENOUS at 06:38

## 2017-05-18 RX ADMIN — CIPROFLOXACIN 400 MG: 2 INJECTION, SOLUTION INTRAVENOUS at 08:16

## 2017-05-18 RX ADMIN — METOPROLOL TARTRATE 5 MG: 5 INJECTION INTRAVENOUS at 01:53

## 2017-05-18 RX ADMIN — SODIUM CHLORIDE: 9 INJECTION, SOLUTION INTRAVENOUS at 04:20

## 2017-05-18 RX ADMIN — ACETAMINOPHEN 1000 MG: 10 INJECTION, SOLUTION INTRAVENOUS at 06:38

## 2017-05-18 RX ADMIN — SODIUM CHLORIDE: 9 INJECTION, SOLUTION INTRAVENOUS at 16:25

## 2017-05-18 RX ADMIN — ACETAMINOPHEN 1000 MG: 10 INJECTION, SOLUTION INTRAVENOUS at 16:25

## 2017-05-18 RX ADMIN — MORPHINE SULFATE 2 MG: 2 INJECTION, SOLUTION INTRAMUSCULAR; INTRAVENOUS at 11:36

## 2017-05-18 RX ADMIN — HEPARIN SODIUM 5000 UNITS: 10000 INJECTION, SOLUTION INTRAVENOUS; SUBCUTANEOUS at 19:37

## 2017-05-18 RX ADMIN — MORPHINE SULFATE 2 MG: 2 INJECTION, SOLUTION INTRAMUSCULAR; INTRAVENOUS at 09:36

## 2017-05-18 RX ADMIN — PHENOL 1 ML: 1.5 LIQUID ORAL at 01:56

## 2017-05-18 RX ADMIN — PHENOL 1 ML: 1.5 LIQUID ORAL at 04:04

## 2017-05-18 RX ADMIN — CLINDAMYCIN PHOSPHATE 600 MG: 12 INJECTION, SOLUTION INTRAVENOUS at 13:36

## 2017-05-18 RX ADMIN — METOPROLOL TARTRATE 5 MG: 5 INJECTION INTRAVENOUS at 17:16

## 2017-05-18 RX ADMIN — HEPARIN SODIUM 5000 UNITS: 10000 INJECTION, SOLUTION INTRAVENOUS; SUBCUTANEOUS at 08:15

## 2017-05-18 RX ADMIN — METOPROLOL TARTRATE 5 MG: 5 INJECTION INTRAVENOUS at 10:16

## 2017-05-18 RX ADMIN — CIPROFLOXACIN 400 MG: 2 INJECTION, SOLUTION INTRAVENOUS at 19:37

## 2017-05-18 RX ADMIN — PANTOPRAZOLE SODIUM 40 MG: 40 INJECTION, POWDER, FOR SOLUTION INTRAVENOUS at 17:16

## 2017-05-18 RX ADMIN — CLINDAMYCIN PHOSPHATE 600 MG: 12 INJECTION, SOLUTION INTRAVENOUS at 21:53

## 2017-05-18 RX ADMIN — POTASSIUM PHOSPHATE, MONOBASIC AND POTASSIUM PHOSPHATE, DIBASIC 20 MMOL: 224; 236 INJECTION, SOLUTION, CONCENTRATE INTRAVENOUS at 22:31

## 2017-05-18 ASSESSMENT — PAIN DESCRIPTION - DESCRIPTORS
DESCRIPTORS: OTHER (COMMENT)
DESCRIPTORS: TIGHTNESS
DESCRIPTORS: ACHING
DESCRIPTORS: TIGHTNESS

## 2017-05-18 NOTE — PLAN OF CARE
"Problem: Goal Outcome Summary  Goal: Goal Outcome Summary  Outcome: Improving  Orientation: Alert and oriented x4  VSS. 96% on 2L NC. Afebrile. Tachycardic HR 90-100s.  LS: clear and equal bilaterally  GI: States passing \"minimal if any gas\". No BM. NPO. NG tube to Low-Intermittent Suction. 400 ml Green, Brown with sediment output. Clogging often throughout shift. Increased nausea and abdominal pain. Improved with flush.   : Adequate urine output. Gutierrez in place. Dark josé luis.   Skin: Abdominal incisions x4. C/D/I. YURIDIA drain. 40 ml serosanguinous output.   Activity: SBA. In bed throughout night. Pt slept off and on throughout shift.   Pain: 4/10 abdominal pain increasing with clogged NG tube. Improvement when patent. PCA diluadid. 0.4 mg total. Pt refusing to use later in night for pain sating \"gave me bad nightmares last night and really messed me up for the day\"    Plan: Continue with current cares. Pain control. Keep NG patent.           "

## 2017-05-18 NOTE — PROGRESS NOTES
[facility]    Hospitalist Progress Note  Name: Naif Howell Jr.    MRN: [id number]  Provider: Kristy Reddy MD  Date of Service: 05/18/2017    Assessment & Plan   Summary of Stay: Naif Howell Jr. is a 52 year old male who was admitted on 5/14/2017 with nausea and vomiting 2 days after laparoscopic cholecystectomy for chronic cholecystitis, Admitted for small bowel obstruction versus postoperative ileus. s/ps/p Diagnostic laparoscopy, lysis of adhesions, laparotomy and small bowel resection on 5/16.      Small bowel obstruction versus ileus s/ps/p Diagnostic laparoscopy, lysis of adhesions, laparotomy and small bowel resection  -- NPO  -- await return of bowel function  --  NG still with sig output  -- IV fluids  -- Prn pain  -- IV abx   -- PPI for GI prophylaxis    Diaphragmatic irritation with hiccupping    Hypertension  -- prn hydralazine    PTSD:  -- restart PTA meds when able to take PO    Migraine therapy  -- prn pain meds    DVT Prophylaxis: Pneumatic Compression Devices/heparin  Code Status: Full Code    Disposition: Expected discharge TBD when clinically improves      Interval History    S/p laparoscopy , laparotomy and adhesionolysis 5/16, still has NG c/o discomfort. No cp, no sob. Review of all systems negative      -Data reviewed today: I reviewed all new labs and imaging reports over the last 24 hours. I personally reviewed no images or EKG's today.    Physical Exam   Temp: 98.6  F (37  C) Temp src: Axillary BP: 129/74   Heart Rate: 98 Resp: 18 SpO2: 94 % O2 Device: None (Room air) Oxygen Delivery: 2 LPM  Vitals:    05/14/17 2302 05/15/17 0112 05/16/17 0549   Weight: 90.7 kg (200 lb) 88.5 kg (195 lb) 89.2 kg (196 lb 9.6 oz)     Vital Signs with Ranges  Temp:  [97.6  F (36.4  C)-99  F (37.2  C)] 98.6  F (37  C)  Heart Rate:  [] 98  Resp:  [16-18] 18  BP: (123-149)/(68-89) 129/74  SpO2:  [94 %-96 %] 94 %  I/O last 3 completed shifts:  In: 3048 [I.V.:2958; NG/GT:90]  Out: 8645  [Urine:1025; Emesis/NG output:1400; Drains:170]      GEN:  Alert, oriented x 3, appears comfortable, NAD.  HEENT:  Normocephalic/atraumatic, no scleral icterus, no nasal discharge, mouth moist.  CV:  Regular rate and rhythm, no murmur or JVD.  S1 + S2 noted, no S3 or S4.  LUNGS:  Clear to auscultation bilaterally without rales/rhonchi/wheezing/retractions.  Symmetric chest rise on inhalation noted.  ABD: post op  NO bowel sounds, soft, minimal tenderness.  No rebound/guarding/rigidity.  EXT:  No edema.  No cyanosis.  No joint synovitis noted.  SKIN:  Dry to touch, no exanthems noted in the visualized areas.    Medications     NaCl 125 mL/hr at 05/18/17 0420       ciprofloxacin  400 mg Intravenous Q12H     acetaminophen  1,000 mg Intravenous Q8H     clindamycin  600 mg Intravenous Q8H     topiramate  50 mg Oral QPM     topiramate  25 mg Oral QAM     sodium chloride (PF)  3 mL Intracatheter Q8H     heparin  5,000 Units Subcutaneous Q12H     metoprolol  5 mg Intravenous Q6H     pantoprazole  40 mg Intravenous Q24H     Data       Recent Labs  Lab 05/18/17  0606 05/17/17  0654 05/16/17  1814 05/16/17  0745   WBC 16.6* 11.7*  --  9.3   HGB 15.7 17.8*  --  16.7   HCT 46.4 51.8  --  49.1   MCV 84 84  --  84    247 224 253       Recent Labs  Lab 05/18/17  0606 05/17/17  0654 05/16/17  0745    138 137   POTASSIUM 3.4 3.5 3.4   CHLORIDE 107 107 106   CO2 25 23 23   ANIONGAP 8 8 8   * 123* 109*   BUN 16 15 13   CR 0.74 0.71 0.76   GFRESTIMATED >90Non  GFR Calc >90Non  GFR Calc >90Non  GFR Calc   GFRESTBLACK >90African American GFR Calc >90African American GFR Calc >90African American GFR Calc   ADELSO 7.8* 7.7* 8.3*       Recent Labs  Lab 05/18/17  1020   CULT Canceled, Test credited Duplicate request Charge credited  Canceled, Test credited Duplicate request Charge credited       Recent Labs  Lab 05/17/17  0654 05/14/17  2337   AST 21 12   ALT 49 21   ALKPHOS 71  98   BILITOTAL 1.1 0.7     No results for input(s): INR in the last 168 hours.    Recent Labs  Lab 05/14/17  2337   LACT 1.1       Recent Labs  Lab 05/14/17  2337   LIPASE 140     No results for input(s): TSH in the last 168 hours.  No results for input(s): COLOR, APPEARANCE, URINEGLC, URINEBILI, URINEKETONE, SG, UBLD, URINEPH, PROTEIN, UROBILINOGEN, NITRITE, LEUKEST, RBCU, WBCU in the last 168 hours.    No results found for this or any previous visit (from the past 24 hour(s)).

## 2017-05-18 NOTE — PROGRESS NOTES
"[facility]   General Surgery Progress Note         Assessment and Plan:   Assessment:   POD#2 s/p Diagnostic laparoscopy, lysis of adhesions, laparotomy and small bowel resection      Plan:   -Continue NPO/NGT  -Continue ABX (Cipro/Cleocin)  -Pain Mgmt: Morphine, Ofirmev  -Increase activity as tolerated  -VTE PPX: PCDs, heparin  -Stress ulcer PPX: Protonix  -Await return of bowel function         Interval History:   Resting in bed, doing fine. Still feels distended. Had some issues with dilaudid PCA (hallucinations) and was discontinues. He is now doing well with morphine. He is up walking several times per day. Voiding independently. Has passed a small amount of flatus.         Physical Exam:   Blood pressure 129/74, pulse 104, temperature 98.6  F (37  C), temperature source Axillary, resp. rate 18, height 1.88 m (6' 2\"), weight 89.2 kg (196 lb 9.6 oz), SpO2 94 %.    I/O last 3 completed shifts:  In: 3048 [I.V.:2958; NG/GT:90]  Out: 2595 [Urine:1025; Emesis/NG output:1400; Drains:170]    Abdomen:   Soft, mildly distended, rare BS  Inc(s) - clean, dry, intact with kevin      Doug - scant serosang in bulb          Data:     Recent Labs  Lab 05/18/17  0606 05/17/17  0654 05/16/17  1814 05/16/17  0745   WBC 16.6* 11.7*  --  9.3   HGB 15.7 17.8*  --  16.7   HCT 46.4 51.8  --  49.1   MCV 84 84  --  84    247 224 253         Elías Pugh PA-C     As above, pain increased with NG plugging  Continue NG until improved bowel function  Continue ABX, will also culture YURIDIA output for possible resistant organisms or yeast  Corey Vang MD  5/18/2017 12:23 PM    "

## 2017-05-18 NOTE — PLAN OF CARE
Problem: Goal Outcome Summary  Goal: Goal Outcome Summary  Outcome: Improving  Vital signs stable.  Lungs clear, using inspirometer.  Bowel sounds absent, abdomen slightly distended, soft.Dressing to incision intact, dry, hiral drain intact.No flatus.  CMS intact.ng tube intact, to lis., no nausea,   Npo.  Pca  dilaudid for pain control.  Hgb 17.8.wbc 11.7.  Plan of care reviewed with pt and significant other.

## 2017-05-18 NOTE — PLAN OF CARE
Problem: Bowel Obstruction (Adult)  Goal: Signs and Symptoms of Listed Potential Problems Will be Absent or Manageable (Bowel Obstruction)  Signs and symptoms of listed potential problems will be absent or manageable by discharge/transition of care (reference Bowel Obstruction (Adult) CPG).   Outcome: Improving  PCA dcd and pt started on Morphine 2mg IV, good relief of pain with this and no hallucinations. abd distended, round, RUQ has faint BS,  Absent in other quadrants. No flatus, requesting martines removal and dcd this am. Pt has been up amb in halls and also up to BR to void, 100cc dark tea colored urine.  NG patent, and draining dark brown-blackish with sediment returns.  450cc this shift. Pt taking ice chips prn, complaining of throat pain. Pt resting in between cares.

## 2017-05-19 ENCOUNTER — APPOINTMENT (OUTPATIENT)
Dept: GENERAL RADIOLOGY | Facility: CLINIC | Age: 52
DRG: 335 | End: 2017-05-19
Attending: INTERNAL MEDICINE
Payer: OTHER GOVERNMENT

## 2017-05-19 LAB
ANION GAP SERPL CALCULATED.3IONS-SCNC: 6 MMOL/L (ref 3–14)
BASOPHILS # BLD AUTO: 0 10E9/L (ref 0–0.2)
BASOPHILS NFR BLD AUTO: 0.3 %
BUN SERPL-MCNC: 15 MG/DL (ref 7–30)
CALCIUM SERPL-MCNC: 7.8 MG/DL (ref 8.5–10.1)
CHLORIDE SERPL-SCNC: 107 MMOL/L (ref 94–109)
CO2 SERPL-SCNC: 25 MMOL/L (ref 20–32)
CREAT SERPL-MCNC: 0.65 MG/DL (ref 0.66–1.25)
DIFFERENTIAL METHOD BLD: ABNORMAL
EOSINOPHIL # BLD AUTO: 0.4 10E9/L (ref 0–0.7)
EOSINOPHIL NFR BLD AUTO: 2.7 %
ERYTHROCYTE [DISTWIDTH] IN BLOOD BY AUTOMATED COUNT: 13.6 % (ref 10–15)
GFR SERPL CREATININE-BSD FRML MDRD: ABNORMAL ML/MIN/1.7M2
GLUCOSE SERPL-MCNC: 118 MG/DL (ref 70–99)
HCT VFR BLD AUTO: 42.9 % (ref 40–53)
HGB BLD-MCNC: 14.5 G/DL (ref 13.3–17.7)
IMM GRANULOCYTES # BLD: 0.2 10E9/L (ref 0–0.4)
IMM GRANULOCYTES NFR BLD: 1.3 %
LYMPHOCYTES # BLD AUTO: 0.6 10E9/L (ref 0.8–5.3)
LYMPHOCYTES NFR BLD AUTO: 4 %
MAGNESIUM SERPL-MCNC: 2.2 MG/DL (ref 1.6–2.3)
MCH RBC QN AUTO: 28.8 PG (ref 26.5–33)
MCHC RBC AUTO-ENTMCNC: 33.8 G/DL (ref 31.5–36.5)
MCV RBC AUTO: 85 FL (ref 78–100)
MONOCYTES # BLD AUTO: 0.7 10E9/L (ref 0–1.3)
MONOCYTES NFR BLD AUTO: 4.4 %
NEUTROPHILS # BLD AUTO: 13.9 10E9/L (ref 1.6–8.3)
NEUTROPHILS NFR BLD AUTO: 87.3 %
NRBC # BLD AUTO: 0 10*3/UL
NRBC BLD AUTO-RTO: 0 /100
PHOSPHATE SERPL-MCNC: 2.2 MG/DL (ref 2.5–4.5)
PHOSPHATE SERPL-MCNC: 2.3 MG/DL (ref 2.5–4.5)
PLATELET # BLD AUTO: 255 10E9/L (ref 150–450)
POTASSIUM SERPL-SCNC: 3.3 MMOL/L (ref 3.4–5.3)
POTASSIUM SERPL-SCNC: 3.5 MMOL/L (ref 3.4–5.3)
RBC # BLD AUTO: 5.04 10E12/L (ref 4.4–5.9)
SODIUM SERPL-SCNC: 138 MMOL/L (ref 133–144)
WBC # BLD AUTO: 15.9 10E9/L (ref 4–11)

## 2017-05-19 PROCEDURE — 83735 ASSAY OF MAGNESIUM: CPT | Performed by: INTERNAL MEDICINE

## 2017-05-19 PROCEDURE — 99207 ZZC CDG-MDM COMPONENT: MEETS LOW - DOWN CODED: CPT | Performed by: HOSPITALIST

## 2017-05-19 PROCEDURE — 84100 ASSAY OF PHOSPHORUS: CPT | Performed by: HOSPITALIST

## 2017-05-19 PROCEDURE — 25000132 ZZH RX MED GY IP 250 OP 250 PS 637: Performed by: INTERNAL MEDICINE

## 2017-05-19 PROCEDURE — S0077 INJECTION, CLINDAMYCIN PHOSP: HCPCS | Performed by: SURGERY

## 2017-05-19 PROCEDURE — 84132 ASSAY OF SERUM POTASSIUM: CPT | Performed by: INTERNAL MEDICINE

## 2017-05-19 PROCEDURE — 85025 COMPLETE CBC W/AUTO DIFF WBC: CPT | Performed by: INTERNAL MEDICINE

## 2017-05-19 PROCEDURE — 36415 COLL VENOUS BLD VENIPUNCTURE: CPT | Performed by: INTERNAL MEDICINE

## 2017-05-19 PROCEDURE — 80048 BASIC METABOLIC PNL TOTAL CA: CPT | Performed by: INTERNAL MEDICINE

## 2017-05-19 PROCEDURE — 84100 ASSAY OF PHOSPHORUS: CPT | Performed by: INTERNAL MEDICINE

## 2017-05-19 PROCEDURE — 25000128 H RX IP 250 OP 636: Performed by: SURGERY

## 2017-05-19 PROCEDURE — 84132 ASSAY OF SERUM POTASSIUM: CPT | Performed by: HOSPITALIST

## 2017-05-19 PROCEDURE — 87040 BLOOD CULTURE FOR BACTERIA: CPT | Performed by: INTERNAL MEDICINE

## 2017-05-19 PROCEDURE — 25000128 H RX IP 250 OP 636: Performed by: INTERNAL MEDICINE

## 2017-05-19 PROCEDURE — 12000000 ZZH R&B MED SURG/OB

## 2017-05-19 PROCEDURE — 36415 COLL VENOUS BLD VENIPUNCTURE: CPT | Performed by: HOSPITALIST

## 2017-05-19 PROCEDURE — 25000125 ZZHC RX 250: Performed by: PHYSICIAN ASSISTANT

## 2017-05-19 PROCEDURE — 25000125 ZZHC RX 250: Performed by: INTERNAL MEDICINE

## 2017-05-19 PROCEDURE — 74000 XR ABDOMEN PORT F1 VW: CPT

## 2017-05-19 PROCEDURE — 99232 SBSQ HOSP IP/OBS MODERATE 35: CPT | Performed by: HOSPITALIST

## 2017-05-19 PROCEDURE — 25000125 ZZHC RX 250: Performed by: SURGERY

## 2017-05-19 RX ADMIN — METOPROLOL TARTRATE 5 MG: 5 INJECTION INTRAVENOUS at 00:10

## 2017-05-19 RX ADMIN — MORPHINE SULFATE 2 MG: 2 INJECTION, SOLUTION INTRAMUSCULAR; INTRAVENOUS at 00:05

## 2017-05-19 RX ADMIN — ACETAMINOPHEN 1000 MG: 10 INJECTION, SOLUTION INTRAVENOUS at 08:22

## 2017-05-19 RX ADMIN — MORPHINE SULFATE 2 MG: 2 INJECTION, SOLUTION INTRAMUSCULAR; INTRAVENOUS at 09:52

## 2017-05-19 RX ADMIN — METOPROLOL TARTRATE 5 MG: 5 INJECTION INTRAVENOUS at 06:02

## 2017-05-19 RX ADMIN — METOPROLOL TARTRATE 5 MG: 5 INJECTION INTRAVENOUS at 17:55

## 2017-05-19 RX ADMIN — METOPROLOL TARTRATE 5 MG: 5 INJECTION INTRAVENOUS at 11:58

## 2017-05-19 RX ADMIN — POTASSIUM CHLORIDE 10 MEQ: 14.9 INJECTION, SOLUTION, CONCENTRATE PARENTERAL at 11:51

## 2017-05-19 RX ADMIN — POTASSIUM PHOSPHATE, MONOBASIC AND POTASSIUM PHOSPHATE, DIBASIC 15 MMOL: 224; 236 INJECTION, SOLUTION INTRAVENOUS at 14:24

## 2017-05-19 RX ADMIN — CIPROFLOXACIN 400 MG: 2 INJECTION, SOLUTION INTRAVENOUS at 06:50

## 2017-05-19 RX ADMIN — MORPHINE SULFATE 2 MG: 2 INJECTION, SOLUTION INTRAMUSCULAR; INTRAVENOUS at 21:14

## 2017-05-19 RX ADMIN — CLINDAMYCIN PHOSPHATE 600 MG: 12 INJECTION, SOLUTION INTRAVENOUS at 22:45

## 2017-05-19 RX ADMIN — SODIUM CHLORIDE: 9 INJECTION, SOLUTION INTRAVENOUS at 09:32

## 2017-05-19 RX ADMIN — HEPARIN SODIUM 5000 UNITS: 10000 INJECTION, SOLUTION INTRAVENOUS; SUBCUTANEOUS at 19:35

## 2017-05-19 RX ADMIN — ACETAMINOPHEN 1000 MG: 10 INJECTION, SOLUTION INTRAVENOUS at 17:55

## 2017-05-19 RX ADMIN — CIPROFLOXACIN 400 MG: 2 INJECTION, SOLUTION INTRAVENOUS at 19:38

## 2017-05-19 RX ADMIN — PANTOPRAZOLE SODIUM 40 MG: 40 INJECTION, POWDER, FOR SOLUTION INTRAVENOUS at 16:25

## 2017-05-19 RX ADMIN — CLINDAMYCIN PHOSPHATE 600 MG: 12 INJECTION, SOLUTION INTRAVENOUS at 14:58

## 2017-05-19 RX ADMIN — MORPHINE SULFATE 2 MG: 2 INJECTION, SOLUTION INTRAMUSCULAR; INTRAVENOUS at 05:57

## 2017-05-19 RX ADMIN — POTASSIUM CHLORIDE 10 MEQ: 14.9 INJECTION, SOLUTION, CONCENTRATE PARENTERAL at 13:59

## 2017-05-19 RX ADMIN — ACETAMINOPHEN 1000 MG: 10 INJECTION, SOLUTION INTRAVENOUS at 00:15

## 2017-05-19 RX ADMIN — TOPICAL ANESTHETIC 1 EACH: 200 SPRAY DENTAL; PERIODONTAL at 20:14

## 2017-05-19 RX ADMIN — HEPARIN SODIUM 5000 UNITS: 10000 INJECTION, SOLUTION INTRAVENOUS; SUBCUTANEOUS at 08:26

## 2017-05-19 RX ADMIN — CLINDAMYCIN PHOSPHATE 600 MG: 12 INJECTION, SOLUTION INTRAVENOUS at 06:09

## 2017-05-19 NOTE — PROGRESS NOTES
"[facility]   General Surgery Progress Note         Assessment and Plan:   Assessment:   POD#3 s/p Diagnostic laparoscopy, lysis of adhesions, laparotomy and small bowel resection  YURIDIA cultures from 05/18/2017 pending   Tmax 101      Plan:   -Continue NPO/NGT  -Continue ABX (Cipro/Cleocin)  -Pain Mgmt: Morphine, Ofirmev  -Increase activity as tolerated  -VTE PPX: PCDs, heparin  -Stress ulcer PPX: Protonix  -Await return of bowel function         Interval History:   Resting in bed, family at bedside. Reports he is feeling better today as he was able to get more sleep last night. Pain is controlled with morphine PRN. Reports a couple episodes of flatus yesterday, although denies episodes last night or today. Voiding independently. -BM.          Physical Exam:   Blood pressure 126/76, pulse 93, temperature 98.7  F (37.1  C), resp. rate 16, height 1.88 m (6' 2\"), weight 89.2 kg (196 lb 9.6 oz), SpO2 93 %.    I/O last 3 completed shifts:  In: 1554 [I.V.:1494; NG/GT:60]  Out: 1840 [Urine:800; Emesis/NG output:950; Drains:90]  Tmax 101  Abdomen:   Soft, mildly distended, TTP at incision sites, rare BS  Inc(s) - clean, dry, intact with kevin      Doug - scant serosang in bulb          Data:       Recent Labs  Lab 05/19/17  0715 05/18/17  0606 05/17/17  0654   WBC 15.9* 16.6* 11.7*   HGB 14.5 15.7 17.8*   HCT 42.9 46.4 51.8   MCV 85 84 84    254 247         Chichi Knutson PA-C       As above, discussed with ID, consulted them to assist with antibiotics  Awaiting return of bowel function, will trial decreased suction  Corey Vang MD  5/19/2017 2:26 PM    "

## 2017-05-19 NOTE — CONSULTS
ID consult dictated IMP 1 51 yo male SB leak after recent uncomplicated nanda, peritonitis    REC For now cipro/clinda, doubt flagyl allergy and has sadia penicillins so other options,, get Bc, await cx

## 2017-05-19 NOTE — PLAN OF CARE
Problem: Bowel Obstruction (Adult)  Goal: Signs and Symptoms of Listed Potential Problems Will be Absent or Manageable (Bowel Obstruction)  Signs and symptoms of listed potential problems will be absent or manageable by discharge/transition of care (reference Bowel Obstruction (Adult) CPG).   Outcome: Improving  A&O x4. Temp of 99.5, tachycardic in the 90s, all other VSS. LS CTA all fields. BS not audible. NG tube had 200mL of brown thin output set to intermittent suction. YURIDIA drain had 20mL of serous to serosanguinous output. Dressing to YURIDIA site is CDI, Dressing to abdominal incision is CDI. NPO with ice chips. up with SBA. IV morphine and ofirmev managing pain. Voided 250mL of tea colored urine.Will continue to monitor.

## 2017-05-19 NOTE — PROGRESS NOTES
[facility]    Hospitalist Progress Note  Name: Naif Howell Jr.    MRN: [id number]  Provider:  Naif Parker DO MPH  Date of Service: 05/19/2017    Summary of Stay: Naif Howell Jr. is a 52 year old male with a history of HTN, PTSD, migraines admitted on 5/14/2017 with SBO.      Problem List:   1. SBO s/p diagnostic lap, JOHNSON, laparotomy and SB resection 5/16: Still with NGT, NPO, IVF. Appears to be slowly improving. Has YURIDIA drain but Cx NGTD. Fevers resolved. Still on clindamycin and ciprofloxacin. Management per surgical service.  2. HTN: BP controlled on IV metoprolol. PTA on HCTZ and losartan.  3. PTSD: Hold PTA topiramate. PRN ativan.  4. HypoK: Replacement protocol ordered.  5. Leukocytosis: Concern for infection vs WBC stress demargination. Already on abx. Monitor fever curve. AM CBC.    DVT Prophylaxis: Heparin SQ  Code Status: Full Code  Disposition: Expected discharge in 3-4 days to home. Goals prior to discharge include monitor return of bowel function..   Family updated today: Yes      Interval History   Assumed care from previous hospitalist. The history was fully reviewed.  Pt doing well. No chest pain or shortness of breath. No nausea, vomiting. Pain controlled. Flatus, cramps, no BM. No fevers. No other complaints identified.     -Data reviewed today: I reviewed all new labs and imaging results over the last 24 hours.     Physical Exam   Temp: 98.7  F (37.1  C) Temp src: Oral BP: 126/76 Pulse: 93 Heart Rate: 88 Resp: 16 SpO2: 93 % O2 Device: None (Room air) Oxygen Delivery: 1.5 LPM  Vitals:    05/14/17 2302 05/15/17 0112 05/16/17 0549   Weight: 90.7 kg (200 lb) 88.5 kg (195 lb) 89.2 kg (196 lb 9.6 oz)     Vital Signs with Ranges  Temp:  [97.5  F (36.4  C)-101  F (38.3  C)] 98.7  F (37.1  C)  Pulse:  [] 93  Heart Rate:  [88-91] 88  Resp:  [16-18] 16  BP: (121-137)/(75-83) 126/76  SpO2:  [93 %-97 %] 93 %  I/O last 3 completed shifts:  In: 1554 [I.V.:1494; NG/GT:60]  Out: 1840 [Urine:800;  Emesis/NG output:950; Drains:90]    GENERAL: No apparent distress. Awake, alert, and fully oriented.  HEENT: Normocephalic, atraumatic. Extraocular movements intact.   CARDIOVASCULAR: Regular rate and rhythm without murmurs or rubs. No S3.  PULMONARY: Clear bilaterally.  GASTROINTESTINAL: Soft, non-tender, non-distended. Bowel sounds normoactive.   EXTREMITIES: No cyanosis or clubbing. No edema.  NEUROLOGICAL: CN 2-12 grossly intact, no focal neurological deficits.  DERMATOLOGICAL: No rash, ulcer, bruising, nor jaundice.     Medications     NaCl 125 mL/hr at 05/19/17 0932       ciprofloxacin  400 mg Intravenous Q12H     acetaminophen  1,000 mg Intravenous Q8H     clindamycin  600 mg Intravenous Q8H     topiramate  50 mg Oral QPM     topiramate  25 mg Oral QAM     sodium chloride (PF)  3 mL Intracatheter Q8H     heparin  5,000 Units Subcutaneous Q12H     metoprolol  5 mg Intravenous Q6H     pantoprazole  40 mg Intravenous Q24H     Data     Laboratory:    Recent Labs  Lab 05/19/17  0715 05/18/17  0606 05/17/17  0654   WBC 15.9* 16.6* 11.7*   HGB 14.5 15.7 17.8*   HCT 42.9 46.4 51.8   MCV 85 84 84    254 247       Recent Labs  Lab 05/19/17  0715 05/18/17  0606 05/17/17  0654    140 138   POTASSIUM 3.3* 3.4 3.5   CHLORIDE 107 107 107   CO2 25 25 23   ANIONGAP 6 8 8   * 107* 123*   BUN 15 16 15   CR 0.65* 0.74 0.71   GFRESTIMATED >90Non  GFR Calc >90Non  GFR Calc >90Non  GFR Calc   GFRESTBLACK >90African American GFR Calc >90African American GFR Calc >90African American GFR Calc   ADELSO 7.8* 7.8* 7.7*       Recent Labs  Lab 05/18/17  1020 05/18/17  0945   CULT Canceled, Test credited Duplicate request Charge credited  Canceled, Test credited Duplicate request Charge credited Culture negative monitoring continues  Canceled, Test creditedIncorrectly ordered by PCU/ClinicTest reordered as correct code  Pending       Imaging:  No results found for this or  any previous visit (from the past 24 hour(s)).      Naif Parker DO MPH  [facility] Hospitalist  [address] [place] [address]  Pager: [phone]  05/19/2017

## 2017-05-19 NOTE — CONSULTS
INFECTIOUS DISEASE CONSULTATION       REFERRING PHYSICIAN:  Dr. Corey Vang.       IMPRESSION:   1.  A 52-year-old male with apparent chronic cholecystitis and recent cholecystectomy.   2.  Postop cholecystectomy, developed increasing abdominal pain without major fevers or systemic symptoms, found to have small bowel leak at surgery, which has been repaired, peritonitis has resolved, but no purulence in the abdomen, so no culture done, some low-grade fever postop now with cultures from drainage tube pending.   3.  History of cephalosporin allergy, but has tolerated penicillins, listed now as Flagyl allergic, but had a brief set of symptoms that resolved quickly, very likely a true allergy to either Cipro or Flagyl.   4.  History of recurrent otitis over many years, no surgical interventions.      RECOMMENDATIONS:   1.  Currently getting Cipro and clindamycin, continue for now.   2.  Await the drainage culture and get blood cultures.   3.  Further adjust treatment based on cultures and clinical course, penicillins and probably Flagyl both acceptable treatment if cultures direct us that way.      HISTORY:  Naif Howell is a 52-year-old male is seen in consultation due to peritonitis.  The patient has a history of recurrent otitis, but otherwise not much in the way of historical infection problems.  As part of that otitis, he has had a cephalosporin allergy develop, but has tolerated penicillins.  He has had none of those problems in the last 6 months, but has had recurrent episodes of abdominal pain, found to have gallstones and underwent cholecystectomy last week.  Did well initially, then postop developed increasing abdominal pain and was readmitted and found to have an apparent small bowel leak, there were some adhesions and somewhat unclear cause to this; the pathology suggests possible ischemia.  He underwent surgical intervention for this and washout; there was no gross purulence in the abdomen, no cultures  done at that time and he did not have fever.  Postop he has had some low-grade fever and a drainage culture has now been done that is pending and blood cultures are being done now.  He is not really noticing much in the way of fevers symptoms, his abdominal pain is ongoing and he has an NG tube in place.  No other focal symptoms.      PAST MEDICAL HISTORY:  The recurrent otitis, no other major infection problems.      ALLERGIES:  Cephalosporins in the past have caused a rash.  On this admission, he got Flagyl and while it was infusing, had a slight rash on his arms that never evolved into a bigger rash, apparently there were some slight throat symptoms too, all of which resolved completely and did not continue, implying this is not a true allergic reaction.      SOCIAL AND FAMILY HISTORY:  Otherwise unremarkable, no exposures recently, no one else he has been around has been ill.      REVIEW OF SYSTEMS:  Largely as above, some degree of ongoing abdominal pain, he does not feel toxic or ill, did not notice the temperature 101 degrees last night.      PHYSICAL EXAMINATION:   GENERAL:  The patient appears his stated age; he does not look toxic or ill.   VITAL SIGNS:  Currently are normal.  Earlier temperature 101, pulse is 100 currently.   HEENT:  He has an NG tube in place.  No facial skin rashes.   NECK:  Supple and nontender, no lymphadenopathy.   HEART:  Regular rhythm, no particular murmur.   LUNGS:  Clear bilaterally.   ABDOMEN:  Definite tenderness diffusely.   EXTREMITIES:  No rash or skin lesions.      LABORATORY:  Drainage culture pending.  Blood cultures just being done now.  White blood cell count on 05/16/2017 was 9300, it is 15,900 today.       Thank you very much for this consultation.  We will follow this patient with you.         ONESIMO SOMMERS MD             D: 05/19/2017 12:03   T: 05/19/2017 12:36   MT: [id number]      Name:     ALEXA ELLIOTT   MRN:      [id number]        Account:       [id number]    :      1965           Consult Date:  2017      Document: [id number]       cc: Corey Vang MD

## 2017-05-19 NOTE — PLAN OF CARE
Problem: Goal Outcome Summary  Goal: Goal Outcome Summary  Outcome: Improving  Pt ambulated in marquez A1. Passing flatus. Voiding in urinal in BR. Belching. Pain in abd, chest. Has prn morphine, and sched IV tylenol. YURIDIA output 30cc, serosangious, NG in place output 300cc, brown. Started protonix this shift. abd firm, distended and tender upon palpation. BS not audible. Will start phosphorous replacement after antibiotics finished. No N/V. Will continue to monitor.

## 2017-05-19 NOTE — PLAN OF CARE
Problem: Bowel Obstruction (Adult)  Goal: Signs and Symptoms of Listed Potential Problems Will be Absent or Manageable (Bowel Obstruction)  Signs and symptoms of listed potential problems will be absent or manageable by discharge/transition of care (reference Bowel Obstruction (Adult) CPG).   Outcome: Improving  Pt up amb in halls with assist. And up amb to BR to void, complains of cramping pains after taking the morphine so he wants to just use tylenol if needed, dressing dry and intact. hiral patent and draining pink drainage.abdomen soft, distended bowel sounds absent. Denies flatus.  Hiral draining dark brown liquid.pt complains of throat discomfort,. HIRAL  Off suction at 1500, tolerating well. K+ and phos replaced as per orders,  Pt is pleasant and cooperative.

## 2017-05-20 LAB
ANION GAP SERPL CALCULATED.3IONS-SCNC: 11 MMOL/L (ref 3–14)
BUN SERPL-MCNC: 12 MG/DL (ref 7–30)
CALCIUM SERPL-MCNC: 7.6 MG/DL (ref 8.5–10.1)
CHLORIDE SERPL-SCNC: 108 MMOL/L (ref 94–109)
CO2 SERPL-SCNC: 21 MMOL/L (ref 20–32)
CREAT SERPL-MCNC: 0.58 MG/DL (ref 0.66–1.25)
ERYTHROCYTE [DISTWIDTH] IN BLOOD BY AUTOMATED COUNT: 13.6 % (ref 10–15)
GFR SERPL CREATININE-BSD FRML MDRD: >90 ML/MIN/1.7M2
GLUCOSE SERPL-MCNC: 106 MG/DL (ref 70–99)
HCT VFR BLD AUTO: 41.7 % (ref 40–53)
HGB BLD-MCNC: 14.5 G/DL (ref 13.3–17.7)
MCH RBC QN AUTO: 28.9 PG (ref 26.5–33)
MCHC RBC AUTO-ENTMCNC: 34.8 G/DL (ref 31.5–36.5)
MCV RBC AUTO: 83 FL (ref 78–100)
PHOSPHATE SERPL-MCNC: 2.4 MG/DL (ref 2.5–4.5)
PLATELET # BLD AUTO: 253 10E9/L (ref 150–450)
POTASSIUM SERPL-SCNC: 3.2 MMOL/L (ref 3.4–5.3)
POTASSIUM SERPL-SCNC: 3.6 MMOL/L (ref 3.4–5.3)
RBC # BLD AUTO: 5.02 10E12/L (ref 4.4–5.9)
SODIUM SERPL-SCNC: 140 MMOL/L (ref 133–144)
WBC # BLD AUTO: 15 10E9/L (ref 4–11)

## 2017-05-20 PROCEDURE — 25000125 ZZHC RX 250: Performed by: PHYSICIAN ASSISTANT

## 2017-05-20 PROCEDURE — 25000128 H RX IP 250 OP 636: Performed by: SURGERY

## 2017-05-20 PROCEDURE — 25000128 H RX IP 250 OP 636: Performed by: INTERNAL MEDICINE

## 2017-05-20 PROCEDURE — 80048 BASIC METABOLIC PNL TOTAL CA: CPT | Performed by: HOSPITALIST

## 2017-05-20 PROCEDURE — 84132 ASSAY OF SERUM POTASSIUM: CPT | Performed by: HOSPITALIST

## 2017-05-20 PROCEDURE — 99232 SBSQ HOSP IP/OBS MODERATE 35: CPT | Performed by: HOSPITALIST

## 2017-05-20 PROCEDURE — 25000125 ZZHC RX 250: Performed by: INTERNAL MEDICINE

## 2017-05-20 PROCEDURE — 85027 COMPLETE CBC AUTOMATED: CPT | Performed by: HOSPITALIST

## 2017-05-20 PROCEDURE — 36415 COLL VENOUS BLD VENIPUNCTURE: CPT | Performed by: HOSPITALIST

## 2017-05-20 PROCEDURE — 25000125 ZZHC RX 250: Performed by: SURGERY

## 2017-05-20 PROCEDURE — 25000132 ZZH RX MED GY IP 250 OP 250 PS 637: Performed by: INTERNAL MEDICINE

## 2017-05-20 PROCEDURE — 36416 COLLJ CAPILLARY BLOOD SPEC: CPT | Performed by: HOSPITALIST

## 2017-05-20 PROCEDURE — 99207 ZZC CDG-MDM COMPONENT: MEETS LOW - DOWN CODED: CPT | Performed by: HOSPITALIST

## 2017-05-20 PROCEDURE — 25000128 H RX IP 250 OP 636: Performed by: HOSPITALIST

## 2017-05-20 PROCEDURE — 84100 ASSAY OF PHOSPHORUS: CPT | Performed by: HOSPITALIST

## 2017-05-20 PROCEDURE — S0077 INJECTION, CLINDAMYCIN PHOSP: HCPCS | Performed by: SURGERY

## 2017-05-20 PROCEDURE — 12000007 ZZH R&B INTERMEDIATE

## 2017-05-20 RX ORDER — FLUCONAZOLE 200 MG/1
200 TABLET ORAL DAILY
Status: DISCONTINUED | OUTPATIENT
Start: 2017-05-20 | End: 2017-05-20

## 2017-05-20 RX ORDER — FLUCONAZOLE 2 MG/ML
200 INJECTION, SOLUTION INTRAVENOUS EVERY 24 HOURS
Status: DISCONTINUED | OUTPATIENT
Start: 2017-05-20 | End: 2017-05-21

## 2017-05-20 RX ADMIN — TOPICAL ANESTHETIC 1 EACH: 200 SPRAY DENTAL; PERIODONTAL at 17:40

## 2017-05-20 RX ADMIN — ACETAMINOPHEN 1000 MG: 10 INJECTION, SOLUTION INTRAVENOUS at 21:20

## 2017-05-20 RX ADMIN — TOPICAL ANESTHETIC 1 EACH: 200 SPRAY DENTAL; PERIODONTAL at 12:50

## 2017-05-20 RX ADMIN — MORPHINE SULFATE 2 MG: 2 INJECTION, SOLUTION INTRAMUSCULAR; INTRAVENOUS at 19:19

## 2017-05-20 RX ADMIN — CIPROFLOXACIN 400 MG: 2 INJECTION, SOLUTION INTRAVENOUS at 19:22

## 2017-05-20 RX ADMIN — TOPICAL ANESTHETIC 2 EACH: 200 SPRAY DENTAL; PERIODONTAL at 00:37

## 2017-05-20 RX ADMIN — POTASSIUM PHOSPHATE, MONOBASIC AND POTASSIUM PHOSPHATE, DIBASIC 15 MMOL: 224; 236 INJECTION, SOLUTION INTRAVENOUS at 19:28

## 2017-05-20 RX ADMIN — METOPROLOL TARTRATE 5 MG: 5 INJECTION INTRAVENOUS at 09:43

## 2017-05-20 RX ADMIN — SODIUM CHLORIDE: 9 INJECTION, SOLUTION INTRAVENOUS at 00:42

## 2017-05-20 RX ADMIN — MORPHINE SULFATE 2 MG: 2 INJECTION, SOLUTION INTRAMUSCULAR; INTRAVENOUS at 00:32

## 2017-05-20 RX ADMIN — HEPARIN SODIUM 5000 UNITS: 10000 INJECTION, SOLUTION INTRAVENOUS; SUBCUTANEOUS at 09:44

## 2017-05-20 RX ADMIN — TOPICAL ANESTHETIC 2 EACH: 200 SPRAY DENTAL; PERIODONTAL at 06:25

## 2017-05-20 RX ADMIN — FLUCONAZOLE 200 MG: 2 INJECTION, SOLUTION INTRAVENOUS at 14:23

## 2017-05-20 RX ADMIN — ACETAMINOPHEN 1000 MG: 10 INJECTION, SOLUTION INTRAVENOUS at 10:52

## 2017-05-20 RX ADMIN — METOPROLOL TARTRATE 5 MG: 5 INJECTION INTRAVENOUS at 17:39

## 2017-05-20 RX ADMIN — POTASSIUM CHLORIDE 10 MEQ: 14.9 INJECTION, SOLUTION, CONCENTRATE PARENTERAL at 13:45

## 2017-05-20 RX ADMIN — SODIUM CHLORIDE: 9 INJECTION, SOLUTION INTRAVENOUS at 12:32

## 2017-05-20 RX ADMIN — CLINDAMYCIN PHOSPHATE 600 MG: 12 INJECTION, SOLUTION INTRAVENOUS at 17:39

## 2017-05-20 RX ADMIN — HEPARIN SODIUM 5000 UNITS: 10000 INJECTION, SOLUTION INTRAVENOUS; SUBCUTANEOUS at 19:19

## 2017-05-20 RX ADMIN — CIPROFLOXACIN 400 MG: 2 INJECTION, SOLUTION INTRAVENOUS at 06:18

## 2017-05-20 RX ADMIN — POTASSIUM CHLORIDE 10 MEQ: 14.9 INJECTION, SOLUTION, CONCENTRATE PARENTERAL at 10:53

## 2017-05-20 RX ADMIN — METOPROLOL TARTRATE 5 MG: 5 INJECTION INTRAVENOUS at 01:36

## 2017-05-20 RX ADMIN — POTASSIUM CHLORIDE 10 MEQ: 14.9 INJECTION, SOLUTION, CONCENTRATE PARENTERAL at 12:39

## 2017-05-20 RX ADMIN — CLINDAMYCIN PHOSPHATE 600 MG: 12 INJECTION, SOLUTION INTRAVENOUS at 05:37

## 2017-05-20 RX ADMIN — MORPHINE SULFATE 2 MG: 2 INJECTION, SOLUTION INTRAMUSCULAR; INTRAVENOUS at 10:00

## 2017-05-20 RX ADMIN — POTASSIUM CHLORIDE 10 MEQ: 14.9 INJECTION, SOLUTION, CONCENTRATE PARENTERAL at 15:14

## 2017-05-20 RX ADMIN — MORPHINE SULFATE 2 MG: 2 INJECTION, SOLUTION INTRAMUSCULAR; INTRAVENOUS at 15:19

## 2017-05-20 RX ADMIN — PANTOPRAZOLE SODIUM 40 MG: 40 INJECTION, POWDER, FOR SOLUTION INTRAVENOUS at 17:39

## 2017-05-20 RX ADMIN — ACETAMINOPHEN 1000 MG: 10 INJECTION, SOLUTION INTRAVENOUS at 01:37

## 2017-05-20 NOTE — PROGRESS NOTES
[facility]    Infectious Disease Progress Note    Date of Service (when I saw the patient): 05/20/2017     Assessment & Plan   Naif Howell Jr. is a 52 year old male who was admitted on 5/14/2017.     IMPRESSION:   1. A 52-year-old male with apparent chronic cholecystitis and recent cholecystectomy.   2. Postop cholecystectomy, developed increasing abdominal pain without major fevers or systemic symptoms, found to have small bowel leak at surgery, which has been repaired, peritonitis has resolved, but no purulence in the abdomen, so no culture done, some low-grade fever postop now with cultures from drainage tube so far showing yeast yet to be identified.   3. History of cephalosporin allergy, but has tolerated penicillins, listed now as Flagyl allergic, but had a brief set of symptoms that resolved quickly, very likely a true allergy to either Cipro or Flagyl.   4. History of recurrent otitis over many years, no surgical interventions.       RECOMMENDATIONS:   1. Currently getting Cipro and clindamycin, continue for now. Adding fluconazole given yeast.   2. Await the drainage culture and get blood cultures.   3. Further adjust treatment based on cultures and clinical course, penicillins and probably Flagyl both acceptable treatment if cultures direct us that way    Fortino Cespedes MD    Interval History   t max of 100.3   Yeast in the YURIDIA drain yet to be identified.     Physical Exam   Temp: 100.3  F (37.9  C) Temp src: Oral BP: 147/80   Heart Rate: 88 Resp: 18 SpO2: 92 % O2 Device: None (Room air)    Vitals:    05/14/17 2302 05/15/17 0112 05/16/17 0549   Weight: 90.7 kg (200 lb) 88.5 kg (195 lb) 89.2 kg (196 lb 9.6 oz)     Vital Signs with Ranges  Temp:  [98  F (36.7  C)-100.3  F (37.9  C)] 100.3  F (37.9  C)  Heart Rate:  [88-99] 88  Resp:  [18-20] 18  BP: (139-148)/(76-83) 147/80  SpO2:  [91 %-96 %] 92 %    Constitutional: Awake, alert, cooperative, no apparent distress  Lungs: Clear to auscultation  bilaterally, no crackles or wheezing  Cardiovascular: Regular rate and rhythm, normal S1 and S2, and no murmur noted  Abdomen: Normal bowel sounds, soft, non-distended, non-tender  Skin: No rashes, no cyanosis, no edema  Other:    Medications     NaCl 100 mL/hr at 05/20/17 0944       ciprofloxacin  400 mg Intravenous Q12H     acetaminophen  1,000 mg Intravenous Q8H     clindamycin  600 mg Intravenous Q8H     topiramate  50 mg Oral QPM     topiramate  25 mg Oral QAM     sodium chloride (PF)  3 mL Intracatheter Q8H     heparin  5,000 Units Subcutaneous Q12H     metoprolol  5 mg Intravenous Q6H     pantoprazole  40 mg Intravenous Q24H       Data   All microbiology laboratory data reviewed.  Recent Labs   Lab Test  05/20/17   0654  05/19/17   0715  05/18/17   0606   WBC  15.0*  15.9*  16.6*   HGB  14.5  14.5  15.7   HCT  41.7  42.9  46.4   MCV  83  85  84   PLT  253  255  254     Recent Labs   Lab Test  05/20/17   0654  05/19/17   0715  05/18/17   0606   CR  0.58*  0.65*  0.74     Recent Labs   Lab Test  09/08/15   1230   SED  5     Recent Labs   Lab Test  05/19/17   1222  05/19/17   1215  05/18/17   1020  05/18/17   0945  10/11/12   1355  06/11/10   1236   CULT  No growth after 16 hours  No growth after 16 hours  Canceled, Test credited Duplicate request Charge credited  Canceled, Test credited Duplicate request Charge credited  Moderate growth Yeast  Culture in progress  *  Culture negative monitoring continues  Canceled, Test credited  Incorrectly ordered by PCU/Clinic  Test reordered as correct code    No growth  No Beta Streptococcus isolated

## 2017-05-20 NOTE — PLAN OF CARE
Problem: Goal Outcome Summary  Goal: Goal Outcome Summary  Outcome: No Change  Pt sadia 1st clamping of NG, after only 50cc drainage brown out. Pt started to have increased pain and discomfort in throat and nose, stating he was congested, making drainage in his throat that he had to swallow which is painful. vicente Matson examined him and got xray of placement. Gave morphine IV prn and hurricane spray. Pt up to BR to void. A1. No ambulation in marquez this shift d/t discomfort. CMS intact. VSS ex some tachycardia. D/t increased pain, LIS started again and will continue through night. Pt received both K+ and phosphorous replacement today. Awaiting phosphorous lab draw in AM to see if more replacement needed. Pt sig other is spending the night. Will continue to monitor.

## 2017-05-20 NOTE — PROGRESS NOTES
"[facility]   General Surgery Progress Note           Assessment and Plan:   Assessment:   POD#4 s/p Procedure(s):  DIAGNOSTIC LAPAROSCOPY, LAPAROTOMY,  SMALL BOWEL RESECTION, LAPAROSCOPIC LYSIS OF ADHESIONS - Wound Class: IV-Dirty or Infected   - Wound Class: IV-Dirty or Infected   - Wound Class: IV-Dirty or Infected        Plan:   -IV fluids continued  -stress ulcer prophylaxis and prophylaxis against venous thromboembolism  -Keep NPO for now  -repeat decreased NG suction trial         Interval History:   Sore throat, after second cycle of NG trial clamping had increased Sx (congestion, throat pain) so returned to LIS, he reports pain was in throat (not abdomen)  Passing min/no flatus         Physical Exam:   Blood pressure 142/83, pulse 93, temperature 98.5  F (36.9  C), temperature source Tympanic, resp. rate 18, height 1.88 m (6' 2\"), weight 89.2 kg (196 lb 9.6 oz), SpO2 91 %.    I/O last 3 completed shifts:  In: 2930.5 [I.V.:2910.5; NG/GT:20]  Out: 1630 [Urine:1250; Emesis/NG output:325; Drains:55]    Abdomen:   soft, non-distended, tenderness noted at incision and no masses palpated   Inc(s) - clean, dry, intact    Doug - serous          Data:     Recent Labs   Lab Test  05/19/17   0715  05/18/17   0606  05/17/17   0654   HGB  14.5  15.7  17.8*   WBC  15.9*  16.6*  11.7*       Corey Vang MD     "

## 2017-05-20 NOTE — PLAN OF CARE
Problem: Goal Outcome Summary  Goal: Goal Outcome Summary  Outcome: Improving  DAy RN  vss besides highest temp was 100.3 and 99.5 ax. On iv tylenol. WBC 15 today  Encouraged hourly IS use. Lungs are clear.  One small bm today, intermittent cramping unrelated to NG being clamped.   requested iv pain meds x2 this shift for cramping. majority of day pain 4 or less.  tolerated clamping for 3 hours/return to suction for one hour (return was 75cc this shift)  Up and walking.  5cc yellow/pink tinged drainage from YURIDIA  Incision are WDL  K replaced today. Phosphorous will be replaced   Diflucan started today due to yeast in the YURIDIA, tolerated well no reaction  Voiding adequate amounts of urine. Urine clear josé luis in color.   Slept on and off.

## 2017-05-20 NOTE — PLAN OF CARE
Problem: Goal Outcome Summary  Goal: Goal Outcome Summary  Outcome: Improving  Pt alert and oriented. Up to bathroom assist of 1. Pt noted increased throat pain.   Hurricane spray given and morphine 2 mg, pt noted pain better on recheck.   Significant other at bed side, appears to rest between cares. Will continue to monitor.

## 2017-05-20 NOTE — PROGRESS NOTES
[facility]    Hospitalist Progress Note  Name: Naif Howell Jr.    MRN: [id number]  Provider:  Naif Parker DO, MPH  Date of Service: 05/20/2017    Summary of Stay: Naif Howell Jr. is a 52 year old male with a history of HTN, PTSD, migraines admitted on 5/14/2017 with SBO.      Problem List:   1. SBO s/p diagnostic lap, JOHNSON, laparotomy and SB resection 5/16: Still with NGT, NPO, IVF. Appears to be slowly improving. Has YURIDIA drain but Cx NGTD. Fevers resolved. Still on clindamycin and ciprofloxacin. Management per ID and surgical service.  2. HTN: BP controlled on IV metoprolol. PTA on HCTZ and losartan.  3. PTSD: Hold PTA topiramate. PRN ativan.  4. HypoK: Replacement protocol ordered.  5. Leukocytosis: Concern for infection vs WBC stress demargination. Already on abx. Monitor fever curve. AM CBC.  6. Right ear discomfort: Likely pressure changes from NGT. No obvious abnormality on exam. Already on good abx if infection. Monitor for now.    DVT Prophylaxis: Heparin SQ  Code Status: Full Code  Disposition: Expected discharge in 3-4 days to home. Goals prior to discharge include monitor return of bowel function..   Family updated today: Not today     Interval History   Pt doing well. No chest pain or shortness of breath. No nausea, vomiting. Pain controlled. Flatus, cramps, no BM. No fevers. No other complaints identified.     -Data reviewed today: I reviewed all new labs and imaging results over the last 24 hours.     Physical Exam   Temp: 98  F (36.7  C) Temp src: Tympanic BP: 147/80   Heart Rate: 88 Resp: 18 SpO2: 92 % O2 Device: None (Room air)    Vitals:    05/14/17 2302 05/15/17 0112 05/16/17 0549   Weight: 90.7 kg (200 lb) 88.5 kg (195 lb) 89.2 kg (196 lb 9.6 oz)     Vital Signs with Ranges  Temp:  [98  F (36.7  C)-98.7  F (37.1  C)] 98  F (36.7  C)  Heart Rate:  [88-99] 88  Resp:  [18-20] 18  BP: (139-148)/(76-83) 147/80  SpO2:  [91 %-96 %] 92 %  I/O last 3 completed shifts:  In: 2930.5 [I.V.:2910.5;  NG/GT:20]  Out: 1630 [Urine:1250; Emesis/NG output:325; Drains:55]    GENERAL: No apparent distress. Awake, alert, and fully oriented.  HEENT: Normocephalic, atraumatic. Extraocular movements intact.   CARDIOVASCULAR: Regular rate and rhythm without murmurs or rubs. No S3.  PULMONARY: Clear bilaterally.  GASTROINTESTINAL: Soft, non-tender, non-distended. Bowel sounds normoactive.   EXTREMITIES: No cyanosis or clubbing. No edema.  NEUROLOGICAL: CN 2-12 grossly intact, no focal neurological deficits.  DERMATOLOGICAL: No rash, ulcer, bruising, nor jaundice.     Medications     NaCl 100 mL/hr at 05/20/17 0944       ciprofloxacin  400 mg Intravenous Q12H     acetaminophen  1,000 mg Intravenous Q8H     clindamycin  600 mg Intravenous Q8H     topiramate  50 mg Oral QPM     topiramate  25 mg Oral QAM     sodium chloride (PF)  3 mL Intracatheter Q8H     heparin  5,000 Units Subcutaneous Q12H     metoprolol  5 mg Intravenous Q6H     pantoprazole  40 mg Intravenous Q24H     Data     Laboratory:    Recent Labs  Lab 05/20/17  0654 05/19/17  0715 05/18/17  0606   WBC 15.0* 15.9* 16.6*   HGB 14.5 14.5 15.7   HCT 41.7 42.9 46.4   MCV 83 85 84    255 254       Recent Labs  Lab 05/20/17  0654 05/19/17  1630 05/19/17  0715 05/18/17  0606     --  138 140   POTASSIUM 3.2* 3.5 3.3* 3.4   CHLORIDE 108  --  107 107   CO2 21  --  25 25   ANIONGAP 11  --  6 8   *  --  118* 107*   BUN 12  --  15 16   CR 0.58*  --  0.65* 0.74   GFRESTIMATED >90  --  >90Non  GFR Calc >90Non  GFR Calc   GFRESTBLACK >90  --  >90African American GFR Calc >90African American GFR Calc   ADELSO 7.6*  --  7.8* 7.8*       Recent Labs  Lab 05/19/17  1222 05/19/17  1215 05/18/17  1020 05/18/17  0945   CULT No growth after 16 hours No growth after 16 hours Canceled, Test credited Duplicate request Charge credited  Canceled, Test credited Duplicate request Charge credited Culture in progress  Culture negative monitoring  continues  Canceled, Test creditedIncorrectly ordered by PCU/ClinicTest reordered as correct code       Imaging:  Recent Results (from the past 24 hour(s))   XR Abdomen Port 1 View    Narrative    PORTABLE ABDOMEN ONE VIEW  5/19/2017  9:29 PM     HISTORY: Assess nasogastric tube site.     COMPARISON: Abdominal x-ray 5/16/2017.      Impression    IMPRESSION: Supine view of the abdomen is performed. Nasogastric tube  extends below the left hemidiaphragm overlying the region of the  stomach likely in good position. Dilated air-filled loops of small  bowel are present in the left abdomen which appear minimally changed.  Bowel kevin are noted in the right lower quadrant the abdomen and  cholecystectomy clips are noted in the right upper quadrant of the  abdomen. Residual oral contrast is noted within the left colon,  unchanged.    MD Naif MANZO DO MPH  [facility] Hospitalist  [address] [place] [address]  Pager: [phone]  05/20/2017

## 2017-05-21 LAB
ANION GAP SERPL CALCULATED.3IONS-SCNC: 11 MMOL/L (ref 3–14)
BUN SERPL-MCNC: 10 MG/DL (ref 7–30)
CALCIUM SERPL-MCNC: 7.7 MG/DL (ref 8.5–10.1)
CHLORIDE SERPL-SCNC: 107 MMOL/L (ref 94–109)
CO2 SERPL-SCNC: 21 MMOL/L (ref 20–32)
CREAT SERPL-MCNC: 0.5 MG/DL (ref 0.66–1.25)
ERYTHROCYTE [DISTWIDTH] IN BLOOD BY AUTOMATED COUNT: 13.8 % (ref 10–15)
GFR SERPL CREATININE-BSD FRML MDRD: ABNORMAL ML/MIN/1.7M2
GLUCOSE SERPL-MCNC: 100 MG/DL (ref 70–99)
HCT VFR BLD AUTO: 40.9 % (ref 40–53)
HGB BLD-MCNC: 13.9 G/DL (ref 13.3–17.7)
MAGNESIUM SERPL-MCNC: 2.2 MG/DL (ref 1.6–2.3)
MCH RBC QN AUTO: 28.2 PG (ref 26.5–33)
MCHC RBC AUTO-ENTMCNC: 34 G/DL (ref 31.5–36.5)
MCV RBC AUTO: 83 FL (ref 78–100)
PHOSPHATE SERPL-MCNC: 2.4 MG/DL (ref 2.5–4.5)
PLATELET # BLD AUTO: 313 10E9/L (ref 150–450)
POTASSIUM SERPL-SCNC: 3.2 MMOL/L (ref 3.4–5.3)
POTASSIUM SERPL-SCNC: 3.6 MMOL/L (ref 3.4–5.3)
RBC # BLD AUTO: 4.93 10E12/L (ref 4.4–5.9)
SODIUM SERPL-SCNC: 139 MMOL/L (ref 133–144)
WBC # BLD AUTO: 15.6 10E9/L (ref 4–11)

## 2017-05-21 PROCEDURE — 83735 ASSAY OF MAGNESIUM: CPT | Performed by: HOSPITALIST

## 2017-05-21 PROCEDURE — 99207 ZZC CDG-MDM COMPONENT: MEETS LOW - DOWN CODED: CPT | Performed by: HOSPITALIST

## 2017-05-21 PROCEDURE — 12000000 ZZH R&B MED SURG/OB

## 2017-05-21 PROCEDURE — 25000125 ZZHC RX 250: Performed by: INTERNAL MEDICINE

## 2017-05-21 PROCEDURE — 25000128 H RX IP 250 OP 636: Performed by: INTERNAL MEDICINE

## 2017-05-21 PROCEDURE — 25000125 ZZHC RX 250: Performed by: SURGERY

## 2017-05-21 PROCEDURE — 25000132 ZZH RX MED GY IP 250 OP 250 PS 637

## 2017-05-21 PROCEDURE — 36415 COLL VENOUS BLD VENIPUNCTURE: CPT | Performed by: HOSPITALIST

## 2017-05-21 PROCEDURE — 25000132 ZZH RX MED GY IP 250 OP 250 PS 637: Performed by: INTERNAL MEDICINE

## 2017-05-21 PROCEDURE — 84100 ASSAY OF PHOSPHORUS: CPT | Performed by: HOSPITALIST

## 2017-05-21 PROCEDURE — S0077 INJECTION, CLINDAMYCIN PHOSP: HCPCS | Performed by: SURGERY

## 2017-05-21 PROCEDURE — 84132 ASSAY OF SERUM POTASSIUM: CPT | Performed by: HOSPITALIST

## 2017-05-21 PROCEDURE — 25000128 H RX IP 250 OP 636: Performed by: SURGERY

## 2017-05-21 PROCEDURE — 25000132 ZZH RX MED GY IP 250 OP 250 PS 637: Performed by: SURGERY

## 2017-05-21 PROCEDURE — 99232 SBSQ HOSP IP/OBS MODERATE 35: CPT | Performed by: HOSPITALIST

## 2017-05-21 PROCEDURE — 25000128 H RX IP 250 OP 636: Performed by: HOSPITALIST

## 2017-05-21 PROCEDURE — 85027 COMPLETE CBC AUTOMATED: CPT | Performed by: HOSPITALIST

## 2017-05-21 PROCEDURE — 25000125 ZZHC RX 250: Performed by: PHYSICIAN ASSISTANT

## 2017-05-21 PROCEDURE — 80048 BASIC METABOLIC PNL TOTAL CA: CPT | Performed by: HOSPITALIST

## 2017-05-21 RX ORDER — ACETAMINOPHEN 500 MG
1000 TABLET ORAL EVERY 8 HOURS
Status: DISCONTINUED | OUTPATIENT
Start: 2017-05-21 | End: 2017-05-24 | Stop reason: HOSPADM

## 2017-05-21 RX ORDER — PANTOPRAZOLE SODIUM 40 MG/1
40 TABLET, DELAYED RELEASE ORAL EVERY MORNING
Status: DISCONTINUED | OUTPATIENT
Start: 2017-05-22 | End: 2017-05-24 | Stop reason: HOSPADM

## 2017-05-21 RX ADMIN — MICAFUNGIN SODIUM 150 MG: 10 INJECTION, POWDER, LYOPHILIZED, FOR SOLUTION INTRAVENOUS at 16:08

## 2017-05-21 RX ADMIN — METOPROLOL TARTRATE 5 MG: 5 INJECTION INTRAVENOUS at 00:38

## 2017-05-21 RX ADMIN — POTASSIUM CHLORIDE 10 MEQ: 14.9 INJECTION, SOLUTION, CONCENTRATE PARENTERAL at 10:03

## 2017-05-21 RX ADMIN — CLINDAMYCIN PHOSPHATE 600 MG: 12 INJECTION, SOLUTION INTRAVENOUS at 17:24

## 2017-05-21 RX ADMIN — METOPROLOL TARTRATE 5 MG: 5 INJECTION INTRAVENOUS at 06:45

## 2017-05-21 RX ADMIN — CLINDAMYCIN PHOSPHATE 600 MG: 12 INJECTION, SOLUTION INTRAVENOUS at 00:39

## 2017-05-21 RX ADMIN — CIPROFLOXACIN 400 MG: 2 INJECTION, SOLUTION INTRAVENOUS at 18:33

## 2017-05-21 RX ADMIN — POTASSIUM CHLORIDE 10 MEQ: 14.9 INJECTION, SOLUTION, CONCENTRATE PARENTERAL at 11:10

## 2017-05-21 RX ADMIN — POTASSIUM PHOSPHATE, MONOBASIC AND POTASSIUM PHOSPHATE, DIBASIC 15 MMOL: 224; 236 INJECTION, SOLUTION INTRAVENOUS at 14:42

## 2017-05-21 RX ADMIN — MORPHINE SULFATE 2 MG: 2 INJECTION, SOLUTION INTRAMUSCULAR; INTRAVENOUS at 20:21

## 2017-05-21 RX ADMIN — ACETAMINOPHEN 1000 MG: 10 INJECTION, SOLUTION INTRAVENOUS at 04:58

## 2017-05-21 RX ADMIN — HEPARIN SODIUM 5000 UNITS: 10000 INJECTION, SOLUTION INTRAVENOUS; SUBCUTANEOUS at 08:41

## 2017-05-21 RX ADMIN — SODIUM CHLORIDE: 9 INJECTION, SOLUTION INTRAVENOUS at 12:14

## 2017-05-21 RX ADMIN — ACETAMINOPHEN 1000 MG: 160 SUSPENSION ORAL at 20:20

## 2017-05-21 RX ADMIN — METOPROLOL TARTRATE 5 MG: 5 INJECTION INTRAVENOUS at 17:23

## 2017-05-21 RX ADMIN — HEPARIN SODIUM 5000 UNITS: 10000 INJECTION, SOLUTION INTRAVENOUS; SUBCUTANEOUS at 20:21

## 2017-05-21 RX ADMIN — SODIUM CHLORIDE: 9 INJECTION, SOLUTION INTRAVENOUS at 00:38

## 2017-05-21 RX ADMIN — CLINDAMYCIN PHOSPHATE 600 MG: 12 INJECTION, SOLUTION INTRAVENOUS at 09:42

## 2017-05-21 RX ADMIN — METOPROLOL TARTRATE 5 MG: 5 INJECTION INTRAVENOUS at 12:15

## 2017-05-21 RX ADMIN — POTASSIUM CHLORIDE 10 MEQ: 14.9 INJECTION, SOLUTION, CONCENTRATE PARENTERAL at 12:14

## 2017-05-21 RX ADMIN — TOPIRAMATE 25 MG: 25 TABLET, FILM COATED ORAL at 08:41

## 2017-05-21 RX ADMIN — TOPICAL ANESTHETIC 2 EACH: 200 SPRAY DENTAL; PERIODONTAL at 08:41

## 2017-05-21 RX ADMIN — CIPROFLOXACIN 400 MG: 2 INJECTION, SOLUTION INTRAVENOUS at 06:46

## 2017-05-21 RX ADMIN — ACETAMINOPHEN 1000 MG: 10 INJECTION, SOLUTION INTRAVENOUS at 13:11

## 2017-05-21 RX ADMIN — MORPHINE SULFATE 2 MG: 2 INJECTION, SOLUTION INTRAMUSCULAR; INTRAVENOUS at 14:35

## 2017-05-21 RX ADMIN — MORPHINE SULFATE 2 MG: 2 INJECTION, SOLUTION INTRAMUSCULAR; INTRAVENOUS at 18:38

## 2017-05-21 RX ADMIN — POTASSIUM CHLORIDE 10 MEQ: 14.9 INJECTION, SOLUTION, CONCENTRATE PARENTERAL at 13:15

## 2017-05-21 NOTE — PLAN OF CARE
Problem: Goal Outcome Summary  Goal: Goal Outcome Summary  Outcome: Improving  Pt up to bathroom sba of 1. Rated pain a 1 to noc and declined pain meds and hurricane spry offered.   sm bm. Urine 400cc dark josé luis tea colored. Ng irrigated alternating clamping for 3 hours and suction per 1 hour.  Small out put 50cc so far. Appears to be better mood and sleeping between cares. Vs 97.7, 18, 91, 152/82, 93%RA.  Lung sounds clear. Bowel sound hypoactive. Continues cleocin and cipro, diflucan added per infectious dx 2nd to dr pierce showing yeast.   Will continue to monitor.

## 2017-05-21 NOTE — PROGRESS NOTES
"[facility]   General Surgery Progress Note           Assessment and Plan:   Assessment:   POD#5 s/p Procedure(s):  DIAGNOSTIC LAPAROSCOPY, LAPAROTOMY,  SMALL BOWEL RESECTION, LAPAROSCOPIC LYSIS OF ADHESIONS - Wound Class: IV-Dirty or Infected   - Wound Class: IV-Dirty or Infected   - Wound Class: IV-Dirty or Infected  Ileus resolving      Plan:   -IV fluids decreased  -stress ulcer prophylaxis and prophylaxis against venous thromboembolism  -Diet advanced  -directed therapy at yeast on peritoneal culture         Interval History:   Passing gas and stool         Physical Exam:   Blood pressure 154/90, pulse 93, temperature 97.7  F (36.5  C), resp. rate 18, height 1.88 m (6' 2\"), weight 89.2 kg (196 lb 9.6 oz), SpO2 93 %.    I/O last 3 completed shifts:  In: 1347 [I.V.:1347]  Out: 2560 [Urine:2200; Emesis/NG output:350; Drains:10]    Abdomen:   soft, non-distended,  and no masses palpated   Inc(s) - clean, dry, intact      Doug - serous          Data:     Recent Labs   Lab Test  05/21/17   0648  05/20/17   0654  05/19/17   0715   HGB  13.9  14.5  14.5   WBC  15.6*  15.0*  15.9*       Corey Vang MD     "

## 2017-05-21 NOTE — PROGRESS NOTES
[facility]    Hospitalist Progress Note  Name: Naif Howell Jr.    MRN: [id number]  Provider:  Naif Parker DO, MPH  Date of Service: 05/21/2017    Summary of Stay: Naif Howell Jr. is a 52 year old male with a history of HTN, PTSD, migraines admitted on 5/14/2017 with SBO.      Problem List:   1. SBO s/p diagnostic lap, JOHNSON, laparotomy and SB resection 5/16: Still with NGT, NPO, IVF. Appears to be slowly improving. Plan to day for NGT removal and trial CLD. Has YURIDIA drain but Cx only with yeast. Fluconazole added. Still LGF. Still on clindamycin and ciprofloxacin. Management per ID and surgical service.  2. HTN: BP controlled on IV metoprolol. PTA on HCTZ and losartan.  3. PTSD: Hold PTA topiramate. PRN ativan.  4. HypoK: Replacement protocol ordered.  5. Leukocytosis: Concern for infection vs WBC stress demargination. Already on abx. Monitor fever curve. AM CBC.  6. Right ear discomfort: Likely pressure changes from NGT. No obvious abnormality on exam. Already on good abx if infection. Monitor for now, appears better.    DVT Prophylaxis: Heparin SQ  Code Status: Full Code  Disposition: Expected discharge in 2-3 days to home. Goals prior to discharge include monitor return of bowel function, monitor PO, abx plan      Interval History   Pt doing well. No chest pain or shortness of breath. No nausea, vomiting. Pain controlled. Flatus and BM. Low grade fevers. No other complaints identified.     -Data reviewed today: I reviewed all new labs and imaging results over the last 24 hours.     Physical Exam   Temp: 97.7  F (36.5  C) Temp src: Axillary BP: 154/90   Heart Rate: 91 Resp: 18 SpO2: 93 % O2 Device: None (Room air)    Vitals:    05/14/17 2302 05/15/17 0112 05/16/17 0549   Weight: 90.7 kg (200 lb) 88.5 kg (195 lb) 89.2 kg (196 lb 9.6 oz)     Vital Signs with Ranges  Temp:  [96.3  F (35.7  C)-100.3  F (37.9  C)] 97.7  F (36.5  C)  Heart Rate:  [91-98] 91  Resp:  [16-18] 18  BP: (152-157)/(82-90)  154/90  SpO2:  [93 %-94 %] 93 %  I/O last 3 completed shifts:  In: 1347 [I.V.:1347]  Out: 2560 [Urine:2200; Emesis/NG output:350; Drains:10]    GENERAL: No apparent distress. Awake, alert, and fully oriented.  HEENT: Normocephalic, atraumatic. Extraocular movements intact.   CARDIOVASCULAR: Regular rate and rhythm without murmurs or rubs. No S3.  PULMONARY: Clear bilaterally.  GASTROINTESTINAL: Soft, non-tender, non-distended. Bowel sounds normoactive.   EXTREMITIES: No cyanosis or clubbing. No edema.  NEUROLOGICAL: CN 2-12 grossly intact, no focal neurological deficits.  DERMATOLOGICAL: No rash, ulcer, bruising, nor jaundice.     Medications     NaCl 100 mL/hr at 05/21/17 0038       fluconazole  200 mg Intravenous Q24H     ciprofloxacin  400 mg Intravenous Q12H     acetaminophen  1,000 mg Intravenous Q8H     clindamycin  600 mg Intravenous Q8H     topiramate  50 mg Oral QPM     topiramate  25 mg Oral QAM     sodium chloride (PF)  3 mL Intracatheter Q8H     heparin  5,000 Units Subcutaneous Q12H     metoprolol  5 mg Intravenous Q6H     pantoprazole  40 mg Intravenous Q24H     Data     Laboratory:    Recent Labs  Lab 05/21/17  0648 05/20/17  0654 05/19/17  0715   WBC 15.6* 15.0* 15.9*   HGB 13.9 14.5 14.5   HCT 40.9 41.7 42.9   MCV 83 83 85    253 255       Recent Labs  Lab 05/21/17  0648 05/20/17  1825 05/20/17  0654  05/19/17  0715     --  140  --  138   POTASSIUM 3.2* 3.6 3.2*  < > 3.3*   CHLORIDE 107  --  108  --  107   CO2 21  --  21  --  25   ANIONGAP 11  --  11  --  6   *  --  106*  --  118*   BUN 10  --  12  --  15   CR 0.50*  --  0.58*  --  0.65*   GFRESTIMATED >90Non  GFR Calc  --  >90  --  >90Non  GFR Calc   GFRESTBLACK >90African American GFR Calc  --  >90  --  >90African American GFR Calc   ADELSO 7.7*  --  7.6*  --  7.8*   < > = values in this interval not displayed.    Recent Labs  Lab 05/19/17  1222 05/19/17  1215 05/18/17  1020 05/18/17  0945   CULT No  growth after 2 days No growth after 2 days Canceled, Test credited Duplicate request Charge credited  Canceled, Test credited Duplicate request Charge credited Moderate growth YeastCulture in progress*  Culture negative monitoring continues  Canceled, Test creditedIncorrectly ordered by PCU/ClinicTest reordered as correct code       Imaging:  No results found for this or any previous visit (from the past 24 hour(s)).      Naif Parker DO MPH  [facility] Hospitalist  [address] [place] [address]  Pager: [phone]  05/21/2017

## 2017-05-21 NOTE — PROGRESS NOTES
[facility]    Infectious Disease Progress Note    Date of Service (when I saw the patient): 05/21/2017     Assessment & Plan   Naif Howell Jr. is a 52 year old male who was admitted on 5/14/2017.     IMPRESSION:   1. A 52-year-old male with apparent chronic cholecystitis and recent cholecystectomy.   2. Postop cholecystectomy, developed increasing abdominal pain without major fevers or systemic symptoms, found to have small bowel leak at surgery, which has been repaired, peritonitis has resolved, but no purulence in the abdomen, so no culture done, some low-grade fever postop now with cultures from drainage tube so far showing yeast yet to be identified.   3. History of cephalosporin allergy, but has tolerated penicillins, listed now as Flagyl allergic, but had a brief set of symptoms that resolved quickly, very likely a true allergy to either Cipro or Flagyl.   4. History of recurrent otitis over many years, no surgical interventions.       RECOMMENDATIONS:   1. Currently getting Cipro and clindamycin, continue for now. Switching from Fluc to micafungin for the candida glabrata, requested susceptibilities from the lab.   2. Further adjust treatment based on cultures and clinical course, penicillins and probably Flagyl both acceptable treatment if cultures direct us that way    Fortino Cespedes MD    Interval History   afebrile   Candida glabrata in the YURIDIA drain yet to be identified.     Physical Exam   Temp: 97  F (36.1  C) Temp src: Axillary BP: 154/81   Heart Rate: 90 Resp: 18 SpO2: 93 % O2 Device: None (Room air)    Vitals:    05/14/17 2302 05/15/17 0112 05/16/17 0549   Weight: 90.7 kg (200 lb) 88.5 kg (195 lb) 89.2 kg (196 lb 9.6 oz)     Vital Signs with Ranges  Temp:  [97  F (36.1  C)-99.5  F (37.5  C)] 97  F (36.1  C)  Heart Rate:  [83-98] 90  Resp:  [16-18] 18  BP: (147-157)/(80-90) 154/81  SpO2:  [93 %-94 %] 93 %    Constitutional: Awake, alert, cooperative, no apparent distress  Lungs: Clear to  auscultation bilaterally, no crackles or wheezing  Cardiovascular: Regular rate and rhythm, normal S1 and S2, and no murmur noted  Abdomen: Normal bowel sounds, soft, non-distended, non-tender  Skin: No rashes, no cyanosis, no edema  Other:    Medications     NaCl 75 mL/hr at 05/21/17 1214       micafungin  150 mg Intravenous Q24H     ciprofloxacin  400 mg Intravenous Q12H     acetaminophen  1,000 mg Intravenous Q8H     clindamycin  600 mg Intravenous Q8H     topiramate  50 mg Oral QPM     topiramate  25 mg Oral QAM     sodium chloride (PF)  3 mL Intracatheter Q8H     heparin  5,000 Units Subcutaneous Q12H     metoprolol  5 mg Intravenous Q6H     pantoprazole  40 mg Intravenous Q24H       Data   All microbiology laboratory data reviewed.  Recent Labs   Lab Test  05/21/17   0648  05/20/17   0654  05/19/17   0715   WBC  15.6*  15.0*  15.9*   HGB  13.9  14.5  14.5   HCT  40.9  41.7  42.9   MCV  83  83  85   PLT  313  253  255     Recent Labs   Lab Test  05/21/17   0648  05/20/17   0654  05/19/17   0715   CR  0.50*  0.58*  0.65*     Recent Labs   Lab Test  09/08/15   1230   SED  5     Recent Labs   Lab Test  05/19/17   1222  05/19/17   1215  05/18/17   1020  05/18/17   0945  10/11/12   1355  06/11/10   1236   CULT  No growth after 2 days  No growth after 2 days  Canceled, Test credited Duplicate request Charge credited  Canceled, Test credited Duplicate request Charge credited  Moderate growth Candida glabrata  Susceptibility testing not routinely done  *  Culture negative monitoring continues  Canceled, Test credited  Incorrectly ordered by PCU/Clinic  Test reordered as correct code    No growth  No Beta Streptococcus isolated

## 2017-05-21 NOTE — PLAN OF CARE
Problem: Goal Outcome Summary  Goal: Goal Outcome Summary  Outcome: Improving  NG clamped for 3hrs at a time, then LIS for 1hr. Pt sadia. Pt had 2 BMs today, not passing flatus consistently. Cramping in abd, giving morphine for pain mgmt and hurricane spray prn. Low grade fever. Pt ambulating in marquez SBA. Phosphorous replacement given this shift. hiral dressing changed d/t becoming loose and dried drainage on gauze. Will continue to monitor.

## 2017-05-21 NOTE — PLAN OF CARE
"Problem: Goal Outcome Summary  Goal: Goal Outcome Summary  Outcome: Improving  VS /86 (BP Location: Right arm)  Pulse 93  Temp 99.5  F (37.5  C) (Oral)  Resp 18  Ht 1.88 m (6' 2\")  Wt 89.2 kg (196 lb 9.6 oz)  SpO2 95%  BMI 25.24 kg/m2  Lung sounds clear  O2 room air  Bowel sounds hypoactive, tender abdomen  Advanced to clear liquids, going slow  IVF Maintenance fluids infusing, electrolytes (potassium and phosphate) replaced  Dressings CDI  CMS intact  Drains NG removed this AM, patient tolerating. YURIDIA drain remains until tomorrow per surgery  Activity up with stand by assistance  Pain 2 mg IV morphine given x1 this shift after washing up d/t cramping  Patient/family centered care significant other at bedside this shift, attentive to patient's needs             "

## 2017-05-22 LAB
MAGNESIUM SERPL-MCNC: 2 MG/DL (ref 1.6–2.3)
PHOSPHATE SERPL-MCNC: 2.5 MG/DL (ref 2.5–4.5)
PLATELET # BLD AUTO: 325 10E9/L (ref 150–450)
POTASSIUM SERPL-SCNC: 3.4 MMOL/L (ref 3.4–5.3)

## 2017-05-22 PROCEDURE — 25000132 ZZH RX MED GY IP 250 OP 250 PS 637

## 2017-05-22 PROCEDURE — 83735 ASSAY OF MAGNESIUM: CPT | Performed by: HOSPITALIST

## 2017-05-22 PROCEDURE — 12000000 ZZH R&B MED SURG/OB

## 2017-05-22 PROCEDURE — 25000128 H RX IP 250 OP 636: Performed by: INTERNAL MEDICINE

## 2017-05-22 PROCEDURE — 25000132 ZZH RX MED GY IP 250 OP 250 PS 637: Performed by: SURGERY

## 2017-05-22 PROCEDURE — S0077 INJECTION, CLINDAMYCIN PHOSP: HCPCS | Performed by: SURGERY

## 2017-05-22 PROCEDURE — 99232 SBSQ HOSP IP/OBS MODERATE 35: CPT | Performed by: HOSPITALIST

## 2017-05-22 PROCEDURE — 84132 ASSAY OF SERUM POTASSIUM: CPT | Performed by: HOSPITALIST

## 2017-05-22 PROCEDURE — 84100 ASSAY OF PHOSPHORUS: CPT | Performed by: HOSPITALIST

## 2017-05-22 PROCEDURE — 84060 ASSAY ACID PHOSPHATASE: CPT | Performed by: HOSPITALIST

## 2017-05-22 PROCEDURE — 99207 ZZC CDG-MDM COMPONENT: MEETS LOW - DOWN CODED: CPT | Performed by: HOSPITALIST

## 2017-05-22 PROCEDURE — 25000128 H RX IP 250 OP 636: Performed by: SURGERY

## 2017-05-22 PROCEDURE — 25000132 ZZH RX MED GY IP 250 OP 250 PS 637: Performed by: HOSPITALIST

## 2017-05-22 PROCEDURE — 36415 COLL VENOUS BLD VENIPUNCTURE: CPT | Performed by: SURGERY

## 2017-05-22 PROCEDURE — 85049 AUTOMATED PLATELET COUNT: CPT | Performed by: SURGERY

## 2017-05-22 PROCEDURE — 25000125 ZZHC RX 250: Performed by: SURGERY

## 2017-05-22 RX ORDER — BUPROPION HYDROCHLORIDE 150 MG/1
150 TABLET, EXTENDED RELEASE ORAL DAILY
Status: DISCONTINUED | OUTPATIENT
Start: 2017-05-22 | End: 2017-05-24 | Stop reason: HOSPADM

## 2017-05-22 RX ORDER — LOSARTAN POTASSIUM 25 MG/1
50 TABLET ORAL DAILY
Status: DISCONTINUED | OUTPATIENT
Start: 2017-05-22 | End: 2017-05-24 | Stop reason: HOSPADM

## 2017-05-22 RX ADMIN — MORPHINE SULFATE 2 MG: 2 INJECTION, SOLUTION INTRAMUSCULAR; INTRAVENOUS at 08:31

## 2017-05-22 RX ADMIN — METOPROLOL TARTRATE 5 MG: 5 INJECTION INTRAVENOUS at 05:42

## 2017-05-22 RX ADMIN — ACETAMINOPHEN 1000 MG: 500 TABLET, FILM COATED ORAL at 11:18

## 2017-05-22 RX ADMIN — SODIUM CHLORIDE: 9 INJECTION, SOLUTION INTRAVENOUS at 22:45

## 2017-05-22 RX ADMIN — CIPROFLOXACIN 400 MG: 2 INJECTION, SOLUTION INTRAVENOUS at 18:32

## 2017-05-22 RX ADMIN — MORPHINE SULFATE 2 MG: 2 INJECTION, SOLUTION INTRAMUSCULAR; INTRAVENOUS at 00:34

## 2017-05-22 RX ADMIN — TOPIRAMATE 25 MG: 25 TABLET, FILM COATED ORAL at 07:48

## 2017-05-22 RX ADMIN — CIPROFLOXACIN 400 MG: 2 INJECTION, SOLUTION INTRAVENOUS at 05:43

## 2017-05-22 RX ADMIN — ACETAMINOPHEN 1000 MG: 500 TABLET, FILM COATED ORAL at 20:46

## 2017-05-22 RX ADMIN — METOPROLOL TARTRATE 5 MG: 5 INJECTION INTRAVENOUS at 00:28

## 2017-05-22 RX ADMIN — PANTOPRAZOLE SODIUM 40 MG: 40 TABLET, DELAYED RELEASE ORAL at 07:48

## 2017-05-22 RX ADMIN — HEPARIN SODIUM 5000 UNITS: 10000 INJECTION, SOLUTION INTRAVENOUS; SUBCUTANEOUS at 07:48

## 2017-05-22 RX ADMIN — LOSARTAN POTASSIUM 50 MG: 25 TABLET, FILM COATED ORAL at 11:16

## 2017-05-22 RX ADMIN — CLINDAMYCIN PHOSPHATE 600 MG: 12 INJECTION, SOLUTION INTRAVENOUS at 08:36

## 2017-05-22 RX ADMIN — MICAFUNGIN SODIUM 100 MG: 20 INJECTION, POWDER, LYOPHILIZED, FOR SOLUTION INTRAVENOUS at 16:32

## 2017-05-22 RX ADMIN — HEPARIN SODIUM 5000 UNITS: 10000 INJECTION, SOLUTION INTRAVENOUS; SUBCUTANEOUS at 20:48

## 2017-05-22 RX ADMIN — CLINDAMYCIN PHOSPHATE 600 MG: 12 INJECTION, SOLUTION INTRAVENOUS at 00:35

## 2017-05-22 RX ADMIN — CLINDAMYCIN PHOSPHATE 600 MG: 12 INJECTION, SOLUTION INTRAVENOUS at 17:39

## 2017-05-22 RX ADMIN — MORPHINE SULFATE 2 MG: 2 INJECTION, SOLUTION INTRAMUSCULAR; INTRAVENOUS at 17:56

## 2017-05-22 NOTE — PLAN OF CARE
Problem: Goal Outcome Summary  Goal: Goal Outcome Summary  Outcome: Improving  Pt up to bathroom sba of 1. Noted abd cramping rated a 2, morphine given. Pt appeared to rest between cares.   Low grade temp 99.2. Continues on cleocin and cipro. Will continue to monitor.

## 2017-05-22 NOTE — PROGRESS NOTES
[facility]    Hospitalist Progress Note  Name: Naif Howell Jr.    MRN: [id number]  Provider:  Naif Parker DO, MPH  Date of Service: 05/22/2017    Summary of Stay: Naif Howell Jr. is a 52 year old male with a history of HTN, PTSD, migraines admitted on 5/14/2017 with SBO that failed conservative mgmt so taken to OR 5/16 for diagnostic lap, JOHNSON, laparotomy and SB resection needing YURIDIA drain with peritoneal Cx only growing candida glabrate so ID consulted and currently maintained on IV clindamycin, ciprofloxacin and micafungin. Has PMH of anxiety and PTSD now with PTA wellbutrin and topirmate resumed. Also has HTN that was on IV metoprolol while NPO but PTA losartan resumed today but still holding HCTZ. Clinically improving daily.      Problem List:   1. SBO s/p diagnostic lap, JOHNSON, laparotomy and SB resection 5/16: Appears to be slowly improving. NGT removed. Tolerating CLD, advanced to FLD. Pain improved. Has YURIDIA drain but Cx only with candida glabrate. Fluconazole was started but changed to micafungin. Still LGF. Still on clindamycin and ciprofloxacin. Management per ID and surgical service.  2. HTN: BP controlled on IV metoprolol. PTA on HCTZ and losartan. Stop IV metoprolol, resume PTA losartan.  3. PTSD: Resumed PTA topiramate and wellbutrin. PRN ativan.  4. HypoK: Replacement protocol ordered.  5. Leukocytosis: Concern for infection vs WBC stress demargination. Already on abx. Monitor fever curve. AM CBC.    DVT Prophylaxis: Heparin SQ  Code Status: Full Code  Disposition: Expected discharge in 2-3 days to home. Goals prior to discharge include monitor return of bowel function, monitor PO, abx plan, etc      Interval History   Pt doing well. No chest pain or shortness of breath. No nausea, vomiting. Pain controlled. Flatus and BM yesterday. Low grade fevers. Tolerating CLD, advanced to FLD. No other complaints identified.     -Data reviewed today: I reviewed all new labs and imaging results over the  last 24 hours.     Physical Exam   Temp: 99.5  F (37.5  C) Temp src: Oral BP: 133/88   Heart Rate: 82 Resp: 16 SpO2: 95 % O2 Device: None (Room air)    Vitals:    05/14/17 2302 05/15/17 0112 05/16/17 0549   Weight: 90.7 kg (200 lb) 88.5 kg (195 lb) 89.2 kg (196 lb 9.6 oz)     Vital Signs with Ranges  Temp:  [99.2  F (37.3  C)-99.5  F (37.5  C)] 99.5  F (37.5  C)  Heart Rate:  [82-90] 82  Resp:  [16-18] 16  BP: (133-154)/(80-88) 133/88  SpO2:  [94 %-95 %] 95 %  I/O last 3 completed shifts:  In: 2641 [I.V.:2641]  Out: 1020 [Urine:1000; Drains:20]    GENERAL: No apparent distress. Awake, alert, and fully oriented.  HEENT: Normocephalic, atraumatic. Extraocular movements intact.   CARDIOVASCULAR: Regular rate and rhythm without murmurs or rubs. No S3.  PULMONARY: Clear bilaterally.  GASTROINTESTINAL: Soft, mildly tender, non-distended. Bowel sounds normoactive.   EXTREMITIES: No cyanosis or clubbing. No edema.  NEUROLOGICAL: CN 2-12 grossly intact, no focal neurological deficits.  DERMATOLOGICAL: No rash, ulcer, bruising, nor jaundice.     Medications     NaCl 75 mL/hr at 05/21/17 1214       buPROPion  150 mg Oral Daily     losartan  50 mg Oral Daily     micafungin  150 mg Intravenous Q24H     acetaminophen  1,000 mg Oral Q8H    Or     acetaminophen  1,000 mg Oral Q8H     pantoprazole  40 mg Oral QAM     ciprofloxacin  400 mg Intravenous Q12H     clindamycin  600 mg Intravenous Q8H     topiramate  50 mg Oral QPM     topiramate  25 mg Oral QAM     sodium chloride (PF)  3 mL Intracatheter Q8H     heparin  5,000 Units Subcutaneous Q12H     Data     Laboratory:    Recent Labs  Lab 05/22/17  0624 05/21/17  0648 05/20/17  0654 05/19/17  0715   WBC  --  15.6* 15.0* 15.9*   HGB  --  13.9 14.5 14.5   HCT  --  40.9 41.7 42.9   MCV  --  83 83 85    313 253 255       Recent Labs  Lab 05/22/17  0624 05/21/17  1536 05/21/17  0648  05/20/17  0654  05/19/17  0715   NA  --   --  139  --  140  --  138   POTASSIUM 3.4 3.6 3.2*  < >  3.2*  < > 3.3*   CHLORIDE  --   --  107  --  108  --  107   CO2  --   --  21  --  21  --  25   ANIONGAP  --   --  11  --  11  --  6   GLC  --   --  100*  --  106*  --  118*   BUN  --   --  10  --  12  --  15   CR  --   --  0.50*  --  0.58*  --  0.65*   GFRESTIMATED  --   --  >90Non  GFR Calc  --  >90  --  >90Non  GFR Calc   GFRESTBLACK  --   --  >90African American GFR Calc  --  >90  --  >90African American GFR Calc   ADELSO  --   --  7.7*  --  7.6*  --  7.8*   < > = values in this interval not displayed.    Recent Labs  Lab 05/19/17  1222 05/19/17  1215 05/18/17  1020 05/18/17  0945   CULT No growth after 3 days No growth after 3 days Canceled, Test credited Duplicate request Charge credited  Canceled, Test credited Duplicate request Charge credited Moderate growth Candida glabrataSusceptibility testing not routinely done*  Culture negative monitoring continues  Canceled, Test creditedIncorrectly ordered by PCU/ClinicTest reordered as correct code       Imaging:  No results found for this or any previous visit (from the past 24 hour(s)).      Naif Parker DO MPH  [facility] Hospitalist  [address] [place] [address]  Pager: [phone]  05/22/2017

## 2017-05-22 NOTE — PROGRESS NOTES
[facility]    Infectious Disease Progress Note    Date of Service (when I saw the patient): 05/22/2017     Assessment & Plan   Naif Howell Jr. is a 52 year old male who was admitted on 5/14/2017.     IMPRESSION:   1. A 52-year-old male with apparent chronic cholecystitis and recent cholecystectomy.   2. Postop cholecystectomy, developed increasing abdominal pain without major fevers or systemic symptoms, found to have small bowel leak at surgery, which has been repaired, peritonitis has resolved, but no purulence in the abdomen, so no culture done, some low-grade fever postop now with cultures from drainage tube so far showing yeast yet to be identified.   3. History of cephalosporin allergy, but has tolerated penicillins, listed now as Flagyl allergic, but had a brief set of symptoms that resolved quickly, very likely a true allergy to either Cipro or Flagyl.   4. History of recurrent otitis over many years, no surgical interventions.       RECOMMENDATIONS:   1. Continue Cipro and clindamycin, only candida glabrata in cx, on corrie, await sens(called lab)   .   2. Further adjust treatment based on cultures and clinical course, penicillins and probably Flagyl both acceptable treatment if cultures allow , but looks like candida might be the main issue    Ihsan Biswas MD    Interval History   t max of 100 range  C glabrata in the YURIDIA drain yet to be identified.     Physical Exam   Temp: 98.8  F (37.1  C) Temp src: Oral BP: 151/84   Heart Rate: 85 Resp: 16 SpO2: 94 % O2 Device: None (Room air)    Vitals:    05/14/17 2302 05/15/17 0112 05/16/17 0549   Weight: 90.7 kg (200 lb) 88.5 kg (195 lb) 89.2 kg (196 lb 9.6 oz)     Vital Signs with Ranges  Temp:  [98.8  F (37.1  C)-99.5  F (37.5  C)] 98.8  F (37.1  C)  Heart Rate:  [82-85] 85  Resp:  [16-18] 16  BP: (133-152)/(80-88) 151/84  SpO2:  [94 %-95 %] 94 %    Constitutional: Awake, alert, cooperative, no apparent distress  Lungs: Clear to auscultation  bilaterally, no crackles or wheezing  Cardiovascular: Regular rate and rhythm, normal S1 and S2, and no murmur noted  Abdomen: Normal bowel sounds, soft, non-distended, non-tender  Skin: No rashes, no cyanosis, no edema  Other:    Medications     NaCl 75 mL/hr at 05/21/17 1214       buPROPion  150 mg Oral Daily     losartan  50 mg Oral Daily     micafungin  100 mg Intravenous Q24H     acetaminophen  1,000 mg Oral Q8H    Or     acetaminophen  1,000 mg Oral Q8H     pantoprazole  40 mg Oral QAM     ciprofloxacin  400 mg Intravenous Q12H     clindamycin  600 mg Intravenous Q8H     topiramate  50 mg Oral QPM     topiramate  25 mg Oral QAM     sodium chloride (PF)  3 mL Intracatheter Q8H     heparin  5,000 Units Subcutaneous Q12H       Data   All microbiology laboratory data reviewed.  Recent Labs   Lab Test  05/22/17   0624  05/21/17   0648  05/20/17   0654  05/19/17   0715   WBC   --   15.6*  15.0*  15.9*   HGB   --   13.9  14.5  14.5   HCT   --   40.9  41.7  42.9   MCV   --   83  83  85   PLT  325  313  253  255     Recent Labs   Lab Test  05/21/17   0648  05/20/17   0654  05/19/17   0715   CR  0.50*  0.58*  0.65*     Recent Labs   Lab Test  09/08/15   1230   SED  5     Recent Labs   Lab Test  05/19/17   1222  05/19/17   1215  05/18/17   1020  05/18/17   0945  10/11/12   1355  06/11/10   1236   CULT  No growth after 3 days  No growth after 3 days  Canceled, Test credited Duplicate request Charge credited  Canceled, Test credited Duplicate request Charge credited  Moderate growth Candida glabrata  Susceptibility testing not routinely done  *  Culture negative monitoring continues  Canceled, Test credited  Incorrectly ordered by PCU/Clinic  Test reordered as correct code    No growth  No Beta Streptococcus isolated

## 2017-05-22 NOTE — PLAN OF CARE
Problem: Goal Outcome Summary  Goal: Goal Outcome Summary  Outcome: Improving  A/O. Up SBA. Voiding. Flatus +. YURIDIA removed. Tolerating full liquids. Abdominal discomfort and cramping at times. IV fluids infusing. Tolerating pills whole at this time. Throat remains sore.

## 2017-05-22 NOTE — PROGRESS NOTES
Surgery-Center Line  POD#6 s/p laparoscopy, laparotomy, SBR for small bowel ischemia and leak of unclear etiology  Feels well, less bloating, having an appetite, tolerated clears, having consistent flatus  Tmax 99.5  NAD, up in bed  Abd soft, slight distention, minimal tenderness, incision clean and dry, left open to air, YURIDIA scant and serous, bowel tones active  Improving, ileus resolving  Advance to full liquids  Continue abx/antifungals; appreciate ID assistance

## 2017-05-22 NOTE — PLAN OF CARE
Problem: Goal Outcome Summary  Goal: Goal Outcome Summary  Outcome: Improving  Pt belching. Managing pain with prn IV morphine, pain is from cramping in abdomen. Ambulated short distance in marquez. sadia fair. Clear liquids, no N/V. CMS intact. HTN. Will continue to monitor.

## 2017-05-22 NOTE — CONSULTS
"CLINICAL NUTRITION SERVICES  -  ASSESSMENT NOTE       REASON FOR ASSESSMENT  Naif Howell Jr. is a 52 year old male seen by Registered Dietitian for LOS.      NUTRITION HISTORY  - Information obtained from patient.   - Regular diet at home.   - Meals TID-QID.  Patient comments, \"I'm a good eater\".   - NKFA.       CURRENT NUTRITION ORDERS  Diet Order:     Fulls    Current Intake/Tolerance:  Advanced to clears 5/21, fulls 5/22, NPO/liquids x 7 days since admission.  Good appetite/intake on fulls today.  Denies nausea.        PHYSICAL FINDINGS  Observed  No overt fat or muscle wasting  Obtained from Chart/Interdisciplinary Team  SB resection with lysis of adhesions 5/16/2017  BM 5/20    ANTHROPOMETRICS  Height: 6' 2\"  Weight: 89.1 kg (196#)  Body mass index is 25.24 kg/(m^2).  Weight Status:  Overweight BMI 25-29.9  IBW: 86.4 kg (190#)  % IBW: 103%  Weight History:  Wt Readings from Last 10 Encounters:   05/16/17 89.2 kg (196 lb 9.6 oz)   05/12/17 90.7 kg (200 lb)   05/01/17 90.7 kg (200 lb)   04/20/17 90.3 kg (199 lb)   03/29/17 90.7 kg (200 lb)   11/23/16 89.8 kg (198 lb)   11/22/16 90.7 kg (200 lb)   11/06/16 90.7 kg (200 lb)   11/01/16 88.9 kg (196 lb)   10/17/16 90.7 kg (200 lb)   - Wt stable over the past at least 7 months. 200#.      LABS  Labs reviewed    MEDICATIONS  Medications reviewed    Dosing Weight 89.1 kg - most recent wt    ASSESSED NUTRITION NEEDS PER APPROVED PRACTICE GUIDELINES:  Estimated Energy Needs: 5050-2942 kcals (25-30 Kcal/Kg)  Justification: maintenance  Estimated Protein Needs:  grams protein (1-1.2 g pro/Kg)  Justification: post-op  Estimated Fluid Needs: 0844-3027 mL (1 mL/Kcal)  Justification: maintenance and per provider pending fluid status    MALNUTRITION:  % Weight Loss:  None noted  % Intake:  <75% for > 7 days (non-severe malnutrition)  Subcutaneous Fat Loss:  None observed  Muscle Loss:  None observed  Fluid Retention:  None noted    Malnutrition Diagnosis: Patient does not " meet two of the above criteria necessary for diagnosing malnutrition    NUTRITION DIAGNOSIS:  Inadequate oral intake related to altered GI function with slow diet advancement post-op as evidenced by NPO/liquids x 7 days since admission, meeting <50-75% needs during this time though improving.       NUTRITION INTERVENTIONS  Recommendations / Nutrition Prescription  Diet per MD.       Implementation  Nutrition education: Provided education on diet advancement per MD.  Encouraged small, frequent meals.       Nutrition Goals  Diet advancement past fulls w/in  hrs.       MONITORING AND EVALUATION:  Progress towards goals will be monitored and evaluated per protocol and Practice Guidelines        Emilia Adorno RD, LD  Clinical Dietitian  3rd floor/ICU: [phone]  All other floors: [phone]  Weekend/holiday: [phone]

## 2017-05-23 LAB
ACP NON-PROST SERPL-CCNC: 2.8 U/L
ANION GAP SERPL CALCULATED.3IONS-SCNC: 9 MMOL/L (ref 3–14)
BUN SERPL-MCNC: 8 MG/DL (ref 7–30)
C DIFF TOX B STL QL: NORMAL
CALCIUM SERPL-MCNC: 8 MG/DL (ref 8.5–10.1)
CHLORIDE SERPL-SCNC: 106 MMOL/L (ref 94–109)
CO2 SERPL-SCNC: 23 MMOL/L (ref 20–32)
CREAT SERPL-MCNC: 0.57 MG/DL (ref 0.66–1.25)
ERYTHROCYTE [DISTWIDTH] IN BLOOD BY AUTOMATED COUNT: 14.3 % (ref 10–15)
GFR SERPL CREATININE-BSD FRML MDRD: ABNORMAL ML/MIN/1.7M2
GLUCOSE SERPL-MCNC: 122 MG/DL (ref 70–99)
HCT VFR BLD AUTO: 42 % (ref 40–53)
HGB BLD-MCNC: 14.1 G/DL (ref 13.3–17.7)
MCH RBC QN AUTO: 28.1 PG (ref 26.5–33)
MCHC RBC AUTO-ENTMCNC: 33.6 G/DL (ref 31.5–36.5)
MCV RBC AUTO: 84 FL (ref 78–100)
PLATELET # BLD AUTO: 351 10E9/L (ref 150–450)
POTASSIUM SERPL-SCNC: 3.2 MMOL/L (ref 3.4–5.3)
RBC # BLD AUTO: 5.02 10E12/L (ref 4.4–5.9)
SODIUM SERPL-SCNC: 138 MMOL/L (ref 133–144)
SPECIMEN SOURCE: NORMAL
WBC # BLD AUTO: 14.1 10E9/L (ref 4–11)

## 2017-05-23 PROCEDURE — 36415 COLL VENOUS BLD VENIPUNCTURE: CPT | Performed by: HOSPITALIST

## 2017-05-23 PROCEDURE — 12000000 ZZH R&B MED SURG/OB

## 2017-05-23 PROCEDURE — 80048 BASIC METABOLIC PNL TOTAL CA: CPT | Performed by: HOSPITALIST

## 2017-05-23 PROCEDURE — 25000128 H RX IP 250 OP 636: Performed by: SURGERY

## 2017-05-23 PROCEDURE — 87493 C DIFF AMPLIFIED PROBE: CPT | Performed by: HOSPITALIST

## 2017-05-23 PROCEDURE — 99207 ZZC CDG-MDM COMPONENT: MEETS LOW - DOWN CODED: CPT | Performed by: HOSPITALIST

## 2017-05-23 PROCEDURE — 84060 ASSAY ACID PHOSPHATASE: CPT | Performed by: HOSPITALIST

## 2017-05-23 PROCEDURE — 85027 COMPLETE CBC AUTOMATED: CPT | Performed by: HOSPITALIST

## 2017-05-23 PROCEDURE — 25000132 ZZH RX MED GY IP 250 OP 250 PS 637

## 2017-05-23 PROCEDURE — 25000132 ZZH RX MED GY IP 250 OP 250 PS 637: Performed by: HOSPITALIST

## 2017-05-23 PROCEDURE — 25000128 H RX IP 250 OP 636: Performed by: INTERNAL MEDICINE

## 2017-05-23 PROCEDURE — S0077 INJECTION, CLINDAMYCIN PHOSP: HCPCS | Performed by: SURGERY

## 2017-05-23 PROCEDURE — 25000132 ZZH RX MED GY IP 250 OP 250 PS 637: Performed by: SURGERY

## 2017-05-23 PROCEDURE — 25000125 ZZHC RX 250: Performed by: SURGERY

## 2017-05-23 PROCEDURE — 99232 SBSQ HOSP IP/OBS MODERATE 35: CPT | Performed by: HOSPITALIST

## 2017-05-23 RX ORDER — OXYCODONE HYDROCHLORIDE 5 MG/1
5-10 TABLET ORAL
Status: DISCONTINUED | OUTPATIENT
Start: 2017-05-23 | End: 2017-05-24 | Stop reason: HOSPADM

## 2017-05-23 RX ORDER — IBUPROFEN 400 MG/1
800 TABLET, FILM COATED ORAL
Status: DISCONTINUED | OUTPATIENT
Start: 2017-05-23 | End: 2017-05-23

## 2017-05-23 RX ORDER — IBUPROFEN 600 MG/1
600 TABLET, FILM COATED ORAL
Status: DISCONTINUED | OUTPATIENT
Start: 2017-05-23 | End: 2017-05-24 | Stop reason: HOSPADM

## 2017-05-23 RX ADMIN — HEPARIN SODIUM 5000 UNITS: 10000 INJECTION, SOLUTION INTRAVENOUS; SUBCUTANEOUS at 08:51

## 2017-05-23 RX ADMIN — ACETAMINOPHEN 1000 MG: 500 TABLET, FILM COATED ORAL at 05:35

## 2017-05-23 RX ADMIN — HEPARIN SODIUM 5000 UNITS: 10000 INJECTION, SOLUTION INTRAVENOUS; SUBCUTANEOUS at 19:25

## 2017-05-23 RX ADMIN — TOPIRAMATE 25 MG: 25 TABLET, FILM COATED ORAL at 08:51

## 2017-05-23 RX ADMIN — SODIUM CHLORIDE: 9 INJECTION, SOLUTION INTRAVENOUS at 14:10

## 2017-05-23 RX ADMIN — MICAFUNGIN SODIUM 100 MG: 20 INJECTION, POWDER, LYOPHILIZED, FOR SOLUTION INTRAVENOUS at 16:02

## 2017-05-23 RX ADMIN — CIPROFLOXACIN 400 MG: 2 INJECTION, SOLUTION INTRAVENOUS at 19:25

## 2017-05-23 RX ADMIN — ACETAMINOPHEN 1000 MG: 500 TABLET, FILM COATED ORAL at 19:25

## 2017-05-23 RX ADMIN — CLINDAMYCIN PHOSPHATE 600 MG: 12 INJECTION, SOLUTION INTRAVENOUS at 17:31

## 2017-05-23 RX ADMIN — LOSARTAN POTASSIUM 50 MG: 25 TABLET, FILM COATED ORAL at 08:51

## 2017-05-23 RX ADMIN — ACETAMINOPHEN 1000 MG: 500 TABLET, FILM COATED ORAL at 13:50

## 2017-05-23 RX ADMIN — CLINDAMYCIN PHOSPHATE 600 MG: 12 INJECTION, SOLUTION INTRAVENOUS at 08:56

## 2017-05-23 RX ADMIN — CIPROFLOXACIN 400 MG: 2 INJECTION, SOLUTION INTRAVENOUS at 05:36

## 2017-05-23 RX ADMIN — CLINDAMYCIN PHOSPHATE 600 MG: 12 INJECTION, SOLUTION INTRAVENOUS at 00:36

## 2017-05-23 RX ADMIN — IBUPROFEN 600 MG: 600 TABLET ORAL at 17:31

## 2017-05-23 RX ADMIN — PANTOPRAZOLE SODIUM 40 MG: 40 TABLET, DELAYED RELEASE ORAL at 08:51

## 2017-05-23 NOTE — PLAN OF CARE
Problem: Individualization  Goal: Patient Preferences  Outcome: Improving  RN:pt vss  Lungs: are clear, encouraged hourly use of the IS.   BS: are hypoactive, abd is tender and round, passing gas, no nausea.   Last BM: today, diarrhea x2, slightly distended, YURIDIA site CDI  Pain: controlled can take oral oxycodone if needed, doing well now without any pain meds,  Activity:encouraged to walk, up and walked indep  Output: good amounts  IV fluids running due to low oral intake  Plan to change to PO antibiotics for discharge and home tomorrow if doing well still

## 2017-05-23 NOTE — PLAN OF CARE
Problem: Goal Outcome Summary  Goal: Goal Outcome Summary  Pt up independently. Denies pain. LS clear, BS hypo, minimal flatus. LBM 5/20/17. Abdomen rounded and slightly distended. Incisions CODIE. Drain drsg CDI. Voiding adequately. IV Mycamine, Cipro, and Cleocin for abx therapy. IS encouraged along with ankle pumps.  Pt on full liquids but d/t abdominal cramping earlier, it was fairly tolerated. Plan is to go home at dischage.

## 2017-05-23 NOTE — PROGRESS NOTES
"[facility]   General Surgery Progress Note           Assessment and Plan:   Assessment:   POD#7 s/p Procedure(s):  DIAGNOSTIC LAPAROSCOPY, LAPAROTOMY,  SMALL BOWEL RESECTION, LAPAROSCOPIC LYSIS OF ADHESIONS - Wound Class: IV-Dirty or Infected   - Wound Class: IV-Dirty or Infected   - Wound Class: IV-Dirty or Infected  Ileus resolving  Tmax 99.9      Plan:   Ok to advance to low residue diet   IV antibiotics: Cipro, clinda and antifungals: micafungin, ID following, appreciate input on antibiotics for discharge  VTE prophylaxis: heparin   GI prophylaxis: protonix  Activity as tolerated  Pain control: transition to PO pain control, will make oxycodone available.          Interval History:   Comfortable in bed. Denies pain. Tolerating full liquids and reports he would like to trial solid foods. Denies bloating or nausea/vomiting. Up walking. +flatus, +BM.          Physical Exam:   Blood pressure (!) 156/92, pulse 93, temperature 97.1  F (36.2  C), temperature source Oral, resp. rate 16, height 1.88 m (6' 2\"), weight 89.2 kg (196 lb 9.6 oz), SpO2 95 %.    I/O last 3 completed shifts:  In: 2621 [P.O.:500; I.V.:2121]  Out: 1555 [Urine:1550; Drains:5]    Abdomen:   soft, non-distended,  and no masses palpated, +BS  Inc(s) - clean, dry, staples intact              Data:       Recent Labs  Lab 05/23/17  0646 05/22/17  0624 05/21/17  0648 05/20/17  0654   WBC 14.1*  --  15.6* 15.0*   HGB 14.1  --  13.9 14.5   HCT 42.0  --  40.9 41.7   MCV 84  --  83 83    325 313 253     Chichi Knutson PA-C     As above, 2 BM so far today  Advancing diet, change to PO pain Rx  Feels he'll probably be ready for DC tomorrow  ABX per ID  Corey Vang MD  5/23/2017 11:20 AM    "

## 2017-05-23 NOTE — PROGRESS NOTES
[facility]  Hospitalist Progress Note  Name: Naif Howell Jr.    MRN: [id number]  Provider: Gali Basurto MD  Date of Service: 05/23/2017    Summary of Stay: Naif Howell Jr. is a 52 year old male with a history of HTN, PTSD, migraines admitted on 5/14/2017 with SBO that failed conservative mgmt so taken to OR 5/16 for diagnostic lap, JOHNSON, laparotomy and SB resection needing YURIDIA drain with peritoneal Cx only growing candida glabrate so ID consulted and currently maintained on IV clindamycin, ciprofloxacin and micafungin. Has PMH of anxiety and PTSD now with PTA wellbutrin and topirmate resumed. Also has HTN that was on IV metoprolol while NPO but PTA losartan resumed today but still holding HCTZ. Clinically improving daily.      Problem List:   1. SBO s/p diagnostic lap, JOHNSON, laparotomy and SB resection 5/16: Appears to be slowly improving. NGT removed. Tolerating CLD, advanced to FLD. Pain improved, will add ibuprofen for better pain control as pt wants to avoid narcotics. Has YURIDIA drain but Cx only with candida glabrate. Fluconazole was started but changed to micafungin. Still LGF. Still on clindamycin and ciprofloxacin. Management per ID and surgical service.  2. HTN: BP controlled on IV metoprolol. PTA on HCTZ and losartan. Stop IV metoprolol, resume PTA losartan.  3. PTSD: Resumed PTA topiramate and wellbutrin. PRN ativan.  4. HypoK: Replacement protocol ordered.  5. Leukocytosis: Concern for infection vs WBC stress demargination. Already on abx. Monitor fever curve. AM CBC.  6. Diarrhea: r/o C diff      DVT Prophylaxis: Heparin SQ  Code Status: Full Code  Disposition: Expected discharge in 1-2 days to home once outpatient infusion set up. Goals prior to discharge include monitor return of bowel function, monitor PO, abx plan, etc      Interval History   Pt feels well, c/o crampy abd pain and diarrhea  Trying to avoid narcotics  Denies SOB or chest pain     -Data reviewed today: I reviewed  all new labs and imaging results over the last 24 hours.     Physical Exam   Temp: 97.6  F (36.4  C) Temp src: Oral BP: (!) 158/92   Heart Rate: 92 Resp: 16 SpO2: 95 % O2 Device: None (Room air)    Vitals:    05/14/17 2302 05/15/17 0112 05/16/17 0549   Weight: 90.7 kg (200 lb) 88.5 kg (195 lb) 89.2 kg (196 lb 9.6 oz)     Vital Signs with Ranges  Temp:  [97.1  F (36.2  C)-99.9  F (37.7  C)] 97.6  F (36.4  C)  Heart Rate:  [80-92] 92  Resp:  [16-18] 16  BP: (134-158)/(88-93) 158/92  SpO2:  [95 %-96 %] 95 %  I/O last 3 completed shifts:  In: 2621 [P.O.:500; I.V.:2121]  Out: 1350 [Urine:1350]    GENERAL: No apparent distress. Awake, alert, and fully oriented.  HEENT: Normocephalic, atraumatic. Extraocular movements intact.   CARDIOVASCULAR: Regular rate and rhythm without murmurs or rubs. No S3.  PULMONARY: Clear bilaterally.  GASTROINTESTINAL: Soft, mildly tender, non-distended. Bowel sounds normoactive.   EXTREMITIES: No cyanosis or clubbing. No edema.  NEUROLOGICAL: CN 2-12 grossly intact, no focal neurological deficits.  DERMATOLOGICAL: No rash, ulcer, bruising, nor jaundice.     Medications        ibuprofen  600 mg Oral TID w/meals     buPROPion  150 mg Oral Daily     losartan  50 mg Oral Daily     micafungin  100 mg Intravenous Q24H     acetaminophen  1,000 mg Oral Q8H    Or     acetaminophen  1,000 mg Oral Q8H     pantoprazole  40 mg Oral QAM     ciprofloxacin  400 mg Intravenous Q12H     clindamycin  600 mg Intravenous Q8H     topiramate  50 mg Oral QPM     topiramate  25 mg Oral QAM     sodium chloride (PF)  3 mL Intracatheter Q8H     heparin  5,000 Units Subcutaneous Q12H     Data     Laboratory:    Recent Labs  Lab 05/23/17  0646 05/22/17  0624 05/21/17  0648 05/20/17  0654   WBC 14.1*  --  15.6* 15.0*   HGB 14.1  --  13.9 14.5   HCT 42.0  --  40.9 41.7   MCV 84  --  83 83    325 313 253       Recent Labs  Lab 05/23/17  0646 05/22/17  0624 05/21/17  1536 05/21/17  0648  05/20/17  0654     --   --   139  --  140   POTASSIUM 3.2* 3.4 3.6 3.2*  < > 3.2*   CHLORIDE 106  --   --  107  --  108   CO2 23  --   --  21  --  21   ANIONGAP 9  --   --  11  --  11   *  --   --  100*  --  106*   BUN 8  --   --  10  --  12   CR 0.57*  --   --  0.50*  --  0.58*   GFRESTIMATED >90Non  GFR Calc  --   --  >90Non  GFR Calc  --  >90   GFRESTBLACK >90African American GFR Calc  --   --  >90African American GFR Calc  --  >90   ADELSO 8.0*  --   --  7.7*  --  7.6*   < > = values in this interval not displayed.    Recent Labs  Lab 05/19/17  1222 05/19/17  1215 05/18/17  1020 05/18/17  0945   CULT No growth after 4 days No growth after 4 days Canceled, Test credited Duplicate request Charge credited  Canceled, Test credited Duplicate request Charge credited Moderate growth Candida glabrataSusceptibility testing not routinely done*  Culture negative monitoring continues  Canceled, Test creditedIncorrectly ordered by PCU/ClinicTest reordered as correct code       Imaging:  No results found for this or any previous visit (from the past 24 hour(s)).

## 2017-05-23 NOTE — PLAN OF CARE
Problem: Goal Outcome Summary  Goal: Goal Outcome Summary  Outcome: No Change  Anastasia RN  VS monitored, low grade temp, scheduled Tylenol given, up independently, drsg C/D/I, abd cramping, IV Morphine/Tylenol, did not eat anything d/t abd cramping, IV Mycamine/Cleocin/Cipro cont, voiding, active bs, passing some flatus, tender abd, home in 2-3 days, will cont to monitor.

## 2017-05-23 NOTE — CONSULTS
Care Transitions Team: Following for CC, discharge planning, and disposition.        Per chart review pt identified as potential for home IV ABX needs.    [facility] referral placed.    Will continue to follow and be available for discharge planning needs     Katie Danielle RN BSN [facility]  Care Transitions Team  [phone]

## 2017-05-23 NOTE — PROGRESS NOTES
[facility]    Infectious Disease Progress Note    Date of Service (when I saw the patient): 05/23/2017     Assessment & Plan   Naif Howell Jr. is a 52 year old male who was admitted on 5/14/2017.     IMPRESSION:   1. A 52-year-old male with apparent chronic cholecystitis and recent cholecystectomy.   2. Postop cholecystectomy, developed increasing abdominal pain without major fevers or systemic symptoms, found to have small bowel leak at surgery, which has been repaired, peritonitis has resolved, but no purulence in the abdomen, so no culture done, some low-grade fever postop now with cultures from drainage tube so far showing yeast yet to be identified.   3. History of cephalosporin allergy, but has tolerated penicillins, listed now as Flagyl allergic, but had a brief set of symptoms that resolved quickly, very likely a true allergy to either Cipro or Flagyl.   4. History of recurrent otitis over many years, no surgical interventions.       RECOMMENDATIONS:   1. Continue Cipro and clindamycin, only candida glabrata in cx, on corrie, await sens(called lab)   .   2. Further adjust treatment based on cultures and clinical course, penicillins and probably Flagyl both acceptable treatment if cultures allow , but looks like candida might be the main issue  3 Check IV coverage, if not sens flucon likely home corrie IV    Ihsan Biswas MD    Interval History   t max of 100 range  C glabrata in the YURIDIA drain sens pending no other growth    Physical Exam   Temp: 97.1  F (36.2  C) Temp src: Oral BP: (!) 156/92   Heart Rate: 80 Resp: 16 SpO2: 95 % O2 Device: None (Room air)    Vitals:    05/14/17 2302 05/15/17 0112 05/16/17 0549   Weight: 90.7 kg (200 lb) 88.5 kg (195 lb) 89.2 kg (196 lb 9.6 oz)     Vital Signs with Ranges  Temp:  [97.1  F (36.2  C)-99.9  F (37.7  C)] 97.1  F (36.2  C)  Heart Rate:  [80-91] 80  Resp:  [16-18] 16  BP: (134-157)/(88-93) 156/92  SpO2:  [95 %-97 %] 95 %    Constitutional: Awake, alert,  cooperative, no apparent distress  Lungs: Clear to auscultation bilaterally, no crackles or wheezing  Cardiovascular: Regular rate and rhythm, normal S1 and S2, and no murmur noted  Abdomen: Normal bowel sounds, soft, non-distended, non-tender  Skin: No rashes, no cyanosis, no edema  Other:    Medications     NaCl 75 mL/hr at 05/22/17 2245       buPROPion  150 mg Oral Daily     losartan  50 mg Oral Daily     micafungin  100 mg Intravenous Q24H     acetaminophen  1,000 mg Oral Q8H    Or     acetaminophen  1,000 mg Oral Q8H     pantoprazole  40 mg Oral QAM     ciprofloxacin  400 mg Intravenous Q12H     clindamycin  600 mg Intravenous Q8H     topiramate  50 mg Oral QPM     topiramate  25 mg Oral QAM     sodium chloride (PF)  3 mL Intracatheter Q8H     heparin  5,000 Units Subcutaneous Q12H       Data   All microbiology laboratory data reviewed.  Recent Labs   Lab Test  05/23/17   0646  05/22/17   0624  05/21/17   0648  05/20/17   0654   WBC  14.1*   --   15.6*  15.0*   HGB  14.1   --   13.9  14.5   HCT  42.0   --   40.9  41.7   MCV  84   --   83  83   PLT  351  325  313  253     Recent Labs   Lab Test  05/23/17   0646  05/21/17   0648  05/20/17   0654   CR  0.57*  0.50*  0.58*     Recent Labs   Lab Test  09/08/15   1230   SED  5     Recent Labs   Lab Test  05/19/17   1222  05/19/17   1215  05/18/17   1020  05/18/17   0945  10/11/12   1355  06/11/10   1236   CULT  No growth after 4 days  No growth after 4 days  Canceled, Test credited Duplicate request Charge credited  Canceled, Test credited Duplicate request Charge credited  Moderate growth Candida glabrata  Susceptibility testing not routinely done  *  Culture negative monitoring continues  Canceled, Test credited  Incorrectly ordered by PCU/Clinic  Test reordered as correct code    No growth  No Beta Streptococcus isolated

## 2017-05-24 ENCOUNTER — TELEPHONE (OUTPATIENT)
Dept: FAMILY MEDICINE | Facility: CLINIC | Age: 52
End: 2017-05-24

## 2017-05-24 VITALS
RESPIRATION RATE: 16 BRPM | SYSTOLIC BLOOD PRESSURE: 142 MMHG | BODY MASS INDEX: 25.23 KG/M2 | HEART RATE: 81 BPM | OXYGEN SATURATION: 96 % | DIASTOLIC BLOOD PRESSURE: 84 MMHG | TEMPERATURE: 97.6 F | WEIGHT: 196.6 LBS | HEIGHT: 74 IN

## 2017-05-24 LAB
ACP NON-PROST SERPL-CCNC: 3.2 U/L
ANION GAP SERPL CALCULATED.3IONS-SCNC: 9 MMOL/L (ref 3–14)
BACTERIA SPEC CULT: ABNORMAL
BUN SERPL-MCNC: 9 MG/DL (ref 7–30)
CALCIUM SERPL-MCNC: 8 MG/DL (ref 8.5–10.1)
CHLORIDE SERPL-SCNC: 107 MMOL/L (ref 94–109)
CO2 SERPL-SCNC: 21 MMOL/L (ref 20–32)
CREAT SERPL-MCNC: 0.56 MG/DL (ref 0.66–1.25)
ERYTHROCYTE [DISTWIDTH] IN BLOOD BY AUTOMATED COUNT: 14.4 % (ref 10–15)
GFR SERPL CREATININE-BSD FRML MDRD: ABNORMAL ML/MIN/1.7M2
GLUCOSE SERPL-MCNC: 118 MG/DL (ref 70–99)
HCT VFR BLD AUTO: 43.8 % (ref 40–53)
HGB BLD-MCNC: 14.7 G/DL (ref 13.3–17.7)
MCH RBC QN AUTO: 28.3 PG (ref 26.5–33)
MCHC RBC AUTO-ENTMCNC: 33.6 G/DL (ref 31.5–36.5)
MCV RBC AUTO: 84 FL (ref 78–100)
MICRO REPORT STATUS: ABNORMAL
MICROORGANISM SPEC CULT: ABNORMAL
PLATELET # BLD AUTO: 393 10E9/L (ref 150–450)
POTASSIUM SERPL-SCNC: 3.3 MMOL/L (ref 3.4–5.3)
RBC # BLD AUTO: 5.19 10E12/L (ref 4.4–5.9)
SODIUM SERPL-SCNC: 137 MMOL/L (ref 133–144)
SPECIMEN SOURCE: ABNORMAL
WBC # BLD AUTO: 13.5 10E9/L (ref 4–11)

## 2017-05-24 PROCEDURE — 25000128 H RX IP 250 OP 636: Performed by: SURGERY

## 2017-05-24 PROCEDURE — 85027 COMPLETE CBC AUTOMATED: CPT | Performed by: HOSPITALIST

## 2017-05-24 PROCEDURE — 80048 BASIC METABOLIC PNL TOTAL CA: CPT | Performed by: HOSPITALIST

## 2017-05-24 PROCEDURE — 99239 HOSP IP/OBS DSCHRG MGMT >30: CPT | Performed by: HOSPITALIST

## 2017-05-24 PROCEDURE — S0077 INJECTION, CLINDAMYCIN PHOSP: HCPCS | Performed by: SURGERY

## 2017-05-24 PROCEDURE — 25000132 ZZH RX MED GY IP 250 OP 250 PS 637: Performed by: INTERNAL MEDICINE

## 2017-05-24 PROCEDURE — 36415 COLL VENOUS BLD VENIPUNCTURE: CPT | Performed by: HOSPITALIST

## 2017-05-24 PROCEDURE — 25000132 ZZH RX MED GY IP 250 OP 250 PS 637: Performed by: SURGERY

## 2017-05-24 PROCEDURE — 25000125 ZZHC RX 250: Performed by: SURGERY

## 2017-05-24 PROCEDURE — 25000132 ZZH RX MED GY IP 250 OP 250 PS 637: Performed by: HOSPITALIST

## 2017-05-24 RX ORDER — CIPROFLOXACIN 500 MG/1
500 TABLET, FILM COATED ORAL 2 TIMES DAILY
Qty: 10 TABLET | Refills: 0 | Status: SHIPPED | OUTPATIENT
Start: 2017-05-24 | End: 2017-05-29

## 2017-05-24 RX ORDER — OXYCODONE HYDROCHLORIDE 5 MG/1
5-10 TABLET ORAL
Qty: 10 TABLET | Refills: 0 | Status: SHIPPED | OUTPATIENT
Start: 2017-05-24 | End: 2017-06-22

## 2017-05-24 RX ORDER — FLUCONAZOLE 200 MG/1
200 TABLET ORAL DAILY
Status: DISCONTINUED | OUTPATIENT
Start: 2017-05-24 | End: 2017-05-24 | Stop reason: HOSPADM

## 2017-05-24 RX ORDER — FLUCONAZOLE 200 MG/1
200 TABLET ORAL DAILY
Qty: 10 TABLET | Refills: 0 | Status: SHIPPED | OUTPATIENT
Start: 2017-05-24 | End: 2017-06-03

## 2017-05-24 RX ORDER — MICAFUNGIN 20 MG/ML
100 INJECTION, POWDER, LYOPHILIZED, FOR SOLUTION INTRAVENOUS DAILY
Qty: 35 ML | Refills: 0 | Status: SHIPPED | OUTPATIENT
Start: 2017-05-24 | End: 2017-05-24

## 2017-05-24 RX ADMIN — LOSARTAN POTASSIUM 50 MG: 25 TABLET, FILM COATED ORAL at 08:09

## 2017-05-24 RX ADMIN — FLUCONAZOLE 200 MG: 200 TABLET ORAL at 16:36

## 2017-05-24 RX ADMIN — PANTOPRAZOLE SODIUM 40 MG: 40 TABLET, DELAYED RELEASE ORAL at 08:06

## 2017-05-24 RX ADMIN — POTASSIUM CHLORIDE 20 MEQ: 1500 TABLET, EXTENDED RELEASE ORAL at 13:04

## 2017-05-24 RX ADMIN — IBUPROFEN 600 MG: 600 TABLET ORAL at 14:17

## 2017-05-24 RX ADMIN — POTASSIUM CHLORIDE 40 MEQ: 1500 TABLET, EXTENDED RELEASE ORAL at 10:49

## 2017-05-24 RX ADMIN — HEPARIN SODIUM 5000 UNITS: 10000 INJECTION, SOLUTION INTRAVENOUS; SUBCUTANEOUS at 08:07

## 2017-05-24 RX ADMIN — CLINDAMYCIN PHOSPHATE 600 MG: 12 INJECTION, SOLUTION INTRAVENOUS at 01:05

## 2017-05-24 RX ADMIN — CIPROFLOXACIN 400 MG: 2 INJECTION, SOLUTION INTRAVENOUS at 07:03

## 2017-05-24 RX ADMIN — CLINDAMYCIN PHOSPHATE 600 MG: 12 INJECTION, SOLUTION INTRAVENOUS at 09:44

## 2017-05-24 NOTE — PLAN OF CARE
Problem: Bowel Obstruction (Adult)  Goal: Signs and Symptoms of Listed Potential Problems Will be Absent or Manageable (Bowel Obstruction)  Signs and symptoms of listed potential problems will be absent or manageable by discharge/transition of care (reference Bowel Obstruction (Adult) CPG).   Outcome: Adequate for Discharge Date Met:  05/24/17  Reviewed discharge instructions with pt.  Questions addressed. Pt reports he was instructed by surgical team to remain on full liquid diet for the next few days at home.  Given PO antibiotic diflucan prior to discharge. Will continue PO antibiotics tomorrow morning.  Prescriptions sent home with pt. Personal belongings sent with pt.  Discharged at 1700 to home with son.

## 2017-05-24 NOTE — PLAN OF CARE
Problem: Goal Outcome Summary  Goal: Goal Outcome Summary  Outcome: Improving  Areas of concern: K+ 3.3, replaced. Staples mildly red/irritated appearing.      Areas of improvement: diarrhea improving. Taking minimal pain medications. Staples removed.      Plan: recheck K+ at 1700. Home today on oral antibiotics. Surgery and ID have been here, awaiting hospitalist to round.

## 2017-05-24 NOTE — DISCHARGE SUMMARY
[facility]  Discharge Summary  Name: Naif Howell Jr.    MRN: [id number]  YOB: 1965    Age: 52 year old  Date of Discharge:  5/24/2017  Date of Admission: 5/14/2017  Primary Care Provider: Josefina Rogers  Discharge Physician:  Gali Basurto MD  Discharging Service:  Hospitalist      Discharge Diagnosis:  SBO s/p diagnostic lap, laparotomy and SB resection on 5/16  HTN  PTSD  HypoK  Leukocytosis   Diarrhea        Discharge Disposition:  Discharged to home     History of Illness:  See detailed admission note for full details.    Hospital Course:  Naif Howell Jr. is a 52 year old male with a history of HTN, PTSD, migraines admitted on 5/14/2017 with SBO that failed conservative mgmt so taken to OR 5/16 for diagnostic lap, JOHNSON, laparotomy and SB resection needing YURIDIA drain with peritoneal Cx only growing candida glabrata so ID consulted and currently maintained on IV clindamycin, ciprofloxacin and micafungin. Has PMH of anxiety and PTSD now with PTA wellbutrin and topirmate resumed. Also has HTN that was on IV metoprolol while NPO but PTA losartan resumed today but still holding HCTZ. Clinically improving daily. He was switched to PO abx and PO anti fungal prior to discharge.        Problem List:   1. SBO s/p diagnostic lap, JOHNSON, laparotomy and SB resection 5/16: Appears to be slowly improving. NGT removed. Tolerating CLD, advanced to FLD. Pain improved, will add ibuprofen for better pain control as pt wants to avoid narcotics. Has YURIDIA drain but Cx only with candida glabrate. Fluconazole was started but changed to micafungin. Still LGF. Still on clindamycin and ciprofloxacin. Management per ID and surgical service.  2. HTN: BP controlled on IV metoprolol. PTA on HCTZ and losartan. Stop IV metoprolol, resume PTA losartan.  3. PTSD: Resumed PTA topiramate and wellbutrin. PRN ativan.  4. HypoK: Replacement protocol ordered.  5. Leukocytosis: Concern for infection vs WBC stress  "demargination. Already on abx. Monitor fever curve. AM CBC.  6. Diarrhea: r/o C diff       Physical Exam Findings:  Blood pressure 142/84, pulse 81, temperature 97.6  F (36.4  C), temperature source Oral, resp. rate 16, height 1.88 m (6' 2\"), weight 89.2 kg (196 lb 9.6 oz), SpO2 96 %.  Wt Readings from Last 1 Encounters:   05/16/17 89.2 kg (196 lb 9.6 oz)      GENERAL: No apparent distress. Awake, alert, and fully oriented.  HEENT: Normocephalic, atraumatic. Extraocular movements intact.   CARDIOVASCULAR: Regular rate and rhythm without murmurs or rubs. No S3.  PULMONARY: Clear bilaterally.  GASTROINTESTINAL: Soft, mildly tender, non-distended. Bowel sounds normoactive.   EXTREMITIES: No cyanosis or clubbing. No edema.  NEUROLOGICAL: CN 2-12 grossly intact, no focal neurological deficits.  DERMATOLOGICAL: No rash, ulcer, bruising, nor jaundice.     Allergies:  Allergies   Allergen Reactions     Omnicef [Cefdinir] Diarrhea     Cefuroxime Rash     Flagyl [Metronidazole] Swelling and Rash     Mild throat swelling        Discharge Medications:   Current Discharge Medication List      START taking these medications    Details   ciprofloxacin (CIPRO) 500 MG tablet Take 1 tablet (500 mg) by mouth 2 times daily for 5 days  Qty: 10 tablet, Refills: 0    Associated Diagnoses: Cholecystitis      fluconazole (DIFLUCAN) 200 MG tablet Take 1 tablet (200 mg) by mouth daily for 10 days  Qty: 10 tablet, Refills: 0    Associated Diagnoses: Acute suppurative peritonitis (H)         CONTINUE these medications which have CHANGED    Details   oxyCODONE (ROXICODONE) 5 MG IR tablet Take 1-2 tablets (5-10 mg) by mouth every 3 hours as needed for pain or other (Moderate to Severe)  Qty: 10 tablet, Refills: 0    Associated Diagnoses: Cholecystitis         CONTINUE these medications which have NOT CHANGED    Details   !! Topiramate (TOPAMAX PO) Take 25 mg by mouth every morning      !! Topiramate (TOPAMAX PO) Take 50 mg by mouth every evening " "     HYDROCHLOROTHIAZIDE PO Take 25 mg by mouth daily      losartan (COZAAR) 50 MG tablet Take 1 tablet (50 mg) by mouth daily  Qty: 30 tablet, Refills: 1    Associated Diagnoses: Benign essential hypertension      buPROPion (WELLBUTRIN SR) 150 MG 12 hr tablet Take 1 tablet once daily and increase to 1 tablet twice daily after 4 to 7 days  Qty: 60 tablet, Refills: 2    Associated Diagnoses: Smoker      multivitamin, therapeutic with minerals (THERA-VIT-M) TABS tablet Take 1 tablet by mouth daily      ibuprofen (ADVIL/MOTRIN) 600 MG tablet Take 1 tablet (600 mg) by mouth every 6 hours as needed for moderate pain  Qty: 120 tablet, Refills: 0       !! - Potential duplicate medications found. Please discuss with provider.      STOP taking these medications       ondansetron (ZOFRAN ODT) 4 MG ODT tab Comments:   Reason for Stopping:                Condition on Discharge:  Discharge condition: Stable   Discharge vitals: Blood pressure 142/84, pulse 81, temperature 97.6  F (36.4  C), temperature source Oral, resp. rate 16, height 1.88 m (6' 2\"), weight 89.2 kg (196 lb 9.6 oz), SpO2 96 %.   Code status on discharge: Full Code       Procedures other than Imaging:  laprotomy      Imaging:  Results for orders placed or performed during the hospital encounter of 05/14/17   XR Abdomen 2 Views    Narrative    XR ABDOMEN 2 VIEWS   5/14/2017 11:51 PM     INDICATION: Abdominal pain, vomiting.  Recent cholecystectomy.   Concern for postoperative ileus.    COMPARISON: None.      Impression    IMPRESSION: Multiple dilated small bowel loops with air-fluid levels  suggesting either mechanical small bowel obstruction or localized  ileus. Colonic loops are decompressed. There is some residual contrast  in the colon. Surgical clips in the right upper quadrant. Small amount  of free air in the upper abdomen may relate to the recent  cholecystectomy.    Findings discussed with ER physician at 12:18 AM. Cholecystectomy was  two days ago and " likely accounts for the small amount of free air.    SARI MENDOSA MD   CT Abdomen Pelvis w Contrast    Narrative    CT ABDOMEN AND PELVIS WITH CONTRAST  5/15/2017 11:28 AM     HISTORY: Small bowel obstruction. Recent cholecystectomy.     COMPARISON: CT chest, abdomen and pelvis 11/6/2016.    TECHNIQUE: Axial images from the lung bases to the symphysis are  performed with additional coronal reformatted images. 98 mL of Isovue  370 are given intravenously.  Radiation dose for this scan was reduced  using automated exposure control, adjustment of the mA and/or kV  according to patient size, or iterative reconstruction technique.    FINDINGS: Atelectasis is noted at the lung bases.    Abdomen: Free intraperitoneal air is present, likely coinciding with  patient's recent laparoscopic cholecystectomy. Scattered probable  cysts are noted in the liver. Cholecystectomy changes are noted. No  fluid is noted near the gallbladder fossa. Medial segment left hepatic  lobe cyst is adjacent to the gallbladder fossa on series 2, image 21,  measuring 4.2 cm in diameter. This was present on the prior chest,  abdomen, pelvis CT from 11/6/2016. The spleen, pancreas, adrenal  glands and kidneys are unremarkable. No hydronephrosis or obvious  renal calculi. Aorta demonstrates scattered calcified and noncalcified  plaque. No aneurysm or dissection. Dilated loops of small bowel are  present in the upper abdomen. Contrast is noted in the colon probably  from recent intraoperative cholangiogram. There appears to be a  transition point in the left upper quadrant in the region of series 2,  image 44 with dilated loops of more distal small bowel and  decompression of the more distal small bowel. There is mesenteric  edema in this area suggesting an internal hernia.    Pelvis: The bladder, prostate and rectum are unremarkable. No enlarged  pelvic lymph nodes. Moderate amount of free pelvic fluid is present  likely from the mesenteric edema.  Bone window examination is  unremarkable.      Impression    IMPRESSION:  1. Small bowel obstruction with decompressed proximal jejunum and  decompressed ileum. Intervening small bowel is dilated suggesting  closed loop obstruction or internal hernia. Mesenteric edema raises  suspicion for bowel ischemia.  2. Free intraperitoneal air likely related to recent laparoscopic  cholecystectomy.  3. Multiple liver cysts unchanged since prior CT.    KAY OLVERA MD   XR Abdomen 2 Views    Narrative    ABDOMEN TWO VIEWS   5/16/2017 8:00 AM     HISTORY: Small bowel obstruction.    COMPARISON: [facility]UB 5/14/2017, CT abdomen and pelvis 5/15/2017.      Impression    IMPRESSION: No change of multiple dilated fluid- and gas-filled small  bowel loops at the left and mid abdomen with a few dilated loops at  the right midabdomen. Findings compatible with continued bowel  obstruction. Free intraperitoneal gas again noted underneath the  bilateral hemidiaphragms, stable.    LACY MOLINA MD   XR Abdomen Port 1 View    Narrative    XR ABDOMEN PORT F1 VW 5/16/2017 1:51 PM    HISTORY: Did not have time to do instrument count, looking for FB.    COMPARISON: None.    FINDINGS: Suture material in the right hemiabdomen. Surgical staples  and drain tubing. No other radiopaque foreign body. Bowel gas pattern  is nonobstructive.      Impression    IMPRESSION: No retained surgical instruments.    ONESIMO GAMEZ MD   XR Abdomen Port 1 View    Narrative    PORTABLE ABDOMEN ONE VIEW  5/19/2017  9:29 PM     HISTORY: Assess nasogastric tube site.     COMPARISON: Abdominal x-ray 5/16/2017.      Impression    IMPRESSION: Supine view of the abdomen is performed. Nasogastric tube  extends below the left hemidiaphragm overlying the region of the  stomach likely in good position. Dilated air-filled loops of small  bowel are present in the left abdomen which appear minimally changed.  Bowel kevin are noted in the right lower quadrant the abdomen  and  cholecystectomy clips are noted in the right upper quadrant of the  abdomen. Residual oral contrast is noted within the left colon,  unchanged.    KAY OLVERA MD        Consultations:  Consultation during this admission received from surgery.     Recent Lab Results:    Recent Labs  Lab 05/24/17  0707 05/23/17  0646 05/22/17  0624 05/21/17  0648   WBC 13.5* 14.1*  --  15.6*   HGB 14.7 14.1  --  13.9   HCT 43.8 42.0  --  40.9   MCV 84 84  --  83    351 325 313       Recent Labs  Lab 05/24/17  0707 05/23/17  0646 05/22/17  0624  05/21/17  0648    138  --   --  139   POTASSIUM 3.3* 3.2* 3.4  < > 3.2*   CHLORIDE 107 106  --   --  107   CO2 21 23  --   --  21   ANIONGAP 9 9  --   --  11   * 122*  --   --  100*   BUN 9 8  --   --  10   CR 0.56* 0.57*  --   --  0.50*   GFRESTIMATED >90Non  GFR Calc >90Non  GFR Calc  --   --  >90Non  GFR Calc   GFRESTBLACK >90African American GFR Calc >90African American GFR Calc  --   --  >90African American GFR Calc   ADELSO 8.0* 8.0*  --   --  7.7*   < > = values in this interval not displayed.       Pending Results:    Unresulted Labs Ordered in the Past 30 Days of this Admission     Date and Time Order Name Status Description    5/23/2017 0000 Acid phosphatase total In process     5/19/2017 1157 Blood culture Preliminary     5/19/2017 1157 Blood culture Preliminary     5/16/2017 1606 Anaerobic bacterial culture Preliminary            Discharge Instructions and Follow-Up:     Discharge Procedure Orders  Discharge Instructions         Total time spent in face to face contact with the patient and coordinating discharge was:  45 Minutes.    Gali Basurto MD  Hospitalist  [facility] [address] [place] [address]

## 2017-05-24 NOTE — PLAN OF CARE
Problem: Goal Outcome Summary  Goal: Goal Outcome Summary  Outcome: Improving  Evening RN  A/O.  VSS, afebrile.  Pt requesting something more for pain, started on oral ibuprofen.  BS hypoactive, abdomen rounded and distended, passing gas.  Diarrhea x 3 this shift, c-diff specimen sent.  YURIDIA site and lap sites CDI.  Up independently in room.  Tolerating low fiber diet, minimal appetite.  IV infusing due to low fluid intake.  Continues on IV clindamycin, cipro and mycamine.  Awaiting insurance approval for home antibiotics.  Possible DC to home tomorrow.  Will continue to monitor.

## 2017-05-24 NOTE — PROGRESS NOTES
"[facility]   General Surgery Progress Note           Assessment and Plan:   Assessment:   POD#8 s/p Procedure(s):  DIAGNOSTIC LAPAROSCOPY, LAPAROTOMY,  SMALL BOWEL RESECTION, LAPAROSCOPIC LYSIS OF ADHESIONS - Wound Class: IV-Dirty or Infected   - Wound Class: IV-Dirty or Infected   - Wound Class: IV-Dirty or Infected  Ileus resolving      Plan:   Continue soft diet  IV antibiotics: Cipro, clinda and antifungals: micafungin. ID following, appreciate input on antibiotics for discharge  OK to MA home from surgical perspective, await arrangements for outpatient abx infusion   Rx Oxycodone done  RTC 2-3 weeks         Interval History:   Feels well, pain controlled with Tylenol today.  Up walking frequently. Tolerating solids, passing flatus, having loose BMs.  + some bloating after meals which resolves somewhat quickly.  Wants to go home.         Physical Exam:   Blood pressure 142/84, pulse 81, temperature 97.6  F (36.4  C), temperature source Oral, resp. rate 16, height 1.88 m (6' 2\"), weight 89.2 kg (196 lb 9.6 oz), SpO2 96 %.         Abdomen:   soft, mildly distended,  Normal BS  Inc(s) - clean, dry, staples intact.  Removed staples and placed steri strips            Data:       Recent Labs  Lab 05/24/17  0707 05/23/17  0646 05/22/17  0624 05/21/17  0648   WBC 13.5* 14.1*  --  15.6*   HGB 14.7 14.1  --  13.9   HCT 43.8 42.0  --  40.9   MCV 84 84  --  83    351 325 313     Shantell Benson PA-C       The patient has been seen and examined by me.  I agree with the above assessment and plan.  Ryanne Calderón MD    "

## 2017-05-24 NOTE — DISCHARGE SUMMARY
Pt seen and examined    Ok to dc from my standpoint-abx changed and entered on discharge per ID recs

## 2017-05-24 NOTE — PROGRESS NOTES
Care Transitions Team: Following for CC, discharge planning, and disposition.      Pt has [employer] insurance with 100% coverage for home IV antibiotic.    Will continue to follow and be available for disposition when plan is indicated.     Katie Daneille RN BSN [facility]  Care Transitions Team  [phone]

## 2017-05-24 NOTE — TELEPHONE ENCOUNTER
[facility] calling stating pt needs a referral to insurance to get home infusions and has specific things needed.  Transferred to Lori in referrals to help assist with insurance referrals    Kenisha Goodwin RN, BSN

## 2017-05-24 NOTE — DISCHARGE INSTRUCTIONS
HOME CARE FOLLOWING ABDOMINAL SURGERY  LAMIN Mccall E. Gavin, N. Guttormson, D. Maurer, MISSAEL Meyer, BRAYAN Melendez     INCISIONAL CARE:  Replace the bandage over your incision (or incisions) until all drainage stops, or if more comfortable to have in place.  If present, leave the steri-strips (white paper tapes) in place for 14 days after surgery.  If you have staples in your incision at the time of discharge, they will be removed at your follow-up appointment.  If Dermabond (a type of skin glue) is present, leave in place until it wears/flakes off.     BATHING:  Avoid baths for 1 week after surgery.  Showers are okay.  You may wash your hair at any time.  Gently pat your incision dry after bathing.    ACTIVITY:  Light Activity -- you may immediately be up and about as tolerated.  Driving -- you may drive when comfortable and off narcotic pain medications.  Light Work -- resume when comfortable off pain medications.  (If you can drive, you probably can work.)  Strenuous Work/Activity -- limit lifting to 20 pounds for 4 weeks.  Then, progressively increase with time.  Active Sports (running, biking, etc.) -- cautiously resume after 6 weeks.    DISCOMFORT:  Use pain medications as prescribed by your surgeon.  Take the pain medication with some food, when possible, to minimize side effects.  Expect gradual improvement.    DIET:  Return to diet you were on before surgery, unless you are given specific diet instructions.  Drink plenty of fluids.  While taking pain medications, increase dietary fiber or add a fiber supplementation like Metamucil or Citrucel to help prevent constipation - a possible side effect of pain medications.    NAUSEA:  If nauseated from the anesthetic/pain meds; rest in bed, get up cautiously with assistance, and drink clear liquids (juice, tea, broth).    RETURN APPOINTMENT:  Schedule a follow-up visit 2-3 weeks after discharge from the hospital.  Office Phone:   [phone]     CONTACT US IF THE FOLLOWING DEVELOPS:   1. A fever that is above 101     2. If there is a large amount of drainage, bleeding, or swelling.   3. Severe pain that is not relieved by your prescription.   4. Drainage that is thick, cloudy, yellow, green or white.   5. Any other questions not answered by  Frequently Asked Questions  sheet.      FREQUENTLY ASKED QUESTIONS:    Q:  How should my incision look?    A:  Normally your incision will appear slightly swollen with light redness directly along the incision itself as it heals.  It may feel like a bump or ridge as the healing/scarring happens, and over time (3-4 months) this bump or ridge feeling should slowly go away.  In general, clear or pink watery drainage can be normal at first as your incision heals, but should decrease over time.    Q:  How do I know if my incision is infected?  A:  Look at your incision for signs of infection, like redness around the incision spreading to surrounding skin, or drainage of cloudy or foul-smelling drainage.  If you feel warm, check your temperature to see if you are running a fever.    **If any of these things occur, please notify the nurse at our office.  We may need you to come into the office for an incision check.      Q:  How do I take care of my incision?  A:  If you have a dressing in place - Starting the day after surgery, replace the dressing 1-2 times a day until there is no further drainage from the incision.  At that time, a dressing is no longer needed.  Try to minimize tape on the skin if irritation is occurring at the tape sites.  If you have significant irritation from tape on the skin, please call the office to discuss other method of dressing your incision.    Small pieces of tape called  steri-strips  may be present directly overlying your incision; these may be removed 10 days after surgery unless otherwise specified by your surgeon.  If these tapes start to loosen at the ends, you may trim  them back until they fall off or are removed.    A:  If you had  Dermabond  tissue glue used as a dressing (this causes your incision to look shiny with a clear covering over it) - This type of dressing wears off with time and does not require more dressings over the top unless it is draining around the glue as it wears off.  Do not apply ointments or lotions over the incisions until the glue has completely worn off.    Q:  There is a piece of tape or a sticky  lead  still on my skin.  Can I remove this?  A:  Sometimes the sticky  leads  used for monitoring during surgery or for evaluation in the emergency department are not all removed while you are in the hospital.  These sometimes have a tab or metal dot on them.  You can easily remove these on your own, like taking off a band-aid.  If there is a gel substance under the  lead , simply wipe/clean it off with a washcloth or paper towel.      Q:  What can I do to minimize constipation (very hard stools, or lack of stools)?  A:  Stay well hydrated.  Increase your dietary fiber intake or take a fiber supplement -with plenty of water.  Walk around frequently.  You may consider an over-the-counter stool-softener.  Your Pharmacist can assist you with choosing one that is stocked at your pharmacy.  Constipation is also one of the most common side effects of pain medication.  If you are using pain medication, be pro-active and try to PREVENT problems with constipation by taking the steps above BEFORE constipation becomes a problem.    Q:  What do I do if I need more pain medications?  A:  Call the office to receive refills.  Be aware that certain pain meds cannot be called into a pharmacy and actually require a paper prescription.  A change may be made in your pain med as you progress thru your recovery period or if you have side effects to certain meds.    --Pain meds are NOT refilled after 5pm on weekdays, and NOT AT ALL on the weekends, so please look ahead to prevent  problems.      Q:  Why am I having a hard time sleeping now that I am at home?  A:  Many medications you receive while you are in the hospital can impact your sleep for a number of days after your surgery/hospitalization.  Decreased level of activity and naps during the day may also make sleeping at night difficult.  Try to minimize day-time naps, and get up frequently during the day to walk around your home during your recovery time.  Sleep aides may be of some help, but are not recommended for long-term use.      Q:  I am having some back discomfort.  What should I do?  A:  This may be related to certain positioning that was required for your surgery, extended periods of time in bed, or other changes in your overall activity level.  You may try ice, heat, acetaminophen, or ibuprofen to treat this temporarily.  Note that many pain medications have acetaminophen in them and would state this on the prescription bottle.  Be sure not to exceed the maximum of 4000mg per day of acetaminophen.     **If the pain you are having does not resolve, is severe, or is a flare of back pain you have had on other occasions prior to surgery, please contact your primary physician for further recommendations or for an appointment to be examined at their office.    Q:  Why am I having headaches?  A:  Headaches can be caused by many things:  caffeine withdrawal, use of pain meds, dehydration, high blood pressure, lack of sleep, over-activity/exhaustion, flare-up of usual migraine headaches.  If you feel this is related to muscle tension (a band-like feeling around the head, or a pressure at the low-back of the head) you may try ice or heat to this area.  You may need to drink more fluids (try electrolyte drink like Gatorade), rest, or take your usual migraine medications.   **If your headaches do not resolve, worsen, are accompanied by other symptoms, or if your blood pressure is high, please call your primary physician for  recommendation and/or examination.    Q:  I am unable to urinate.  What do I do?  A:  A small percentage of people can have difficulty urinating initially after surgery.  This includes being able to urinate only a very small amount at a time and feeling discomfort or pressure in the very low abdomen.  This is called  urinary retention , and is actually an urgent situation.  Proceed to your nearest Emergency department for evaluation (not an Urgent Care Center).  Sometimes the bladder does not work correctly after certain medications you receive during surgery, or related to certain procedures.  You may need to have a catheter placed until your bladder recovers.  When planning to go to an Emergency department, it may help to call the ER to let them know you are coming in for this problem after a surgery.  This may help you get in quicker to be evaluated.  **If you have symptoms of a urinary tract infection, please contact your primary physician for the proper evaluation and treatment.          If you have other questions, please call the office Monday thru Friday between 8am and 5pm to discuss with the nurse or physician assistant.  #[phone]    There is a surgeon ON CALL on weekday evenings and over the weekend in case of urgent need only, and may be contacted at the same number.    If you are having an emergency, call 911 or proceed to your nearest emergency department.

## 2017-05-24 NOTE — PLAN OF CARE
Problem: Goal Outcome Summary  Goal: Goal Outcome Summary  Outcome: Improving  Pt up independently. Denies pain. LS clear, BS active, passing flatus. LBM 5/23/17. Abdomen rounded and slightly distended. Incisions CODIE. Drain drsg CDI. Voiding adequately. IV Mycamine, Cipro, and Cleocin for abx therapy. IS encouraged along with ankle pumps.  Pt on low fiber diet - tolerating diet. Potassium was 3.2 yesterday, labs will be drawn again this morning for recheck. AM nurse aware. Plan is to possibly discharge to home today.

## 2017-05-24 NOTE — PROGRESS NOTES
Paged hospitalist    Saw your note that pt okay to discharge home. Do you want pt to have potassium rechecked at 1700 prior to discharge? Or okay to leave without potassium check?     Spoke with Dr. Basurto, pt okay to discharge home without potassium recheck.

## 2017-05-24 NOTE — PROGRESS NOTES
Care Transitions Team: Following for CC, discharge planning, and disposition.      Per chart review, pt will be discharged home on all po antibiotics and will not need [facility] services.    [facility] Consult cancelled   This was discussed at the bedside with pt., pt is very relieved.   Explained Bedside RN to review formal discharge with pt.  Including recommended follow up.    Encouraged pt to follow up with PCP if indicated on discharge to assure on going improvement.  Pt reports plan to follow up with general Surgery as indicated in 2-3 weeks and AVS has General Surgery Clinic number included on AVS for any questions prior. Pt is understanding of this.     Pt is ELEVATED URSZULA will provide Josefina Rogers handoff     Katie Danielle RN BSN CTS  Care Transitions Team  [phone]

## 2017-05-24 NOTE — PROGRESS NOTES
[facility]    Infectious Disease Progress Note    Date of Service (when I saw the patient): 05/24/2017     Assessment & Plan   Naif Howell Jr. is a 52 year old male who was admitted on 5/14/2017.     IMPRESSION:   1. A 52-year-old male with apparent chronic cholecystitis and recent cholecystectomy.   2. Postop cholecystectomy, developed increasing abdominal pain without major fevers or systemic symptoms, found to have small bowel leak at surgery, which has been repaired, peritonitis has resolved, but no purulence in the abdomen, so no culture done, some low-grade fever postop now with cultures from drainage tube so far showing yeast yet to be identified.   3. History of cephalosporin allergy, but has tolerated penicillins, listed now as Flagyl allergic, but had a brief set of symptoms that resolved quickly, very likely a true allergy to either Cipro or Flagyl.   4. History of recurrent otitis over many years, no surgical interventions.   5 Diarrhea , C diff neg      RECOMMENDATIONS:   1. Ok to po Cipro 500 bid for 5 days , Dc clindamycin, only candida glabrata in cx, on corrie, is flucon sens, so 10 days po flucon  .   2. OK home all po    Ihsan Biswas MD    Interval History   t max of 100 range  C glabrata in the YURIDIA drain sens flucon    Physical Exam   Temp: 97.6  F (36.4  C) Temp src: Oral BP: 142/84 Pulse: 81 Heart Rate: 86 Resp: 16 SpO2: 96 % O2 Device: None (Room air)    Vitals:    05/14/17 2302 05/15/17 0112 05/16/17 0549   Weight: 90.7 kg (200 lb) 88.5 kg (195 lb) 89.2 kg (196 lb 9.6 oz)     Vital Signs with Ranges  Temp:  [97.6  F (36.4  C)-98.2  F (36.8  C)] 97.6  F (36.4  C)  Pulse:  [81] 81  Heart Rate:  [86-92] 86  Resp:  [16] 16  BP: (142-158)/(84-92) 142/84  SpO2:  [94 %-96 %] 96 %    Constitutional: Awake, alert, cooperative, no apparent distress  Lungs: Clear to auscultation bilaterally, no crackles or wheezing  Cardiovascular: Regular rate and rhythm, normal S1 and S2, and no murmur  noted  Abdomen: Normal bowel sounds, soft, non-distended, non-tender  Skin: No rashes, no cyanosis, no edema  Other:    Medications        ibuprofen  600 mg Oral TID w/meals     buPROPion  150 mg Oral Daily     losartan  50 mg Oral Daily     micafungin  100 mg Intravenous Q24H     acetaminophen  1,000 mg Oral Q8H    Or     acetaminophen  1,000 mg Oral Q8H     pantoprazole  40 mg Oral QAM     ciprofloxacin  400 mg Intravenous Q12H     clindamycin  600 mg Intravenous Q8H     topiramate  50 mg Oral QPM     topiramate  25 mg Oral QAM     sodium chloride (PF)  3 mL Intracatheter Q8H     heparin  5,000 Units Subcutaneous Q12H       Data   All microbiology laboratory data reviewed.  Recent Labs   Lab Test  05/24/17   0707  05/23/17   0646  05/22/17   0624  05/21/17   0648   WBC  13.5*  14.1*   --   15.6*   HGB  14.7  14.1   --   13.9   HCT  43.8  42.0   --   40.9   MCV  84  84   --   83   PLT  393  351  325  313     Recent Labs   Lab Test  05/24/17   0707  05/23/17   0646  05/21/17   0648   CR  0.56*  0.57*  0.50*     Recent Labs   Lab Test  09/08/15   1230   SED  5     Recent Labs   Lab Test  05/19/17   1222  05/19/17   1215  05/18/17   1020  05/18/17   0945  10/11/12   1355  06/11/10   1236   CULT  No growth after 5 days  No growth after 5 days  Canceled, Test credited Duplicate request Charge credited  Canceled, Test credited Duplicate request Charge credited  Moderate growth Candida glabrata  Susceptibility testing requested by Dr. Cespedes on 5.21.2017 at 1351. KVO Please   do routine sensitivities  *  Culture negative monitoring continues  Canceled, Test credited  Incorrectly ordered by PCU/Clinic  Test reordered as correct code    No growth  No Beta Streptococcus isolated

## 2017-05-25 ENCOUNTER — CARE COORDINATION (OUTPATIENT)
Dept: CARE COORDINATION | Facility: CLINIC | Age: 52
End: 2017-05-25

## 2017-05-25 PROBLEM — Z76.89 HEALTH CARE HOME: Status: ACTIVE | Noted: 2017-05-25

## 2017-05-25 LAB
BACTERIA SPEC CULT: NO GROWTH
BACTERIA SPEC CULT: NO GROWTH
BACTERIA SPEC CULT: NORMAL
Lab: NORMAL
MICRO REPORT STATUS: NORMAL
SPECIMEN SOURCE: NORMAL

## 2017-05-25 NOTE — LETTER
Health Care Home - Access Care Plan    About Me  Patient Name:  Naif Howell Jr.    YOB: 1965  Age:                            52 year old   Yu MRN:         [id number] Telephone Information:     Home Phone [phone]   Mobile [phone]       Address:    [address] [facility] [place] [address] Email address:  [email or web address]      Emergency Contact(s)  Name Relationship Lgl Grd Work Phone Home Phone Mobile Phone   1. SUSIE RAMOS Significant ot*  none [phone]             Health Maintenance: Routine Health maintenance Reviewed: Due/Overdue   Health Maintenance Due   Topic Date Due     URINE DRUG SCREEN Q1 YR  02/24/1978     HEPATITIS C SCREENING  02/24/1981       My Access Plan  Medical Emergency 911   Questions or concerns during clinic hours Primary Clinic Line, I will call the clinic directly: Primary Clinic: [facility] [phone]   24 Hour Appointment Line [phone] or  [phone])  (toll free)   24 Hour Nurse Line [phone] (toll free)   Questions or concerns outside clinic hours 24 Hour Appointment Line, I will call the after-hours on-call line:   [facility] [phone] or [phone]) (toll-free)   Preferred Urgent Care Preferred Urgent Care: Other ([facility] [phone])   Preferred Hospital Preferred Hospital: [facility]  [phone]   Preferred Pharmacy [facility] [address] [place] [address]  CINDY MARTINEZ     Behavioral Health Crisis Line Crisis [facility], [phone] or 911     My Care Team Members  Josefina Rogers MD PCP - General Family Practice 1/11/16   Phone: [phone] Fax: [phone]     Tiffanie Hatfield RN Clinic Care Coordinator  5/25/17   Phone: [phone] Fax: [phone]     Corey Vang MD Surgeon General Surgery 5/25/17   Phone: [phone] Fax: [phone]     Yue Mckeon DO MD  Physical Medicine and Rehab 5/25/17   Phone: [phone] Fax: [phone]       My Medical and Care Information  Problem List   Patient Active Problem List   Diagnosis     Smoker     PTSD (post-traumatic stress disorder)     Aortic root dilatation (H)     GERD (gastroesophageal reflux disease)     Aortic regurgitation     Chronic pain     Dyslipidemia     Benign essential hypertension     HTN, goal below 140/90     Chronic midline thoracic back pain     Vestibular migraine     SBO (small bowel obstruction) (H)     Health Care Home        Current Medications and Allergies:  See printed Medication Report

## 2017-05-25 NOTE — LETTER
[facility] CARE COORDINATION  [facility]   [id number] [place] [address]    May 26, 2017    Naif Howell Jr.  [address] [place] [address]    Dear ,  I am the Clinic Care Coordinator that works with your primary care provider's clinic. I wanted to introduce myself and provide you with my contact information for you to be able to call me with any questions or concerns. I wanted to thank you for spending the time to talk with me.  Below is a description of what Clinic Care Coordination is and how I can further assist you.     The Clinic Care Coordinator role is a Registered Nurse and/or  who understands the health care system. The goal of Clinic Care Coordination is to help you manage your health and improve access to the [facility] system in the most efficient manner.  The Registered Nurse can assist you in meeting your health care goals by providing education, coordinating services, and strengthening the communication among your providers. The  can assist you with financial, behavioral, psychosocial, and chemical dependency and counseling/psychiatric resources.    Please feel free to keep this letter and contact information to contact me at [phone] with any further questions or concerns that may arise. We at [facility] are focused on providing you with the highest-quality healthcare experience possible and that all starts with you.       Sincerely,     Tiffanie Hatfield Care Coordinator RN  [facility]  [phone]  May 26, 2017     Enclosed: I have enclosed a copy of a 24 Hour Access Plan. This has helpful phone numbers for you to call when needed. Please keep this in an easy to access place to use as needed.

## 2017-05-25 NOTE — TELEPHONE ENCOUNTER
Pt no longer needs infusion, per [facility].        Lori-Referrals

## 2017-05-25 NOTE — PROGRESS NOTES
Care Coordination Contact Attempt    Referral Source: CTS     Clinical Data: post op ileus     Outreach attempted x 1.  Left message on voicemail with call back information and requested return call.    Plan:  Will attempt to contact in 1 business days and await response to message.    Tiffanie Hatfield Care Coordinator RN  [facility]  [phone]  May 25, 2017       -------------------------------------------------------------------------------------------------------------------------------------------------------------------------------------------  Clinic Care Coordination Contact  OUTREACH    Referral Information:  Referral Source: CTS  Reason for Contact: Post Hospital follow up - post op Ileus   Care Conference: No     Universal Utilization:   ED Visits in last year: 3  Hospital visits in last year: 2  Last PCP appointment: 04/20/17  Missed Appointments:  (NO CONCERNS)  Concerns:  (NONE)  Multiple Providers or Specialists: YES  (SEE CARE TEAM)     Clinical Concerns:  Current Medical Concerns: Patient hospitalized from 5/14 - 5/24 for postoperative ileus, non intractable nasuea/vomiting/dehydration   Patient had cholescystectomy on 5/12   Patient states he has been doing pretty well since coming home from the hospital   Pain is being controlled with ibuprofen and he has not needed to take oxycodone that they sent him home with   Patient did have one small episode of emesis but doing okay since that time   Patient has not yet scheduled f/u with surgeon however he plans to do so soon   CCRN asked patient if she could help schedule hospital follow up appt. With pcp and patient states that he does need to see pcp for f/u on blood pressure - however he would like to wait until all f/u from hospitalization/surgery has been completed first. Patient plans on calling to set up appt. At a later time   Patient is off work for approx 2 weeks - he has a note. He works for the  [employer] and if he feels he needs an extension of time he will contact pcp/surgeon   Patient has no questions relating to his hospital stay at this time and will call if any arise     Current Behavioral Concerns: history PTSD   PHQ-9 score:    PHQ-9 SCORE 9/23/2016   Total Score -   Total Score 3     BEATRICE-7 SCORE 2/24/2016 3/31/2016 9/20/2016   Total Score - - -   Total Score 3 3 3     Clinical Pathway Name: None    Medication Management:  Discharged with cipro - oxycodone - diflucan   Tolerating cipro without side effects   Patient has not had to use oxycodone since discharge and is using ibuprofen instead which is working well on his pain     Functional Status:  Mobility Status: Independent  Equipment Currently Used at Home: none  Transportation: drives   Works for the [employer] full time       Psychosocial:  Current living arrangement:: I live alone, I live in a private home  Financial/Insurance: no concerns      Resources and Interventions:  Current Resources: Other (Comments) ( PAIN CLINIC);  (NA)  PAS Number:  (NA)  Senior Linkage Line Referral Placed:  (NA)  Advanced Care Plans/Directives on file:: No  Referrals Placed:  (NA)     Patient/Caregiver understanding: yes   Frequency of Care Coordination: 1 WEEK   Upcoming appointment:  (NONE ADV BY HOSP NO NEED TO F/U PCP)     Plan:   Patient will f/u with surgeon as directed   Patient will call with questions/concerns   Patient will call to schedule f/u with pcp for hypertension   CCRN will f/u with patient in 1 week     Tiffanie Hatfield Care Coordinator RN  [facility]  [phone]  May 26, 2017

## 2017-05-25 NOTE — PROGRESS NOTES
Hand-off for Care Transitions to Next Level of Care Provider  Name: Naif Howell JrGuerrero  MRN #: [id number]  Reason for Hospitalization:  Dehydration [E86.0]  Postoperative ileus [K91.3]  Non-intractable vomiting with nausea, unspecified vomiting type [R11.2]  Admit Date/Time: 5/14/2017 11:03 PM  Discharge Date:5/24/2016    Reason for Communication Hand-off Referral: Other ELEVATED URSZULA     Discharge Plan:  Discharged to: Home-independent                   Patient agreeable to post-hospital support suggestions:  Yes  Discharge Plan:             Patient is on new medications:   Yes           MTM follow up recommended: No             Follow-up specialty is recommended:   Schedule a follow-up visit 2-3 weeks after discharge from the hospital.  Office Phone:  [phone]    Follow-up plan:  No future appointments.      NO PCP Follow up indicated per Hospitalitiist on discharge.  Plan will be for pt to follow up with general surgery         Key Recommendations:  Handoff secondary to URSZULA elevation. Pt stable during hospitalization  He was followed by Surgery, and ID was sent home on PO abx, no follow up indicated with ID.   Katie Danielle

## 2017-06-05 ENCOUNTER — OFFICE VISIT (OUTPATIENT)
Dept: SURGERY | Facility: CLINIC | Age: 52
End: 2017-06-05
Payer: OTHER GOVERNMENT

## 2017-06-05 DIAGNOSIS — Z09 SURGICAL FOLLOWUP VISIT: Primary | ICD-10-CM

## 2017-06-05 PROCEDURE — 99024 POSTOP FOLLOW-UP VISIT: CPT | Performed by: SURGERY

## 2017-06-05 NOTE — MR AVS SNAPSHOT
"              After Visit Summary   6/5/2017    Naif Howell Jr.    MRN: [id number]           Patient Information     Date Of Birth          1965        Visit Information        Provider Department      6/5/2017 4:15 PM Corey Vang MD [facility] General Surgery      Today's Diagnoses     Surgical followup visit    -  1       Follow-ups after your visit        Your next 10 appointments already scheduled     Anthony 15, 2017  3:45 PM CDT   Post Op with Corey Vang MD   [facility] ([facility])    [facility] [address] [facility]  [facility] [id number] [phone]              Who to contact     If you have questions or need follow up information about today's clinic visit or your schedule please contact [facility] directly at [phone].  Normal or non-critical lab and imaging results will be communicated to you by MyChart, letter or phone within 4 business days after the clinic has received the results. If you do not hear from us within 7 days, please contact the clinic through NullPointerhart or phone. If you have a critical or abnormal lab result, we will notify you by phone as soon as possible.  Submit refill requests through Kodkod or call your pharmacy and they will forward the refill request to us. Please allow 3 business days for your refill to be completed.          Additional Information About Your Visit        MyChart Information     Kodkod lets you send messages to your doctor, view your test results, renew your prescriptions, schedule appointments and more. To sign up, go to [email or web address] . Click on \"Log in\" on the left side of the screen, which will take you to the Welcome page. Then click on \"Sign up Now\" on the right side of the page.     You will be asked to enter the access code listed below, as well as some personal information. Please follow the directions to create " your username and password.     Your access code is: [email or web address]  Expires: 2017 11:11 AM     Your access code will  in 90 days. If you need help or a new code, please call your [facility] or [phone].        Care EveryWhere ID     This is your Care EveryWhere ID. This could be used by other organizations to access your [place] medical records  [id number]         Blood Pressure from Last 3 Encounters:   17 142/84   17 (!) 175/120   17 145/88    Weight from Last 3 Encounters:   17 89.2 kg (196 lb 9.6 oz)   17 90.7 kg (200 lb)   17 90.7 kg (200 lb)              Today, you had the following     No orders found for display       Primary Care Provider Office Phone # Fax #    Josefina Lester Rogers [phone]       [facility] [address] [address] [address]        Thank you!     Thank you for choosing [facility]  for your care. Our goal is always to provide you with excellent care. Hearing back from our patients is one way we can continue to improve our services. Please take a few minutes to complete the written survey that you may receive in the mail after your visit with us. Thank you!             Your Updated Medication List - Protect others around you: Learn how to safely use, store and throw away your medicines at www.disposemymeds.org.          This list is accurate as of: 17  4:22 PM.  Always use your most recent med list.                   Brand Name Dispense Instructions for use    buPROPion 150 MG 12 hr tablet    WELLBUTRIN SR    60 tablet    Take 1 tablet once daily and increase to 1 tablet twice daily after 4 to 7 days       HYDROCHLOROTHIAZIDE PO      Take 25 mg by mouth daily       ibuprofen 600 MG tablet    ADVIL/MOTRIN    120 tablet    Take 1 tablet (600 mg) by mouth every 6 hours as needed for moderate pain       losartan 50 MG tablet    COZAAR    30 tablet    Take 1 tablet (50 mg) by  mouth daily       multivitamin, therapeutic with minerals Tabs tablet      Take 1 tablet by mouth daily       oxyCODONE 5 MG IR tablet    ROXICODONE    10 tablet    Take 1-2 tablets (5-10 mg) by mouth every 3 hours as needed for pain or other (Moderate to Severe)       * TOPAMAX PO      Take 25 mg by mouth every morning       * TOPAMAX PO      Take 50 mg by mouth every evening       * Notice:  This list has 2 medication(s) that are the same as other medications prescribed for you. Read the directions carefully, and ask your doctor or other care provider to review them with you.

## 2017-06-05 NOTE — PROGRESS NOTES
He returns in follow-up after laparoscopy and resection of his small bowel for perforation.  Pathology showed ischemic changes at the perforation site.  His antibiotics have been managed by infectious disease, they are complete and he has had no fevers.  Yesterday, he noted development of an area of fibrin in his incision.  He has had no drainage and no pain at the site.  There is no erythema.  He reports that his bowels are not quite back to normal but they are functioning well and his stools are soft.  He does note early satiety but by eating several small meals a day, he feels his nutrition is adequate.  He has not been weighing himself but encouraged him to do so to assure that he is maintaining his weight or, increasing.    Exam: Abdomen is soft and nondistended and nontender.  At the lower end of his short midline epigastric incision, there is a 5 mm open area with moist fibrin.  This was not probed.  There is no purulence expressible.  There is no surrounding erythema.    Impression: Good GI function, no evidence for persistent intra-abdominal infection.  New area of skin breakdown in his central abdominal incision without any clinical sign of infection.    Plan: I gave him supplies and instructed him on local wound care.  We will have him return to the office in 7-10 days for reevaluation.  He will be checking his weight as noted above.  He will call us if he has any signs of developing infection such as purulent drainage or erythema in the region of his incision.    Corey Vang MD  6/5/2017 4:18 PM    Please route or send letter to:  Primary Care Provider (PCP) and Include Progress Note

## 2017-06-07 NOTE — OP NOTE
DATE OF PROCEDURE:  05/16/2017      PREOPERATIVE DIAGNOSIS:  Persistent small-bowel obstruction.      POSTOPERATIVE DIAGNOSIS:  Persistent small-bowel obstruction,  small bowel perforation.      PROCEDURE:  Diagnostic laparoscopy, limited laparotomy with small bowel resection and laparoscopic lysis of adhesions.      SURGEON:  Corey Vang MD      ASSISTANT:  Shannan Goldberg PA-C      BLOOD LOSS:  Minimal.      INDICATION:  Naif Howell is a 52-year-old male who has developed a persistent small bowel obstruction following laparoscopic gallbladder surgery.  Despite feeling some clinical improvement, his obstruction persists on imaging.  He was noted at reveal cholecystectomy to have significant adhesions of the omentum to his right anterior and lateral abdominal wall as well as the ascending colon.  It is suspected that this may be playing a part in his obstruction.  After discussion with the patient, he would like to go ahead with a laparoscopy to see if the cause of the obstruction can be identified and remedied either with laparoscopic lysis of adhesions or possible small bowel resection.  The procedure was discussed in detail with him including incision, scar, risks, benefits, complications, convalescence, postop limitations, bleeding, infection, recurrence of obstruction and possible need for bowel resection.  He seems to understand all these issues all his questions have been answered and he is ready to proceed with surgery.      DESCRIPTION OF PROCEDURE:  The patient was brought to the operating room, placed supine on the table and after induction of anesthetic, the abdomen was prepped and draped in sterile manner.  The incision above his umbilicus is reopened.  The Jarred trocar was inserted, gas was insufflated, the 10 mm straight-viewing laparoscope was advanced.  Two 5 mm trocars were also placed.  There is a small amount of clear fluid in the abdomen.  The small bowel was first addressed by identifying the  appendix and terminal ileum.  These are both below the adhesions of the omentum noted above.  The small bowel was grasped and then gradually run proximally.  We could evaluate about half of the small bowel in this manner and all appeared normal with some areas of mild dilation suggesting ileus.  As we continued running the small bowel proximally we did note that there were also significant adhesions to the left colon involving his omentum, creating essentially a pocket in which the small bowel was contained.  In order to continue running the small bowel proximally, we divided the omentum adherent to the descending colon and rotated it cephalad and to the right.  This then allowed us to continue running the small bowel up more proximally.  As we did, we encountered a small amount of fibrin and then identified a small perforation of the small bowel.  There was a mild stricture at this site.  There was no other extrinsic abnormality at this point.  We continued running the small bowel proximally and found no other abnormalities.  We specifically searched for paraduodenal or pericolic hernia and could not identify either of these defects.  Since the etiology of the perforation is unclear, we felt simple closure was inappropriate.  The affected loop was therefore brought up through a small epigastric incision.  A AURELIA stapler was used to divide the bowel proximal and distal to the perforation.  The intervening mesentery was divided and the specimen was sent to pathology.  Side-to-side (functional end-to-end) anastomosis was then created by removing the antimesenteric end of each staple line placing a limb of the AURELIA stapler down each limb of the small bowel and then anastomosing the antimesenteric borders of each loop to each other by firing the stapler.  The anastomosis was checked from within to be sure no bleeding was present and none was seen.  The residual defect was then closed with a linear stapler.  The  anastomosis was pressurized and shows no evidence for leak.  The sutures placed to reinforce the apex of the anastomosis.  The anastomosis was then returned to the abdominal cavity.  The midline incision was partially closed.  A Jarred trocar was placed through this incision.  A 15 round Doug drain was placed through one of the 5 mm trocars.  The other 5 mm trocar was removed and checked from within to be sure no bleeding was present and none was seen.  The Jarred trocar was then again removed and we completed closure of the midline incision.  After irrigating, the midline incision was then also closed at the skin level.  Dressings and a binder were applied and he was returned to the recovery room in excellent condition with all sponge and needle counts correct, having tolerated the procedure well.      Intraoperative findings include minimal fluid in the abdomen, perforation noted in the small of the mid to proximal jejunum after lysing adhesions of the omentum to the descending colon.  No other small bowel abnormalities are noted, no other colonic abnormalities are noted and the appendix appears normal.         DAVIS SHEFFIELD MD             D: 2017 19:30   T: 2017 10:59   MT: [id number]      Name:     ALEXA ELLIOTT   MRN:      [id number]        Account:        [id number]   :      1965           Procedure Date: 2017      Document: [id number]

## 2017-06-15 ENCOUNTER — OFFICE VISIT (OUTPATIENT)
Dept: SURGERY | Facility: CLINIC | Age: 52
End: 2017-06-15
Payer: OTHER GOVERNMENT

## 2017-06-15 VITALS — HEIGHT: 74 IN | DIASTOLIC BLOOD PRESSURE: 80 MMHG | SYSTOLIC BLOOD PRESSURE: 124 MMHG

## 2017-06-15 DIAGNOSIS — Z09 SURGICAL FOLLOWUP VISIT: Primary | ICD-10-CM

## 2017-06-15 PROCEDURE — 99024 POSTOP FOLLOW-UP VISIT: CPT | Performed by: SURGERY

## 2017-06-15 NOTE — LETTER
Manda 15, 2017    RE:  Naif NIXONGuerrero Howell Jr.-:  61    He returns in follow-up after his laparoscopic small bowel resection.  He developed a superficial separation in his skin incision.  This is now crusted over and there is a fibrinous plug here.  There is no discharge or drainage and there is no surrounding erythema.     Overall, he feels quite well.  He is quite hungry but eats only in small amounts.  I encouraged him to continue eating frequently and again back some of the weight that he lost with surgery.  This should assist in healing and improve his fatigue.  He reports that his bowels are working well.     Exam: The incision appears to be healing normally except for the small area that opened and now has a fibrinous plug.  As noted above, there is no discharge, drainage or erythema.  There is no tenderness to palpation.     Impression: Normal GI function and improving appetite.  Small opened area of the skin incision is healing by secondary intent without any apparent complications     Plan: He will return to our office if he has any questions or problems or concerns.     Corey Vang MD

## 2017-06-15 NOTE — PROGRESS NOTES
He returns in follow-up after his laparoscopic small bowel resection.  He developed a superficial separation in his skin incision.  This is now crusted over and there is a fibrinous plug here.  There is no discharge or drainage and there is no surrounding erythema.    Overall, he feels quite well.  He is quite hungry but eats only in small amounts.  I encouraged him to continue eating frequently and again back some of the weight that he lost with surgery.  This should assist in healing and improve his fatigue.  He reports that his bowels are working well.    Exam: The incision appears to be healing normally except for the small area that opened and now has a fibrinous plug.  As noted above, there is no discharge, drainage or erythema.  There is no tenderness to palpation.    Impression: Normal GI function and improving appetite.  Small opened area of the skin incision is healing by secondary intent without any apparent complications    Plan: He will return to our office if he has any questions or problems or concerns.    Corey Vang MD  6/15/2017 3:41 PM    Please route or send letter to:  Primary Care Provider (PCP) and Include Progress Note

## 2017-06-15 NOTE — MR AVS SNAPSHOT
"              After Visit Summary   6/15/2017    Naif Howell Jr.    MRN: [id number]           Patient Information     Date Of Birth          1965        Visit Information        Provider Department      6/15/2017 3:45 PM Corey Vang MD [facility] General Surgery      Today's Diagnoses     Surgical followup visit    -  1       Follow-ups after your visit        Your next 10 appointments already scheduled     Anthony 15, 2017  3:45 PM CDT   Post Op with Corey Vang MD   [facility] ([facility])    [facility] [address] [facility]  [facility] [id number] [phone]              Who to contact     If you have questions or need follow up information about today's clinic visit or your schedule please contact [facility] directly at [phone].  Normal or non-critical lab and imaging results will be communicated to you by MyChart, letter or phone within 4 business days after the clinic has received the results. If you do not hear from us within 7 days, please contact the clinic through Noveporterhart or phone. If you have a critical or abnormal lab result, we will notify you by phone as soon as possible.  Submit refill requests through BrickTrends or call your pharmacy and they will forward the refill request to us. Please allow 3 business days for your refill to be completed.          Additional Information About Your Visit        MyChart Information     BrickTrends lets you send messages to your doctor, view your test results, renew your prescriptions, schedule appointments and more. To sign up, go to [email or web address] . Click on \"Log in\" on the left side of the screen, which will take you to the Welcome page. Then click on \"Sign up Now\" on the right side of the page.     You will be asked to enter the access code listed below, as well as some personal information. Please follow the directions to " "create your username and password.     Your access code is: [email or web address]  Expires: 2017 11:11 AM     Your access code will  in 90 days. If you need help or a new code, please call your [facility] or [phone].        Care EveryWhere ID     This is your Care EveryWhere ID. This could be used by other organizations to access your [place] medical records  [id number]        Your Vitals Were     Height                   1.88 m (6' 2\")            Blood Pressure from Last 3 Encounters:   06/15/17 124/80   17 142/84   17 (!) 175/120    Weight from Last 3 Encounters:   17 89.2 kg (196 lb 9.6 oz)   17 90.7 kg (200 lb)   17 90.7 kg (200 lb)              Today, you had the following     No orders found for display       Primary Care Provider Office Phone # Fax #    Josefina Lester Rogers [phone]       [facility] [id number] [place] [address]        Thank you!     Thank you for choosing [facility]  for your care. Our goal is always to provide you with excellent care. Hearing back from our patients is one way we can continue to improve our services. Please take a few minutes to complete the written survey that you may receive in the mail after your visit with us. Thank you!             Your Updated Medication List - Protect others around you: Learn how to safely use, store and throw away your medicines at www.disposemymeds.org.          This list is accurate as of: 6/15/17  3:42 PM.  Always use your most recent med list.                   Brand Name Dispense Instructions for use    buPROPion 150 MG 12 hr tablet    WELLBUTRIN SR    60 tablet    Take 1 tablet once daily and increase to 1 tablet twice daily after 4 to 7 days       HYDROCHLOROTHIAZIDE PO      Take 25 mg by mouth daily       ibuprofen 600 MG tablet    ADVIL/MOTRIN    120 tablet    Take 1 tablet (600 mg) by mouth every 6 hours as needed for moderate pain    "    losartan 50 MG tablet    COZAAR    30 tablet    Take 1 tablet (50 mg) by mouth daily       multivitamin, therapeutic with minerals Tabs tablet      Take 1 tablet by mouth daily       oxyCODONE 5 MG IR tablet    ROXICODONE    10 tablet    Take 1-2 tablets (5-10 mg) by mouth every 3 hours as needed for pain or other (Moderate to Severe)       * TOPAMAX PO      Take 25 mg by mouth every morning       * TOPAMAX PO      Take 50 mg by mouth every evening       * Notice:  This list has 2 medication(s) that are the same as other medications prescribed for you. Read the directions carefully, and ask your doctor or other care provider to review them with you.

## 2017-06-20 ENCOUNTER — TRANSFERRED RECORDS (OUTPATIENT)
Dept: HEALTH INFORMATION MANAGEMENT | Facility: CLINIC | Age: 52
End: 2017-06-20

## 2017-06-22 ENCOUNTER — OFFICE VISIT (OUTPATIENT)
Dept: FAMILY MEDICINE | Facility: CLINIC | Age: 52
End: 2017-06-22
Payer: OTHER GOVERNMENT

## 2017-06-22 VITALS
BODY MASS INDEX: 24.04 KG/M2 | HEART RATE: 99 BPM | HEIGHT: 74 IN | SYSTOLIC BLOOD PRESSURE: 120 MMHG | TEMPERATURE: 98.1 F | DIASTOLIC BLOOD PRESSURE: 68 MMHG | WEIGHT: 187.3 LBS | OXYGEN SATURATION: 95 %

## 2017-06-22 DIAGNOSIS — I10 BENIGN ESSENTIAL HYPERTENSION: ICD-10-CM

## 2017-06-22 DIAGNOSIS — G89.29 CHRONIC MIDLINE THORACIC BACK PAIN: ICD-10-CM

## 2017-06-22 DIAGNOSIS — N52.9 VASCULOGENIC ERECTILE DYSFUNCTION, UNSPECIFIED VASCULOGENIC ERECTILE DYSFUNCTION TYPE: ICD-10-CM

## 2017-06-22 DIAGNOSIS — R73.9 ELEVATED BLOOD SUGAR: Primary | ICD-10-CM

## 2017-06-22 DIAGNOSIS — F17.200 TOBACCO USE DISORDER: ICD-10-CM

## 2017-06-22 DIAGNOSIS — M54.6 CHRONIC MIDLINE THORACIC BACK PAIN: ICD-10-CM

## 2017-06-22 DIAGNOSIS — E78.5 DYSLIPIDEMIA: ICD-10-CM

## 2017-06-22 LAB
ANION GAP SERPL CALCULATED.3IONS-SCNC: 8 MMOL/L (ref 3–14)
BUN SERPL-MCNC: 11 MG/DL (ref 7–30)
CALCIUM SERPL-MCNC: 8.8 MG/DL (ref 8.5–10.1)
CHLORIDE SERPL-SCNC: 106 MMOL/L (ref 94–109)
CHOLEST SERPL-MCNC: 196 MG/DL
CO2 SERPL-SCNC: 25 MMOL/L (ref 20–32)
CREAT SERPL-MCNC: 0.67 MG/DL (ref 0.66–1.25)
GFR SERPL CREATININE-BSD FRML MDRD: ABNORMAL ML/MIN/1.7M2
GLUCOSE SERPL-MCNC: 102 MG/DL (ref 70–99)
HBA1C MFR BLD: 5.3 % (ref 4.3–6)
HDLC SERPL-MCNC: 32 MG/DL
LDLC SERPL CALC-MCNC: 143 MG/DL
NONHDLC SERPL-MCNC: 164 MG/DL
POTASSIUM SERPL-SCNC: 3.9 MMOL/L (ref 3.4–5.3)
SODIUM SERPL-SCNC: 139 MMOL/L (ref 133–144)
TRIGL SERPL-MCNC: 107 MG/DL

## 2017-06-22 PROCEDURE — 83036 HEMOGLOBIN GLYCOSYLATED A1C: CPT | Performed by: FAMILY MEDICINE

## 2017-06-22 PROCEDURE — 80061 LIPID PANEL: CPT | Performed by: FAMILY MEDICINE

## 2017-06-22 PROCEDURE — 99214 OFFICE O/P EST MOD 30 MIN: CPT | Performed by: FAMILY MEDICINE

## 2017-06-22 PROCEDURE — 36415 COLL VENOUS BLD VENIPUNCTURE: CPT | Performed by: FAMILY MEDICINE

## 2017-06-22 PROCEDURE — 80048 BASIC METABOLIC PNL TOTAL CA: CPT | Performed by: FAMILY MEDICINE

## 2017-06-22 RX ORDER — LOSARTAN POTASSIUM 50 MG/1
50 TABLET ORAL DAILY
Qty: 90 TABLET | Refills: 1 | Status: SHIPPED | OUTPATIENT
Start: 2017-06-22 | End: 2017-12-30

## 2017-06-22 RX ORDER — SILDENAFIL CITRATE 20 MG/1
TABLET ORAL
Qty: 9 TABLET | Refills: 0 | Status: SHIPPED | OUTPATIENT
Start: 2017-06-22 | End: 2019-10-08

## 2017-06-22 NOTE — NURSING NOTE
"Chief Complaint   Patient presents with     Recheck Medication       Initial /68 (BP Location: Right arm, Patient Position: Chair, Cuff Size: Adult Regular)  Pulse 99  Temp 98.1  F (36.7  C) (Oral)  Ht 6' 2\" (1.88 m)  Wt 187 lb 4.8 oz (85 kg)  SpO2 95%  BMI 24.05 kg/m2 Estimated body mass index is 24.05 kg/(m^2) as calculated from the following:    Height as of this encounter: 6' 2\" (1.88 m).    Weight as of this encounter: 187 lb 4.8 oz (85 kg).  Medication Reconciliation: complete   BRANDEE Uribe      "

## 2017-06-22 NOTE — LETTER
[facility]   [address] [place] [address] [phone]      June 23, 2017    Naif Howell Jr.  [address] [address]      Dear Mr. Howell    Your recent lab work is back.    It looks like - with your history of smoking and high blood pressure - that you would benefit from taking a daily cholesterol medication. Let me know what you think about this before I send in a prescription.    Sincerely,        Josefina Rogers MD    Results for orders placed or performed in visit on 06/22/17   Basic metabolic panel  (Ca, Cl, CO2, Creat, Gluc, K, Na, BUN)   Result Value Ref Range    Sodium 139 133 - 144 mmol/L    Potassium 3.9 3.4 - 5.3 mmol/L    Chloride 106 94 - 109 mmol/L    Carbon Dioxide 25 20 - 32 mmol/L    Anion Gap 8 3 - 14 mmol/L    Glucose 102 (H) 70 - 99 mg/dL    Urea Nitrogen 11 7 - 30 mg/dL    Creatinine 0.67 0.66 - 1.25 mg/dL    GFR Estimate >90  Non  GFR Calc   >60 mL/min/1.7m2    GFR Estimate If Black >90   GFR Calc   >60 mL/min/1.7m2    Calcium 8.8 8.5 - 10.1 mg/dL   Hemoglobin A1c   Result Value Ref Range    Hemoglobin A1C 5.3 4.3 - 6.0 %   Lipid Profile with reflex to direct LDL   Result Value Ref Range    Cholesterol 196 <200 mg/dL    Triglycerides 107 <150 mg/dL    HDL Cholesterol 32 (L) >39 mg/dL    LDL Cholesterol Calculated 143 (H) <100 mg/dL    Non HDL Cholesterol 164 (H) <130 mg/dL

## 2017-06-22 NOTE — MR AVS SNAPSHOT
"              After Visit Summary   6/22/2017    Naif Howell Jr.    MRN: [id number]           Patient Information     Date Of Birth          1965        Visit Information        Provider Department      6/22/2017 8:15 AM Josefina Rogers MD [facility]        Today's Diagnoses     Elevated blood sugar    -  1    Benign essential hypertension        Tobacco use disorder        Dyslipidemia        Vasculogenic erectile dysfunction, unspecified vasculogenic erectile dysfunction type          Care Instructions    6 month blood pressure follow up          Follow-ups after your visit        Who to contact     If you have questions or need follow up information about today's clinic visit or your schedule please contact [facility] directly at [phone].  Normal or non-critical lab and imaging results will be communicated to you by MyChart, letter or phone within 4 business days after the clinic has received the results. If you do not hear from us within 7 days, please contact the clinic through MyChart or phone. If you have a critical or abnormal lab result, we will notify you by phone as soon as possible.  Submit refill requests through [employer] or call your pharmacy and they will forward the refill request to us. Please allow 3 business days for your refill to be completed.          Additional Information About Your Visit        MyChart Information     [employer] lets you send messages to your doctor, view your test results, renew your prescriptions, schedule appointments and more. To sign up, go to [email or web address] . Click on \"Log in\" on the left side of the screen, which will take you to the Welcome page. Then click on \"Sign up Now\" on the right side of the page.     You will be asked to enter the access code listed below, as well as some personal information. Please follow the directions to create your username and password.     Your access code is: [email or web address]  Expires: " "2017  8:55 AM     Your access code will  in 90 days. If you need help or a new code, please call your [place] clinic or [phone].        Care EveryWhere ID     This is your Care EveryWhere ID. This could be used by other organizations to access your [place] medical records  [id number]        Your Vitals Were     Pulse Temperature Height Pulse Oximetry BMI (Body Mass Index)       99 98.1  F (36.7  C) (Oral) 6' 2\" (1.88 m) 95% 24.05 kg/m2        Blood Pressure from Last 3 Encounters:   17 120/68   06/15/17 124/80   17 142/84    Weight from Last 3 Encounters:   17 187 lb 4.8 oz (85 kg)   17 196 lb 9.6 oz (89.2 kg)   17 200 lb (90.7 kg)              We Performed the Following     Basic metabolic panel  (Ca, Cl, CO2, Creat, Gluc, K, Na, BUN)     Hemoglobin A1c     Lipid Profile with reflex to direct LDL          Today's Medication Changes          These changes are accurate as of: 17  8:55 AM.  If you have any questions, ask your nurse or doctor.               Start taking these medicines.        Dose/Directions    sildenafil 20 MG tablet   Commonly known as:  REVATIO/VIAGRA   Used for:  Vasculogenic erectile dysfunction, unspecified vasculogenic erectile dysfunction type   Started by:  Josefina Rogers MD        Take 1 tablet (20 mg) by mouth three times daily for pulmonary hypertension.  Never use with nitroglycerin, terazosin or doxazosin.   Quantity:  9 tablet   Refills:  0         Stop taking these medicines if you haven't already. Please contact your care team if you have questions.     HYDROCHLOROTHIAZIDE PO   Stopped by:  Josefina Rogers MD                Where to get your medicines      These medications were sent to [facility] [address] [facility] [address] AT [facility] [address] [facility] [address]     Phone:  [phone]     losartan 50 MG tablet    sildenafil 20 MG tablet                " Primary Care Provider Office Phone # Fax #    Josefina Lester Rogers [phone]       [facility] [address]  [employer] [address]        Equal Access to Services     JENNIFER MARQUEZ : Hadamrita carl ku anho Sojustineali, waaxda luqadaha, qaybta kaalmada adeegyada, waxrose mary reynan jose ny lagallo bird. So [facility] [phone].    ATENCIÓN: Si habla español, tiene a morocho disposición servicios gratuitos de asistencia lingüística. Llame al [phone].    We comply with applicable federal civil rights laws and [place] laws. We do not discriminate on the basis of race, color, national origin, age, disability sex, sexual orientation or gender identity.            Thank you!     Thank you for choosing [facility]  for your care. Our goal is always to provide you with excellent care. Hearing back from our patients is one way we can continue to improve our services. Please take a few minutes to complete the written survey that you may receive in the mail after your visit with us. Thank you!             Your Updated Medication List - Protect others around you: Learn how to safely use, store and throw away your medicines at www.disposemymeds.org.          This list is accurate as of: 6/22/17  8:55 AM.  Always use your most recent med list.                   Brand Name Dispense Instructions for use Diagnosis    buPROPion 150 MG 12 hr tablet    WELLBUTRIN SR    60 tablet    Take 1 tablet once daily and increase to 1 tablet twice daily after 4 to 7 days    Smoker       ibuprofen 600 MG tablet    ADVIL/MOTRIN    120 tablet    Take 1 tablet (600 mg) by mouth every 6 hours as needed for moderate pain        losartan 50 MG tablet    COZAAR    90 tablet    Take 1 tablet (50 mg) by mouth daily    Benign essential hypertension       multivitamin, therapeutic with minerals Tabs tablet      Take 1 tablet by mouth daily        sildenafil 20 MG tablet    REVATIO/VIAGRA    9 tablet    Take 1  tablet (20 mg) by mouth three times daily for pulmonary hypertension.  Never use with nitroglycerin, terazosin or doxazosin.    Vasculogenic erectile dysfunction, unspecified vasculogenic erectile dysfunction type

## 2017-06-22 NOTE — PROGRESS NOTES
SUBJECTIVE:                                                    Naif Howell Jr. is a 52 year old male who presents to clinic today for the following health issues:      Medication Followup of Losartan     Taking Medication as prescribed: yes    Side Effects:  None    Medication Helping Symptoms:  yes       At our last visit, we switched from hydrochlorothiazide to losartan to get better control of his blood pressures. This was in mid April. Since then, he had a cholecystectomy followed by superlative peritonitis and postoperative ileus. He spent over 10 days in the hospital. Reports he is doing well at this point.    His blood potassium was 3.3 about one month ago while he was inpatient.    Reports he continues to struggle with maintaining an erection and ejaculation. This is better than when he was on the hydrochlorothiazide but still present. At this point, he is happy with his blood pressures and does not want to make any changes to his medications. He would like to discuss options to use as needed for erectile dysfunction.    Reports he stopped smoking for a month, starting with his hospitalization. Following this, he returned to work where there was another smoker, and he began smoking again. He feels confident that he can stop smoking and will plan a time in the near future to do so.    Reports his back pain is still present somewhat but much more tolerable. Is happy with this.    [facility]. Has a consultation with a neurologist next month.      Problem list and histories reviewed & adjusted, as indicated.  Additional history: as documented    Patient Active Problem List   Diagnosis     PTSD (post-traumatic stress disorder)     Aortic root dilatation (H)     GERD (gastroesophageal reflux disease)     Aortic regurgitation     Chronic pain     Dyslipidemia     Benign essential hypertension     HTN, goal below 140/90     Chronic midline thoracic back  pain     Vestibular migraine     SBO (small bowel obstruction) (H)     Health Care Home     Tobacco use disorder     Past Surgical History:   Procedure Laterality Date     LAPAROSCOPIC CHOLECYSTECTOMY WITH CHOLANGIOGRAMS N/A 2017    Procedure: LAPAROSCOPIC CHOLECYSTECTOMY WITH CHOLANGIOGRAMS;  LAPAROSCOPIC CHOLECYSTECTOMY WITH CHOLANGIOGRAMS ;  Surgeon: Corey Vang MD;  Location: [facility] OR     LAPAROSCOPIC LYSIS ADHESIONS N/A 2017    Procedure: LAPAROSCOPIC LYSIS ADHESIONS;;  Surgeon: Corey Vang MD;  Location: [facility] OR     LAPAROSCOPY DIAGNOSTIC (GENERAL) N/A 2017    Procedure: LAPAROSCOPY DIAGNOSTIC (GENERAL);  DIAGNOSTIC LAPAROSCOPY, LAPAROTOMY,  SMALL BOWEL RESECTION, LAPAROSCOPIC LYSIS OF ADHESIONS;  Surgeon: Corey Vang MD;  Location: [facility] OR     RESECT SMALL BOWEL WITHOUT OSTOMY N/A 2017    Procedure: RESECT SMALL BOWEL WITHOUT OSTOMY;;  Surgeon: Corey Vang MD;  Location: [facility] OR     Resection of basal cell carcinoma      Left forehead       Social History   Substance Use Topics     Smoking status: Current Every Day Smoker     Packs/day: 1.00     Types: Cigarettes     Smokeless tobacco: Former User     Alcohol use No     Family History   Problem Relation Age of Onset     Hypertension Mother      alive     CANCER Father      chest tumor-not sure about details,  at age of 71     Family History Negative Sister      2     Hypertension Sister      Family History Negative Brother      1     Family History Negative Maternal Grandmother      Hypertension Maternal Grandmother      Family History Negative Maternal Grandfather      Family History Negative Paternal Grandmother      Family History Negative Paternal Grandfather      Cancer - colorectal No family hx of      Prostate Cancer No family hx of            Reviewed and updated as needed this visit by clinical staff  Tobacco  Allergies  Med Hx  Surg Hx  Fam Hx  Soc Hx      Reviewed and updated as needed this visit by  "Provider         ROS:  Constitutional, HEENT, cardiovascular, pulmonary, gi and gu systems are negative, except as otherwise noted.    OBJECTIVE:                                                    /68 (BP Location: Right arm, Patient Position: Chair, Cuff Size: Adult Regular)  Pulse 99  Temp 98.1  F (36.7  C) (Oral)  Ht 6' 2\" (1.88 m)  Wt 187 lb 4.8 oz (85 kg)  SpO2 95%  BMI 24.05 kg/m2  Body mass index is 24.05 kg/(m^2).  GENERAL: healthy, alert and no distress  RESP: lungs clear to auscultation - no rales, rhonchi or wheezes  CV: regular rate and rhythm, normal S1 S2, no S3 or S4, no murmur, click or rub, no peripheral edema and peripheral pulses strong  PSYCH: mentation appears normal, affect normal/bright    Diagnostic Test Results:  none      ASSESSMENT/PLAN:                                                      1. Benign essential hypertension - well controlled on 50 mg losartan, will continue. Need to check electrolytes and kidney function today.  - losartan (COZAAR) 50 MG tablet; Take 1 tablet (50 mg) by mouth daily  Dispense: 90 tablet; Refill: 1  - Basic metabolic panel  (Ca, Cl, CO2, Creat, Gluc, K, Na, BUN)    2. Elevated blood sugar - reports elevated blood sugar in the hospital, we'll check A1c today.  - Hemoglobin A1c    3. Tobacco use disorder - planning to quit in the near future, we'll continue to monitor.    4. Dyslipidemia - overdue for cholesterol levels. With history of smoking and hypertension, may be a candidate for statin.  - Lipid Profile with reflex to direct LDL    5. Vasculogenic erectile dysfunction, unspecified vasculogenic erectile dysfunction type - discussed trial PDE5I. Discussed how best to use, potential side effects and worrisome symptoms. In contact for refills if this works well. Advised he contact us should we need to alter dosage or medication.  - sildenafil (REVATIO/VIAGRA) 20 MG tablet; Take 1 tablet (20 mg) by mouth three times daily for pulmonary hypertension. "  Never use with nitroglycerin, terazosin or doxazosin.  Dispense: 9 tablet; Refill: 0    6. Chronic midline thoracic back pain - improved    Six-month blood pressure recheck    Josefina Rogers MD  [facility]

## 2017-06-29 ENCOUNTER — TELEPHONE (OUTPATIENT)
Dept: FAMILY MEDICINE | Facility: CLINIC | Age: 52
End: 2017-06-29

## 2017-06-29 NOTE — TELEPHONE ENCOUNTER
Panel Management Review      Patient has the following on his problem list:     Hypertension   Last three blood pressure readings:  BP Readings from Last 3 Encounters:   06/22/17 120/68   06/15/17 124/80   05/24/17 142/84     Blood pressure: Passed    HTN Guidelines:  Age 18-59 BP range:  Less than 140/90  Age 60-85 with Diabetes:  Less than 140/90  Age 60-85 without Diabetes:  less than 150/90      Composite cancer screening  Chart review shows that this patient is due/due soon for the following None  Summary:    Patient is due/failing the following:   None, recent ov pt up to date.                                                                                                                                       Danis Sanders [facility]           Chart routed to none .

## 2017-07-16 ENCOUNTER — CARE COORDINATION (OUTPATIENT)
Dept: CARE COORDINATION | Facility: CLINIC | Age: 52
End: 2017-07-16

## 2017-07-16 DIAGNOSIS — E78.2 MIXED HYPERLIPIDEMIA: ICD-10-CM

## 2017-07-16 DIAGNOSIS — G43.809 MIGRAINE VARIANT: Primary | ICD-10-CM

## 2017-07-17 NOTE — PROGRESS NOTES
Clinic Care Coordination Contact  OUTREACH    Referral Information:  Referral Source: CTS  Reason for Contact: Follow Up Ileus - Diabetes   Care Conference: No     Universal Utilization:   ED Visits in last year: 3  Hospital visits in last year: 2  Last PCP appointment: 06/22/17  Missed Appointments:  (NO CONCERNS)  Concerns:  (NONE)  Multiple Providers or Specialists: YES  (SEE CARE TEAM)    Clinical Concerns:  Current Medical Concerns:   Ileus - patient has followed up with surgeon. He feels he is 95% back to normal. Still struggling to put on some weight - he lost about 25 lbs and is having a hard time gaining it back, so far up 6 lbs     DM - patient states he is not certain how his blood sugars are - he wonders about his recent lab results   Reviewed lab results with patient   Lab Results   Component Value Date    A1C 5.3 06/22/2017   Which is in normal range,     Metabolic panel looks pretty normal - blood sugar only thing flagging there at 102     Cholesterol numbers are flagging HDL and LDL - per chart review patient had been sent a letter with results and he states he did not receive the letter. Reviewed note from Dr. Rogers on lab asking if he would be willing to try cholesterol medication due to risk factors of smoking and htn as well. Patient is willing to take cholesterol medication however he states he was NOT fasting the day his labs were checked.   CCRN will send note to pcp advising of this     Patient states he has been instructed to stop by [facility] to sign a consent form to bring to  so that they can release his records? He is no exactly sure what this is for. CCRN reviewed chart and we do have notes from [facility] scanned into [facility]     Current Behavioral Concerns: history of PTSD - no concerns today - patient is in good spirits     Clinical Pathway Name: None    Medication Management:  States compliance with meds, willing to take cholesterol meds if ordered by  pcp     Functional Status:  Mobility Status: Independent  Equipment Currently Used at Home: none  Transportation: drives   Works full time for the      Psychosocial:  Current living arrangement:: I live alone, I live in a private home  Financial/Insurance: no concerns noted      Resources and Interventions:  Current Resources: Other (Comments) ([facility]);  (NA)  PAS Number:  (NA)  Senior Linkage Line Referral Placed:  (NA)  Advanced Care Plans/Directives on file:: No  Referrals Placed:  (NA)     Patient/Caregiver understanding: yes      Upcoming appointment: none      Plan:   Patient stable   CCRN will send note to pcp regarding cholesterol meds, labs     Tiffanie Hatfield Care Coordinator RN  [facility]  [phone]  July 18, 2017

## 2017-07-18 RX ORDER — SIMVASTATIN 40 MG
40 TABLET ORAL AT BEDTIME
Qty: 90 TABLET | Refills: 3 | Status: SHIPPED | OUTPATIENT
Start: 2017-07-18 | End: 2019-05-05

## 2017-07-18 NOTE — PROGRESS NOTES
Please call Naif.    Referral to [facility] is in. This was his request.     Your provider has referred you for the following:   Consult at [facility]: [facility] [place] - [phone]    Additionally, he does not need to repeat cholesterol as non-fasting cholesterol is accurate for how we assess stroke risk. I sent in a script for simvastatin to his pharmacy. HEATHER

## 2017-07-19 NOTE — PROGRESS NOTES
Clinic Care Coordination Contact    Phone call to patient -     Discussed note from pcp below   Patient will  simvastatin at pharmacy and start taking this. He will call with questions/concerns   Advised that referral was in for neurology - offered to give patient phone number on referral and he declined this stating he had the number   Patient will call with questions/concerns     Closing care coordination at this time patient stable -    Tiffanie Hatfield Care Coordinator RN  [facility] [place] [facility]  [phone]  July 19, 2017

## 2017-08-30 ENCOUNTER — TRANSFERRED RECORDS (OUTPATIENT)
Dept: HEALTH INFORMATION MANAGEMENT | Facility: CLINIC | Age: 52
End: 2017-08-30

## 2017-09-14 DIAGNOSIS — H53.10 SUBJECTIVE VISUAL DISTURBANCE: Primary | ICD-10-CM

## 2017-09-27 ENCOUNTER — OFFICE VISIT (OUTPATIENT)
Dept: OPHTHALMOLOGY | Facility: CLINIC | Age: 52
End: 2017-09-27

## 2017-09-27 DIAGNOSIS — H53.10 SUBJECTIVE VISUAL DISTURBANCE: ICD-10-CM

## 2017-09-27 DIAGNOSIS — H04.123 BILATERAL DRY EYES: ICD-10-CM

## 2017-09-27 DIAGNOSIS — R42 DIZZINESS: ICD-10-CM

## 2017-09-27 DIAGNOSIS — L56.8 PHOTOSENSITIVITY: Primary | ICD-10-CM

## 2017-09-27 DIAGNOSIS — H51.9 CONVERGENCE INSUFFICIENCY OR PALSY IN BINOCULAR EYE MOVEMENT: ICD-10-CM

## 2017-09-27 RX ORDER — ACETAZOLAMIDE 250 MG/1
250 TABLET ORAL 4 TIMES DAILY
Qty: 120 TABLET | Refills: 11 | Status: SHIPPED | OUTPATIENT
Start: 2017-09-27 | End: 2017-11-30

## 2017-09-27 ASSESSMENT — REFRACTION_WEARINGRX
OD_CYLINDER: +0.50
OD_AXIS: 131
OD_SPHERE: -0.25
SPECS_TYPE: BIFOCAL
OD_ADD: +2.00
OS_SPHERE: +0.25
OS_CYLINDER: +0.50
OS_AXIS: 038
OS_ADD: +2.00

## 2017-09-27 ASSESSMENT — VISUAL ACUITY
OD_SC: 20/20
OS_SC+: -2
METHOD: SNELLEN - LINEAR
OD_SC+: -
OS_SC: 20/20

## 2017-09-27 ASSESSMENT — REFRACTION_MANIFEST
OD_CYLINDER: SPHERE
OS_CYLINDER: SPHERE
OS_SPHERE: +0.75
OS_ADD: +2.00
OD_SPHERE: +0.50
OD_ADD: +2.00

## 2017-09-27 ASSESSMENT — TONOMETRY
IOP_METHOD: ICARE
OD_IOP_MMHG: 13
OS_IOP_MMHG: 13

## 2017-09-27 ASSESSMENT — EXTERNAL EXAM - LEFT EYE: OS_EXAM: NORMAL

## 2017-09-27 ASSESSMENT — SLIT LAMP EXAM - LIDS
COMMENTS: MEIBOMIAN GLAND DYSFUNCTION
COMMENTS: MEIBOMIAN GLAND DYSFUNCTION

## 2017-09-27 ASSESSMENT — CONF VISUAL FIELD
OS_NORMAL: 1
OD_NORMAL: 1
METHOD: COUNTING FINGERS

## 2017-09-27 ASSESSMENT — CUP TO DISC RATIO
OS_RATIO: 0.2
OD_RATIO: 0.2

## 2017-09-27 ASSESSMENT — EXTERNAL EXAM - RIGHT EYE: OD_EXAM: NORMAL

## 2017-09-27 NOTE — PROGRESS NOTES
Assessment & Plan     Naif Howell Jr. is a 52 year old male with the following diagnoses:   1. Photosensitivity    2. Subjective visual disturbance    3. Bilateral dry eyes       He has been referred by Dr. Medrano at the [facility] neurological clinic for the initial evaluation of vision problems. He describes his eyes as ''sore'' that is worse at night. Bright light seems to make things worse. Reading and computer work would make things better and taking breaks in between computer work seems to help. He has never been told that he has dry eyes. He has tried blue-tinted lenses that has not helped him much. He also feels dizzy and has balance issues. He has not been able to identify a specific trigger but balance is worse with driving or walking faster. He has had a normal MRI brain in 3/2017.    His exam is significant for moderate to severe meibomian gland dysfunction and his symptoms seems to worsen with computer use or reading and improve with break which fits the diagnosis of dry eye syndrome. Also, he is hyperopic and he was found today to have more hyperopia on manifest refraction so this ,as well, may be contributing to his eye strain. Recommend lid hygeine, fish oil supplementation, hydration and using more hyperopic prescription as well as taking regular breaks during computer work.     He has convergence insufficiency.  Will give him 6 base in Fresnel prism on his bifocals.  This may be contributing to his difficulty.      He has visually induced dizziness.  This can improve with diamox.  Will give him 250 at bedtime and increase to 1000 mg a day as needed.  Will also refer him to the balance center for evaluation and treatment.           Attending Physician Attestation:  Complete documentation of historical and exam elements from today's encounter can be found in the full encounter summary report (not reduplicated in this progress note).  I personally obtained the chief complaint(s) and history of  present illness.  I confirmed and edited as necessary the review of systems, past medical/surgical history, family history, social history, and examination findings as documented by others; and I examined the patient myself.  I personally reviewed the relevant tests, images, and reports as documented above.  I formulated and edited as necessary the assessment and plan and discussed the findings and management plan with the patient and family. - Claude Batista MD

## 2017-09-27 NOTE — MR AVS SNAPSHOT
"              After Visit Summary   9/27/2017    Naif Howell Jr.    MRN: [id number]           Patient Information     Date Of Birth          1965        Visit Information        Provider Department      9/27/2017 7:15 AM Claude Batista MD [facility] Ophthalmology        Today's Diagnoses     Photosensitivity    -  1    Subjective visual disturbance        Bilateral dry eyes        Dizziness           Follow-ups after your visit        Additional Services     PHYSICAL THERAPY REFERRAL       *This therapy referral will be filtered to a centralized scheduling office at [facility] and the patient will receive a call to schedule an appointment at a [place] location most convenient for them. *     [facility] provides Physical Therapy evaluation and treatment and many specialty services across the [place] system.  If requesting a specialty program, please choose from the list below.    If you have not heard from the scheduling office within 2 business days, please call [phone] for all locations, with the exception of [place], please call [phone].  Treatment: Evaluation & Treatment  Special Instructions/Modalities: visual dizziness - cathy Bettencourt  Special Programs: Balance/Vestibular    Please be aware that coverage of these services is subject to the terms and limitations of your health insurance plan.  Call member services at your health plan with any benefit or coverage questions.      **Note to Provider:  If you are referring outside of [place] for the therapy appointment, please list the name of the location in the \"special instructions\" above, print the referral and give to the patient to schedule the appointment.                  Who to contact     Please call your clinic at [phone] to:    Ask questions about your health    Make or cancel appointments    Discuss your medicines    Learn about your test results    Speak to your doctor   If you " have compliments or concerns about an experience at your clinic, or if you wish to file a complaint, please contact [facility] Patient Relations at [phone] or email us at [email or web address]         Additional Information About Your Visit        Nvelopedhart Information     MediaXstreamt is an electronic gateway that provides easy, online access to your medical records. With CarePayment, you can request a clinic appointment, read your test results, renew a prescription or communicate with your care team.     To sign up for CarePayment visit the website at [email or web address]   You will be asked to enter the access code listed below, as well as some personal information. Please follow the directions to create your username and password.     Your access code is: [email or web address]  Expires: 2017  9:52 AM     Your access code will  in 90 days. If you need help or a new code, please contact your [facility] or call [phone] for assistance.        Care EveryWhere ID     This is your Care EveryWhere ID. This could be used by other organizations to access your [place] medical records  [id number]         Blood Pressure from Last 3 Encounters:   17 120/68   06/15/17 124/80   17 142/84    Weight from Last 3 Encounters:   17 85 kg (187 lb 4.8 oz)   17 89.2 kg (196 lb 9.6 oz)   17 90.7 kg (200 lb)              We Performed the Following     Color Vision - Screening OU (both eyes)     Glaucoma Top OU     PHYSICAL THERAPY REFERRAL     Refraction     Sensorimotor          Today's Medication Changes          These changes are accurate as of: 17  9:52 AM.  If you have any questions, ask your nurse or doctor.               Start taking these medicines.        Dose/Directions    acetaZOLAMIDE 250 MG tablet   Commonly known as:  DIAMOX   Used for:  Subjective visual disturbance, Photosensitivity, Bilateral dry eyes   Started  by:  Claude Batista MD        Dose:  250 mg   Take 1 tablet (250 mg) by mouth 4 times daily   Quantity:  120 tablet   Refills:  11            Where to get your medicines      These medications were sent to [id number] - [facility] [address]  AT [facility] [place] [address] [facility] [address]     Phone:  [phone]     acetaZOLAMIDE 250 MG tablet                Primary Care Provider Office Phone # Fax #    Josefina Lester Rogers [phone] [address]  [facility] [address]        Equal Access to Services     PADMINI MARQUEZ AH: Hadii carl salmeron hadasho Soomaali, waaxda luqadaha, qaybta kaalmada adeegyada, tres bird. So [facility] [phone].    ATENCIÓN: Si habla español, tiene a morocho disposición servicios gratuitos de asistencia lingüística. [facility] [phone].    We comply with applicable federal civil rights laws and [place] laws. We do not discriminate on the basis of race, color, national origin, age, disability sex, sexual orientation or gender identity.            Thank you!     Thank you for choosing [facility] OPHTHALMOLOGY  for your care. Our goal is always to provide you with excellent care. Hearing back from our patients is one way we can continue to improve our services. Please take a few minutes to complete the written survey that you may receive in the mail after your visit with us. Thank you!             Your Updated Medication List - Protect others around you: Learn how to safely use, store and throw away your medicines at www.disposemymeds.org.          This list is accurate as of: 9/27/17  9:52 AM.  Always use your most recent med list.                   Brand Name Dispense Instructions for use Diagnosis    acetaZOLAMIDE 250 MG tablet    DIAMOX    120 tablet    Take 1 tablet (250 mg) by mouth 4 times daily    Subjective visual disturbance, Photosensitivity, Bilateral dry eyes       buPROPion 150  MG 12 hr tablet    WELLBUTRIN SR    60 tablet    Take 1 tablet once daily and increase to 1 tablet twice daily after 4 to 7 days    Smoker       ibuprofen 600 MG tablet    ADVIL/MOTRIN    120 tablet    Take 1 tablet (600 mg) by mouth every 6 hours as needed for moderate pain        losartan 50 MG tablet    COZAAR    90 tablet    Take 1 tablet (50 mg) by mouth daily    Benign essential hypertension       multivitamin, therapeutic with minerals Tabs tablet      Take 1 tablet by mouth daily        sildenafil 20 MG tablet    REVATIO    9 tablet    Take 1 tablet (20 mg) by mouth three times daily for pulmonary hypertension.  Never use with nitroglycerin, terazosin or doxazosin.    Vasculogenic erectile dysfunction, unspecified vasculogenic erectile dysfunction type       simvastatin 40 MG tablet    ZOCOR    90 tablet    Take 1 tablet (40 mg) by mouth At Bedtime    Mixed hyperlipidemia

## 2017-09-27 NOTE — NURSING NOTE
Chief Complaints and History of Present Illnesses   Patient presents with     Consult For     Blurry vision, photophobia, difficulty driving at night     HPI    Affected eye(s):  Both   Symptoms:     Blurred vision   Difficulty with driving   Redness   Photophobia      Frequency:  Constant       Do you have eye pain now?:  Yes   Location:  OU   Pain Level:  Mild Pain (3)   Pain Frequency:  Constant      Comments:  2 years with blurry vision, photophobia, difficulty with bright light when driving at night, balance issues, and headaches. Slowly getting worse.     Casie Scott COT 7:06 AM September 27, 2017

## 2017-09-27 NOTE — LETTER
2017         RE:  :  MRN: Naif Howell Jr.  1965  [id number]     Dear Dr. Medrano,    Thank you for asking me to see your very pleasant patient, Naif Howell Jr., in neuro-ophthalmic consultation.  I would like to thank you for sending your records and I have summarized them in the history of present illness.  My assessment and plan are below.  For further details, please see my attached clinic note.      Assessment & Plan     Naif Howell Jr. is a 52 year old male with the following diagnoses:   1. Photosensitivity    2. Subjective visual disturbance    3. Bilateral dry eyes       He has been referred by Dr. Medrano at the [facility] neurological clinic for the initial evaluation of vision problems. He describes his eyes as ''sore'' that is worse at night. Bright light seems to make things worse. Reading and computer work would make things better and taking breaks in between computer work seems to help. He has never been told that he has dry eyes. He has tried blue-tinted lenses that has not helped him much. He also feels dizzy and has balance issues. He has not been able to identify a specific trigger but balance is worse with driving or walking faster. He has had a normal MRI brain in 3/2017.    His exam is significant for moderate to severe meibomian gland dysfunction and his symptoms seems to worsen with computer use or reading and improve with break which fits the diagnosis of dry eye syndrome. Also, he is hyperopic and he was found today to have more hyperopia on manifest refraction so this ,as well, may be contributing to his eye strain. Recommend lid hygeine, fish oil supplementation, hydration and using more hyperopic prescription as well as taking regular breaks during computer work.     He has convergence insufficiency.  Will give him 6 base in Fresnel prism on his bifocals.  This may be contributing to his difficulty.      He has visually induced dizziness.  This can improve with diamox.  Will  give him 250 at bedtime and increase to 1000 mg a day as needed.  Will also refer him to the balance center for evaluation and treatment.          Again, thank you for allowing me to participate in the care of your patient.      Sincerely,    Claude Batista MD  Professor, Neuro-Ophthalmology  Department of Ophthalmology and Visual Neurosciences  [facility]    CC: Jackie Medrano MD  [facility] Neurological [facility]  [address] [facility] [address]  VIA Facsimile: [phone]     Josefina Rogers MD  [address] [facility] [address]  VIA In Basket     Corey Vang MD  [address]  [facility] [address]  VIA In Basket     Yue Mckeon DO  [facility]  [address]  [facility] [address]  VIA In Basket

## 2017-10-03 ENCOUNTER — OFFICE VISIT (OUTPATIENT)
Dept: FAMILY MEDICINE | Facility: CLINIC | Age: 52
End: 2017-10-03
Payer: OTHER GOVERNMENT

## 2017-10-03 VITALS
HEIGHT: 74 IN | WEIGHT: 198.8 LBS | TEMPERATURE: 98.3 F | HEART RATE: 91 BPM | DIASTOLIC BLOOD PRESSURE: 80 MMHG | SYSTOLIC BLOOD PRESSURE: 130 MMHG | BODY MASS INDEX: 25.51 KG/M2 | OXYGEN SATURATION: 95 %

## 2017-10-03 DIAGNOSIS — H60.392 INFECTIVE OTITIS EXTERNA, LEFT: Primary | ICD-10-CM

## 2017-10-03 PROCEDURE — 99213 OFFICE O/P EST LOW 20 MIN: CPT | Performed by: NURSE PRACTITIONER

## 2017-10-03 RX ORDER — NEOMYCIN SULFATE, POLYMYXIN B SULFATE AND HYDROCORTISONE 10; 3.5; 1 MG/ML; MG/ML; [USP'U]/ML
4 SUSPENSION/ DROPS AURICULAR (OTIC) 4 TIMES DAILY
Qty: 10 ML | Refills: 0 | Status: SHIPPED | OUTPATIENT
Start: 2017-10-03 | End: 2017-11-30

## 2017-10-03 ASSESSMENT — ANXIETY QUESTIONNAIRES
IF YOU CHECKED OFF ANY PROBLEMS ON THIS QUESTIONNAIRE, HOW DIFFICULT HAVE THESE PROBLEMS MADE IT FOR YOU TO DO YOUR WORK, TAKE CARE OF THINGS AT HOME, OR GET ALONG WITH OTHER PEOPLE: SOMEWHAT DIFFICULT
7. FEELING AFRAID AS IF SOMETHING AWFUL MIGHT HAPPEN: NOT AT ALL
1. FEELING NERVOUS, ANXIOUS, OR ON EDGE: SEVERAL DAYS
2. NOT BEING ABLE TO STOP OR CONTROL WORRYING: NOT AT ALL
5. BEING SO RESTLESS THAT IT IS HARD TO SIT STILL: SEVERAL DAYS
6. BECOMING EASILY ANNOYED OR IRRITABLE: SEVERAL DAYS
GAD7 TOTAL SCORE: 4
3. WORRYING TOO MUCH ABOUT DIFFERENT THINGS: NOT AT ALL

## 2017-10-03 ASSESSMENT — PATIENT HEALTH QUESTIONNAIRE - PHQ9
5. POOR APPETITE OR OVEREATING: SEVERAL DAYS
SUM OF ALL RESPONSES TO PHQ QUESTIONS 1-9: 4

## 2017-10-03 NOTE — NURSING NOTE
"Chief Complaint   Patient presents with     Ear Problem       Initial /80 (BP Location: Right arm, Patient Position: Chair, Cuff Size: Adult Large)  Pulse 91  Temp 98.3  F (36.8  C) (Oral)  Ht 6' 2\" (1.88 m)  Wt 198 lb 12.8 oz (90.2 kg)  SpO2 95%  BMI 25.52 kg/m2 Estimated body mass index is 25.52 kg/(m^2) as calculated from the following:    Height as of this encounter: 6' 2\" (1.88 m).    Weight as of this encounter: 198 lb 12.8 oz (90.2 kg).  Medication Reconciliation: complete   BRANDEE Uribe      "

## 2017-10-03 NOTE — PROGRESS NOTES
SUBJECTIVE:   Naif Howell Jr. is a 52 year old male who presents to clinic today for the following health issues:      Acute Illness   Acute illness concerns: ear pain   Onset: 2 days     Fever: no    Chills/Sweats: no    Headache (location?): YES    Sinus Pressure:no    Conjunctivitis:  no    Ear Pain: YES: bilateral    Rhinorrhea: no    Congestion: no    Sore Throat: no     Cough: no    Wheeze: no    Decreased Appetite: no    Nausea: no    Vomiting: no    Diarrhea:  no    Dysuria/Freq.: no    Fatigue/Achiness: no    Sick/Strep Exposure: no     Therapies Tried and outcome: Ibu     Complaints of left outer ear pain for the past several days. History of recurrent otitis externa and typically using drops that are helpful in managing his symptoms. Has noticed worsening pain in the left ear.     Problem list and histories reviewed & adjusted, as indicated.  Additional history: none    Patient Active Problem List   Diagnosis     PTSD (post-traumatic stress disorder)     Aortic root dilatation (H)     GERD (gastroesophageal reflux disease)     Aortic regurgitation     Dyslipidemia     Benign essential hypertension     HTN, goal below 140/90     Chronic midline thoracic back pain     Vestibular migraine     SBO (small bowel obstruction)     Health Care Home     Tobacco use disorder     Past Surgical History:   Procedure Laterality Date     LAPAROSCOPIC CHOLECYSTECTOMY WITH CHOLANGIOGRAMS N/A 5/12/2017    Procedure: LAPAROSCOPIC CHOLECYSTECTOMY WITH CHOLANGIOGRAMS;  LAPAROSCOPIC CHOLECYSTECTOMY WITH CHOLANGIOGRAMS ;  Surgeon: Corey Vang MD;  Location: [facility] OR     LAPAROSCOPIC LYSIS ADHESIONS N/A 5/16/2017    Procedure: LAPAROSCOPIC LYSIS ADHESIONS;;  Surgeon: Corey Vang MD;  Location: [facility] OR     LAPAROSCOPY DIAGNOSTIC (GENERAL) N/A 5/16/2017    Procedure: LAPAROSCOPY DIAGNOSTIC (GENERAL);  DIAGNOSTIC LAPAROSCOPY, LAPAROTOMY,  SMALL BOWEL RESECTION, LAPAROSCOPIC LYSIS OF ADHESIONS;  Surgeon: Corey Vang MD;   "Location: [facility] OR     RESECT SMALL BOWEL WITHOUT OSTOMY N/A 2017    Procedure: RESECT SMALL BOWEL WITHOUT OSTOMY;;  Surgeon: Corey Vang MD;  Location: [facility] OR     Resection of basal cell carcinoma      Left forehead       Social History   Substance Use Topics     Smoking status: Current Every Day Smoker     Packs/day: 1.00     Types: Cigarettes     Smokeless tobacco: Former User     Alcohol use No     Family History   Problem Relation Age of Onset     Hypertension Mother      alive     CANCER Father      chest tumor-not sure about details,  at age of 71     Family History Negative Sister      2     Hypertension Sister      Family History Negative Brother      1     Family History Negative Maternal Grandmother      Hypertension Maternal Grandmother      Family History Negative Maternal Grandfather      Family History Negative Paternal Grandmother      Family History Negative Paternal Grandfather      Cancer - colorectal No family hx of      Prostate Cancer No family hx of      Glaucoma No family hx of      Macular Degeneration No family hx of              Reviewed and updated as needed this visit by clinical staffTobacco  Allergies  Meds  Problems  Med Hx  Surg Hx  Fam Hx  Soc Hx        Reviewed and updated as needed this visit by Provider  Allergies  Meds  Problems  Med Hx  Surg Hx  Fam Hx         ROS:  Constitutional, HEENT, cardiovascular, pulmonary, gi and gu systems are negative, except as otherwise noted.      OBJECTIVE:   /80 (BP Location: Right arm, Patient Position: Chair, Cuff Size: Adult Large)  Pulse 91  Temp 98.3  F (36.8  C) (Oral)  Ht 6' 2\" (1.88 m)  Wt 198 lb 12.8 oz (90.2 kg)  SpO2 95%  BMI 25.52 kg/m2  Body mass index is 25.52 kg/(m^2).  GENERAL: healthy, alert and no distress  EYES: Eyes grossly normal to inspection, PERRL and conjunctivae and sclerae normal  HENT: normal cephalic/atraumatic, left ear: erythematous and red and boggy canal, nose and mouth without " ulcers or lesions, oropharynx clear and oral mucous membranes moist  NECK: no adenopathy, no asymmetry, masses, or scars and thyroid normal to palpation  RESP: lungs clear to auscultation - no rales, rhonchi or wheezes  CV: regular rate and rhythm, normal S1 S2, no S3 or S4, no murmur, click or rub, no peripheral edema and peripheral pulses strong  PSYCH: mentation appears normal, affect normal/bright        ASSESSMENT/PLAN:             1. Infective otitis externa, left   Cortisporin ear drops prescribed. Encouraged to use tylenol for pain.   - neomycin-polymyxin-hydrocortisone (CORTISPORIN) 3.5-48605-0 otic suspension; Place 4 drops in ear(s) 4 times daily  Dispense: 10 mL; Refill: 0  Follow up if no improvement.     Kiki Ly, NP  [facility]

## 2017-10-03 NOTE — MR AVS SNAPSHOT
"              After Visit Summary   10/3/2017    Naif Howell Jr.    MRN: [id number]           Patient Information     Date Of Birth          1965        Visit Information        Provider Department      10/3/2017 2:30 PM Kiki Ly, NP [facility]        Today's Diagnoses     Infective otitis externa, left    -  1       Follow-ups after your visit        Your next 10 appointments already scheduled     Oct 04, 2017  8:00 AM CDT   Vestibular Eval with Gato Navarro, PT   [facility] ([facility])    [address] [facility] [id number] [phone]              Who to contact     If you have questions or need follow up information about today's clinic visit or your schedule please contact [facility] directly at [phone].  Normal or non-critical lab and imaging results will be communicated to you by WebEventshart, letter or phone within 4 business days after the clinic has received the results. If you do not hear from us within 7 days, please contact the clinic through MyChart or phone. If you have a critical or abnormal lab result, we will notify you by phone as soon as possible.  Submit refill requests through Medrobotics or call your pharmacy and they will forward the refill request to us. Please allow 3 business days for your refill to be completed.          Additional Information About Your Visit        MyChart Information     Medrobotics lets you send messages to your doctor, view your test results, renew your prescriptions, schedule appointments and more. To sign up, go to [email or web address] . Click on \"Log in\" on the left side of the screen, which will take you to the Welcome page. Then click on \"Sign up Now\" on the right side of the page.     You will be asked to enter the access code listed below, as well as some personal information. Please follow the directions to create your username and password.     Your access code " "is: [email or web address]  Expires: 2017  9:52 AM     Your access code will  in 90 days. If you need help or a new code, please call your [place] clinic or [phone].        Care EveryWhere ID     This is your Care EveryWhere ID. This could be used by other organizations to access your [place] medical records  [id number]        Your Vitals Were     Pulse Temperature Height Pulse Oximetry BMI (Body Mass Index)       91 98.3  F (36.8  C) (Oral) 6' 2\" (1.88 m) 95% 25.52 kg/m2        Blood Pressure from Last 3 Encounters:   10/03/17 130/80   17 120/68   06/15/17 124/80    Weight from Last 3 Encounters:   10/03/17 198 lb 12.8 oz (90.2 kg)   17 187 lb 4.8 oz (85 kg)   17 196 lb 9.6 oz (89.2 kg)              Today, you had the following     No orders found for display         Today's Medication Changes          These changes are accurate as of: 10/3/17  2:30 PM.  If you have any questions, ask your nurse or doctor.               Start taking these medicines.        Dose/Directions    neomycin-polymyxin-hydrocortisone 3.5-93834-3 otic suspension   Commonly known as:  CORTISPORIN   Used for:  Infective otitis externa, left   Started by:  Kiki Ly, NP        Dose:  4 drop   Place 4 drops in ear(s) 4 times daily   Quantity:  10 mL   Refills:  0            Where to get your medicines      These medications were sent to [facility] [address] [place] [address] [address] AT [facility] [address]  [address] [facility] [address]     Phone:  [phone]     neomycin-polymyxin-hydrocortisone 3.5-14649-3 otic suspension                Primary Care Provider Office Phone # Fax #    Josefina Rogers [phone] [address] [address]        Equal Access to Services     JENNIFER MARQUEZ AH: Brooklyn Mccann, wafreddy mayorga waxay idiin hayaan adeeg kharash la'aan . [facility] [phone].    ATENCIÓN: " Si habla silvio, tiene a morocho disposición servicios gratuitos de asistencia lingüística. Chelle potter [phone].    We comply with applicable federal civil rights laws and [place] laws. We do not discriminate on the basis of race, color, national origin, age, disability, sex, sexual orientation, or gender identity.            Thank you!     Thank you for choosing [facility]  for your care. Our goal is always to provide you with excellent care. Hearing back from our patients is one way we can continue to improve our services. Please take a few minutes to complete the written survey that you may receive in the mail after your visit with us. Thank you!             Your Updated Medication List - Protect others around you: Learn how to safely use, store and throw away your medicines at www.disposemymeds.org.          This list is accurate as of: 10/3/17  2:30 PM.  Always use your most recent med list.                   Brand Name Dispense Instructions for use Diagnosis    acetaZOLAMIDE 250 MG tablet    DIAMOX    120 tablet    Take 1 tablet (250 mg) by mouth 4 times daily    Subjective visual disturbance, Photosensitivity, Bilateral dry eyes       buPROPion 150 MG 12 hr tablet    WELLBUTRIN SR    60 tablet    Take 1 tablet once daily and increase to 1 tablet twice daily after 4 to 7 days    Smoker       losartan 50 MG tablet    COZAAR    90 tablet    Take 1 tablet (50 mg) by mouth daily    Benign essential hypertension       multivitamin, therapeutic with minerals Tabs tablet      Take 1 tablet by mouth daily        neomycin-polymyxin-hydrocortisone 3.5-67809-3 otic suspension    CORTISPORIN    10 mL    Place 4 drops in ear(s) 4 times daily    Infective otitis externa, left       sildenafil 20 MG tablet    REVATIO    9 tablet    Take 1 tablet (20 mg) by mouth three times daily for pulmonary hypertension.  Never use with nitroglycerin, terazosin or doxazosin.    Vasculogenic erectile dysfunction,  unspecified vasculogenic erectile dysfunction type       simvastatin 40 MG tablet    ZOCOR    90 tablet    Take 1 tablet (40 mg) by mouth At Bedtime    Mixed hyperlipidemia

## 2017-10-04 ENCOUNTER — HOSPITAL ENCOUNTER (OUTPATIENT)
Dept: PHYSICAL THERAPY | Facility: CLINIC | Age: 52
Setting detail: THERAPIES SERIES
End: 2017-10-04
Attending: OPHTHALMOLOGY
Payer: OTHER GOVERNMENT

## 2017-10-04 PROCEDURE — 40000840 ZZHC STATISTIC PT VESTIBULAR VISIT: Performed by: PHYSICAL THERAPIST

## 2017-10-04 PROCEDURE — 97161 PT EVAL LOW COMPLEX 20 MIN: CPT | Mod: GP | Performed by: PHYSICAL THERAPIST

## 2017-10-04 PROCEDURE — 97112 NEUROMUSCULAR REEDUCATION: CPT | Mod: GP | Performed by: PHYSICAL THERAPIST

## 2017-10-04 ASSESSMENT — ANXIETY QUESTIONNAIRES: GAD7 TOTAL SCORE: 4

## 2017-10-04 NOTE — PROGRESS NOTES
10/04/17 0700   Quick Adds   Quick Adds Vestibular Eval   Type of Visit Initial OP PT Evaluation   General Information   Start of Care Date 10/04/17   Referring Physician Claude Batista MD    Orders Evaluate and Treat as Indicated   Order Date 09/27/17   Medical Diagnosis Dizziness   Onset of illness/injury or Date of Surgery 10/04/15  (pt reports onset ~2 yrs ago)   Precautions/Limitations no known precautions/limitations   Surgical/Medical history reviewed Yes   Pertinent history of current vestibular problem (include personal factors and/or comorbidities that impact the POC)  (photosensitivity)   Pertinent history of current problem (include personal factors and/or comorbidities that impact the POC) Pt presents to PT to address symptoms of intermittent HA with visual disturbances and balance issues that he first noticed ~2 yrs ago, denies any preceding head trauma or injury.  Hx of longstanding photosensitivity, has difficulty looking at a computer screen, driving looking at vehicle head lights, and looking at the sun due to eye irritiation, redness, HA, somewhat blurred vision and difficulty focusing.  He recently was seen by neurology, was referred to a neuroophlamologist and was provided prism lenses on the L side, has not noted a large improvement in symptoms so far.  He reports they told him he has a difficulty with convergence of his eyes.   He was seen by vestibular PT in 2016 with mild improvement in symptoms.  He works a desk job within the  and needs to take many visual/mental rest breaks from using his computer due to symptoms of HA/blurred vision/visual strain.  He denies overt vertigo, but feels his balance is impaired with dynamic head/body turns.   Pertinent Visual History  wears bifocals, recently prescribed prism lenses on his glasses on one side   Prior level of function comment Indep PLOF, active with the    Diagnostic Tests MRI  (per Neuro notes, normal MRI)   MRI  Results unremarkable   Previous/Current Treatment Physical Therapy  (Vestib PT in 2016)   Improvement after PT Mild   Patient role/Employment history Employed  ()   Home/Community Accessibility Comments No issues accessing home or community. Continues to drive.   Assistive Devices Comments no AD   Patient/Family Goals Statement Pt hopes to improve his visual acuity and less strain/HA with looking at computer, driving, improve his balance   Fall Risk Screen   Fall screen completed by PT   Per patient - Fall 2 or more times in past year? No   Per patient - Fall with injury in past year? No   Is patient a fall risk? No   System Outcome Measures   Outcome Measures BPPV   Dizziness Handicap Inventory (score out of 100) A decrease in score by 17.18 or greater indicates a clinically significant change in symptoms. 48   Pain   Patient currently in pain No   Pain comments hx of chronic neck pain, minimal pain currently   Cognitive Status Examination   Orientation orientation to person, place and time   Integumentary   Integumentary No deficits were identified   Posture   Posture Forward head position   Range of Motion (ROM)   ROM Comment UE/LE AROM WNL, mild end range cervical ROM loss but WNL, chronic neck issues reported   Bed Mobility   Bed Mobility Comments Indep, no change in symptoms   Transfer Skills   Transfer Comments Indep, steady on feet, no/minimal use of hands   Gait   Gait Comments Steady gait, normal gait speed, reciprocal gait pattern.  Reports visual strain and offbalance during visual focusing on target.  Able to navigate stairs reciprocal pattern, no rail.   Gait Special Tests Dynamic Gait Index   Score out of 24 11/12   Comments 4-item DGI, low risk for falls, only mild path deviation with horiz head turns   Balance   Balance Comments impaired balance with SL, tandem or on foam with eyes closed, otherwise steady gait.   Balance Special Tests Modified CTSIB Conditions   Condition 1, seconds 30  Seconds   Condition 2, seconds 30 Seconds   Condition 4, seconds 30 Seconds   Condition 5, seconds 10 Seconds   Sensory Examination   Sensory Perception Comments intermittent n/t into R hand, improved after nerve ablation 1-2 yrs ago, now worsening again, currently fine   Coordination   Coordination no deficits were identified   Muscle Tone   Muscle Tone no deficits were identified   Cervicogenic Screen   Neck ROM cervical ROM WNL, mild end range tightness in all directions   Oculomotor Exam   Smooth Pursuit Normal   Saccades Normal   VOR Normal   Rapid Head Thrust Normal   Convergence Testing Normal;Other   Convergence Testing Comments increased eye strain and posterior HA reported with convergence challenge   Infrared Goggle Exam or Frenzel Lense Exam   Exam completed with Infrared Goggles   Spontaneous Nystagmus Negative   Gaze Evoked Nystagmus Negative   Head Shake Horizontal Nystagmus Negative   Positional Testing comments no increase in symptoms with positional change, only mild dizziness iwth quick supine > sit, mild upbeat with supine positioning, asymptomatic   Zhanna-Hallpike (right) Negative   Zhanna-Hallpike (Left) Negative   HSCC Supine Roll Test (Right) Negative   HSCC Supine Roll Test (Left) Negative   Head Shake Horizontal Nystagmus comments increased symptoms of leaning/tilting, no vertigo   Dynamic Visual Acuity (DVA)   Static Acuity (LogMar) Line 11   Horizontal Head Movement at 2 Hz (LogMar) Line 10   DVA Comments normal testing with glasses on   Planned Therapy Interventions   Planned Therapy Interventions balance training;gait training;neuromuscular re-education;manual therapy;other (see comments)  (CRM, VOR training, habituation training as indicated)   Clinical Impression   Criteria for Skilled Therapeutic Interventions Met yes, treatment indicated   PT Diagnosis Subjective dizziness and visual sensitivity/strain   Influenced by the following impairments Impaired visual vergence tolerance and gaze  stability, impaired dynamic balance   Functional limitations due to impairments Impaired tolerance with visual fixation, using computer, driving tasks, impaired dynamic gait stability with head/body turns   Clinical Presentation Stable/Uncomplicated   Clinical Presentation Rationale stable medical status, normal MRI, PMH, PLOF, stable symptoms   Clinical Decision Making (Complexity) Low complexity   Therapy Frequency 1 time/week   Predicted Duration of Therapy Intervention (days/wks) 4 wks   Risk & Benefits of therapy have been explained Yes   Patient, Family & other staff in agreement with plan of care Yes   Clinical Impression Comments Negative s/s of BPPV or UVH, but positive visual sensitivity/strain with focusing tasks and vergence challenges.  Mild impairment dynamic gait stability with quick head turns and mild impairment in balance with emphasis on vestibular reliance.     Education Assessment   Preferred Learning Style Listening;Reading;Demonstration;Pictures/video   Barriers to Learning No barriers   Goal 1   Goal Identifier DHI   Goal Description Pt will report score <10/100 on the DHI for improved dizziness symptoms and return to improved daily activity (baseline 48/100)   Target Date 11/10/17   Goal 2   Goal Identifier Gait stability   Goal Description Pt will score 12/12 on the 4-item DGI and/or 22/24 or greater on the full DGI to indicate improved dynamic gait stability, tolerance with quick head turns while walking, and minimized risk for falls.   Target Date 11/10/17   Goal 3   Goal Identifier HEP   Goal Description Pt will be indep with continued HEP for management of visual/balance symptoms and improved QOL/tolerance with daily/work related tasks.   Target Date 11/10/17   Total Evaluation Time   Total Evaluation Time (Minutes) 35

## 2017-10-17 NOTE — TELEPHONE ENCOUNTER
Patient had a radiofrequency ablation (RFA) on 03/21/17.  Called patient for an update.        Left message that we were calling for an update about how s/he was doing after the procedure and that if s/he has any questions or concerns to call the nurse line at [phone].      [place] FERNANDEZ (R)       see chief complaint quote

## 2017-10-18 ENCOUNTER — HOSPITAL ENCOUNTER (OUTPATIENT)
Dept: PHYSICAL THERAPY | Facility: CLINIC | Age: 52
Setting detail: THERAPIES SERIES
End: 2017-10-18
Attending: OTOLARYNGOLOGY
Payer: OTHER GOVERNMENT

## 2017-10-18 PROCEDURE — 40000840 ZZHC STATISTIC PT VESTIBULAR VISIT: Performed by: PHYSICAL THERAPIST

## 2017-10-18 PROCEDURE — 97112 NEUROMUSCULAR REEDUCATION: CPT | Mod: GP | Performed by: PHYSICAL THERAPIST

## 2017-10-23 NOTE — ADDENDUM NOTE
Encounter addended by: Keira Gastelum PT on: 10/23/2017 10:04 AM<BR>     Actions taken: Flowsheet accepted

## 2017-10-23 NOTE — ADDENDUM NOTE
Encounter addended by: Keira Gastelum PT on: 10/23/2017  1:15 PM<BR>     Actions taken: Flowsheet accepted

## 2017-10-25 ENCOUNTER — HOSPITAL ENCOUNTER (OUTPATIENT)
Dept: PHYSICAL THERAPY | Facility: CLINIC | Age: 52
Setting detail: THERAPIES SERIES
End: 2017-10-25
Attending: OTOLARYNGOLOGY
Payer: OTHER GOVERNMENT

## 2017-10-25 PROCEDURE — 97140 MANUAL THERAPY 1/> REGIONS: CPT | Mod: GP | Performed by: PHYSICAL THERAPIST

## 2017-10-25 PROCEDURE — 97112 NEUROMUSCULAR REEDUCATION: CPT | Mod: GP | Performed by: PHYSICAL THERAPIST

## 2017-10-25 PROCEDURE — 97012 MECHANICAL TRACTION THERAPY: CPT | Mod: GP | Performed by: PHYSICAL THERAPIST

## 2017-10-25 PROCEDURE — 40000185 ZZHC STATISTIC PT OUTPT VISIT: Performed by: PHYSICAL THERAPIST

## 2017-10-25 PROCEDURE — 97110 THERAPEUTIC EXERCISES: CPT | Mod: GP | Performed by: PHYSICAL THERAPIST

## 2017-10-30 ENCOUNTER — HOSPITAL ENCOUNTER (OUTPATIENT)
Dept: PHYSICAL THERAPY | Facility: CLINIC | Age: 52
Setting detail: THERAPIES SERIES
End: 2017-10-30
Attending: OTOLARYNGOLOGY
Payer: OTHER GOVERNMENT

## 2017-10-30 PROCEDURE — 97012 MECHANICAL TRACTION THERAPY: CPT | Mod: GP | Performed by: PHYSICAL THERAPIST

## 2017-10-30 PROCEDURE — 97140 MANUAL THERAPY 1/> REGIONS: CPT | Mod: GP | Performed by: PHYSICAL THERAPIST

## 2017-10-30 PROCEDURE — 40000718 ZZHC STATISTIC PT DEPARTMENT ORTHO VISIT: Performed by: PHYSICAL THERAPIST

## 2017-11-15 ENCOUNTER — HOSPITAL ENCOUNTER (OUTPATIENT)
Dept: PHYSICAL THERAPY | Facility: CLINIC | Age: 52
Setting detail: THERAPIES SERIES
End: 2017-11-15
Attending: OTOLARYNGOLOGY
Payer: OTHER GOVERNMENT

## 2017-11-15 DIAGNOSIS — G43.809 VESTIBULAR MIGRAINE: Primary | ICD-10-CM

## 2017-11-15 PROCEDURE — 40000185 ZZHC STATISTIC PT OUTPT VISIT: Performed by: PHYSICAL THERAPIST

## 2017-11-15 PROCEDURE — 97140 MANUAL THERAPY 1/> REGIONS: CPT | Mod: GP,59 | Performed by: PHYSICAL THERAPIST

## 2017-11-15 PROCEDURE — 97012 MECHANICAL TRACTION THERAPY: CPT | Mod: GP | Performed by: PHYSICAL THERAPIST

## 2017-11-15 PROCEDURE — 97110 THERAPEUTIC EXERCISES: CPT | Mod: GP | Performed by: PHYSICAL THERAPIST

## 2017-11-21 ENCOUNTER — HOSPITAL ENCOUNTER (OUTPATIENT)
Dept: PHYSICAL THERAPY | Facility: CLINIC | Age: 52
Setting detail: THERAPIES SERIES
End: 2017-11-21
Attending: OTOLARYNGOLOGY
Payer: OTHER GOVERNMENT

## 2017-11-21 PROCEDURE — 97110 THERAPEUTIC EXERCISES: CPT | Mod: GP | Performed by: PHYSICAL THERAPIST

## 2017-11-21 PROCEDURE — 97012 MECHANICAL TRACTION THERAPY: CPT | Mod: GP | Performed by: PHYSICAL THERAPIST

## 2017-11-21 PROCEDURE — 97140 MANUAL THERAPY 1/> REGIONS: CPT | Mod: GP | Performed by: PHYSICAL THERAPIST

## 2017-11-21 PROCEDURE — 40000718 ZZHC STATISTIC PT DEPARTMENT ORTHO VISIT: Performed by: PHYSICAL THERAPIST

## 2017-11-29 ENCOUNTER — HOSPITAL ENCOUNTER (OUTPATIENT)
Dept: PHYSICAL THERAPY | Facility: CLINIC | Age: 52
Setting detail: THERAPIES SERIES
End: 2017-11-29
Attending: OTOLARYNGOLOGY
Payer: OTHER GOVERNMENT

## 2017-11-29 PROCEDURE — 97110 THERAPEUTIC EXERCISES: CPT | Mod: GP | Performed by: PHYSICAL THERAPIST

## 2017-11-29 PROCEDURE — 97112 NEUROMUSCULAR REEDUCATION: CPT | Mod: GP | Performed by: PHYSICAL THERAPIST

## 2017-11-29 PROCEDURE — 97140 MANUAL THERAPY 1/> REGIONS: CPT | Mod: GP,59 | Performed by: PHYSICAL THERAPIST

## 2017-11-29 PROCEDURE — 97012 MECHANICAL TRACTION THERAPY: CPT | Mod: GP | Performed by: PHYSICAL THERAPIST

## 2017-11-29 PROCEDURE — 40000185 ZZHC STATISTIC PT OUTPT VISIT: Performed by: PHYSICAL THERAPIST

## 2017-11-30 ENCOUNTER — TELEPHONE (OUTPATIENT)
Dept: SURGERY | Facility: CLINIC | Age: 52
End: 2017-11-30

## 2017-11-30 ENCOUNTER — OFFICE VISIT (OUTPATIENT)
Dept: FAMILY MEDICINE | Facility: CLINIC | Age: 52
End: 2017-11-30
Payer: OTHER GOVERNMENT

## 2017-11-30 VITALS
BODY MASS INDEX: 26.69 KG/M2 | TEMPERATURE: 98.5 F | DIASTOLIC BLOOD PRESSURE: 78 MMHG | WEIGHT: 208 LBS | HEIGHT: 74 IN | SYSTOLIC BLOOD PRESSURE: 116 MMHG | HEART RATE: 88 BPM

## 2017-11-30 DIAGNOSIS — K43.9 VENTRAL HERNIA WITHOUT OBSTRUCTION OR GANGRENE: ICD-10-CM

## 2017-11-30 DIAGNOSIS — M54.2 CERVICALGIA: ICD-10-CM

## 2017-11-30 DIAGNOSIS — G43.809 VESTIBULAR MIGRAINE: Primary | ICD-10-CM

## 2017-11-30 PROCEDURE — 99214 OFFICE O/P EST MOD 30 MIN: CPT | Performed by: FAMILY MEDICINE

## 2017-11-30 NOTE — TELEPHONE ENCOUNTER
Referral received from ANTONINA ROSS  for ventral hernia.    Attempt #1:    Called patient at [phone].  No answer - left message for patient to return call to clinic at [phone].  Emilia

## 2017-11-30 NOTE — MR AVS SNAPSHOT
After Visit Summary   11/30/2017    Naif Howell Jr.    MRN: [id number]           Patient Information     Date Of Birth          1965        Visit Information        Provider Department      11/30/2017 8:15 AM Josefina Rogers MD [facility]        Today's Diagnoses     Vestibular migraine    -  1    Cervicalgia        Ventral hernia without obstruction or gangrene           Follow-ups after your visit        Additional Services     GENERAL SURG ADULT REFERRAL       Your provider has referred you to: FM[facility]: [facility] [place] [phone]   [email or web address]    Please be aware that coverage of these services is subject to the terms and limitations of your health insurance plan.  Call member services at your health plan with any benefit or coverage questions.      Please bring the following with you to your appointment:    (1) Any X-Rays, CTs or MRIs which have been performed.  Contact the facility where they were done to arrange for  prior to your scheduled appointment.   (2) List of current medications   (3) This referral request   (4) Any documents/labs given to you for this referral            NEUROLOGY ADULT REFERRAL       Referral to Neurology - ongoing migraine headaches, dizziness, visual symptoms, neck pain            ORTHO  REFERRAL       [facility] is referring you to the Orthopedic  Services at [facility].       The  Representative will assist you in the coordination of your Orthopedic and Musculoskeletal Care as prescribed by your physician.    The  Representative will call you within 1 business day to help schedule your appointment, or you may contact the  Representative at:    All areas ~ [phone]     Type of Referral : Spine: Cervical / Thoracic: Cervical / Thoracic Spine Surgeon  - needs to see neurosurgery     "  Timeframe requested: 3 - 5 days    Coverage of these services is subject to the terms and limitations of your health insurance plan.  Please call member services at your health plan with any benefit or coverage questions.      If X-rays, CT or MRI's have been performed, please contact the facility where they were done to arrange for , prior to your scheduled appointment.  Please bring this referral request to your appointment and present it to your specialist.                  Who to contact     If you have questions or need follow up information about today's clinic visit or your schedule please contact [facility] directly at [phone].  Normal or non-critical lab and imaging results will be communicated to you by Arrayenthart, letter or phone within 4 business days after the clinic has received the results. If you do not hear from us within 7 days, please contact the clinic through Arrayenthart or phone. If you have a critical or abnormal lab result, we will notify you by phone as soon as possible.  Submit refill requests through TransactionTree or call your pharmacy and they will forward the refill request to us. Please allow 3 business days for your refill to be completed.          Additional Information About Your Visit        [email or web address] Information     TransactionTree lets you send messages to your doctor, view your test results, renew your prescriptions, schedule appointments and more. To sign up, go to [email or web address] . Click on \"Log in\" on the left side of the screen, which will take you to the Welcome page. Then click on \"Sign up Now\" on the right side of the page.     You will be asked to enter the access code listed below, as well as some personal information. Please follow the directions to create your username and password.     Your access code is: [email or web address]  Expires: 2017  8:52 AM     Your access code will  in 90 days. If you need help or a new code, please call your [place] " "clinic or [phone].        Care EveryWhere ID     This is your Care EveryWhere ID. This could be used by other organizations to access your [place] medical records  [id number]        Your Vitals Were     Pulse Temperature Height BMI (Body Mass Index)          88 98.5  F (36.9  C) (Oral) 6' 2\" (1.88 m) 26.71 kg/m2         Blood Pressure from Last 3 Encounters:   11/30/17 116/78   10/03/17 130/80   06/22/17 120/68    Weight from Last 3 Encounters:   11/30/17 208 lb (94.3 kg)   10/03/17 198 lb 12.8 oz (90.2 kg)   06/22/17 187 lb 4.8 oz (85 kg)              We Performed the Following     GENERAL SURG ADULT REFERRAL     NEUROLOGY ADULT REFERRAL     ORTHO  REFERRAL          Today's Medication Changes          These changes are accurate as of: 11/30/17  8:43 AM.  If you have any questions, ask your nurse or doctor.               Stop taking these medicines if you haven't already. Please contact your care team if you have questions.     acetaZOLAMIDE 250 MG tablet   Commonly known as:  DIAMOX   Stopped by:  Josefina Rogers MD           buPROPion 150 MG 12 hr tablet   Commonly known as:  WELLBUTRIN SR   Stopped by:  Josefina Rogers MD           neomycin-polymyxin-hydrocortisone [id number] otic suspension   Commonly known as:  CORTISPORIN   Stopped by:  Josefina Rogers MD           order for DME   Stopped by:  Josefina Rogers MD                    Primary Care Provider Office Phone # Fax #    Josefina Rogers [phone] [id number] [address] [facility] [id number]        Equal Access to Services     [facility] : Hadii carl salmeron hadbi Soadrian, waaxda luqadaha, qaybta kaalmada francisco, tres bird. So [facility] [phone].    ATENCIÓN: Si habla español, tiene a morocho disposición servicios gratuitos de asistencia lingüística. Llame al [phone].    We comply with applicable federal civil rights laws and [place] laws. We do not " discriminate on the basis of race, color, national origin, age, disability, sex, sexual orientation, or gender identity.            Thank you!     Thank you for choosing [facility]  for your care. Our goal is always to provide you with excellent care. Hearing back from our patients is one way we can continue to improve our services. Please take a few minutes to complete the written survey that you may receive in the mail after your visit with us. Thank you!             Your Updated Medication List - Protect others around you: Learn how to safely use, store and throw away your medicines at www.disposemymeds.org.          This list is accurate as of: 11/30/17  8:43 AM.  Always use your most recent med list.                   Brand Name Dispense Instructions for use Diagnosis    losartan 50 MG tablet    COZAAR    90 tablet    Take 1 tablet (50 mg) by mouth daily    Benign essential hypertension       multivitamin, therapeutic with minerals Tabs tablet      Take 1 tablet by mouth daily        sildenafil 20 MG tablet    REVATIO    9 tablet    Take 1 tablet (20 mg) by mouth three times daily for pulmonary hypertension.  Never use with nitroglycerin, terazosin or doxazosin.    Vasculogenic erectile dysfunction, unspecified vasculogenic erectile dysfunction type       simvastatin 40 MG tablet    ZOCOR    90 tablet    Take 1 tablet (40 mg) by mouth At Bedtime    Mixed hyperlipidemia

## 2017-11-30 NOTE — PROGRESS NOTES
SUBJECTIVE:   Naif Howell Jr. is a 52 year old male who presents to clinic today for the following health issues:    Following up on neck pain, migraines, visual symptoms, mid back pain.     Has been following with [facility], was not helpful for him, so they sent him to Neurology. Neurology felt this was more of an ophtho issue, so sent him there. He has been doing ocular rehab since then with no benefit.     Has also followed with Pain clinic for cervical spine injections.     Cervical spine MRI 4/28/16:  IMPRESSION:  1. Small to moderate size right paramedian to posterolateral C5-C6  disc protrusion with secondary mild-to-moderate central canal stenosis  and moderate right-sided foraminal stenosis.  2. Small right paramedian posterior osteophyte and disc protrusion at  C3-C4 with some right-sided facet hypertrophy resulting in  mild-to-moderate right-sided foraminal stenosis.    Tried dry eye drops, didn't help him.     Feels like pain starts at the base of his skull and radiates outward. Also still having trouble with tired eyes.     Would like to improve his symptoms so he can resume full work duties, exercise more.     He is frustrated that he is seeing so many docs and not getting any help.     Taking losartan for BP control, working well for him. Exercising on recumbent bike, feels his BP is tied very much to level of exercise.     Hasn't started wellbutrin yet, feels like he is not in the right mindframe to quit smoking.       Notes he has a bulge in his abdomen from the incision from his bowel resection this past summer. Becoming more painful for him at times. Bulge goes away when laying flat.       Problem list and histories reviewed & adjusted, as indicated.  Additional history: none    Patient Active Problem List   Diagnosis     PTSD (post-traumatic stress disorder)     Aortic root dilatation (H)     GERD (gastroesophageal reflux disease)     Aortic regurgitation      Dyslipidemia     Benign essential hypertension     HTN, goal below 140/90     Chronic midline thoracic back pain     Vestibular migraine     SBO (small bowel obstruction)     Health Care Home     Tobacco use disorder     Ventral hernia without obstruction or gangrene     Past Surgical History:   Procedure Laterality Date     LAPAROSCOPIC CHOLECYSTECTOMY WITH CHOLANGIOGRAMS N/A 2017    Procedure: LAPAROSCOPIC CHOLECYSTECTOMY WITH CHOLANGIOGRAMS;  LAPAROSCOPIC CHOLECYSTECTOMY WITH CHOLANGIOGRAMS ;  Surgeon: Corey Vang MD;  Location: [facility] OR     LAPAROSCOPIC LYSIS ADHESIONS N/A 2017    Procedure: LAPAROSCOPIC LYSIS ADHESIONS;;  Surgeon: Corey Vang MD;  Location: [facility] OR     LAPAROSCOPY DIAGNOSTIC (GENERAL) N/A 2017    Procedure: LAPAROSCOPY DIAGNOSTIC (GENERAL);  DIAGNOSTIC LAPAROSCOPY, LAPAROTOMY,  SMALL BOWEL RESECTION, LAPAROSCOPIC LYSIS OF ADHESIONS;  Surgeon: Corey Vang MD;  Location: [facility] OR     RESECT SMALL BOWEL WITHOUT OSTOMY N/A 2017    Procedure: RESECT SMALL BOWEL WITHOUT OSTOMY;;  Surgeon: Corey Vang MD;  Location: [facility] OR     Resection of basal cell carcinoma      Left forehead       Social History   Substance Use Topics     Smoking status: Current Every Day Smoker     Packs/day: 1.00     Types: Cigarettes     Smokeless tobacco: Never Used     Alcohol use No     Family History   Problem Relation Age of Onset     Hypertension Mother      alive     CANCER Father      chest tumor-not sure about details,  at age of 71     Family History Negative Sister      2     Hypertension Sister      Family History Negative Brother      1     Family History Negative Maternal Grandmother      Hypertension Maternal Grandmother      Family History Negative Maternal Grandfather      Family History Negative Paternal Grandmother      Family History Negative Paternal Grandfather      Cancer - colorectal No family hx of      Prostate Cancer No family hx of      Glaucoma No family hx of       "Macular Degeneration No family hx of              Reviewed and updated as needed this visit by clinical staff       Reviewed and updated as needed this visit by Provider         ROS:  Constitutional, HEENT, cardiovascular, pulmonary, gi and gu systems are negative, except as otherwise noted.      OBJECTIVE:   /78 (BP Location: Right arm, Patient Position: Chair, Cuff Size: Adult Large)  Pulse 88  Temp 98.5  F (36.9  C) (Oral)  Ht 6' 2\" (1.88 m)  Wt 208 lb (94.3 kg)  BMI 26.71 kg/m2  Body mass index is 26.71 kg/(m^2).  GENERAL: healthy, alert and no distress  PSYCH: mentation appears normal, affect normal/bright  ABD: what appears to be a reducible ventral hernia along the midline surgical incision    Diagnostic Test Results:  none     ASSESSMENT/PLAN:     1. Vestibular migraine - previously diagnosed, vestibular rehab not helpful, may be cervical spine related, will refer  - ORTHO  REFERRAL    2. Cervicalgia - previous MRI showed disc herniation, will refer to see if he would benefit from alternative injections versus surgery.  - ORTHO  REFERRAL    3. Ventral hernia without obstruction or gangrene - will refer as he reports he is having some discomfort in this area at times  - GENERAL SURG ADULT REFERRAL    25 minutes spent with patient face-to-face, > 50% in counseling and coordination of care regarding the issues addressed in the assessment and plan.     Josefina Rogers MD  [facility]  "

## 2017-11-30 NOTE — NURSING NOTE
"Chief Complaint   Patient presents with     Referral     for chronic neck and back pain        Initial /78 (BP Location: Right arm, Patient Position: Chair, Cuff Size: Adult Large)  Pulse 88  Temp 98.5  F (36.9  C) (Oral)  Ht 6' 2\" (1.88 m)  Wt 208 lb (94.3 kg)  BMI 26.71 kg/m2 Estimated body mass index is 26.71 kg/(m^2) as calculated from the following:    Height as of this encounter: 6' 2\" (1.88 m).    Weight as of this encounter: 208 lb (94.3 kg).  Medication Reconciliation: complete     Health maintenance- up to date    /Danis Sanders CMA            "

## 2017-12-07 ENCOUNTER — OFFICE VISIT (OUTPATIENT)
Dept: NEUROSURGERY | Facility: CLINIC | Age: 52
End: 2017-12-07
Attending: NURSE PRACTITIONER
Payer: OTHER GOVERNMENT

## 2017-12-07 VITALS
BODY MASS INDEX: 26.77 KG/M2 | OXYGEN SATURATION: 95 % | SYSTOLIC BLOOD PRESSURE: 147 MMHG | HEIGHT: 74 IN | DIASTOLIC BLOOD PRESSURE: 95 MMHG | WEIGHT: 208.6 LBS | HEART RATE: 87 BPM

## 2017-12-07 DIAGNOSIS — M54.2 CERVICALGIA: Primary | ICD-10-CM

## 2017-12-07 PROCEDURE — 99211 OFF/OP EST MAY X REQ PHY/QHP: CPT | Performed by: NURSE PRACTITIONER

## 2017-12-07 PROCEDURE — 99214 OFFICE O/P EST MOD 30 MIN: CPT | Performed by: NURSE PRACTITIONER

## 2017-12-07 ASSESSMENT — PAIN SCALES - GENERAL: PAINLEVEL: MILD PAIN (2)

## 2017-12-07 NOTE — PROGRESS NOTES
"[facility]  Neurosurgery followup:    HPI: Mr. Howell is a 52 year old male that returns to discuss his pain to the base of his skull.  He notes he has had this pain for the past 2 years.  He states that in the past he had neck and arm pain but injections helped this. He notes that recently his pain is to the base of his skull. He also is noting the neck and arm pain with balance issues and dizziness. He has been \"fighting this\" for many years but the pain is worse now.  He is currently in the . He notes constant but variable pain and headaches.      Exam:  Constitutional:  Alert, well nourished, NAD.  HEENT: Normocephalic, atraumatic.   Pulm:  Without shortness of breath   CV:  No pitting edema of BLE.     Neurological:  Awake  Alert  Oriented x 3  Motor exam:     Shoulder Abduction:  Right:  5/5    Left:  5/5  Biceps:                      Right:  5/5    Left:  5/5  Triceps:                     Right:  5/5    Left:  5/5  Wrist Extensors:       Right:  5/5    Left:  5/5  Wrist Flexors:           Right:  5/5    Left:  5/5  Intrinsics:                  Right:  5/5    Left:  5/5     Able to spontaneously move U/E bilaterally  Sensation intact throughout all U/E dermatomes  Pain with palpation to bilateral occiput and cervical paraspinous muscles.      Imaging:  None    A/P: Mr. Howell is a 52 year old male that returns to discuss his pain to the base of his skull.  He notes he has had this pain for the past 2 years.  He states that in the past he had neck and arm pain but injections helped this. He notes that recently his pain is to the base of his skull. He also is noting the neck and arm pain with balance issues and dizziness. He has been \"fighting this\" for many years but the pain is worse now.  He is currently in the . He notes constant but variable pain and headaches.  He last had injections about 1 year ago and is in PT now.  He states that he has been to neurology in the past and they " feel this is related to his vision.  He had MR of head, neck and brain in March of 2017. All were negative.  At this time it was recommended that he obtain a new cervical MRI. We may want to try occipital nerve blocks as well in the future.   Patient Instructions   1. Please call [facility] Radiology to have Cervical MRI completed. Call [phone].   I will contact you with results.        Vanessa Li [facility]  [facility]  [address] [address]    Tel [phone]  Pager [phone]

## 2017-12-07 NOTE — NURSING NOTE
"Naif Howell Jr. is a 52 year old male who presents for:  Chief Complaint   Patient presents with     Neurologic Problem     Cervicalgia, pain from the base of the skull to the neck, pain across the chest pain, right arm pain and pinky numbness, off balance         Initial Vitals:  BP (!) 147/95 (BP Location: Right arm, Patient Position: Sitting, Cuff Size: Adult Large)  Pulse 87  Ht 6' 2\" (1.88 m)  Wt 208 lb 9.6 oz (94.6 kg)  SpO2 95%  BMI 26.78 kg/m2 Estimated body mass index is 26.78 kg/(m^2) as calculated from the following:    Height as of this encounter: 6' 2\" (1.88 m).    Weight as of this encounter: 208 lb 9.6 oz (94.6 kg).. Body surface area is 2.22 meters squared. BP completed using cuff size: large  Mild Pain (2)    Do you feel safe in your environment?  Yes  Do you need any refills today? No    Nursing Comments: Cervicalgia, pain from the base of the skull to the neck, pain across the chest pain, right arm pain and pinky numbness, off balance.        5 min. nursing intake time  Jelly Mireles MA       Discharge plan: see providers dictation  2 min. nursing discharge time  Jelly Mireles MA        "

## 2017-12-07 NOTE — LETTER
"    12/7/2017         RE: Naif Howell Jr.  [address] [facility] [address]        Dear Colleague,    Thank you for referring your patient, Naif Howell Jr., to the [facility]. Please see a copy of my visit note below.    [facility]  Neurosurgery followup:    HPI: Mr. Howell is a 52 year old male that returns to discuss his pain to the base of his skull.  He notes he has had this pain for the past 2 years.  He states that in the past he had neck and arm pain but injections helped this. He notes that recently his pain is to the base of his skull. He also is noting the neck and arm pain with balance issues and dizziness. He has been \"fighting this\" for many years but the pain is worse now.  He is currently in the . He notes constant but variable pain and headaches.      Exam:  Constitutional:  Alert, well nourished, NAD.  HEENT: Normocephalic, atraumatic.   Pulm:  Without shortness of breath   CV:  No pitting edema of BLE.     Neurological:  Awake  Alert  Oriented x 3  Motor exam:     Shoulder Abduction:  Right:  5/5    Left:  5/5  Biceps:                      Right:  5/5    Left:  5/5  Triceps:                     Right:  5/5    Left:  5/5  Wrist Extensors:       Right:  5/5    Left:  5/5  Wrist Flexors:           Right:  5/5    Left:  5/5  Intrinsics:                  Right:  5/5    Left:  5/5     Able to spontaneously move U/E bilaterally  Sensation intact throughout all U/E dermatomes  Pain with palpation to bilateral occiput and cervical paraspinous muscles.      Imaging:  None    A/P: Mr. Howell is a 52 year old male that returns to discuss his pain to the base of his skull.  He notes he has had this pain for the past 2 years.  He states that in the past he had neck and arm pain but injections helped this. He notes that recently his pain is to the base of his skull. He also is noting the neck and arm pain with balance issues and dizziness. He has been \"fighting " "this\" for many years but the pain is worse now.  He is currently in the . He notes constant but variable pain and headaches.  He last had injections about 1 year ago and is in PT now.  He states that he has been to neurology in the past and they feel this is related to his vision.  He had MR of head, neck and brain in March of 2017. All were negative.  At this time it was recommended that he obtain a new cervical MRI. We may want to try occipital nerve blocks as well in the future.   Patient Instructions   1. Please call [facility] Radiology to have Cervical MRI completed. Call [phone].   I will contact you with results.        Vanessa Li CNP  [facility]  [address] [address]    Tel [phone]  Pager [phone]      Again, thank you for allowing me to participate in the care of your patient.        Sincerely,        REBECA Hernández CNP    "

## 2017-12-07 NOTE — PATIENT INSTRUCTIONS
1. Please call [facility] Radiology to have Cervical MRI completed. Call [phone].   I will contact you with results.

## 2017-12-07 NOTE — MR AVS SNAPSHOT
After Visit Summary   12/7/2017    Naif Howell Jr.    MRN: [id number]           Patient Information     Date Of Birth          1965        Visit Information        Provider Department      12/7/2017 1:00 PM Vanessa Li APRN CNP [facility]        Today's Diagnoses     Cervicalgia    -  1      Care Instructions    1. Please call [facility] Radiology to have Cervical MRI completed. Call [phone].   I will contact you with results.           Follow-ups after your visit        Your next 10 appointments already scheduled     Dec 18, 2017  9:15 AM CST   CONSULT with Corey Vang MD   [facility]    [facility] [address]  [facility] [address] [phone]              Future tests that were ordered for you today     Open Future Orders        Priority Expected Expires Ordered    MR Cervical Spine w/o Contrast Routine  12/7/2018 12/7/2017            Who to contact     If you have questions or need follow up information about today's clinic visit or your schedule please contact [facility] directly at [phone].  Normal or non-critical lab and imaging results will be communicated to you by MyChart, letter or phone within 4 business days after the clinic has received the results. If you do not hear from us within 7 days, please contact the clinic through [employer] or phone. If you have a critical or abnormal lab result, we will notify you by phone as soon as possible.  Submit refill requests through [employer] or call your pharmacy and they will forward the refill request to us. Please allow 3 business days for your refill to be completed.          Additional Information About Your Visit        MyChart Information     [employer] lets you send messages to your doctor, view your test results, renew your prescriptions, schedule appointments and more. To sign up, go to  [email or web address] . Click on \"Log in\" on the left side of the screen, which will take you to the Welcome page. Then click on \"Sign up Now\" on the right side of the page.     You will be asked to enter the access code listed below, as well as some personal information. Please follow the directions to create your username and password.     Your access code is: [email or web address]  Expires: 2017  8:52 AM     Your access code will  in 90 days. If you need help or a new code, please call your [facility] clinic or [phone].        Care EveryWhere ID     This is your Care EveryWhere ID. This could be used by other organizations to access your [facility] medical records  [id number]        Your Vitals Were     Pulse Height Pulse Oximetry BMI (Body Mass Index)          87 6' 2\" (1.88 m) 95% 26.78 kg/m2         Blood Pressure from Last 3 Encounters:   17 (!) 147/95   17 116/78   10/03/17 130/80    Weight from Last 3 Encounters:   17 208 lb 9.6 oz (94.6 kg)   17 208 lb (94.3 kg)   10/03/17 198 lb 12.8 oz (90.2 kg)               Primary Care Provider Office Phone # Fax #    Josefina Lester Rogers [phone] [address] [facility] [address]        Equal Access to Services     [facility]: Hadii aad ku hadasho Soomaali, waaxda luqadaha, qaybta kaalmada adeegyada, tres greenberg . So [facility] [phone].    ATENCIÓN: Si habla español, tiene a morocho disposición servicios gratuitos de asistencia lingüística. Llame al [phone].    We comply with applicable federal civil rights laws and [place] laws. We do not discriminate on the basis of race, color, national origin, age, disability, sex, sexual orientation, or gender identity.            Thank you!     Thank you for choosing [facility]  for your care. Our goal is always to provide you with excellent care. Hearing back from our patients is one way we can continue to improve " our services. Please take a few minutes to complete the written survey that you may receive in the mail after your visit with us. Thank you!             Your Updated Medication List - Protect others around you: Learn how to safely use, store and throw away your medicines at www.disposemymeds.org.          This list is accurate as of: 12/7/17  1:05 PM.  Always use your most recent med list.                   Brand Name Dispense Instructions for use Diagnosis    losartan 50 MG tablet    COZAAR    90 tablet    Take 1 tablet (50 mg) by mouth daily    Benign essential hypertension       multivitamin, therapeutic with minerals Tabs tablet      Take 1 tablet by mouth daily        sildenafil 20 MG tablet    REVATIO    9 tablet    Take 1 tablet (20 mg) by mouth three times daily for pulmonary hypertension.  Never use with nitroglycerin, terazosin or doxazosin.    Vasculogenic erectile dysfunction, unspecified vasculogenic erectile dysfunction type       simvastatin 40 MG tablet    ZOCOR    90 tablet    Take 1 tablet (40 mg) by mouth At Bedtime    Mixed hyperlipidemia

## 2017-12-08 ENCOUNTER — HOSPITAL ENCOUNTER (OUTPATIENT)
Dept: MRI IMAGING | Facility: CLINIC | Age: 52
Discharge: HOME | End: 2017-12-08
Attending: NURSE PRACTITIONER | Admitting: NURSE PRACTITIONER
Payer: OTHER GOVERNMENT

## 2017-12-08 DIAGNOSIS — M54.2 CERVICALGIA: ICD-10-CM

## 2017-12-08 PROCEDURE — 72141 MRI NECK SPINE W/O DYE: CPT

## 2017-12-11 ENCOUNTER — TELEPHONE (OUTPATIENT)
Dept: PALLIATIVE MEDICINE | Facility: CLINIC | Age: 52
End: 2017-12-11

## 2017-12-11 ENCOUNTER — TELEPHONE (OUTPATIENT)
Dept: NEUROSURGERY | Facility: CLINIC | Age: 52
End: 2017-12-11

## 2017-12-11 DIAGNOSIS — M54.12 CERVICAL RADICULAR PAIN: Primary | ICD-10-CM

## 2017-12-11 NOTE — TELEPHONE ENCOUNTER
Pt contacted with MRI results.  MRI shows severe stenosis.  Can try Injections or if he has continued radicular pain then return to see Dr. Esa Dior.

## 2017-12-11 NOTE — TELEPHONE ENCOUNTER
Pre-screening Questions for Radiology Injections:    Injection to be done at which interventional clinic site? [facility]    Procedure ordered by HOMAR KELLER    Procedure ordered? Cervical Epidural Steroid Injection    What insurance would patient like us to bill for this procedure?       Worker's comp or MVA (motor vehicle accident) -Any injection DO NOT SCHEDULE and route to Estelita Ling.      [employer] insurance - For SI joint injections, DO NOT SCHEDULE and route Lashell Ramos.      [employer]- MBB's must be scheduled at LEAST two weeks apart      [employer] - Any injection besides hip/shoulder/knee joint DO NOT SCHEDULE and route to Lashell Ramos. She will obtain PA and call pt back to schedule procedure or notify pt of denial.       HP [employer]-PA REQUIRED FOR NON-PADMINI OR Joint injections    Any chance of pregnancy? NO   If YES, do NOT schedule and route to RN pool    Is an  needed? No     Patient has a drive home? (mandatory) YES:     Is patient taking any blood thinners (plavix, coumadin, jantoven, warfarin, heparin, pradaxa or dabigatran )? No   If hold needed, do NOT schedule, route to RN pool     Is patient taking any aspirin products? No     If more than 325mg/day do NOT schedule; route to RN pool     For CERVICAL procedures, hold all aspirin products for 6 days.      Does the patient have a bleeding or clotting disorder? No     If YES, okay to schedule AND route to RN nurse pool    **For any patients with platelet count <100, must be forwarded to provider**    Is patient diabetic?  No  If YES, have them bring their glucometer.    Does patient have an active infection or treated for one within the past week? No     Is patient currently taking any antibiotics?  No     For patients on chronic, preventative, or prophylactic antibiotics, procedures may be scheduled.     For patients on antibiotics for active or recent infection:    Juan Sorenson Burton,  [place]-antibiotic course must have been completed for 4 days    Dr. Batista-antibiotic course must have been completed for 7 days    Is patient currently taking any steroid medications? (i.e. Prednisone, Medrol)  No     For patients on steroid medications:    Juan Sorenson Burton, [place]-steroid course must have been completed for 4 days    -steroid course must have been completed for 7 days    Reviewed with patient:  If you are started on any steroids or antibiotics between now and your appointment, you must contact us because it may affect our ability to perform your procedure.  Yes    Is patient actively being treated for cancer or immunocompromised? No  If YES, do NOT schedule and route to RN pool     Are you able to get on and off an exam table with minimal or no assistance? Yes  If NO, do NOT schedule and route to RN pool    Are you able to roll over and lay on your stomach with minimal or no assistance? Yes  If NO, do NOT schedule and route to RN pool     Any allergies to contrast dye, iodine, shellfish, or numbing and steroid medications? No  If YES, route to RN pool AND add allergy information to appointment notes    Allergies: Omnicef [cefdinir]; Cefuroxime; and Flagyl [metronidazole]      Has the patient had a flu shot or any other vaccinations within 7 days before or after the procedure.  No     Does patient have an MRI/CT?  YES:   (SI joint, hip injections, lumbar sympathetic blocks, and stellate ganglion blocks do not require an MRI)    Was the MRI done w/in the last 3 years?  Yes    Was MRI done at [facility]? Yes      If not, where was it done? N/A       If MRI was not done at [facility], [facility] or [facility] do NOT schedule and route to nursing.  If pt has an imaging disc, the injection may be scheduled but pt has to bring disc to appt. If they show up w/out disc the injection cannot be done    Reminders (please tell patient if applicable):       Instructed pt to arrive 30 minutes  early for IV start if this is for a cervical procedure, ALL sympathetic (stellate ganglion, hypogastric, or lumbar sympathetic block) and all sedation procedures (RFA, spinal cord stimulation trials).  Not Applicable   -IVs are not routinely placed for Dr. Rhodes cervical cases   -Dr. Schultz: IVs for cervical ESIs and cervical TBDs (not CMBBs/facet inj)      If NPO for sedation, informed patient that it is okay to take medications with sips of water (except if they are to hold blood thinners).  Not Applicable   *DO take blood pressure medication if it is prescribed*      If this is for a cervical PADMINI, informed patient that aspirin needs to be held for 6 days.   Not Applicable      For all patients not having spinal cord stimulator (SCS) trials or radiofrequency ablations (RFAs), informed patient:    IV sedation is not provided for this procedure.  If you feel that an oral anti-anxiety medication is needed, you can discuss this further with your referring provider or primary care provider.  The Pain Clinic provider will discuss specifics of what the procedure includes at your appointment.  Most procedures last 10-20 minutes.  We use numbing medications to help with any discomfort during the procedure.  Not Applicable      Do not schedule procedures requiring IV placement in the first appointment of the day or first appointment after lunch.       For patients 85 or older we recommend having an adult stay w/ them for the remainder of the day.       Does the patient have any questions?    [facility]

## 2017-12-11 NOTE — TELEPHONE ENCOUNTER
REASON FOR CALL:  Returning Vanessa's call to discuss MRI results and recommendations    Detailed message can be left:  YES

## 2017-12-18 ENCOUNTER — OFFICE VISIT (OUTPATIENT)
Dept: SURGERY | Facility: CLINIC | Age: 52
End: 2017-12-18
Payer: OTHER GOVERNMENT

## 2017-12-18 VITALS
HEART RATE: 89 BPM | SYSTOLIC BLOOD PRESSURE: 132 MMHG | WEIGHT: 208 LBS | DIASTOLIC BLOOD PRESSURE: 72 MMHG | OXYGEN SATURATION: 97 % | HEIGHT: 74 IN | BODY MASS INDEX: 26.69 KG/M2

## 2017-12-18 DIAGNOSIS — K43.2 INCISIONAL HERNIA, WITHOUT OBSTRUCTION OR GANGRENE: Primary | ICD-10-CM

## 2017-12-18 PROCEDURE — 99214 OFFICE O/P EST MOD 30 MIN: CPT | Performed by: SURGERY

## 2017-12-18 NOTE — PROGRESS NOTES
Naif is a 52 year old White male who presents for hernia evaluation. The patient has noticed a bulge. Pain has been present.  Symptoms are described as aching and pressure  located in the epigastric region.  Associated with this  is none.  Pt has had previous ABD surgery including laparotomy with SB resection.  Patient does report that increased activity/lifting causes pain. Employment does require lifting.    Constipation No  Dysuria No  Cough No  Smoking No  Diabetes No    Pt's chart has been reviewed for PMH, PSH, allergies, medications and social history.    ROS:  Pulm:  No shortness of breath, dyspnea on exertion, cough, or hemoptysis  CV:  negative  ABD:  See chief complaint  :  negative    Physical exam:  Patient able to get up on table without difficulty.  Head eyes, nose and mouth within normal limits.  Sclera are clear  No supraclavicular or cervical adenopathy noted.  Neck shows no gross mass  Respirations are regular and non labored  Abdomen is abdomen is soft without significant tenderness, masses, organomegaly or guarding  bowel sounds are positive and no caput medusa noted.  Hernia is present in the upper midline at the site of his previous laparotomy.  There is also diastases that extends up to his xiphoid.  Hernia is reducible and mildly tender.    Imaging  CT No      Assesment: upper ventral wall hernia within diastases    Plan: Discussed observation, external support, possible progression, incarceration and strangulation signs and symptoms and need for immediate treatment if they develop.  Discussed surgery in detail, including risk, benefits, complications, incision/cosmetics, mesh, infection (possibly requiring removal of the mesh), chronic pain, involvement of inta-abdominal organs, lifting and activity limits after surgery. Gave literature to review. Will schedule surgery in near future  Time spent with the patient with greater that 50% of the time in discussion was 20-30 minutes.    Corey  Taj SCHAEFER  12/18/2017 8:16 AM    Please route or send letter to:  Primary Care Provider (PCP) and Include Progress Note

## 2017-12-18 NOTE — MR AVS SNAPSHOT
"              After Visit Summary   12/18/2017    Naif Howell Jr.    MRN: [id number]           Patient Information     Date Of Birth          1965        Visit Information        Provider Department      12/18/2017 9:15 AM Corey Vang MD [facility]      Today's Diagnoses     Incisional hernia, without obstruction or gangrene    -  1       Follow-ups after your visit        Your next 10 appointments already scheduled     Jan 03, 2018  7:45 AM CST   Radiology Injections with Santa Rhodes MD   [facility] ([facility])    [address]  [address]  [facility] [address]   [phone]              Who to contact     If you have questions or need follow up information about today's clinic visit or your schedule please contact [facility] directly at [phone].  Normal or non-critical lab and imaging results will be communicated to you by MyChart, letter or phone within 4 business days after the clinic has received the results. If you do not hear from us within 7 days, please contact the clinic through MyChart or phone. If you have a critical or abnormal lab result, we will notify you by phone as soon as possible.  Submit refill requests through [employer] or call your pharmacy and they will forward the refill request to us. Please allow 3 business days for your refill to be completed.          Additional Information About Your Visit        MyChart Information     [employer] lets you send messages to your doctor, view your test results, renew your prescriptions, schedule appointments and more. To sign up, go to [email or web address] . Click on \"Log in\" on the left side of the screen, which will take you to the Welcome page. Then click on \"Sign up Now\" on the right side of the page.     You will be asked to enter the access code listed below, as well as some personal information. " "Please follow the directions to create your username and password.     Your access code is: [email or web address]  Expires: 2017  8:52 AM     Your access code will  in 90 days. If you need help or a new code, please call your [place] clinic or [phone].        Care EveryWhere ID     This is your Care EveryWhere ID. This could be used by other organizations to access your [facility] medical records  [id number]        Your Vitals Were     Pulse Height Pulse Oximetry BMI (Body Mass Index)          89 6' 2\" (1.88 m) 97% 26.71 kg/m2         Blood Pressure from Last 3 Encounters:   17 132/72   17 (!) 147/95   17 116/78    Weight from Last 3 Encounters:   17 208 lb (94.3 kg)   17 208 lb 9.6 oz (94.6 kg)   17 208 lb (94.3 kg)              Today, you had the following     No orders found for display       Primary Care Provider Office Phone # Fax #    Josefina Lester Rogers [phone] [address] [id number]        Equal Access to Services     PADMINI MARQUEZ AH: Hadii carl ku hadasho Soomaali, waaxda luqadaha, qaybta kaalmada adeegyada, waxay lucrecia hayfrantzn jose bird. So [facility] [phone].    ATENCIÓN: Si habla español, tiene a morocho disposición servicios gratuitos de asistencia lingüística. Llame al [phone].    We comply with applicable federal civil rights laws and [place] laws. We do not discriminate on the basis of race, color, national origin, age, disability, sex, sexual orientation, or gender identity.            Thank you!     Thank you for choosing [facility]  for your care. Our goal is always to provide you with excellent care. Hearing back from our patients is one way we can continue to improve our services. Please take a few minutes to complete the written survey that you may receive in the mail after your visit with us. Thank you!             Your Updated Medication List - Protect others around you: Learn " how to safely use, store and throw away your medicines at www.disposemymeds.org.          This list is accurate as of: 12/18/17  9:24 AM.  Always use your most recent med list.                   Brand Name Dispense Instructions for use Diagnosis    losartan 50 MG tablet    COZAAR    90 tablet    Take 1 tablet (50 mg) by mouth daily    Benign essential hypertension       multivitamin, therapeutic with minerals Tabs tablet      Take 1 tablet by mouth daily        sildenafil 20 MG tablet    REVATIO    9 tablet    Take 1 tablet (20 mg) by mouth three times daily for pulmonary hypertension.  Never use with nitroglycerin, terazosin or doxazosin.    Vasculogenic erectile dysfunction, unspecified vasculogenic erectile dysfunction type       simvastatin 40 MG tablet    ZOCOR    90 tablet    Take 1 tablet (40 mg) by mouth At Bedtime    Mixed hyperlipidemia

## 2017-12-18 NOTE — LETTER
2017    Re: Naif Howell Jr. - 1965    Naif is a 52 year old White male who presents for hernia evaluation. The patient has noticed a bulge. Pain has been present.  Symptoms are described as aching and pressure  located in the epigastric region.  Associated with this  is none.  Pt has had previous ABD surgery including laparotomy with SB resection.  Patient does report that increased activity/lifting causes pain. Employment does require lifting.     Constipation No  Dysuria No  Cough No  Smoking No  Diabetes No     Pt's chart has been reviewed for PMH, PSH, allergies, medications and social history.     ROS:  Pulm:  No shortness of breath, dyspnea on exertion, cough, or hemoptysis  CV:  negative  ABD:  See chief complaint  :  negative     Physical exam:  Patient able to get up on table without difficulty.  Head eyes, nose and mouth within normal limits.  Sclera are clear  No supraclavicular or cervical adenopathy noted.  Neck shows no gross mass  Respirations are regular and non labored  Abdomen is abdomen is soft without significant tenderness, masses, organomegaly or guarding  bowel sounds are positive and no caput medusa noted.  Hernia is present in the upper midline at the site of his previous laparotomy.  There is also diastases that extends up to his xiphoid.  Hernia is reducible and mildly tender.     Imaging  CT No      Assesment: upper ventral wall hernia within diastases         Plan: Discussed observation, external support, possible progression, incarceration and strangulation signs and symptoms and need for immediate treatment if they develop.  Discussed surgery in detail, including risk, benefits, complications, incision/cosmetics, mesh, infection (possibly requiring removal of the mesh), chronic pain, involvement of inta-abdominal organs, lifting and activity limits after surgery. Gave literature to review. Will schedule surgery in near future     Corey Vang MD

## 2017-12-30 DIAGNOSIS — I10 BENIGN ESSENTIAL HYPERTENSION: ICD-10-CM

## 2018-01-02 RX ORDER — LOSARTAN POTASSIUM 50 MG/1
50 TABLET ORAL DAILY
Qty: 90 TABLET | Refills: 1 | Status: SHIPPED | OUTPATIENT
Start: 2018-01-02 | End: 2018-07-12

## 2018-01-02 NOTE — TELEPHONE ENCOUNTER
Prescription approved per [facility] Refill Protocol.  Kenisha Goodwin RN, BSN

## 2018-01-02 NOTE — TELEPHONE ENCOUNTER
Requested Prescriptions   Pending Prescriptions Disp Refills     losartan (COZAAR) 50 MG tablet 90 tablet 1    Last Written Prescription Date:  06/22/2017  Last Fill Quantity: 90 TABLET,  # refills: 1   Last Office Visit with [facility], P or [facility] prescribing provider:  11/30/2017   Future Office Visit:      Sig: Take 1 tablet (50 mg) by mouth daily    Angiotensin-II Receptors Passed    12/30/2017 11:26 AM       Passed - Blood pressure under 140/90 in past 12 months.    BP Readings from Last 3 Encounters:   12/18/17 132/72   12/07/17 (!) 147/95   11/30/17 116/78                Passed - Recent or future visit with authorizing provider's specialty    Patient had office visit in the last year or has a visit in the next 30 days with authorizing provider.  See chart review.              Passed - Patient is age 18 or older       Passed - Normal serum creatinine on file in past 12 months    Recent Labs   Lab Test  06/22/17   0902   CR  0.67            Passed - Normal serum potassium on file in past 12 months    Recent Labs   Lab Test  06/22/17   0902   POTASSIUM  3.9

## 2018-01-02 NOTE — PROGRESS NOTES
[facility] - Procedure Note    Date of Visit: 1/03/2018    Procedure performed: C7-T1 interlaminar epidural steroid injection with fluoroscopic guidance  Diagnosis: Cervical spondylosis; Cervical radiculitis/radiculopathy  : Santa Rhodes MD & Alireza Ulloa DO (pain fellow)   Anesthesia: none    Indications: Naif Howell Jr. is a 52 year old male who is seen at the request of Vanessa Li CNP for cervical epidural steroid injection. The patient describes neck pain radiating into both arms. The patient has been exhibiting symptoms consistent with cervical intraspinal inflammation and radiculopathy. Symptoms have been persistent, disabling, and intermittently severe. The patient reports minimal improvement with conservative treatment, including previous injections, PT and medications.    This is a repeat injection.  Previous Cervical PADMINI done on 3/21/2017 by Dr. Mckeon resulted in good pain relief for a period of about 3 months.     Cervical MRI was done on 12/08/2017 which showed   FINDINGS: There is normal alignment of the cervical vertebrae;  however, there is straightening of normal cervical lordosis. Vertebral  body heights of the cervical spine are normal. Marrow signal  throughout the cervical vertebrae is within normal limits. There is no  evidence for fracture or pathologic bony lesion of the cervical spine.      There is loss of disc height, disc desiccation and posterior disc  bulging to varying degrees at all levels of the cervical spine with  exception of the normal appearing C7-T1 disc.      The cervical spinal cord is mildly deformed by degenerative changes at  the C5-C6 level. The cervical spinal cord is otherwise normal in  contour. There is no abnormal signal in the cervical spinal cord.  There is no evidence for intrathecal abnormality.     Level by level:     C2-C3: There is facet arthropathy bilaterally, uncinate hypertrophy  bilaterally and a posterior broad-based  disc-osteophyte complex. These  findings result in moderate right neural foraminal stenosis but no  spinal canal or left foraminal stenosis. No change from the comparison  study.     C3-C4: There is facet arthropathy bilaterally, uncinate hypertrophy  bilaterally and a posterior broad-based disc-osteophyte complex. These  findings result in severe right foraminal stenosis, mild left  foraminal narrowing and minimal spinal canal narrowing. No change from  the comparison study.     C4-C5: There is facet arthropathy bilaterally, uncinate hypertrophy  bilaterally and a posterior broad-based disc-osteophyte complex. There  is no spinal canal or neural foraminal stenosis at this level. No  change from the comparison study.     C5-C6: There is facet arthropathy bilaterally, uncinate hypertrophy  bilaterally and a posterior broad-based disc-osteophyte complex with a  superimposed small right paracentral disc herniation (protrusion).  These findings result in moderate-severe right foraminal stenosis and  mild spinal canal narrowing but no left foraminal stenosis. No change  from the comparison study.     C6-C7: There is facet arthropathy bilaterally, uncinate hypertrophy  bilaterally and a posterior broad-based disc-osteophyte complex. These  findings result in mild bilateral neural foraminal narrowing but no  spinal canal stenosis. No change from the comparison study.     C7-T1: There is facet arthropathy and uncinate arthropathy  bilaterally. These findings result in mild-moderate left foraminal  narrowing and mild right foraminal narrowing but no spinal canal  stenosis. No change from the comparison study.       IMPRESSION: Degenerative changes of the cervical spine as described  above.       Allergies:      Allergies   Allergen Reactions     Omnicef [Cefdinir] Diarrhea     Cefuroxime Rash     Flagyl [Metronidazole] Swelling and Rash     Mild throat swelling        Vitals:  BP (!) 141/94  Pulse 80  SpO2 98%    Review of  Systems: The patient denies recent fever, chills, illness, use of antibiotics or anticoagulants. All other 10-point review of systems negative.     Procedure: The procedure and risks were explained, and informed written consent was obtained from the patient. Risks include but are not limited to: infection, bleeding, increased pain, and damage to soft tissue, nerve, muscle, and vasculature structures. After getting informed consent, patient was brought into the procedure suite and was placed in a prone position on the procedure table. A Pause for the Cause was performed. Patient was prepped and draped in sterile fashion.     The C7-T1 interspace was identified with use of fluoroscopy in AP view. A 25-gauge, 1.5 inch needle was used to anesthetize the skin and subcutaneous tissue entry site with a total of 2 ml of 1% lidocaine. Under fluoroscopic visualization, a 22-gauge, 3.5 inch Tuohy epidural needle was slowly advanced towards the epidural space a few millimeters left of midline. The latter part of the needle advancement was guided with fluoroscopy in the lateral view. The epidural space was identified using loss of resistance technique. After negative aspiration for heme and cerebrospinal fluid, a total of 1 mL of non-ionic contrast was injected to confirm needle placement. 9 mL of contrast was wasted. Epidurogram confirmed spread within the posterior epidural space. 2 ml of 10mg/ml of dexamethasone and 1 ml of preservative free 1% lidocaine was injected. The needle was removed.  Images were saved to PACS.    The patient tolerated the procedure well, and there was no evidence of procedural complications. No new sensory or motor deficits were noted following the procedure. The patient was stable and able to ambulate on discharge home. Post-procedure instructions were provided.     Pre-procedure pain score: 6/10 in the neck, 6/10 in the arm  Post-procedure pain score: 6/10 in the neck, 6/10 in the  arm    Assessment/Plan: Naif Howell Jr. is a 52 year old male s/p cervical interlaminar epidural steroid injection today for cervical spondylosis and radiculitis/radiculopathy.     1. Following today's procedure, the patient was advised to contact the [facility] for any of the following:   Fever, chills, or night sweats   New onset of pain, numbness, or weakness   Any questions/concerns regarding the procedure  If unable to contact the Pain Center, the patient was instructed to go to a local Emergency Room for any complications.   2. The patient will receive a follow-up call in 1 week.   3. Follow-up with the referring provider in 2 weeks for post-procedure evaluation.      Santa Rhodes MD   [facility]

## 2018-01-03 ENCOUNTER — RADIANT APPOINTMENT (OUTPATIENT)
Dept: GENERAL RADIOLOGY | Facility: CLINIC | Age: 53
End: 2018-01-03
Attending: ANESTHESIOLOGY
Payer: OTHER GOVERNMENT

## 2018-01-03 ENCOUNTER — RADIOLOGY INJECTION OFFICE VISIT (OUTPATIENT)
Dept: PALLIATIVE MEDICINE | Facility: CLINIC | Age: 53
End: 2018-01-03
Payer: OTHER GOVERNMENT

## 2018-01-03 VITALS — SYSTOLIC BLOOD PRESSURE: 129 MMHG | OXYGEN SATURATION: 98 % | HEART RATE: 77 BPM | DIASTOLIC BLOOD PRESSURE: 86 MMHG

## 2018-01-03 DIAGNOSIS — M54.12 CERVICAL RADICULOPATHY: Primary | ICD-10-CM

## 2018-01-03 DIAGNOSIS — M54.12 CERVICAL RADICULAR PAIN: ICD-10-CM

## 2018-01-03 PROCEDURE — 62321 NJX INTERLAMINAR CRV/THRC: CPT | Performed by: ANESTHESIOLOGY

## 2018-01-03 NOTE — NURSING NOTE
Pre-procedure Intake    Have you been fasting? NA    If yes, for how long?     Are you taking a prescribed blood thinner such as coumadin, Plavix, Xarelto?    No    If yes, when did you take your last dose?     Do you take aspirin?  No    If cervical procedure, have you held aspirin for 6 days?   NA    Do you have any allergies to contrast dye, iodine, steroid and/or numbing medications?  NO    Are you currently taking antibiotics or have an active infection?  NO    Have you had a fever/elevated temperature within the past week? NO    Are you currently taking oral steroids? NO    Do you have a ? Yes       Are you pregnant or breastfeeding?  Not Applicable    Are the vital signs normal?  No: elevated BP

## 2018-01-03 NOTE — PATIENT INSTRUCTIONS
[facility] Procedure Discharge Instructions    Today you saw:     Dr. Santa Rhodes      Your procedure:  Epidural steroid injection       Medications used:  Lidocaine (anesthetic)   Dexamethasone (steroid)    Omnipaque (contrast)              Be cautious when walking as numbness and/or weakness in the legs may occur up to 6-8 hours after the procedure due to effect of the local anesthetic    Do not drive for 6 hours. The effect of the local anesthetic could slow your reflexes.     Avoid strenuous activity for the first 24 hours. You may resume your regular activities after that.     You may shower, however avoid swimming, tub baths or hot tubs for 24 hours following your procedure    You may have a mild to moderate increase in pain for several days following the injection.      You may use ice packs for 10-15 minutes, 3 to 4 times a day at the injection site for comfort    Do not use heat to painful areas for 6 to 8 hours. This will give the local anesthetic time to wear off and prevent you from accidentally burning your skin.    You may use anti-inflammatory medications (such as Ibuprofen/Advil or Aleve) or Tylenol for pain control if necessary    It may take up to 14 days for the steroid medication to start working although you may feel the effect as early as a few days after the procedure.     Follow up with your referring provider in 2 - 3 weeks.       If you experience any of the following, call the pain center  line during work hours at [phone] or on-call physician after hours at [phone]:  -Fever over 100 degree F  -Swelling, bleeding, redness, drainage, warmth at the injection site  -Progressive weakness or numbness in your legs or arms  -Loss of bowel or bladder function  -Unusual headache that is not relieved by Tylenol  -Unusual new onset of pain that is not improving    Phone #s:  Nurse line for general questions: [phone]

## 2018-01-03 NOTE — MR AVS SNAPSHOT
After Visit Summary   1/3/2018    Naif Howell Jr.    MRN: [id number]           Patient Information     Date Of Birth          1965        Visit Information        Provider Department      1/3/2018 7:45 AM Santa Rhodes MD [facility]        Care Instructions    [facility] Procedure Discharge Instructions    Today you saw:     Dr. Santa Rhodes      Your procedure:  Epidural steroid injection       Medications used:  Lidocaine (anesthetic)   Dexamethasone (steroid)    Omnipaque (contrast)              Be cautious when walking as numbness and/or weakness in the legs may occur up to 6-8 hours after the procedure due to effect of the local anesthetic    Do not drive for 6 hours. The effect of the local anesthetic could slow your reflexes.     Avoid strenuous activity for the first 24 hours. You may resume your regular activities after that.     You may shower, however avoid swimming, tub baths or hot tubs for 24 hours following your procedure    You may have a mild to moderate increase in pain for several days following the injection.      You may use ice packs for 10-15 minutes, 3 to 4 times a day at the injection site for comfort    Do not use heat to painful areas for 6 to 8 hours. This will give the local anesthetic time to wear off and prevent you from accidentally burning your skin.    You may use anti-inflammatory medications (such as Ibuprofen/Advil or Aleve) or Tylenol for pain control if necessary    It may take up to 14 days for the steroid medication to start working although you may feel the effect as early as a few days after the procedure.     Follow up with your referring provider in 2 - 3 weeks.       If you experience any of the following, call the pain center  line during work hours at [phone] or on-call physician after hours at [phone]:  -Fever over 100 degree F  -Swelling, bleeding, redness, drainage, warmth at the injection site  -Progressive  "weakness or numbness in your legs or arms  -Loss of bowel or bladder function  -Unusual headache that is not relieved by Tylenol  -Unusual new onset of pain that is not improving    Phone #s:  Nurse line for general questions: [phone]          Follow-ups after your visit        Your next 10 appointments already scheduled     Jan 11, 2018  7:30 AM [facility] Same Day Surgery with Corey Vang MD, Shantell Benson PA-C   [facility]    [facility] ([facility])   [phone]            Jan 11, 2018   Procedure with Corey Vang MD   [facility] (--)    [address]  [facility] [address] [phone]              Who to contact     If you have questions or need follow up information about today's clinic visit or your schedule please contact [facility] directly at [phone].  Normal or non-critical lab and imaging results will be communicated to you by [employer], letter or phone within 4 business days after the clinic has received the results. If you do not hear from us within 7 days, please contact the clinic through [employer] or phone. If you have a critical or abnormal lab result, we will notify you by phone as soon as possible.  Submit refill requests through [employer] or call your pharmacy and they will forward the refill request to us. Please allow 3 business days for your refill to be completed.          Additional Information About Your Visit        [employer] Information     [employer] lets you send messages to your doctor, view your test results, renew your prescriptions, schedule appointments and more. To sign up, go to [email or web address] . Click on \"Log in\" on the left side of the screen, which will take you to the Welcome page. Then click on \"Sign up Now\" on the right side of the page.     You will be asked to enter the access code listed " below, as well as some personal information. Please follow the directions to create your username and password.     Your access code is: [email or web address]  Expires: 4/3/2018  8:06 AM     Your access code will  in 90 days. If you need help or a new code, please call your [facility] clinic or [phone].        Care EveryWhere ID     This is your Care EveryWhere ID. This could be used by other organizations to access your [facility] medical records  [id number]        Your Vitals Were     Pulse Pulse Oximetry                80 98%           Blood Pressure from Last 3 Encounters:   18 (!) 141/94   17 132/72   17 (!) 147/95    Weight from Last 3 Encounters:   17 94.3 kg (208 lb)   17 94.6 kg (208 lb 9.6 oz)   17 94.3 kg (208 lb)              Today, you had the following     No orders found for display       Primary Care Provider Office Phone # Fax #    Josefina Lester Rogers [phone] [address] [facility] [address]        Equal Access to Services     [facility] : Hadii aad ku hadasho Soomaali, waaxda luqadaha, qaybta kaalmada adeegyada, tres reynan jose bird. So [facility] [phone].    ATENCIÓN: Si habla español, tiene a morocho disposición servicios gratuitos de asistencia lingüística. [facility] [phone].    We comply with applicable federal civil rights laws and [place] laws. We do not discriminate on the basis of race, color, national origin, age, disability, sex, sexual orientation, or gender identity.            Thank you!     Thank you for choosing [facility]  for your care. Our goal is always to provide you with excellent care. Hearing back from our patients is one way we can continue to improve our services. Please take a few minutes to complete the written survey that you may receive in the mail after your visit with us. Thank you!             Your Updated Medication List - Protect others around you: Learn how to  safely use, store and throw away your medicines at www.disposemymeds.org.          This list is accurate as of: 1/3/18  8:06 AM.  Always use your most recent med list.                   Brand Name Dispense Instructions for use Diagnosis    losartan 50 MG tablet    COZAAR    90 tablet    Take 1 tablet (50 mg) by mouth daily    Benign essential hypertension       multivitamin, therapeutic with minerals Tabs tablet      Take 1 tablet by mouth daily        sildenafil 20 MG tablet    REVATIO    9 tablet    Take 1 tablet (20 mg) by mouth three times daily for pulmonary hypertension.  Never use with nitroglycerin, terazosin or doxazosin.    Vasculogenic erectile dysfunction, unspecified vasculogenic erectile dysfunction type       simvastatin 40 MG tablet    ZOCOR    90 tablet    Take 1 tablet (40 mg) by mouth At Bedtime    Mixed hyperlipidemia

## 2018-01-03 NOTE — NURSING NOTE
Discharge Information    IV Discontiued Time:  NA    Amount of Fluid Infused:  NA    Discharge Criteria = When patient returns to baseline or as per MD order    Consciousness:  Pt is fully awake    Circulation:  BP +/- 20% of pre-procedure level    Respiration:  Patient is able to breathe deeply    O2 Sat:  Patient is able to maintain O2 Sat >92% on room air    Activity:  Moves 4 extremities on command    Ambulation:  Patient is able to stand and walk or stand and pivot into wheelchair    Dressing:  Clean/dry or No Dressing    Notes:   Discharge instructions and AVS given to patient    Patient meets criteria for discharge?  YES    Admitted to PCU?  No    Responsible adult present to accompany patient home?  Yes    Signature/Title:    Josefina Ferris RN Care Coordinator  [facility]

## 2018-01-04 ENCOUNTER — HOSPITAL ENCOUNTER (EMERGENCY)
Facility: CLINIC | Age: 53
Discharge: HOME | End: 2018-01-04
Attending: EMERGENCY MEDICINE | Admitting: EMERGENCY MEDICINE
Payer: OTHER GOVERNMENT

## 2018-01-04 VITALS
WEIGHT: 200 LBS | BODY MASS INDEX: 25.67 KG/M2 | HEART RATE: 97 BPM | TEMPERATURE: 98.7 F | SYSTOLIC BLOOD PRESSURE: 150 MMHG | OXYGEN SATURATION: 99 % | RESPIRATION RATE: 16 BRPM | HEIGHT: 74 IN | DIASTOLIC BLOOD PRESSURE: 91 MMHG

## 2018-01-04 DIAGNOSIS — M54.6 ACUTE BILATERAL THORACIC BACK PAIN: ICD-10-CM

## 2018-01-04 PROCEDURE — 99282 EMERGENCY DEPT VISIT SF MDM: CPT

## 2018-01-04 RX ORDER — IBUPROFEN 800 MG/1
800 TABLET, FILM COATED ORAL EVERY 8 HOURS PRN
Qty: 24 TABLET | Refills: 0 | Status: SHIPPED | OUTPATIENT
Start: 2018-01-04 | End: 2018-01-12

## 2018-01-04 RX ORDER — OXYCODONE HYDROCHLORIDE 5 MG/1
5 TABLET ORAL EVERY 4 HOURS PRN
Qty: 20 TABLET | Refills: 0 | Status: SHIPPED | OUTPATIENT
Start: 2018-01-04 | End: 2018-12-21

## 2018-01-04 RX ORDER — METHYLPREDNISOLONE 4 MG
TABLET, DOSE PACK ORAL
Qty: 21 TABLET | Refills: 0 | Status: SHIPPED | OUTPATIENT
Start: 2018-01-04 | End: 2018-01-09

## 2018-01-04 ASSESSMENT — ENCOUNTER SYMPTOMS
WEAKNESS: 0
HEADACHES: 1
DIARRHEA: 0
VOMITING: 0
NECK PAIN: 1
COUGH: 0
SHORTNESS OF BREATH: 0
NUMBNESS: 0
FEVER: 0
BACK PAIN: 1

## 2018-01-04 NOTE — ED PROVIDER NOTES
History     Chief Complaint:  Headache    HPI   Naif Howell Jr. is a 52 year old male with history of spinal stenosis who presents to the emergency department today for evaluation of a headache. The patient reports pain in the middle of his back radiating up into the back of his neck and back of his head. Yesterday morning he received cortisone injections in his upper thoracic region that initially improved pain from spinal stenosis, however he states the pain then returned and was more severe. He also reports a some pain across his chest, but denies any tearing sensation in his chest or back. He reports a history of mid back pain between his shoulder blades, and that seems to be where his current back pain is originating. Prior to arrival the patient had taken ibuprofen at home with no relief. He denies any fever, cough, shortness of breath, vomiting, or diarrhea. He denies any new or worsening numbness/weakness. He denies having follow up scheduled with the physician who performed his cortisone injection.     Allergies:  Omnicef [Cefdinir]  Cefuroxime  Flagyl [Metronidazole]      Medications:    losartan (COZAAR) 50 MG tablet   simvastatin (ZOCOR) 40 MG tablet   sildenafil (REVATIO/VIAGRA) 20 MG tablet   multivitamin, therapeutic with minerals (THERA-VIT-M) TABS tablet     Past Medical History:    Anxiety   Aortic regurgitation   Back pain   Basal cell carcinoma   GERD   Hypertension   PTSD   Vestibular migraine   Thoracic stenosis    Past Surgical History:    Cholecystectomy   Lysis adhesions   Diagnostic laparoscopy   Small bowel resection without ostomy     Family History:    Hypertension  Cancer     Social History:  The patient was accompanied to the ED by himself.  Smoking Status: Current every day smoker  Smokeless Tobacco: No  Alcohol Use: No    Marital Status:  Legally       Review of Systems   Constitutional: Negative for fever.   Respiratory: Negative for cough and shortness of breath.   "  Cardiovascular: Positive for chest pain.   Gastrointestinal: Negative for diarrhea and vomiting.   Musculoskeletal: Positive for back pain (mid to upper) and neck pain (posterior).   Neurological: Positive for headaches. Negative for weakness and numbness.   All other systems reviewed and are negative.    Physical Exam     Patient Vitals for the past 24 hrs:   BP Temp Temp src Pulse Resp SpO2 Height Weight   01/04/18 0534 (!) 150/91 - - 97 16 99 % - -   01/04/18 0435 (!) 143/96 98.7  F (37.1  C) Temporal 125 18 99 % 1.88 m (6' 2\") 90.7 kg (200 lb)      Physical Exam  Nursing note and vitals reviewed.  Constitutional: Cooperative. Uncomfortable appearing.   HENT:   Mouth/Throat: Mucous membranes are normal.   Cardiovascular: Tachycardic rate, regular rhythm and normal heart sounds.  No murmur.  Pulmonary/Chest: Effort normal and breath sounds normal. No respiratory distress. No wheezes.   Abdominal: Soft. Normal appearance. There is no tenderness.   Musculoskeletal: Normal range of motion of UE's.   Neurological: Alert. Strength and sensation in UE's normal.   Skin: Skin is warm and dry. No rash noted. Two needle puncture sites in upper thoracic region without evidence of cellulitis   Psychiatric: Normal mood and affect.      Emergency Department Course     Emergency Department Course:  Nursing notes and vitals reviewed.  0505 I performed an exam of the patient as documented above.   Findings and plan explained to the Patient. Patient discharged home with instructions regarding supportive care, medications, and reasons to return. The importance of close follow-up was reviewed. The patient was prescribed Oxycodone, Medrol, and Ibuprofen.   Impression & Plan      Medical Decision Making:  Naif Griffithskurt Elliott is a 52 year old male with history of thoracic stenosis who presents with acute thoracic back pain as described above. He did have several cortisone injections earlier today. His pain is fairly diffuse from his head " and thoracic region. No concern clinically for post injection infection or superficial hematoma. He has no neurological deficits in his upper extremities. I doubt spinal abscess or spinal hematoma clinically. I would hold on advanced imaging in favor to pain control at this time. Given the chest and upper back pain I did consider thoracic catastrophe such as aortic dissection. This seems unlikely given the clinical picture. This was discussed with patient and he is OK forgoing further workup as am I. I did review his prescription monitoring program history which includes several opioids in the past two years but these have only been administered post-operatively with documented surgeries. The alert on his chart is from 2011 from the time when he had returned from [place] and did not have a primary care doctor seeing him. I see no pattern of recent opiate abuse or misuse of providers in the last several years and am happy to provide a short course of pain medicine.     Diagnosis:    ICD-10-CM    1. Acute bilateral thoracic back pain M54.6        Disposition:  Discharged to home with the below prescription.     Discharge Medications:  New Prescriptions    IBUPROFEN (ADVIL/MOTRIN) 800 MG TABLET    Take 1 tablet (800 mg) by mouth every 8 hours as needed for moderate pain    METHYLPREDNISOLONE (MEDROL DOSEPAK) 4 MG TABLET    Follow package instructions    OXYCODONE IR (ROXICODONE) 5 MG TABLET    Take 1 tablet (5 mg) by mouth every 4 hours as needed for pain       Scribe Disclosure:  I, Shayne Jiménez, am serving as a scribe at 4:55 AM on 1/4/2018 to document services personally performed by Naif Atkinson MD based on my observations and the provider's statements to me.    1/4/2018   [facility] EMERGENCY DEPARTMENT       Naif Atkinson MD  01/04/18 0602

## 2018-01-04 NOTE — ED AVS SNAPSHOT
[facility] Emergency Department    [address]    [facility] [address]    Phone:  [phone]    Fax:  [phone]                                       Naif Howell Jr.   MRN: [id number]    Department:  [facility] Emergency Department   Date of Visit:  1/4/2018           Patient Information     Date Of Birth          1965        Your diagnoses for this visit were:     Acute bilateral thoracic back pain        You were seen by Naif Atkinson MD.      Follow-up Information     Follow up with PCP and your spine specialist.        Discharge Instructions       Discharge Instructions  Back Pain  You were seen today for back pain. Back pain can have many causes, but most will get better without surgery or other specific treatment. Sometimes there is a herniated ( slipped ) disc. We do not usually do MRI scans to look for these right away, since most herniated discs will get better on their own with time.  Today, we did not find any evidence that your back pain was caused by a serious condition. However, sometimes symptoms develop over time and cannot be found during an emergency visit, so it is very important that you follow up with your primary provider.  Generally, every Emergency Department visit should have a follow-up clinic visit with either a primary or a specialty clinic/provider. Please follow-up as instructed by your emergency provider today.    Return to the Emergency Department if:    You develop a fever with your back pain.     You have weakness or change in sensation in one or both legs.    You lose control of your bowels or bladder, or cannot empty your bladder (cannot pee).    Your pain gets much worse.     Follow-up with your provider:    Unless your pain has completely gone away, please make an appointment with your provider within one week. Most of the routine care for back pain is available in a clinic and not the Emergency Department. You may need further  management of your back pain, such as more pain medication, imaging such as an X-ray or MRI, or physical therapy.    What can I do to help myself?    Remain Active -- People are often afraid that they will hurt their back further or delay recovery by remaining active, but this is one of the best things you can do for your back. In fact, staying in bed for a long time to rest is not recommended. Studies have shown that people with low back pain recover faster when they remain active. Movement helps to bring blood flow to the muscles and relieve muscle spasms as well as preventing loss of muscle strength.    Heat -- Using a heating pad can help with low back pain during the first few weeks. Do not sleep with a heating pad, as you can be burned.     Pain medications - You may take a pain medication such as Tylenol  (acetaminophen), Advil , Motrin  (ibuprofen) or Aleve  (naproxen).  If you were given a prescription for medicine here today, be sure to read all of the information (including the package insert) that comes with your prescription.  This will include important information about the medicine, its side effects, and any warnings that you need to know about.  The pharmacist who fills the prescription can provide more information and answer questions you may have about the medicine.  If you have questions or concerns that the pharmacist cannot address, please call or return to the Emergency Department.   Remember that you can always come back to the Emergency Department if you are not able to see your regular provider in the amount of time listed above, if you get any new symptoms, or if there is anything that worries you.      Future Appointments        Provider Department Dept Phone Center    1/11/2018 7:30 AM Shantell Benson PA-C; Corey Vang MD [facility] Sched[facility] [phone] [facility]      24 Hour Appointment Hotline       To make an appointment at any [facility], call  [phone] ([phone]). If you don't have a family doctor or clinic, we will help you find one. [facility] clinics are conveniently located to serve the needs of you and your family.             Review of your medicines      START taking        Dose / Directions Last dose taken    ibuprofen 800 MG tablet   Commonly known as:  ADVIL/MOTRIN   Dose:  800 mg   Quantity:  24 tablet        Take 1 tablet (800 mg) by mouth every 8 hours as needed for moderate pain   Refills:  0        methylPREDNISolone 4 MG tablet   Commonly known as:  MEDROL DOSEPAK   Quantity:  21 tablet        Follow package instructions   Refills:  0        oxyCODONE IR 5 MG tablet   Commonly known as:  ROXICODONE   Dose:  5 mg   Quantity:  20 tablet        Take 1 tablet (5 mg) by mouth every 4 hours as needed for pain   Refills:  0          Our records show that you are taking the medicines listed below. If these are incorrect, please call your family doctor or clinic.        Dose / Directions Last dose taken    losartan 50 MG tablet   Commonly known as:  COZAAR   Dose:  50 mg   Quantity:  90 tablet        Take 1 tablet (50 mg) by mouth daily   Refills:  1        multivitamin, therapeutic with minerals Tabs tablet   Dose:  1 tablet        Take 1 tablet by mouth daily   Refills:  0        sildenafil 20 MG tablet   Commonly known as:  REVATIO   Quantity:  9 tablet        Take 1 tablet (20 mg) by mouth three times daily for pulmonary hypertension.  Never use with nitroglycerin, terazosin or doxazosin.   Refills:  0        simvastatin 40 MG tablet   Commonly known as:  ZOCOR   Dose:  40 mg   Quantity:  90 tablet        Take 1 tablet (40 mg) by mouth At Bedtime   Refills:  3                Prescriptions were sent or printed at these locations (3 Prescriptions)                   Other Prescriptions                Printed at Department/Unit printer (3 of 3)         methylPREDNISolone (MEDROL DOSEPAK) 4 MG tablet               oxyCODONE IR (ROXICODONE)  5 MG tablet               ibuprofen (ADVIL/MOTRIN) 800 MG tablet                Orders Needing Specimen Collection     None      Pending Results     No orders found from 1/2/2018 to 1/5/2018.            Pending Culture Results     No orders found from 1/2/2018 to 1/5/2018.            Pending Results Instructions     If you had any lab results that were not finalized at the time of your Discharge, you can call the ED Lab Result RN at [phone]. You will be contacted by this team for any positive Lab results or changes in treatment. The nurses are available 7 days a week from 10A to 6:30P.  You can leave a message 24 hours per day and they will return your call.        Test Results From Your Hospital Stay               Clinical Quality Measure: Blood Pressure Screening     Your blood pressure was checked while you were in the emergency department today. The last reading we obtained was  BP: (!) 143/96 . Please read the guidelines below about what these numbers mean and what you should do about them.  If your systolic blood pressure (the top number) is less than 120 and your diastolic blood pressure (the bottom number) is less than 80, then your blood pressure is normal. There is nothing more that you need to do about it.  If your systolic blood pressure (the top number) is 120-139 or your diastolic blood pressure (the bottom number) is 80-89, your blood pressure may be higher than it should be. You should have your blood pressure rechecked within a year by a primary care provider.  If your systolic blood pressure (the top number) is 140 or greater or your diastolic blood pressure (the bottom number) is 90 or greater, you may have high blood pressure. High blood pressure is treatable, but if left untreated over time it can put you at risk for heart attack, stroke, or kidney failure. You should have your blood pressure rechecked by a primary care provider within the next 4 weeks.  If your provider in the emergency  "department today gave you specific instructions to follow-up with your doctor or provider even sooner than that, you should follow that instruction and not wait for up to 4 weeks for your follow-up visit.        Thank you for choosing [facility]       Thank you for choosing [facility] for your care. Our goal is always to provide you with excellent care. Hearing back from our patients is one way we can continue to improve our services. Please take a few minutes to complete the written survey that you may receive in the mail after you visit with us. Thank you!        [employer] Information     [employer] lets you send messages to your doctor, view your test results, renew your prescriptions, schedule appointments and more. To sign up, go to [email or web address] . Click on \"Log in\" on the left side of the screen, which will take you to the Welcome page. Then click on \"Sign up Now\" on the right side of the page.     You will be asked to enter the access code listed below, as well as some personal information. Please follow the directions to create your username and password.     Your access code is: [email or web address]  Expires: 4/3/2018  8:06 AM     Your access code will  in 90 days. If you need help or a new code, please call your [facility] clinic or [phone].        Care EveryWhere ID     This is your Care EveryWhere ID. This could be used by other organizations to access your [facility] medical records  [id number]        Equal Access to Services     JENNIFER MARQUEZ : Hadii carl Mccann, waaxda sneha, qaybta kaalmada francisco, tres greenberg . So [facility] [phone].    ATENCIÓN: Si habla español, tiene a morocho disposición servicios gratuitos de asistencia lingüística. Chelle al [phone].    We comply with applicable federal civil rights laws and [place] laws. We do not discriminate on the basis of race, color, national origin, age, disability, sex, sexual orientation, or gender " identity.            After Visit Summary       This is your record. Keep this with you and show to your community pharmacist(s) and doctor(s) at your next visit.

## 2018-01-04 NOTE — ED NOTES
Pt to ER with c/o headache with pain that radiates down into neck and back, pt unable to sleep due to the pain

## 2018-01-04 NOTE — ED AVS SNAPSHOT
[facility] Emergency Department    [address]    [facility] [address]    Phone:  [phone]    Fax:  [phone]                                       Naif Howell Jr.   MRN: [id number]    Department:  [facility] Emergency Department   Date of Visit:  1/4/2018           After Visit Summary Signature Page     I have received my discharge instructions, and my questions have been answered. I have discussed any challenges I see with this plan with the nurse or doctor.    ..........................................................................................................................................  Patient/Patient Representative Signature      ..........................................................................................................................................  Patient Representative Print Name and Relationship to Patient    ..................................................               ................................................  Date                                            Time    ..........................................................................................................................................  Reviewed by Signature/Title    ...................................................              ..............................................  Date                                                            Time

## 2018-01-04 NOTE — ED NOTES
Pt A&Ox4 on arrival ABC's intact. Pt reports hx of migraines, states onset yesterday, pt has been unable to sleep reports migrane has worsened. Pt drove self in for evaluation

## 2018-01-09 ENCOUNTER — OFFICE VISIT (OUTPATIENT)
Dept: FAMILY MEDICINE | Facility: CLINIC | Age: 53
End: 2018-01-09
Payer: OTHER GOVERNMENT

## 2018-01-09 VITALS
HEART RATE: 90 BPM | BODY MASS INDEX: 26.99 KG/M2 | SYSTOLIC BLOOD PRESSURE: 120 MMHG | HEIGHT: 74 IN | TEMPERATURE: 98.3 F | OXYGEN SATURATION: 96 % | WEIGHT: 210.3 LBS | DIASTOLIC BLOOD PRESSURE: 68 MMHG

## 2018-01-09 DIAGNOSIS — Z01.818 PREOP GENERAL PHYSICAL EXAM: Primary | ICD-10-CM

## 2018-01-09 DIAGNOSIS — K43.2 INCISIONAL HERNIA, WITHOUT OBSTRUCTION OR GANGRENE: ICD-10-CM

## 2018-01-09 DIAGNOSIS — F17.200 TOBACCO USE DISORDER: ICD-10-CM

## 2018-01-09 DIAGNOSIS — E78.5 DYSLIPIDEMIA: ICD-10-CM

## 2018-01-09 DIAGNOSIS — I10 BENIGN ESSENTIAL HYPERTENSION: ICD-10-CM

## 2018-01-09 LAB
ERYTHROCYTE [DISTWIDTH] IN BLOOD BY AUTOMATED COUNT: 13.1 % (ref 10–15)
HCT VFR BLD AUTO: 49.6 % (ref 40–53)
HGB BLD-MCNC: 17 G/DL (ref 13.3–17.7)
MCH RBC QN AUTO: 28.7 PG (ref 26.5–33)
MCHC RBC AUTO-ENTMCNC: 34.3 G/DL (ref 31.5–36.5)
MCV RBC AUTO: 84 FL (ref 78–100)
PLATELET # BLD AUTO: 272 10E9/L (ref 150–450)
RBC # BLD AUTO: 5.92 10E12/L (ref 4.4–5.9)
WBC # BLD AUTO: 12 10E9/L (ref 4–11)

## 2018-01-09 PROCEDURE — 93000 ELECTROCARDIOGRAM COMPLETE: CPT | Performed by: FAMILY MEDICINE

## 2018-01-09 PROCEDURE — 85027 COMPLETE CBC AUTOMATED: CPT | Performed by: FAMILY MEDICINE

## 2018-01-09 PROCEDURE — 99214 OFFICE O/P EST MOD 30 MIN: CPT | Performed by: FAMILY MEDICINE

## 2018-01-09 PROCEDURE — 80048 BASIC METABOLIC PNL TOTAL CA: CPT | Performed by: FAMILY MEDICINE

## 2018-01-09 PROCEDURE — 36415 COLL VENOUS BLD VENIPUNCTURE: CPT | Performed by: FAMILY MEDICINE

## 2018-01-09 NOTE — NURSING NOTE
"Chief Complaint   Patient presents with     Pre-Op Exam       Initial /68 (BP Location: Right arm, Patient Position: Chair, Cuff Size: Adult Regular)  Pulse 90  Temp 98.3  F (36.8  C) (Oral)  Ht 6' 2\" (1.88 m)  Wt 210 lb 4.8 oz (95.4 kg)  SpO2 96%  BMI 27 kg/m2 Estimated body mass index is 27 kg/(m^2) as calculated from the following:    Height as of this encounter: 6' 2\" (1.88 m).    Weight as of this encounter: 210 lb 4.8 oz (95.4 kg).    Medication Reconciliation: complete    Health Maintenance addressed:  NONE    n/a    Nevaeh Singh CMA                           "

## 2018-01-09 NOTE — LETTER
[facility]          [address] [place] [address]                                                                                                       [phone]      Naif Howell Jr.  [address] [facility] [address]      Dear Naif,        Your pre-operative lab work looks great. Your WBC count was a bit up because of your recent steroid injections. Enclosed is a copy of the results.      Thank you for choosing [facility]. We appreciate the opportunity to serve you and look forward to supporting your healthcare needs in the future.    If you have any questions or concerns, please call me or my nurse at           [phone].        Sincerely,            Josefina Rogers MD                Results for orders placed or performed in visit on 01/09/18   CBC with platelets   Result Value Ref Range    WBC 12.0 (H) 4.0 - 11.0 10e9/L    RBC Count 5.92 (H) 4.4 - 5.9 10e12/L    Hemoglobin 17.0 13.3 - 17.7 g/dL    Hematocrit 49.6 40.0 - 53.0 %    MCV 84 78 - 100 fl    MCH 28.7 26.5 - 33.0 pg    MCHC 34.3 31.5 - 36.5 g/dL    RDW 13.1 10.0 - 15.0 %    Platelet Count 272 150 - 450 10e9/L   Basic metabolic panel  (Ca, Cl, CO2, Creat, Gluc, K, Na, BUN)   Result Value Ref Range    Sodium 137 133 - 144 mmol/L    Potassium 4.0 3.4 - 5.3 mmol/L    Chloride 105 94 - 109 mmol/L    Carbon Dioxide 24 20 - 32 mmol/L    Anion Gap 8 3 - 14 mmol/L    Glucose 96 70 - 99 mg/dL    Urea Nitrogen 17 7 - 30 mg/dL    Creatinine 0.80 0.66 - 1.25 mg/dL    GFR Estimate >90 >60 mL/min/1.7m2    GFR Estimate If Black >90 >60 mL/min/1.7m2    Calcium 8.5 8.5 - 10.1 mg/dL

## 2018-01-09 NOTE — PROGRESS NOTES
[facility]  [address] [place] [address]  [phone]  Dept: [phone]    PRE-OP EVALUATION:  Today's date: 2018    Naif Howell Jr. (: 1965) presents for pre-operative evaluation assessment as requested by Dr. Vang .  He requires evaluation and anesthesia risk assessment prior to undergoing surgery/procedure for treatment of HERNIORRHAPHY INCISIONAL.  Proposed procedure: Incisional hernia repair with mesh    Date of Surgery/ Procedure: 18  Time of Surgery/ Procedure: 730  Hospital/Surgical Facility: [facility]     Primary Physician: Josefina Rogers  Type of Anesthesia Anticipated: General    Patient has a Health Care Directive or Living Will:  NO    Preop Questions 2018   1.  Do you have a history of heart attack, stroke, stent, bypass or surgery on an artery in the head, neck, heart or legs? No   2.  Do you ever have any pain or discomfort in your chest? YES - due to costochondritis, no chest pain with exertion   3.  Do you have a history of  Heart Failure? No   4.   Are you troubled by shortness of breath when:  walking on a level surface, or up a slight hill, or at night? No   5.  Do you currently have a cold, bronchitis or other respiratory infection? No   6.  Do you have a cough, shortness of breath, or wheezing? No   7.  Do you sometimes get pains in the calves of your legs when you walk? No   8. Do you or anyone in your family have previous history of blood clots? No   9.  Do you or does anyone in your family have a serious bleeding problem such as prolonged bleeding following surgeries or cuts? No   10. Have you ever had problems with anemia or been told to take iron pills? No   11. Have you had any abnormal blood loss such as black, tarry or bloody stools? No   12. Have you ever had a blood transfusion? No   13. Have you or any of your relatives ever had problems with anesthesia? No   14. Do you have sleep apnea, excessive snoring or daytime  drowsiness? No   15. Do you have any prosthetic heart valves? No   16. Do you have prosthetic joints? No       HPI:                                                      Brief HPI related to upcoming procedure: ventral hernia    HYPERTENSION - Patient has longstanding history of mod-severe HTN , currently denies any symptoms referable to elevated blood pressure. Specifically denies chest pain, palpitations, dyspnea, orthopnea, PND or peripheral edema. Blood pressure readings have been in normal range. Current medication regimen is as listed below. Patient denies any side effects of medication.                                                                                                                                           HYPERLIPIDEMIA - Patient has a long history of significant Hyperlipidemia requiring medication for treatment with recent good control. Patient reports no problems or side effects with the medication.                                                                                                                             MEDICAL HISTORY:                                                    Patient Active Problem List    Diagnosis Date Noted     Ventral hernia without obstruction or gangrene 11/30/2017     Priority: Medium     Tobacco use disorder 06/22/2017     Priority: Medium     Health Care Home 05/25/2017     Priority: Medium     SBO (small bowel obstruction) 05/15/2017     Priority: Medium     Vestibular migraine 04/20/2017     Priority: Medium     Chronic midline thoracic back pain 07/26/2016     Priority: Medium     Benign essential hypertension 06/02/2016     Priority: Medium     HTN, goal below 140/90 06/02/2016     Priority: Medium     Dyslipidemia 04/02/2012     Priority: Medium     Aortic regurgitation 12/23/2011     Priority: Medium     GERD (gastroesophageal reflux disease) 11/28/2011     Priority: Medium     Aortic root dilatation (H) 09/14/2011     Priority: Medium     PTSD  (post-traumatic stress disorder) 06/09/2011     Priority: Medium      Past Medical History:   Diagnosis Date     Anxiety     resolved     Aortic regurgitation      Arthritis     neck     Back pain      Basal cell carcinoma     left forehead     GERD (gastroesophageal reflux disease)      Hypertension      Migraines      PTSD (post-traumatic stress disorder)      SBO (small bowel obstruction)      Vestibular migraine      Past Surgical History:   Procedure Laterality Date     LAPAROSCOPIC CHOLECYSTECTOMY WITH CHOLANGIOGRAMS N/A 5/12/2017    Procedure: LAPAROSCOPIC CHOLECYSTECTOMY WITH CHOLANGIOGRAMS;  LAPAROSCOPIC CHOLECYSTECTOMY WITH CHOLANGIOGRAMS ;  Surgeon: Corey Vang MD;  Location: [facility] OR     LAPAROSCOPIC LYSIS ADHESIONS N/A 5/16/2017    Procedure: LAPAROSCOPIC LYSIS ADHESIONS;;  Surgeon: Corey Vang MD;  Location: [facility] OR     LAPAROSCOPY DIAGNOSTIC (GENERAL) N/A 5/16/2017    Procedure: LAPAROSCOPY DIAGNOSTIC (GENERAL);  DIAGNOSTIC LAPAROSCOPY, LAPAROTOMY,  SMALL BOWEL RESECTION, LAPAROSCOPIC LYSIS OF ADHESIONS;  Surgeon: Corey Vang MD;  Location: [facility] OR     RESECT SMALL BOWEL WITHOUT OSTOMY N/A 5/16/2017    Procedure: RESECT SMALL BOWEL WITHOUT OSTOMY;;  Surgeon: Corey Vang MD;  Location: [facility] OR     Resection of basal cell carcinoma      Left forehead     Current Outpatient Prescriptions   Medication Sig Dispense Refill     oxyCODONE IR (ROXICODONE) 5 MG tablet Take 1 tablet (5 mg) by mouth every 4 hours as needed for pain 20 tablet 0     ibuprofen (ADVIL/MOTRIN) 800 MG tablet Take 1 tablet (800 mg) by mouth every 8 hours as needed for moderate pain 24 tablet 0     losartan (COZAAR) 50 MG tablet Take 1 tablet (50 mg) by mouth daily 90 tablet 1     simvastatin (ZOCOR) 40 MG tablet Take 1 tablet (40 mg) by mouth At Bedtime 90 tablet 3     sildenafil (REVATIO/VIAGRA) 20 MG tablet Take 1 tablet (20 mg) by mouth three times daily for pulmonary hypertension.  Never use with nitroglycerin, terazosin  "or doxazosin. 9 tablet 0     multivitamin, therapeutic with minerals (THERA-VIT-M) TABS tablet Take 1 tablet by mouth daily       OTC products: None, except as noted above    Allergies   Allergen Reactions     Omnicef [Cefdinir] Diarrhea     Cefuroxime Rash     Flagyl [Metronidazole] Swelling and Rash     Mild throat swelling      Latex Allergy: NO    Social History   Substance Use Topics     Smoking status: Current Every Day Smoker     Packs/day: 1.00     Types: Cigarettes     Smokeless tobacco: Never Used     Alcohol use No     History   Drug Use No       REVIEW OF SYSTEMS:                                                    Constitutional, neuro, ENT, endocrine, pulmonary, cardiac, gastrointestinal, genitourinary, musculoskeletal, integument and psychiatric systems are negative, except as otherwise noted.    EXAM:                                                    /68 (BP Location: Right arm, Patient Position: Chair, Cuff Size: Adult Regular)  Pulse 90  Temp 98.3  F (36.8  C) (Oral)  Ht 6' 2\" (1.88 m)  Wt 210 lb 4.8 oz (95.4 kg)  SpO2 96%  BMI 27 kg/m2    GENERAL APPEARANCE: healthy, alert and no distress     EYES: EOMI,  PERRL     HENT: ear canals and TM's normal and nose and mouth without ulcers or lesions     NECK: no adenopathy, no asymmetry, masses, or scars and thyroid normal to palpation     RESP: lungs clear to auscultation - no rales, rhonchi or wheezes     CV: regular rates and rhythm, normal S1 S2, no S3 or S4 and no murmur, click or rub     ABDOMEN:  soft, nontender, no HSM or masses and bowel sounds normal     MS: extremities normal- no gross deformities noted, no evidence of inflammation in joints, FROM in all extremities.     SKIN: no suspicious lesions or rashes     NEURO: Normal strength and tone, sensory exam grossly normal, mentation intact and speech normal     PSYCH: mentation appears normal. and affect normal/bright     LYMPHATICS: No axillary, cervical, or supraclavicular " nodes    DIAGNOSTICS:                                                    EKG: appears normal, NSR, normal axis, normal intervals, no acute ST/T changes c/w ischemia, no LVH by voltage criteria, unchanged from previous tracings    labwork pending     IMPRESSION:                                                    Diagnosis/reason for consult: pre operative clearance for ventral hernia repair    The proposed surgical procedure is considered INTERMEDIATE risk.    REVISED CARDIAC RISK INDEX  The patient has the following serious cardiovascular risks for perioperative complications such as (MI, PE, VFib and 3  AV Block):  No serious cardiac risks  INTERPRETATION: 0 risks: Class I (very low risk - 0.4% complication rate)    The patient has the following additional risks for perioperative complications:  No identified additional risks      ICD-10-CM    1. Preop general physical exam Z01.818 EKG 12-lead complete w/read - Clinics     CBC with platelets     Basic metabolic panel  (Ca, Cl, CO2, Creat, Gluc, K, Na, BUN)   2. Incisional hernia, without obstruction or gangrene K43.2    3. Dyslipidemia E78.5    4. Benign essential hypertension I10    5. Tobacco use disorder F17.200        Addendum dated 1/16/2018 - previous leukocytosis resolved with a normal procalcitonin, likely reflective of his recent epidural steroid injection. OK for surgery at this point.        RECOMMENDATIONS:                                                      --Patient is to take all scheduled medications on the day of surgery     APPROVAL GIVEN to proceed with proposed procedure, without further diagnostic evaluation       Signed Electronically by: Josefina Rogers MD

## 2018-01-10 ENCOUNTER — TELEPHONE (OUTPATIENT)
Dept: PALLIATIVE MEDICINE | Facility: CLINIC | Age: 53
End: 2018-01-10

## 2018-01-10 LAB
ANION GAP SERPL CALCULATED.3IONS-SCNC: 8 MMOL/L (ref 3–14)
BUN SERPL-MCNC: 17 MG/DL (ref 7–30)
CALCIUM SERPL-MCNC: 8.5 MG/DL (ref 8.5–10.1)
CHLORIDE SERPL-SCNC: 105 MMOL/L (ref 94–109)
CO2 SERPL-SCNC: 24 MMOL/L (ref 20–32)
CREAT SERPL-MCNC: 0.8 MG/DL (ref 0.66–1.25)
GFR SERPL CREATININE-BSD FRML MDRD: >90 ML/MIN/1.7M2
GLUCOSE SERPL-MCNC: 96 MG/DL (ref 70–99)
POTASSIUM SERPL-SCNC: 4 MMOL/L (ref 3.4–5.3)
SODIUM SERPL-SCNC: 137 MMOL/L (ref 133–144)

## 2018-01-10 NOTE — TELEPHONE ENCOUNTER
Patient had a C7-T1 interlaminar epidural steroid injection  on 1/3/18.  Called patient for an update.      Left message that we were calling for an update about how he was doing after the injection.  LM that if he has any problems or questions to call the nurse line at [phone].

## 2018-01-10 NOTE — H&P (VIEW-ONLY)
[facility]  [address] [place] [address]  [phone]  Dept: [phone]    PRE-OP EVALUATION:  Today's date: 2018    Naif Howell Jr. (: 1965) presents for pre-operative evaluation assessment as requested by Dr. Vang .  He requires evaluation and anesthesia risk assessment prior to undergoing surgery/procedure for treatment of HERNIORRHAPHY INCISIONAL.  Proposed procedure: Incisional hernia repair with mesh    Date of Surgery/ Procedure: 18  Time of Surgery/ Procedure: 730  Hospital/Surgical Facility: [facility]     Primary Physician: Josefina Rogers  Type of Anesthesia Anticipated: General    Patient has a Health Care Directive or Living Will:  NO    Preop Questions 2018   1.  Do you have a history of heart attack, stroke, stent, bypass or surgery on an artery in the head, neck, heart or legs? No   2.  Do you ever have any pain or discomfort in your chest? YES - due to costochondritis, no chest pain with exertion   3.  Do you have a history of  Heart Failure? No   4.   Are you troubled by shortness of breath when:  walking on a level surface, or up a slight hill, or at night? No   5.  Do you currently have a cold, bronchitis or other respiratory infection? No   6.  Do you have a cough, shortness of breath, or wheezing? No   7.  Do you sometimes get pains in the calves of your legs when you walk? No   8. Do you or anyone in your family have previous history of blood clots? No   9.  Do you or does anyone in your family have a serious bleeding problem such as prolonged bleeding following surgeries or cuts? No   10. Have you ever had problems with anemia or been told to take iron pills? No   11. Have you had any abnormal blood loss such as black, tarry or bloody stools? No   12. Have you ever had a blood transfusion? No   13. Have you or any of your relatives ever had problems with anesthesia? No   14. Do you have sleep apnea, excessive snoring or daytime  drowsiness? No   15. Do you have any prosthetic heart valves? No   16. Do you have prosthetic joints? No       HPI:                                                      Brief HPI related to upcoming procedure: ventral hernia    HYPERTENSION - Patient has longstanding history of mod-severe HTN , currently denies any symptoms referable to elevated blood pressure. Specifically denies chest pain, palpitations, dyspnea, orthopnea, PND or peripheral edema. Blood pressure readings have been in normal range. Current medication regimen is as listed below. Patient denies any side effects of medication.                                                                                                                                           HYPERLIPIDEMIA - Patient has a long history of significant Hyperlipidemia requiring medication for treatment with recent good control. Patient reports no problems or side effects with the medication.                                                                                                                             MEDICAL HISTORY:                                                    Patient Active Problem List    Diagnosis Date Noted     Ventral hernia without obstruction or gangrene 11/30/2017     Priority: Medium     Tobacco use disorder 06/22/2017     Priority: Medium     Health Care Home 05/25/2017     Priority: Medium     SBO (small bowel obstruction) 05/15/2017     Priority: Medium     Vestibular migraine 04/20/2017     Priority: Medium     Chronic midline thoracic back pain 07/26/2016     Priority: Medium     Benign essential hypertension 06/02/2016     Priority: Medium     HTN, goal below 140/90 06/02/2016     Priority: Medium     Dyslipidemia 04/02/2012     Priority: Medium     Aortic regurgitation 12/23/2011     Priority: Medium     GERD (gastroesophageal reflux disease) 11/28/2011     Priority: Medium     Aortic root dilatation (H) 09/14/2011     Priority: Medium     PTSD  (post-traumatic stress disorder) 06/09/2011     Priority: Medium      Past Medical History:   Diagnosis Date     Anxiety     resolved     Aortic regurgitation      Arthritis     neck     Back pain      Basal cell carcinoma     left forehead     GERD (gastroesophageal reflux disease)      Hypertension      Migraines      PTSD (post-traumatic stress disorder)      SBO (small bowel obstruction)      Vestibular migraine      Past Surgical History:   Procedure Laterality Date     LAPAROSCOPIC CHOLECYSTECTOMY WITH CHOLANGIOGRAMS N/A 5/12/2017    Procedure: LAPAROSCOPIC CHOLECYSTECTOMY WITH CHOLANGIOGRAMS;  LAPAROSCOPIC CHOLECYSTECTOMY WITH CHOLANGIOGRAMS ;  Surgeon: Corey Vang MD;  Location: [facility] OR     LAPAROSCOPIC LYSIS ADHESIONS N/A 5/16/2017    Procedure: LAPAROSCOPIC LYSIS ADHESIONS;;  Surgeon: Corey Vang MD;  Location: [facility] OR     LAPAROSCOPY DIAGNOSTIC (GENERAL) N/A 5/16/2017    Procedure: LAPAROSCOPY DIAGNOSTIC (GENERAL);  DIAGNOSTIC LAPAROSCOPY, LAPAROTOMY,  SMALL BOWEL RESECTION, LAPAROSCOPIC LYSIS OF ADHESIONS;  Surgeon: Corey Vang MD;  Location: [facility] OR     RESECT SMALL BOWEL WITHOUT OSTOMY N/A 5/16/2017    Procedure: RESECT SMALL BOWEL WITHOUT OSTOMY;;  Surgeon: Corey Vang MD;  Location: [facility] OR     Resection of basal cell carcinoma      Left forehead     Current Outpatient Prescriptions   Medication Sig Dispense Refill     oxyCODONE IR (ROXICODONE) 5 MG tablet Take 1 tablet (5 mg) by mouth every 4 hours as needed for pain 20 tablet 0     ibuprofen (ADVIL/MOTRIN) 800 MG tablet Take 1 tablet (800 mg) by mouth every 8 hours as needed for moderate pain 24 tablet 0     losartan (COZAAR) 50 MG tablet Take 1 tablet (50 mg) by mouth daily 90 tablet 1     simvastatin (ZOCOR) 40 MG tablet Take 1 tablet (40 mg) by mouth At Bedtime 90 tablet 3     sildenafil (REVATIO/VIAGRA) 20 MG tablet Take 1 tablet (20 mg) by mouth three times daily for pulmonary hypertension.  Never use with nitroglycerin, terazosin  "or doxazosin. 9 tablet 0     multivitamin, therapeutic with minerals (THERA-VIT-M) TABS tablet Take 1 tablet by mouth daily       OTC products: None, except as noted above    Allergies   Allergen Reactions     Omnicef [Cefdinir] Diarrhea     Cefuroxime Rash     Flagyl [Metronidazole] Swelling and Rash     Mild throat swelling      Latex Allergy: NO    Social History   Substance Use Topics     Smoking status: Current Every Day Smoker     Packs/day: 1.00     Types: Cigarettes     Smokeless tobacco: Never Used     Alcohol use No     History   Drug Use No       REVIEW OF SYSTEMS:                                                    Constitutional, neuro, ENT, endocrine, pulmonary, cardiac, gastrointestinal, genitourinary, musculoskeletal, integument and psychiatric systems are negative, except as otherwise noted.    EXAM:                                                    /68 (BP Location: Right arm, Patient Position: Chair, Cuff Size: Adult Regular)  Pulse 90  Temp 98.3  F (36.8  C) (Oral)  Ht 6' 2\" (1.88 m)  Wt 210 lb 4.8 oz (95.4 kg)  SpO2 96%  BMI 27 kg/m2    GENERAL APPEARANCE: healthy, alert and no distress     EYES: EOMI,  PERRL     HENT: ear canals and TM's normal and nose and mouth without ulcers or lesions     NECK: no adenopathy, no asymmetry, masses, or scars and thyroid normal to palpation     RESP: lungs clear to auscultation - no rales, rhonchi or wheezes     CV: regular rates and rhythm, normal S1 S2, no S3 or S4 and no murmur, click or rub     ABDOMEN:  soft, nontender, no HSM or masses and bowel sounds normal     MS: extremities normal- no gross deformities noted, no evidence of inflammation in joints, FROM in all extremities.     SKIN: no suspicious lesions or rashes     NEURO: Normal strength and tone, sensory exam grossly normal, mentation intact and speech normal     PSYCH: mentation appears normal. and affect normal/bright     LYMPHATICS: No axillary, cervical, or supraclavicular " nodes    DIAGNOSTICS:                                                    EKG: appears normal, NSR, normal axis, normal intervals, no acute ST/T changes c/w ischemia, no LVH by voltage criteria, unchanged from previous tracings    labwork pending     IMPRESSION:                                                    Diagnosis/reason for consult: pre operative clearance for ventral hernia repair    The proposed surgical procedure is considered INTERMEDIATE risk.    REVISED CARDIAC RISK INDEX  The patient has the following serious cardiovascular risks for perioperative complications such as (MI, PE, VFib and 3  AV Block):  No serious cardiac risks  INTERPRETATION: 0 risks: Class I (very low risk - 0.4% complication rate)    The patient has the following additional risks for perioperative complications:  No identified additional risks      ICD-10-CM    1. Preop general physical exam Z01.818 EKG 12-lead complete w/read - Clinics     CBC with platelets     Basic metabolic panel  (Ca, Cl, CO2, Creat, Gluc, K, Na, BUN)   2. Incisional hernia, without obstruction or gangrene K43.2    3. Dyslipidemia E78.5    4. Benign essential hypertension I10    5. Tobacco use disorder F17.200        RECOMMENDATIONS:                                                      --Patient is to take all scheduled medications on the day of surgery     APPROVAL GIVEN to proceed with proposed procedure, without further diagnostic evaluation       Signed Electronically by: Josefina Rogers MD

## 2018-01-11 ENCOUNTER — SURGERY (OUTPATIENT)
Age: 53
End: 2018-01-11

## 2018-01-11 ENCOUNTER — HOSPITAL ENCOUNTER (OUTPATIENT)
Facility: CLINIC | Age: 53
Discharge: HOME | End: 2018-01-11
Attending: SURGERY | Admitting: SURGERY
Payer: OTHER GOVERNMENT

## 2018-01-11 VITALS
OXYGEN SATURATION: 98 % | TEMPERATURE: 97.2 F | BODY MASS INDEX: 25.87 KG/M2 | WEIGHT: 201.6 LBS | HEIGHT: 74 IN | SYSTOLIC BLOOD PRESSURE: 135 MMHG | RESPIRATION RATE: 16 BRPM | DIASTOLIC BLOOD PRESSURE: 96 MMHG

## 2018-01-11 PROCEDURE — 85048 AUTOMATED LEUKOCYTE COUNT: CPT | Performed by: ANESTHESIOLOGY

## 2018-01-11 PROCEDURE — 40000881 ZZH CANCELLED SURGERY UP TO 31-45 MINS: Performed by: SURGERY

## 2018-01-11 RX ORDER — HYDRALAZINE HYDROCHLORIDE 20 MG/ML
2.5-5 INJECTION INTRAMUSCULAR; INTRAVENOUS EVERY 10 MIN PRN
Status: CANCELLED | OUTPATIENT
Start: 2018-01-11

## 2018-01-11 RX ORDER — SODIUM CHLORIDE, SODIUM LACTATE, POTASSIUM CHLORIDE, CALCIUM CHLORIDE 600; 310; 30; 20 MG/100ML; MG/100ML; MG/100ML; MG/100ML
INJECTION, SOLUTION INTRAVENOUS CONTINUOUS
Status: DISCONTINUED | OUTPATIENT
Start: 2018-01-11 | End: 2018-01-11 | Stop reason: HOSPADM

## 2018-01-11 RX ORDER — LIDOCAINE 40 MG/G
CREAM TOPICAL
Status: DISCONTINUED | OUTPATIENT
Start: 2018-01-11 | End: 2018-01-11 | Stop reason: HOSPADM

## 2018-01-11 RX ORDER — FENTANYL CITRATE 50 UG/ML
25-50 INJECTION, SOLUTION INTRAMUSCULAR; INTRAVENOUS
Status: CANCELLED | OUTPATIENT
Start: 2018-01-11

## 2018-01-11 RX ORDER — NALOXONE HYDROCHLORIDE 0.4 MG/ML
.1-.4 INJECTION, SOLUTION INTRAMUSCULAR; INTRAVENOUS; SUBCUTANEOUS
Status: CANCELLED | OUTPATIENT
Start: 2018-01-11 | End: 2018-01-12

## 2018-01-11 RX ORDER — DIMENHYDRINATE 50 MG/ML
25 INJECTION, SOLUTION INTRAMUSCULAR; INTRAVENOUS
Status: CANCELLED | OUTPATIENT
Start: 2018-01-11

## 2018-01-11 RX ORDER — HYDROMORPHONE HYDROCHLORIDE 1 MG/ML
.3-.5 INJECTION, SOLUTION INTRAMUSCULAR; INTRAVENOUS; SUBCUTANEOUS EVERY 10 MIN PRN
Status: CANCELLED | OUTPATIENT
Start: 2018-01-11

## 2018-01-11 RX ORDER — SODIUM CHLORIDE, SODIUM LACTATE, POTASSIUM CHLORIDE, CALCIUM CHLORIDE 600; 310; 30; 20 MG/100ML; MG/100ML; MG/100ML; MG/100ML
INJECTION, SOLUTION INTRAVENOUS CONTINUOUS
Status: CANCELLED | OUTPATIENT
Start: 2018-01-11

## 2018-01-11 RX ORDER — PROMETHAZINE HYDROCHLORIDE 25 MG/ML
12.5 INJECTION, SOLUTION INTRAMUSCULAR; INTRAVENOUS
Status: CANCELLED | OUTPATIENT
Start: 2018-01-11

## 2018-01-11 RX ORDER — ONDANSETRON 4 MG/1
4 TABLET, ORALLY DISINTEGRATING ORAL EVERY 30 MIN PRN
Status: CANCELLED | OUTPATIENT
Start: 2018-01-11

## 2018-01-11 RX ORDER — KETOROLAC TROMETHAMINE 30 MG/ML
30 INJECTION, SOLUTION INTRAMUSCULAR; INTRAVENOUS EVERY 6 HOURS PRN
Status: CANCELLED | OUTPATIENT
Start: 2018-01-11 | End: 2018-01-16

## 2018-01-11 RX ORDER — BUPIVACAINE HYDROCHLORIDE AND EPINEPHRINE 2.5; 5 MG/ML; UG/ML
30 INJECTION, SOLUTION INFILTRATION; PERINEURAL ONCE
Status: DISCONTINUED | OUTPATIENT
Start: 2018-01-11 | End: 2018-01-11 | Stop reason: HOSPADM

## 2018-01-11 RX ORDER — CEFAZOLIN SODIUM 1 G/3ML
1 INJECTION, POWDER, FOR SOLUTION INTRAMUSCULAR; INTRAVENOUS SEE ADMIN INSTRUCTIONS
Status: DISCONTINUED | OUTPATIENT
Start: 2018-01-11 | End: 2018-01-11 | Stop reason: HOSPADM

## 2018-01-11 RX ORDER — CEFAZOLIN SODIUM 2 G/100ML
2 INJECTION, SOLUTION INTRAVENOUS
Status: DISCONTINUED | OUTPATIENT
Start: 2018-01-11 | End: 2018-01-11 | Stop reason: HOSPADM

## 2018-01-11 RX ORDER — DEXAMETHASONE SODIUM PHOSPHATE 4 MG/ML
4 INJECTION, SOLUTION INTRA-ARTICULAR; INTRALESIONAL; INTRAMUSCULAR; INTRAVENOUS; SOFT TISSUE EVERY 10 MIN PRN
Status: CANCELLED | OUTPATIENT
Start: 2018-01-11

## 2018-01-11 RX ORDER — ONDANSETRON 2 MG/ML
4 INJECTION INTRAMUSCULAR; INTRAVENOUS EVERY 30 MIN PRN
Status: CANCELLED | OUTPATIENT
Start: 2018-01-11

## 2018-01-11 RX ORDER — METOCLOPRAMIDE 10 MG/1
10 TABLET ORAL EVERY 6 HOURS PRN
Status: CANCELLED | OUTPATIENT
Start: 2018-01-11

## 2018-01-11 RX ORDER — METOCLOPRAMIDE HYDROCHLORIDE 5 MG/ML
10 INJECTION INTRAMUSCULAR; INTRAVENOUS EVERY 6 HOURS PRN
Status: CANCELLED | OUTPATIENT
Start: 2018-01-11

## 2018-01-11 RX ORDER — MEPERIDINE HYDROCHLORIDE 25 MG/ML
12.5 INJECTION INTRAMUSCULAR; INTRAVENOUS; SUBCUTANEOUS
Status: CANCELLED | OUTPATIENT
Start: 2018-01-11

## 2018-01-11 NOTE — OR NURSING
Pt discussed risks of surgery and increased WBC with Dr. Vang, pt decided to post pone surgery until WBC back WNL. Time spent with pt 30 minutes.

## 2018-01-12 ENCOUNTER — OFFICE VISIT (OUTPATIENT)
Dept: FAMILY MEDICINE | Facility: CLINIC | Age: 53
End: 2018-01-12
Payer: OTHER GOVERNMENT

## 2018-01-12 VITALS
BODY MASS INDEX: 26.82 KG/M2 | SYSTOLIC BLOOD PRESSURE: 132 MMHG | WEIGHT: 209 LBS | HEART RATE: 84 BPM | DIASTOLIC BLOOD PRESSURE: 76 MMHG | TEMPERATURE: 98.3 F | HEIGHT: 74 IN

## 2018-01-12 DIAGNOSIS — D72.829 LEUKOCYTOSIS, UNSPECIFIED TYPE: Primary | ICD-10-CM

## 2018-01-12 DIAGNOSIS — H65.01 RIGHT ACUTE SEROUS OTITIS MEDIA, RECURRENCE NOT SPECIFIED: ICD-10-CM

## 2018-01-12 PROCEDURE — 99213 OFFICE O/P EST LOW 20 MIN: CPT | Performed by: FAMILY MEDICINE

## 2018-01-12 RX ORDER — AZITHROMYCIN 250 MG/1
TABLET, FILM COATED ORAL
Qty: 6 TABLET | Refills: 0 | Status: SHIPPED | OUTPATIENT
Start: 2018-01-12 | End: 2018-01-17

## 2018-01-12 NOTE — MR AVS SNAPSHOT
"              After Visit Summary   1/12/2018    Naif Howell Jr.    MRN: [id number]           Patient Information     Date Of Birth          1965        Visit Information        Provider Department      1/12/2018 11:20 AM Josefina Rogers MD [facility]        Today's Diagnoses     Leukocytosis, unspecified type    -  1    Right acute serous otitis media, recurrence not specified           Follow-ups after your visit        Future tests that were ordered for you today     Open Future Orders        Priority Expected Expires Ordered    CBC with platelets and differential Routine  1/12/2019 1/12/2018    Procalcitonin Routine  1/12/2019 1/12/2018            Who to contact     If you have questions or need follow up information about today's clinic visit or your schedule please contact [facility] directly at [phone].  Normal or non-critical lab and imaging results will be communicated to you by MyChart, letter or phone within 4 business days after the clinic has received the results. If you do not hear from us within 7 days, please contact the clinic through MyChart or phone. If you have a critical or abnormal lab result, we will notify you by phone as soon as possible.  Submit refill requests through [employer] or call your pharmacy and they will forward the refill request to us. Please allow 3 business days for your refill to be completed.          Additional Information About Your Visit        MyChart Information     [employer] lets you send messages to your doctor, view your test results, renew your prescriptions, schedule appointments and more. To sign up, go to [email or web address] . Click on \"Log in\" on the left side of the screen, which will take you to the Welcome page. Then click on \"Sign up Now\" on the right side of the page.     You will be asked to enter the access code listed below, as well as some personal information. Please follow the directions to create your " "username and password.     Your access code is: [email or web address]  Expires: 4/3/2018  8:06 AM     Your access code will  in 90 days. If you need help or a new code, please call your [facility] or [phone].        Care EveryWhere ID     This is your Care EveryWhere ID. This could be used by other organizations to access your [place] medical records  [id number]        Your Vitals Were     Pulse Temperature Height BMI (Body Mass Index)          84 98.3  F (36.8  C) (Oral) 6' 2\" (1.88 m) 26.83 kg/m2         Blood Pressure from Last 3 Encounters:   18 132/76   18 (!) 135/96   18 120/68    Weight from Last 3 Encounters:   18 209 lb (94.8 kg)   18 201 lb 9.6 oz (91.4 kg)   18 210 lb 4.8 oz (95.4 kg)                 Today's Medication Changes          These changes are accurate as of: 18 11:37 AM.  If you have any questions, ask your nurse or doctor.               Start taking these medicines.        Dose/Directions    azithromycin 250 MG tablet   Commonly known as:  ZITHROMAX   Used for:  Right acute serous otitis media, recurrence not specified   Started by:  Josefina Rogers MD        Two tablets first day, then one tablet daily for four days.   Quantity:  6 tablet   Refills:  0            Where to get your medicines      These medications were sent to [facility] [address] [place] [address] [address] AT [facility] [address]  [address] [facility] [address]     Phone:  [phone]     azithromycin 250 MG tablet                Primary Care Provider Office Phone # Fax #    Josefnia Rogers [phone] [address]  [facility] [address]        Equal Access to Services     JENNIFER MARQUEZ AH: Hadii carl salmeron hadasho Soomaali, waaxda luqadaha, qaybta kaalmada adecatherine, tres greenberg ah. [facility] [phone].    ATENCIÓN: Si habla español, tiene a morocho disposición servicios gratuitos de " asistencia lingüística. Chelle al [phone].    We comply with applicable federal civil rights laws and [place] laws. We do not discriminate on the basis of race, color, national origin, age, disability, sex, sexual orientation, or gender identity.            Thank you!     Thank you for choosing [facility]  for your care. Our goal is always to provide you with excellent care. Hearing back from our patients is one way we can continue to improve our services. Please take a few minutes to complete the written survey that you may receive in the mail after your visit with us. Thank you!             Your Updated Medication List - Protect others around you: Learn how to safely use, store and throw away your medicines at www.disposemymeds.org.          This list is accurate as of: 1/12/18 11:37 AM.  Always use your most recent med list.                   Brand Name Dispense Instructions for use Diagnosis    azithromycin 250 MG tablet    ZITHROMAX    6 tablet    Two tablets first day, then one tablet daily for four days.    Right acute serous otitis media, recurrence not specified       ibuprofen 800 MG tablet    ADVIL/MOTRIN    24 tablet    Take 1 tablet (800 mg) by mouth every 8 hours as needed for moderate pain        losartan 50 MG tablet    COZAAR    90 tablet    Take 1 tablet (50 mg) by mouth daily    Benign essential hypertension       multivitamin, therapeutic with minerals Tabs tablet      Take 1 tablet by mouth daily        oxyCODONE IR 5 MG tablet    ROXICODONE    20 tablet    Take 1 tablet (5 mg) by mouth every 4 hours as needed for pain        sildenafil 20 MG tablet    REVATIO    9 tablet    Take 1 tablet (20 mg) by mouth three times daily for pulmonary hypertension.  Never use with nitroglycerin, terazosin or doxazosin.    Vasculogenic erectile dysfunction, unspecified vasculogenic erectile dysfunction type       simvastatin 40 MG tablet    ZOCOR    90 tablet    Take 1 tablet (40 mg) by  mouth At Bedtime    Mixed hyperlipidemia

## 2018-01-12 NOTE — NURSING NOTE
"Chief Complaint   Patient presents with     Consult     recent high WBC        Initial /76 (BP Location: Right arm, Patient Position: Sitting, Cuff Size: Adult Regular)  Pulse 84  Temp 98.3  F (36.8  C) (Oral)  Ht 6' 2\" (1.88 m)  Wt 209 lb (94.8 kg)  BMI 26.83 kg/m2 Estimated body mass index is 26.83 kg/(m^2) as calculated from the following:    Height as of this encounter: 6' 2\" (1.88 m).    Weight as of this encounter: 209 lb (94.8 kg).  Medication Reconciliation: complete     Danis Sanders CMA          "

## 2018-01-12 NOTE — PROGRESS NOTES
SUBJECTIVE:   Naif Howell Jr. is a 52 year old male who presents to clinic today for the following health issues:    Was in for a preop exam earlier this week, in preparation for incisional hernia repair.    White blood cell results came back elevated at 12.0. He elected to cancel his surgery on the chance that he could have an underlying infection as the cause of his leukocytosis. He would like to reschedule his surgery for the near future.    He had had an epidural steroid injection on January 3, which was likely the reason for his leukocytosis. However, he has commented on a sensation of more fluid within his middle ear. He has a history of ear infections in adulthood and wonders if he doesn't have an evolving ear infection. He notes some lightheadedness with bending over. No significant ear pain. No fevers. No cough or sore throat. No skin rashes or pain. No change in bowel movements. No dysuria.      Problem list and histories reviewed & adjusted, as indicated.  Additional history: none    Patient Active Problem List   Diagnosis     PTSD (post-traumatic stress disorder)     Aortic root dilatation (H)     GERD (gastroesophageal reflux disease)     Aortic regurgitation     Dyslipidemia     Benign essential hypertension     HTN, goal below 140/90     Chronic midline thoracic back pain     Vestibular migraine     SBO (small bowel obstruction)     Health Care Home     Tobacco use disorder     Ventral hernia without obstruction or gangrene     Past Surgical History:   Procedure Laterality Date     LAPAROSCOPIC CHOLECYSTECTOMY WITH CHOLANGIOGRAMS N/A 5/12/2017    Procedure: LAPAROSCOPIC CHOLECYSTECTOMY WITH CHOLANGIOGRAMS;  LAPAROSCOPIC CHOLECYSTECTOMY WITH CHOLANGIOGRAMS ;  Surgeon: Corey Vang MD;  Location: [facility] OR     LAPAROSCOPIC LYSIS ADHESIONS N/A 5/16/2017    Procedure: LAPAROSCOPIC LYSIS ADHESIONS;;  Surgeon: Corey Vang MD;  Location: [facility] OR     LAPAROSCOPY DIAGNOSTIC (GENERAL) N/A 5/16/2017     "Procedure: LAPAROSCOPY DIAGNOSTIC (GENERAL);  DIAGNOSTIC LAPAROSCOPY, LAPAROTOMY,  SMALL BOWEL RESECTION, LAPAROSCOPIC LYSIS OF ADHESIONS;  Surgeon: Corey Vang MD;  Location: [facility] OR     RESECT SMALL BOWEL WITHOUT OSTOMY N/A 2017    Procedure: RESECT SMALL BOWEL WITHOUT OSTOMY;;  Surgeon: Corey Vang MD;  Location: [facility] OR     Resection of basal cell carcinoma      Left forehead       Social History   Substance Use Topics     Smoking status: Current Every Day Smoker     Packs/day: 1.00     Types: Cigarettes     Smokeless tobacco: Never Used     Alcohol use No     Family History   Problem Relation Age of Onset     Hypertension Mother      alive     CANCER Father      chest tumor-not sure about details,  at age of 71     Family History Negative Sister      2     Hypertension Sister      Family History Negative Brother      1     Family History Negative Maternal Grandmother      Hypertension Maternal Grandmother      Family History Negative Maternal Grandfather      Family History Negative Paternal Grandmother      Family History Negative Paternal Grandfather      Cancer - colorectal No family hx of      Prostate Cancer No family hx of      Glaucoma No family hx of      Macular Degeneration No family hx of              Reviewed and updated as needed this visit by clinical staffAllergies       Reviewed and updated as needed this visit by Provider         ROS:  Constitutional, HEENT, cardiovascular, pulmonary, gi and gu systems are negative, except as otherwise noted.      OBJECTIVE:   /76 (BP Location: Right arm, Patient Position: Sitting, Cuff Size: Adult Regular)  Pulse 84  Temp 98.3  F (36.8  C) (Oral)  Ht 6' 2\" (1.88 m)  Wt 209 lb (94.8 kg)  BMI 26.83 kg/m2  Body mass index is 26.83 kg/(m^2).  GENERAL: healthy, alert and no distress  EYES: Eyes grossly normal to inspection, conjunctivae and sclerae normal  HENT: Serous effusion of the left middle ear, no erythema or bulge. Cloudy effusion " of the middle ear with mild erythema of the tympanic membrane. No pharyngitis, no maxillary sinus pressure to percussion   NECK: no adenopathy  RESP: Good air entry, lungs sounds symmetric, lungs clear to auscultation - no rales, rhonchi or wheezes  CV: regular rate and rhythm, normal S1 S2, no S3 or S4, no murmur, click or rub  SKIN: no suspicious lesions or rashes  PSYCH: mentation appears normal, affect normal/bright    Diagnostic Test Results:  none     ASSESSMENT/PLAN:     1. Leukocytosis, unspecified type - suspect his elevated white blood cell count is a product of his recent epidural steroid injection. However he does have a cloudy effusion of his right middle ear, which could represent an involving otitis media. Will treat ear infection as below, repeat lab work early next week. Assuming his labs are back within normal range, he should be able to have surgery in the near future.  - CBC with platelets and differential; Future  - Procalcitonin; Future    2. Right acute serous otitis media, recurrence not specified - will treat as above  - azithromycin (ZITHROMAX) 250 MG tablet; Two tablets first day, then one tablet daily for four days.  Dispense: 6 tablet; Refill: 0    Josefina Rogers MD  [facility]

## 2018-01-15 DIAGNOSIS — D72.829 LEUKOCYTOSIS, UNSPECIFIED TYPE: ICD-10-CM

## 2018-01-15 LAB
BASOPHILS # BLD AUTO: 0.1 10E9/L (ref 0–0.2)
BASOPHILS NFR BLD AUTO: 0.7 %
DIFFERENTIAL METHOD BLD: ABNORMAL
EOSINOPHIL # BLD AUTO: 0.2 10E9/L (ref 0–0.7)
EOSINOPHIL NFR BLD AUTO: 1.8 %
ERYTHROCYTE [DISTWIDTH] IN BLOOD BY AUTOMATED COUNT: 13.4 % (ref 10–15)
HCT VFR BLD AUTO: 50.3 % (ref 40–53)
HGB BLD-MCNC: 17.3 G/DL (ref 13.3–17.7)
LYMPHOCYTES # BLD AUTO: 2.4 10E9/L (ref 0.8–5.3)
LYMPHOCYTES NFR BLD AUTO: 22 %
MCH RBC QN AUTO: 29.1 PG (ref 26.5–33)
MCHC RBC AUTO-ENTMCNC: 34.4 G/DL (ref 31.5–36.5)
MCV RBC AUTO: 85 FL (ref 78–100)
MONOCYTES # BLD AUTO: 0.8 10E9/L (ref 0–1.3)
MONOCYTES NFR BLD AUTO: 7 %
NEUTROPHILS # BLD AUTO: 7.4 10E9/L (ref 1.6–8.3)
NEUTROPHILS NFR BLD AUTO: 68.5 %
PLATELET # BLD AUTO: 249 10E9/L (ref 150–450)
PROCALCITONIN SERPL-MCNC: <0.05 NG/ML
RBC # BLD AUTO: 5.94 10E12/L (ref 4.4–5.9)
WBC # BLD AUTO: 10.8 10E9/L (ref 4–11)

## 2018-01-15 PROCEDURE — 85025 COMPLETE CBC W/AUTO DIFF WBC: CPT | Performed by: FAMILY MEDICINE

## 2018-01-15 PROCEDURE — 36415 COLL VENOUS BLD VENIPUNCTURE: CPT | Performed by: FAMILY MEDICINE

## 2018-01-15 PROCEDURE — 84145 PROCALCITONIN (PCT): CPT | Performed by: FAMILY MEDICINE

## 2018-01-17 ENCOUNTER — APPOINTMENT (OUTPATIENT)
Dept: GENERAL RADIOLOGY | Facility: CLINIC | Age: 53
End: 2018-01-17
Attending: EMERGENCY MEDICINE
Payer: OTHER GOVERNMENT

## 2018-01-17 ENCOUNTER — HOSPITAL ENCOUNTER (EMERGENCY)
Facility: CLINIC | Age: 53
Discharge: HOME | End: 2018-01-17
Attending: EMERGENCY MEDICINE | Admitting: EMERGENCY MEDICINE
Payer: OTHER GOVERNMENT

## 2018-01-17 VITALS
SYSTOLIC BLOOD PRESSURE: 133 MMHG | RESPIRATION RATE: 13 BRPM | OXYGEN SATURATION: 96 % | DIASTOLIC BLOOD PRESSURE: 80 MMHG | BODY MASS INDEX: 26.61 KG/M2 | HEART RATE: 100 BPM | WEIGHT: 207.23 LBS | TEMPERATURE: 97.5 F

## 2018-01-17 DIAGNOSIS — R42 DIZZINESS: ICD-10-CM

## 2018-01-17 DIAGNOSIS — G89.29 CHRONIC NECK PAIN: ICD-10-CM

## 2018-01-17 DIAGNOSIS — R07.9 CHEST PAIN, UNSPECIFIED TYPE: ICD-10-CM

## 2018-01-17 DIAGNOSIS — M54.2 CHRONIC NECK PAIN: ICD-10-CM

## 2018-01-17 DIAGNOSIS — M54.6 THORACIC BACK PAIN, UNSPECIFIED BACK PAIN LATERALITY, UNSPECIFIED CHRONICITY: ICD-10-CM

## 2018-01-17 LAB
ALBUMIN SERPL-MCNC: 3.3 G/DL (ref 3.4–5)
ALP SERPL-CCNC: 115 U/L (ref 40–150)
ALT SERPL W P-5'-P-CCNC: 24 U/L (ref 0–70)
ANION GAP SERPL CALCULATED.3IONS-SCNC: 7 MMOL/L (ref 3–14)
AST SERPL W P-5'-P-CCNC: 11 U/L (ref 0–45)
BASOPHILS # BLD AUTO: 0.1 10E9/L (ref 0–0.2)
BASOPHILS NFR BLD AUTO: 0.6 %
BILIRUB SERPL-MCNC: 0.2 MG/DL (ref 0.2–1.3)
BUN SERPL-MCNC: 20 MG/DL (ref 7–30)
CALCIUM SERPL-MCNC: 8.7 MG/DL (ref 8.5–10.1)
CHLORIDE SERPL-SCNC: 107 MMOL/L (ref 94–109)
CO2 SERPL-SCNC: 26 MMOL/L (ref 20–32)
CREAT SERPL-MCNC: 0.87 MG/DL (ref 0.66–1.25)
D DIMER PPP FEU-MCNC: 0.3 UG/ML FEU (ref 0–0.5)
DIFFERENTIAL METHOD BLD: NORMAL
EOSINOPHIL # BLD AUTO: 0.3 10E9/L (ref 0–0.7)
EOSINOPHIL NFR BLD AUTO: 2.9 %
ERYTHROCYTE [DISTWIDTH] IN BLOOD BY AUTOMATED COUNT: 12.5 % (ref 10–15)
GFR SERPL CREATININE-BSD FRML MDRD: >90 ML/MIN/1.7M2
GLUCOSE SERPL-MCNC: 98 MG/DL (ref 70–99)
HCT VFR BLD AUTO: 46.6 % (ref 40–53)
HGB BLD-MCNC: 16 G/DL (ref 13.3–17.7)
IMM GRANULOCYTES # BLD: 0 10E9/L (ref 0–0.4)
IMM GRANULOCYTES NFR BLD: 0.3 %
LYMPHOCYTES # BLD AUTO: 2.9 10E9/L (ref 0.8–5.3)
LYMPHOCYTES NFR BLD AUTO: 26.3 %
MCH RBC QN AUTO: 29.3 PG (ref 26.5–33)
MCHC RBC AUTO-ENTMCNC: 34.3 G/DL (ref 31.5–36.5)
MCV RBC AUTO: 85 FL (ref 78–100)
MONOCYTES # BLD AUTO: 0.7 10E9/L (ref 0–1.3)
MONOCYTES NFR BLD AUTO: 6.3 %
NEUTROPHILS # BLD AUTO: 7 10E9/L (ref 1.6–8.3)
NEUTROPHILS NFR BLD AUTO: 63.6 %
NRBC # BLD AUTO: 0 10*3/UL
NRBC BLD AUTO-RTO: 0 /100
PLATELET # BLD AUTO: 262 10E9/L (ref 150–450)
POTASSIUM SERPL-SCNC: 4.1 MMOL/L (ref 3.4–5.3)
PROT SERPL-MCNC: 7 G/DL (ref 6.8–8.8)
RBC # BLD AUTO: 5.47 10E12/L (ref 4.4–5.9)
SODIUM SERPL-SCNC: 140 MMOL/L (ref 133–144)
TROPONIN I SERPL-MCNC: <0.015 UG/L (ref 0–0.04)
WBC # BLD AUTO: 11 10E9/L (ref 4–11)

## 2018-01-17 PROCEDURE — 85025 COMPLETE CBC W/AUTO DIFF WBC: CPT | Performed by: EMERGENCY MEDICINE

## 2018-01-17 PROCEDURE — 99285 EMERGENCY DEPT VISIT HI MDM: CPT | Mod: 25

## 2018-01-17 PROCEDURE — 71046 X-RAY EXAM CHEST 2 VIEWS: CPT

## 2018-01-17 PROCEDURE — 93005 ELECTROCARDIOGRAM TRACING: CPT

## 2018-01-17 PROCEDURE — 85379 FIBRIN DEGRADATION QUANT: CPT | Performed by: EMERGENCY MEDICINE

## 2018-01-17 PROCEDURE — 80053 COMPREHEN METABOLIC PANEL: CPT | Performed by: EMERGENCY MEDICINE

## 2018-01-17 PROCEDURE — 84484 ASSAY OF TROPONIN QUANT: CPT | Performed by: EMERGENCY MEDICINE

## 2018-01-17 ASSESSMENT — ENCOUNTER SYMPTOMS
DIZZINESS: 1
BACK PAIN: 1
NECK PAIN: 1

## 2018-01-17 NOTE — ED PROVIDER NOTES
History     Chief Complaint:  Chest Pain      HPI   Naif Howell Jr. is a 52 year old male who presents with chest pain, neck pain and dizziness onset at noon while at work today. This started with neck pain radiating to his head, back and chest. He went home to take a nap and these symptoms worsened when he woke up. He reports the pain has subsided almost 6 hours later. Patient also endorses some numbness and tingling which he attributes to steroid injections for chronic pain and are not new symptoms for him. He states he's seen ENT, neurologist and more specialists for balance issues. Patient notes his symptoms today weren't any different than what he has experienced, but they were more severe. Patient denies unilateral leg swelling and pain.     Allergies:  Omnicef  Cefuroxime  Flagyl     Medications:    Cozaar  Zocor  Viagra  Multivitamin     Past Medical History:    Anxiety  Aortic regurgitation  Arthritis  Basal cell carcinoma  GERD  HTN  Migraines  PTSD  SBO  Vestibular migraine    Past Surgical History:    Laparoscopic cholecystectomy with cholangiograms  Laparoscopic lysis adhesions   Laparoscopy diagnostic   Resect small bowel without ostomy  Resection of basal radha carcinoma    Family History:    HTN  Cancer    Social History:  Marital Status: Legally   Presents to the ED alone.   Tobacco Use: Current ppd smoker.   Alcohol Use: No  PCP: Josefina Rogers     Review of Systems   Cardiovascular: Positive for chest pain. Negative for leg swelling.   Musculoskeletal: Positive for back pain and neck pain.   Neurological: Positive for dizziness.   All other systems reviewed and are negative.      Physical Exam   First Vitals:  BP: (!) 151/98  Pulse: 100  Temp: 97.5  F (36.4  C)  Resp: 20  Weight: 94 kg (207 lb 3.7 oz)  SpO2: 97 %      Physical Exam  General: Adult male sitting upright.   Eyes: PERRL, Conjunctive within normal limits  ENT: Moist mucous membranes, oropharynx clear.   CV: Normal S1S2,  no murmur, rub or gallop. Regular rate and rhythm  Resp: Clear to auscultation bilaterally, no wheezes, rales or rhonchi. Normal respiratory effort.  GI: Abdomen is soft, nontender and nondistended. No palpable masses. No rebound or guarding.  MSK: No edema. Nontender. Normal active range of motion.  Skin: Warm and dry. No rashes or lesions or ecchymoses on visible skin.  Neuro: Alert and oriented. Responds appropriately to all questions and commands. No focal findings appreciated. Normal muscle tone.  Psych: Normal mood and affect. Pleasant.    Emergency Department Course   ECG:  @ 1625  Indication: Chest pain  Vent. Rate 94 bpm. NY interval 168 ms. QRS duration 102 ms. QT/QTc 338/422 ms. P-R-T axis 66 13 58.   Normal sinus rhythm. Normal ECG.     Read @ 1745 by Dr. Stovall.    Imaging:  Chest XR, PA and LAT, per radiology:   IMPRESSION: Clear lungs.    Radiographic findings were communicated with the patient who voiced understanding of the findings.    Laboratory:  CBC:  WBC 11.0, HGB 16.0, , otherwise WNL  CMP: Albumin 3.3 (L), otherwise WNL (Creatinine 0.87)  Troponin: <0.015  D-Dimer: 0.3    Emergency Department Course:  Nursing notes and vitals reviewed.  1748: I performed an exam of the patient as documented above.  The above workup was undertaken.  1857: I rechecked the patient and discussed results. Patient is comfortable with plan for discharge. He denies any new concerns.  Findings and plan explained to the Patient. Patient discharged home, status improved, with instructions regarding supportive care, medications, and reasons to return as well as the importance of close follow-up was reviewed.     Impression & Plan      Medical Decision Making:  Naif Howell Jr. is a 52 year old male with chronic neck pain and intermittent radiation to his back, arm and chest who presents to the ED with worsening of his typical symptoms including the chest and the back. Screening tests here were unremarkable for  acute life threatening cardiac pathology. PE seems unlikely in a low risk patient with a negative D Dimer. XR did not show any widened mediastinum or acute processes in the chest. With regards to his neck pain this is a chronic issue for him, unchanged per his report, without new trauma or new neurologic symptoms/deficits. No indication for advanced imaging.  Ultimately, he is low risk for acute life-threatening pathology and has had an MRI and extensive workup for these symptoms in the past. Follow up with neurology as scheduled in 2 days and PCP within 3 days. Return to ED with worsening symptoms. All questions answered prior to discharge.     Diagnosis:    ICD-10-CM    1. Chest pain, unspecified type R07.9    2. Thoracic back pain, unspecified back pain laterality, unspecified chronicity M54.6    3. Chronic neck pain M54.2     G89.29        Disposition:  Discharged to home.         MITZY, Belle Rothman, am serving as a scribe on 1/17/2018 at 5:48 PM to personally document services performed by Dr. Stovall based on my observations and the provider's statements to me.   [facility] EMERGENCY DEPARTMENT       Aleena Stovlal MD  01/25/18 0643

## 2018-01-17 NOTE — ED AVS SNAPSHOT
[facility] Emergency Department    [address]    [facility] [address]    Phone:  [phone]    Fax:  [phone]                                       Naif Howell Jr.   MRN: [id number]    Department:  [facility] Emergency Department   Date of Visit:  1/17/2018           Patient Information     Date Of Birth          1965        Your diagnoses for this visit were:     Chest pain, unspecified type     Thoracic back pain, unspecified back pain laterality, unspecified chronicity     Chronic neck pain     Dizziness        You were seen by Aleena Stovall MD.      Follow-up Information     Follow up with Neurologist.    Why:  as scheduled on Friday        Follow up with Josefina Rogers MD.    Specialty:  Family Practice    Why:  within 3 days    Contact information:    [address]  [facility] [address]  [phone]          Follow up with [facility] Emergency Department.    Specialty:  EMERGENCY MEDICINE    Why:  As needed, If symptoms worsen    Contact information:    [address]  [facility] [id number] [phone]        Discharge Instructions          *CHEST PAIN, UNCERTAIN CAUSE    Based on your exam today, the exact cause of your chest pain is not certain. Your condition does not seem serious at this time, and your pain does not appear to be coming from your heart. However, sometimes the signs of a serious problem take more time to appear. Therefore, watch for the warning signs listed below.  HOME CARE:  1. Rest today and avoid strenuous activity.  2. Take any prescribed medicine as directed.  FOLLOW UP with your doctor in 1-3 days.   GET PROMPT MEDICAL ATTENTION if any of the following occur:    A change in the type of pain: if it feels different, becomes more severe, lasts longer, or begins to spread into your shoulder, arm, neck, jaw or back    Shortness of breath or increased pain with breathing    Weakness,  dizziness, or fainting    Cough with blood or dark colored sputum (phlegm)    Fever over 101  F (38.3  C)    Swelling, pain or redness in one leg    2511-8522 [employer]. [address] [place] [address], [place] [address]. All rights reserved. This information is not intended as a substitute for professional medical care. Always follow your healthcare professional's instructions.  This information has been modified by your health care provider with permission from the publisher.          Discharge References/Attachments     SYMPTOMS WITH UNCERTAIN CAUSE (ENGLISH)      Future Appointments        Provider Department Dept Phone Center    2/22/2018 9:15 AM Shantell Benson PA-C; Corey Vang MD [facility] Surgery Scheduling [phone] [phone]      24 Hour Appointment Hotline       To make an appointment at any [facility], call [phone] ([phone]). If you don't have a family doctor or clinic, we will help you find one. [facility] clinics are conveniently located to serve the needs of you and your family.             Review of your medicines      Our records show that you are taking the medicines listed below. If these are incorrect, please call your family doctor or clinic.        Dose / Directions Last dose taken    losartan 50 MG tablet   Commonly known as:  COZAAR   Dose:  50 mg   Quantity:  90 tablet        Take 1 tablet (50 mg) by mouth daily   Refills:  1        multivitamin, therapeutic with minerals Tabs tablet   Dose:  1 tablet        Take 1 tablet by mouth daily   Refills:  0        oxyCODONE IR 5 MG tablet   Commonly known as:  ROXICODONE   Dose:  5 mg   Quantity:  20 tablet        Take 1 tablet (5 mg) by mouth every 4 hours as needed for pain   Refills:  0        sildenafil 20 MG tablet   Commonly known as:  REVATIO   Quantity:  9 tablet        Take 1 tablet (20 mg) by mouth three times daily for pulmonary hypertension.  Never use with nitroglycerin, terazosin or  doxazosin.   Refills:  0        simvastatin 40 MG tablet   Commonly known as:  ZOCOR   Dose:  40 mg   Quantity:  90 tablet        Take 1 tablet (40 mg) by mouth At Bedtime   Refills:  3                Procedures and tests performed during your visit     CBC + differential    Chest XR,  PA & LAT    Comprehensive metabolic panel    D dimer quantitative    Troponin I (now)      Orders Needing Specimen Collection     None      Pending Results     Date and Time Order Name Status Description    1/17/2018 1746 Chest XR,  PA & LAT Preliminary             Pending Culture Results     No orders found from 1/15/2018 to 1/18/2018.            Pending Results Instructions     If you had any lab results that were not finalized at the time of your Discharge, you can call the ED Lab Result RN at [phone]. You will be contacted by this team for any positive Lab results or changes in treatment. The nurses are available 7 days a week from 10A to 6:30P.  You can leave a message 24 hours per day and they will return your call.        Test Results From Your Hospital Stay        1/17/2018  4:58 PM      Component Results     Component Value Ref Range & Units Status    WBC 11.0 4.0 - 11.0 10e9/L Final    RBC Count 5.47 4.4 - 5.9 10e12/L Final    Hemoglobin 16.0 13.3 - 17.7 g/dL Final    Hematocrit 46.6 40.0 - 53.0 % Final    MCV 85 78 - 100 fl Final    MCH 29.3 26.5 - 33.0 pg Final    MCHC 34.3 31.5 - 36.5 g/dL Final    RDW 12.5 10.0 - 15.0 % Final    Platelet Count 262 150 - 450 10e9/L Final    Diff Method Automated Method  Final    % Neutrophils 63.6 % Final    % Lymphocytes 26.3 % Final    % Monocytes 6.3 % Final    % Eosinophils 2.9 % Final    % Basophils 0.6 % Final    % Immature Granulocytes 0.3 % Final    Nucleated RBCs 0 0 /100 Final    Absolute Neutrophil 7.0 1.6 - 8.3 10e9/L Final    Absolute Lymphocytes 2.9 0.8 - 5.3 10e9/L Final    Absolute Monocytes 0.7 0.0 - 1.3 10e9/L Final    Absolute Eosinophils 0.3 0.0 - 0.7 10e9/L Final     Absolute Basophils 0.1 0.0 - 0.2 10e9/L Final    Abs Immature Granulocytes 0.0 0 - 0.4 10e9/L Final    Absolute Nucleated RBC 0.0  Final         1/17/2018  5:18 PM      Component Results     Component Value Ref Range & Units Status    Sodium 140 133 - 144 mmol/L Final    Potassium 4.1 3.4 - 5.3 mmol/L Final    Chloride 107 94 - 109 mmol/L Final    Carbon Dioxide 26 20 - 32 mmol/L Final    Anion Gap 7 3 - 14 mmol/L Final    Glucose 98 70 - 99 mg/dL Final    Urea Nitrogen 20 7 - 30 mg/dL Final    Creatinine 0.87 0.66 - 1.25 mg/dL Final    GFR Estimate >90 >60 mL/min/1.7m2 Final    Non  GFR Calc    GFR Estimate If Black >90 >60 mL/min/1.7m2 Final    African American GFR Calc    Calcium 8.7 8.5 - 10.1 mg/dL Final    Bilirubin Total 0.2 0.2 - 1.3 mg/dL Final    Albumin 3.3 (L) 3.4 - 5.0 g/dL Final    Protein Total 7.0 6.8 - 8.8 g/dL Final    Alkaline Phosphatase 115 40 - 150 U/L Final    ALT 24 0 - 70 U/L Final    AST 11 0 - 45 U/L Final         1/17/2018  5:18 PM      Component Results     Component Value Ref Range & Units Status    Troponin I ES <0.015 0.000 - 0.045 ug/L Final    The 99th percentile for upper reference range is 0.045 ug/L.  Troponin values   in the range of 0.045 - 0.120 ug/L may be associated with risks of adverse   clinical events.           1/17/2018  6:46 PM      Narrative     CHEST TWO VIEWS  1/17/2018 6:39 PM     COMPARISON: Two view chest x-ray 3/29/2017.    HISTORY: Chest pain.    FINDINGS: The cardiac silhouette, pulmonary vasculature, lungs and  pleural spaces are within normal limits.        Impression     IMPRESSION: Clear lungs.         1/17/2018  6:13 PM      Component Results     Component Value Ref Range & Units Status    D Dimer 0.3 0.0 - 0.50 ug/ml FEU Final    This D-dimer assay is intended for use in conjunction with a clinical pretest   probability assessment model to exclude pulmonary embolism (PE) and deep   venous thrombosis (DVT) in outpatients suspected of  PE or DVT. The cut-off   value is 0.5 ug/mL FEU.                  Clinical Quality Measure: Blood Pressure Screening     Your blood pressure was checked while you were in the emergency department today. The last reading we obtained was  BP: 150/86 . Please read the guidelines below about what these numbers mean and what you should do about them.  If your systolic blood pressure (the top number) is less than 120 and your diastolic blood pressure (the bottom number) is less than 80, then your blood pressure is normal. There is nothing more that you need to do about it.  If your systolic blood pressure (the top number) is 120-139 or your diastolic blood pressure (the bottom number) is 80-89, your blood pressure may be higher than it should be. You should have your blood pressure rechecked within a year by a primary care provider.  If your systolic blood pressure (the top number) is 140 or greater or your diastolic blood pressure (the bottom number) is 90 or greater, you may have high blood pressure. High blood pressure is treatable, but if left untreated over time it can put you at risk for heart attack, stroke, or kidney failure. You should have your blood pressure rechecked by a primary care provider within the next 4 weeks.  If your provider in the emergency department today gave you specific instructions to follow-up with your doctor or provider even sooner than that, you should follow that instruction and not wait for up to 4 weeks for your follow-up visit.        Thank you for choosing [facility]       Thank you for choosing [facility] for your care. Our goal is always to provide you with excellent care. Hearing back from our patients is one way we can continue to improve our services. Please take a few minutes to complete the written survey that you may receive in the mail after you visit with us. Thank you!        [facility] Information     [employer] lets you send messages to your doctor, view your test results, renew  "your prescriptions, schedule appointments and more. To sign up, go to [email or web address] . Click on \"Log in\" on the left side of the screen, which will take you to the Welcome page. Then click on \"Sign up Now\" on the right side of the page.     You will be asked to enter the access code listed below, as well as some personal information. Please follow the directions to create your username and password.     Your access code is: [email or web address]  Expires: 4/3/2018  8:06 AM     Your access code will  in 90 days. If you need help or a new code, please call your [facility] clinic or [phone].        Care EveryWhere ID     This is your Care EveryWhere ID. This could be used by other organizations to access your [facility] medical records  [id number]        Equal Access to Services     JENNIFER MARQUEZ : Brooklyn Mccann, giselle hoover, freddy singh, tres greenberg . So [facility] [phone].    ATENCIÓN: Si habla español, tiene a morocho disposición servicios gratuitos de asistencia lingüística. Llame al [phone].    We comply with applicable federal civil rights laws and [place] laws. We do not discriminate on the basis of race, color, national origin, age, disability, sex, sexual orientation, or gender identity.            After Visit Summary       This is your record. Keep this with you and show to your community pharmacist(s) and doctor(s) at your next visit.                  "

## 2018-01-17 NOTE — ED NOTES
Pt reports about 20 minutes prior to arrival he was taking a nap and then when he woke up and stood up he became very dizzy and had pain in his chest and back and felt short of breath. Pt states he has been having balance issues, headaches, and dizziness for the past 2 years, is seeing a neurologist. Pt states pain is starting to let up at this time.

## 2018-01-17 NOTE — ED AVS SNAPSHOT
[facility] Emergency Department    [address]    [facility] [address]    Phone:  [phone]    Fax:  [phone]                                       Naif Howell Jr.   MRN: [id number]    Department:  [facility] Emergency Department   Date of Visit:  1/17/2018           After Visit Summary Signature Page     I have received my discharge instructions, and my questions have been answered. I have discussed any challenges I see with this plan with the nurse or doctor.    ..........................................................................................................................................  Patient/Patient Representative Signature      ..........................................................................................................................................  Patient Representative Print Name and Relationship to Patient    ..................................................               ................................................  Date                                            Time    ..........................................................................................................................................  Reviewed by Signature/Title    ...................................................              ..............................................  Date                                                            Time

## 2018-01-18 LAB — INTERPRETATION ECG - MUSE: NORMAL

## 2018-01-19 ENCOUNTER — HOSPITAL ENCOUNTER (EMERGENCY)
Facility: CLINIC | Age: 53
Discharge: HOME | End: 2018-01-19
Attending: EMERGENCY MEDICINE | Admitting: EMERGENCY MEDICINE
Payer: OTHER GOVERNMENT

## 2018-01-19 VITALS
WEIGHT: 200 LBS | SYSTOLIC BLOOD PRESSURE: 143 MMHG | OXYGEN SATURATION: 98 % | HEART RATE: 86 BPM | RESPIRATION RATE: 18 BRPM | BODY MASS INDEX: 25.67 KG/M2 | TEMPERATURE: 98 F | DIASTOLIC BLOOD PRESSURE: 86 MMHG | HEIGHT: 74 IN

## 2018-01-19 DIAGNOSIS — R51.9 ACUTE NONINTRACTABLE HEADACHE, UNSPECIFIED HEADACHE TYPE: ICD-10-CM

## 2018-01-19 LAB — INTERPRETATION ECG - MUSE: NORMAL

## 2018-01-19 PROCEDURE — 96361 HYDRATE IV INFUSION ADD-ON: CPT

## 2018-01-19 PROCEDURE — 99284 EMERGENCY DEPT VISIT MOD MDM: CPT | Mod: 25

## 2018-01-19 PROCEDURE — 96375 TX/PRO/DX INJ NEW DRUG ADDON: CPT

## 2018-01-19 PROCEDURE — 96374 THER/PROPH/DIAG INJ IV PUSH: CPT

## 2018-01-19 PROCEDURE — 25000128 H RX IP 250 OP 636: Performed by: EMERGENCY MEDICINE

## 2018-01-19 RX ORDER — KETOROLAC TROMETHAMINE 15 MG/ML
15 INJECTION, SOLUTION INTRAMUSCULAR; INTRAVENOUS ONCE
Status: COMPLETED | OUTPATIENT
Start: 2018-01-19 | End: 2018-01-19

## 2018-01-19 RX ORDER — METOCLOPRAMIDE HYDROCHLORIDE 5 MG/ML
10 INJECTION INTRAMUSCULAR; INTRAVENOUS ONCE
Status: COMPLETED | OUTPATIENT
Start: 2018-01-19 | End: 2018-01-19

## 2018-01-19 RX ORDER — DEXAMETHASONE SODIUM PHOSPHATE 10 MG/ML
10 INJECTION, SOLUTION INTRAMUSCULAR; INTRAVENOUS ONCE
Status: COMPLETED | OUTPATIENT
Start: 2018-01-19 | End: 2018-01-19

## 2018-01-19 RX ORDER — DIPHENHYDRAMINE HYDROCHLORIDE 50 MG/ML
25 INJECTION INTRAMUSCULAR; INTRAVENOUS ONCE
Status: COMPLETED | OUTPATIENT
Start: 2018-01-19 | End: 2018-01-19

## 2018-01-19 RX ADMIN — DEXAMETHASONE SODIUM PHOSPHATE 10 MG: 10 INJECTION, SOLUTION INTRAMUSCULAR; INTRAVENOUS at 05:38

## 2018-01-19 RX ADMIN — DIPHENHYDRAMINE HYDROCHLORIDE 25 MG: 50 INJECTION, SOLUTION INTRAMUSCULAR; INTRAVENOUS at 05:35

## 2018-01-19 RX ADMIN — SODIUM CHLORIDE 1000 ML: 9 INJECTION, SOLUTION INTRAVENOUS at 05:36

## 2018-01-19 RX ADMIN — METOCLOPRAMIDE 10 MG: 5 INJECTION, SOLUTION INTRAMUSCULAR; INTRAVENOUS at 05:40

## 2018-01-19 RX ADMIN — KETOROLAC TROMETHAMINE 15 MG: 15 INJECTION, SOLUTION INTRAMUSCULAR; INTRAVENOUS at 05:36

## 2018-01-19 NOTE — ED AVS SNAPSHOT
[facility] Emergency Department    [address]    [facility] [address]    Phone:  [phone]    Fax:  [phone]                                       Naif Howell Jr.   MRN: [id number]    Department:  [facility] Emergency Department   Date of Visit:  1/19/2018           After Visit Summary Signature Page     I have received my discharge instructions, and my questions have been answered. I have discussed any challenges I see with this plan with the nurse or doctor.    ..........................................................................................................................................  Patient/Patient Representative Signature      ..........................................................................................................................................  Patient Representative Print Name and Relationship to Patient    ..................................................               ................................................  Date                                            Time    ..........................................................................................................................................  Reviewed by Signature/Title    ...................................................              ..............................................  Date                                                            Time

## 2018-01-19 NOTE — ED PROVIDER NOTES
CHIEF COMPLAINT:  Headache.      HISTORY OF PRESENT ILLNESS:  A 52-year-old gentleman with chronic neck pain, headache syndrome, currently being followed by ENT and Neurology as well as his primary care physician.  He has had multiple recent evaluations in the Emergency Department as well in the outpatient setting.  He had steroid injections in his upper thoracic and lower cervical spine region on 01/03/2018.  He was evaluated in the emergency department for headaches on 01/04/2018 and for chest pain on 01/17/2018.  On 01/17/2018 he had negative labs including D-dimer, troponin and x-ray.  Most recent MRI of the cervical spine was 12/17/2017.  He had MRI of the head and neck as well as MRA of the head and neck in March, 2017 which was unremarkable.  He states his symptoms are similar to what they normally are just more severe including headache that radiates down into his neck and chest.  He is dizzy but does not describe any other neurologic complaints.  No new falls.  No fevers.  He has had no vomiting or other concerns.  No focal weakness or numbness, double vision or other concerns.      PAST MEDICAL HISTORY:   1.  Anxiety.   2.  Aortic regurgitation.   3.  Back pain.   4.  Basal cell carcinoma.   5.  Chronic back pain.   6.  Degenerative disk disease of the cervical spine.   7.  Gastroesophageal reflux disease.   8.  Hypertension.   9.  Migraines.   10.  Posttraumatic stress disorder.   11.  Small-bowel obstruction.   12.  History of vestibular migraines.      PAST SURGICAL HISTORY:   1.  Laparoscopic cholecystectomy.   2.  Laparoscopic lysis of adhesions.   3.  Laparoscopic diagnostic general.   4.  Resection of small-bowel without ostomy.   5.  Resection of basal cell carcinoma.      MEDICATIONS:   1.  Cozaar.   2.  Oxycodone.   3.  Sildenafil.   4.  Simvastatin.   5.  Multivitamin.      ALLERGIES:     1.  FLAGYL.   2.  CEFUROXIME.      SOCIAL HISTORY:  The patient presents with a female .  He  currently smokes.      REVIEW OF SYSTEMS:  A pertinent 10-point review of systems is negative except for that noted in the HPI.      PHYSICAL EXAMINATION:   GENERAL:  The patient is lying in bed with his eyes closed.  He is uncomfortable appearing, but appropriate with interaction.   VITAL SIGNS:  Blood pressure 145/95, heart rate 86, respiratory rate 18, oxygen 98% on room air, temperature 98 degrees.   HEENT:  Atraumatic.  Moist mucous membranes.   NECK:  Full range of motion without rigidity.   CARDIOVASCULAR:  Regular rhythm.  Normal rate.  No murmurs.   RESPIRATORY:  Clear to auscultation bilaterally without wheezes, rales or rhonchi.   GASTROINTESTINAL:  Soft, nondistended, nontender, normal bowel sounds.   MUSCULOSKELETAL:  Full range of motion in all extremities.  No edema in the lower extremities.   SKIN:  There are no pertinent skin findings.   PSYCHIATRIC:  The patient has a normal mood and affect.   NEUROLOGIC:  The patient is alert, oriented x 4 and has normal strength.  Cranial nerves II-XII are intact and he has normal gait.      LABORATORY AND DIAGNOSTIC STUDIES:  None.      EMERGENCY DEPARTMENT COURSE AND MEDICAL DECISION MAKING:  This is a 52-year-old male with headache syndrome and chronic neck pain who presents with more severe symptoms than usual.  He has had several imaging studies looking for vascular abnormalities, intracranial mass lesions, stroke; all of which have been unremarkable.  He is followed chronically by Neurology and I suspect this is some type of migraine variant, especially with the dizziness that he describes.  With interventions here in the emergency department including an IV fluid normal saline bolus, IV Compazine, Benadryl, Decadron and Toradol he feels much better and would like to go home and sleep until his appointment this afternoon.  I think this is reasonable.  No concern clinically for intracranial hemorrhage or meningitis.  He will be discharged home with  appropriate followup instructions.      DIAGNOSIS:  Headache.         ALEXA CARVALHO MD             D: 2018 06:56   T: 2018 07:18   MT: GRACE      Name:     ALEXA ELLIOTT   MRN:      [id number]        Account:      [id number]   :      1965           Visit Date:   2018      Document: [id number]

## 2018-01-19 NOTE — ED NOTES
Was here couple days ago with same  Ha back  Base of skull 7/10  Does have apt with neurologist later today but could not wait   Nauseated no vomiting  Having balance issues here for eval

## 2018-01-19 NOTE — ED AVS SNAPSHOT
[facility] Emergency Department    [address]    [facility] [address]    Phone:  [phone]    Fax:  [phone]                                       Naif Howell Jr.   MRN: [id number]    Department:  [facility] Emergency Department   Date of Visit:  1/19/2018           Patient Information     Date Of Birth          1965        Your diagnoses for this visit were:     Acute nonintractable headache, unspecified headache type        You were seen by Naif Atkinson MD.      Follow-up Information     Follow up with Neurologist today as scheduled.        Discharge Instructions       Discharge Instructions  Headache    You were seen today for a headache. Headaches may be caused by many different things such as muscle tension, sinus inflammation, anxiety and stress, having too little sleep, too much alcohol, some medical conditions or injury. You may have a migraine, which is caused by changes in the blood vessels in your head.  At this time your provider does not find that your headache is a sign of anything dangerous or life-threatening.  However, sometimes the signs of serious illness do not show up right away.      Generally, every Emergency Department visit should have a follow-up clinic visit with either a primary or a specialty clinic/provider. Please follow-up as instructed by your emergency provider today.    Return to the Emergency Department if:    You get a new fever of 100.4 F or higher.    Your headache gets much worse.    You get a stiff neck with your headache.    You get a new headache that is significantly different or worse than headaches you have had before.    You are vomiting (throwing up) and cannot keep food or water down.    You have blurry or double vision or other problems with your eyes.    You have a new weakness on one side of your body.    You have difficulty with balance which is new.    You or your family thinks you are confused.    You have  a seizure.    What can I do to help myself?    Pain medications - You may take a pain medication such as Tylenol  (acetaminophen), Advil , Motrin  (ibuprofen) or Aleve  (naproxen).    Take a pain reliever as soon as you notice symptoms.  Starting medications as soon as you start to have symptoms may lessen the amount of pain you have.    Relaxing in a quiet, dark room may help.    Get enough sleep and eat meals regularly.    You may need to watch for certain foods or other things which may trigger your headaches.  Keeping a journal of your headaches and possible triggers may help you and your primary provider to identify things which you should avoid which may be causing your headaches.  If you were given a prescription for medicine here today, be sure to read all of the information (including the package insert) that comes with your prescription.  This will include important information about the medicine, its side effects, and any warnings that you need to know about.  The pharmacist who fills the prescription can provide more information and answer questions you may have about the medicine.  If you have questions or concerns that the pharmacist cannot address, please call or return to the Emergency Department.   Remember that you can always come back to the Emergency Department if you are not able to see your regular provider in the amount of time listed above, if you get any new symptoms, or if there is anything that worries you.      Future Appointments        Provider Department Dept Phone Center    2/22/2018 9:15 AM Shantell Benson PA-C; Corey Vang MD [facility] Surgery Scheduling [phone] [phone]      24 Hour Appointment Hotline       To make an appointment at any [facility], call [phone] ([phone]). If you don't have a family doctor or clinic, we will help you find one. [facility] are conveniently located to serve the needs of you and your family.              Review of your medicines      Our records show that you are taking the medicines listed below. If these are incorrect, please call your family doctor or clinic.        Dose / Directions Last dose taken    losartan 50 MG tablet   Commonly known as:  COZAAR   Dose:  50 mg   Quantity:  90 tablet        Take 1 tablet (50 mg) by mouth daily   Refills:  1        multivitamin, therapeutic with minerals Tabs tablet   Dose:  1 tablet        Take 1 tablet by mouth daily   Refills:  0        oxyCODONE IR 5 MG tablet   Commonly known as:  ROXICODONE   Dose:  5 mg   Quantity:  20 tablet        Take 1 tablet (5 mg) by mouth every 4 hours as needed for pain   Refills:  0        sildenafil 20 MG tablet   Commonly known as:  REVATIO   Quantity:  9 tablet        Take 1 tablet (20 mg) by mouth three times daily for pulmonary hypertension.  Never use with nitroglycerin, terazosin or doxazosin.   Refills:  0        simvastatin 40 MG tablet   Commonly known as:  ZOCOR   Dose:  40 mg   Quantity:  90 tablet        Take 1 tablet (40 mg) by mouth At Bedtime   Refills:  3                Procedures and tests performed during your visit     EKG 12 lead      Orders Needing Specimen Collection     None      Pending Results     No orders found from 1/17/2018 to 1/20/2018.            Pending Culture Results     No orders found from 1/17/2018 to 1/20/2018.            Pending Results Instructions     If you had any lab results that were not finalized at the time of your Discharge, you can call the ED Lab Result RN at [phone]. You will be contacted by this team for any positive Lab results or changes in treatment. The nurses are available 7 days a week from 10A to 6:30P.  You can leave a message 24 hours per day and they will return your call.        Test Results From Your Hospital Stay               Clinical Quality Measure: Blood Pressure Screening     Your blood pressure was checked while you were in the emergency department today. The last  "reading we obtained was  BP: 143/86 . Please read the guidelines below about what these numbers mean and what you should do about them.  If your systolic blood pressure (the top number) is less than 120 and your diastolic blood pressure (the bottom number) is less than 80, then your blood pressure is normal. There is nothing more that you need to do about it.  If your systolic blood pressure (the top number) is 120-139 or your diastolic blood pressure (the bottom number) is 80-89, your blood pressure may be higher than it should be. You should have your blood pressure rechecked within a year by a primary care provider.  If your systolic blood pressure (the top number) is 140 or greater or your diastolic blood pressure (the bottom number) is 90 or greater, you may have high blood pressure. High blood pressure is treatable, but if left untreated over time it can put you at risk for heart attack, stroke, or kidney failure. You should have your blood pressure rechecked by a primary care provider within the next 4 weeks.  If your provider in the emergency department today gave you specific instructions to follow-up with your doctor or provider even sooner than that, you should follow that instruction and not wait for up to 4 weeks for your follow-up visit.        Thank you for choosing [facility]       Thank you for choosing [facility] for your care. Our goal is always to provide you with excellent care. Hearing back from our patients is one way we can continue to improve our services. Please take a few minutes to complete the written survey that you may receive in the mail after you visit with us. Thank you!        [employer] Information     [employer] lets you send messages to your doctor, view your test results, renew your prescriptions, schedule appointments and more. To sign up, go to [email or web address] . Click on \"Log in\" on the left side of the screen, which will take you to the Welcome page. Then click on \"Sign up " "Now\" on the right side of the page.     You will be asked to enter the access code listed below, as well as some personal information. Please follow the directions to create your username and password.     Your access code is: [email or web address]  Expires: 4/3/2018  8:06 AM     Your access code will  in 90 days. If you need help or a new code, please call your [place] clinic or [phone].        Care EveryWhere ID     This is your Care EveryWhere ID. This could be used by other organizations to access your [place] medical records  [id number]        Equal Access to Services     [facility] : Hadamrita Mccann, giselle hoover, freddy sinhg, tres greenberg . So [facility] [phone].    ATENCIÓN: Si habla español, tiene a morocho disposición servicios gratuitos de asistencia lingüística. Llame al [phone].    We comply with applicable federal civil rights laws and [place] laws. We do not discriminate on the basis of race, color, national origin, age, disability, sex, sexual orientation, or gender identity.            After Visit Summary       This is your record. Keep this with you and show to your community pharmacist(s) and doctor(s) at your next visit.                  "

## 2018-02-02 ENCOUNTER — OFFICE VISIT (OUTPATIENT)
Dept: NEUROSURGERY | Facility: CLINIC | Age: 53
End: 2018-02-02
Attending: NURSE PRACTITIONER
Payer: OTHER GOVERNMENT

## 2018-02-02 ENCOUNTER — TELEPHONE (OUTPATIENT)
Dept: PALLIATIVE MEDICINE | Facility: CLINIC | Age: 53
End: 2018-02-02

## 2018-02-02 VITALS
HEIGHT: 74 IN | OXYGEN SATURATION: 97 % | BODY MASS INDEX: 26.82 KG/M2 | DIASTOLIC BLOOD PRESSURE: 88 MMHG | SYSTOLIC BLOOD PRESSURE: 138 MMHG | HEART RATE: 84 BPM | WEIGHT: 209 LBS

## 2018-02-02 DIAGNOSIS — M54.2 CERVICALGIA: Primary | ICD-10-CM

## 2018-02-02 DIAGNOSIS — M47.812 CERVICAL FACET JOINT SYNDROME: ICD-10-CM

## 2018-02-02 PROCEDURE — G0463 HOSPITAL OUTPT CLINIC VISIT: HCPCS | Performed by: NURSE PRACTITIONER

## 2018-02-02 PROCEDURE — 99214 OFFICE O/P EST MOD 30 MIN: CPT | Performed by: NURSE PRACTITIONER

## 2018-02-02 ASSESSMENT — PAIN SCALES - GENERAL: PAINLEVEL: MODERATE PAIN (4)

## 2018-02-02 NOTE — PATIENT INSTRUCTIONS
1.  Please schedule your injection. Someone will contact you from the pain clinic within 24 hours to schedule.   If they are unable to perform injection call clinic    2. Please contact the clinic if pain persists at [phone].

## 2018-02-02 NOTE — MR AVS SNAPSHOT
After Visit Summary   2/2/2018    Naif Howell Jr.    MRN: [id number]           Patient Information     Date Of Birth          1965        Visit Information        Provider Department      2/2/2018 10:20 AM Vanessa Li APRN CNP [facility]        Today's Diagnoses     Cervicalgia    -  1    Cervical facet joint syndrome          Care Instructions    1.  Please schedule your injection. Someone will contact you from the pain clinic within 24 hours to schedule.   If they are unable to perform injection call clinic    2. Please contact the clinic if pain persists at [phone].           Follow-ups after your visit        Additional Services     PAIN MANAGEMENT REFERRAL       Your provider has referred you to: [facility]: [facility] -   Dr. Rhodes  Reason for Referral: Procedure Order Facet Procedure:  Steroid Injection - cervical   What is your diagnosis for the patient's pain? Cervical facet       For any questions, contact the [facility] at [phone].     **ANY DIAGNOSTIC TESTS THAT ARE NOT IN EPIC SHOULD BE SENT TO THE PAIN CENTER**    REGARDING OPIOID MEDICATIONS:  The discussion of opioids management, appropriateness of therapy, and dosing will be discussed in patients being seen for evaluation.  The pain management clinics are not long-term prescribing clinics, with transition of prescribing of medications ultimately going back to the referring provider/PCP.  If prescribing is taken over at the pain clinic, it is in actively involved patients whom are appropriate for opioids, urine drug screening is completed, and long-term prescribing plan has been determined.  Therefore, we will not be automatically taking over prescribing at the patient's first visit.  Is this agreeable to you? agrees.     Please be aware that coverage of these services is subject to the terms and limitations of your health insurance plan.  Call member  services at your health plan with any benefit or coverage questions.      Please bring the following with you to your appointment:    (1) Any X-Rays, CTs or MRIs which have been performed.  Contact the facility where they were done to arrange for  prior to your scheduled appointment.    (2) List of current medications   (3) This referral request   (4) Any documents/labs given to you for this referral                  Your next 10 appointments already scheduled     Feb 02, 2018 10:20 AM CST   Return Visit with REBECA Hernández CNP   [facility] ([facility])    [address]  [facility] [id number] [phone]            Feb 22, 2018   Procedure with Corey Vang MD   [facility] (--)    [address] [facility] [id number] [phone]            Feb 22, 2018  9:15 AM CST   [facility] Same Day Surgery with Corey Vang MD, Shantell Benson PA-C   [facility] ([facility])    [facility] ([facility])   [phone]              Who to contact     If you have questions or need follow up information about today's clinic visit or your schedule please contact [facility] directly at [phone].  Normal or non-critical lab and imaging results will be communicated to you by MyChart, letter or phone within 4 business days after the clinic has received the results. If you do not hear from us within 7 days, please contact the clinic through MyChart or phone. If you have a critical or abnormal lab result, we will notify you by phone as soon as possible.  Submit refill requests through [employer] or call your pharmacy and they will forward the refill request to us. Please allow 3 business days for your refill to be completed.          Additional Information About Your Visit        MyChart Information  "    Quikey lets you send messages to your doctor, view your test results, renew your prescriptions, schedule appointments and more. To sign up, go to [email or web address] . Click on \"Log in\" on the left side of the screen, which will take you to the Welcome page. Then click on \"Sign up Now\" on the right side of the page.     You will be asked to enter the access code listed below, as well as some personal information. Please follow the directions to create your username and password.     Your access code is: [email or web address]  Expires: 4/3/2018  8:06 AM     Your access code will  in 90 days. If you need help or a new code, please call your [place] clinic or [phone].        Care EveryWhere ID     This is your Care EveryWhere ID. This could be used by other organizations to access your [place] medical records  [id number]        Your Vitals Were     Pulse Pulse Oximetry                84 97%           Blood Pressure from Last 3 Encounters:   18 143/86   18 133/80   18 132/76    Weight from Last 3 Encounters:   18 200 lb (90.7 kg)   18 207 lb 3.7 oz (94 kg)   18 209 lb (94.8 kg)              We Performed the Following     PAIN MANAGEMENT REFERRAL        Primary Care Provider Office Phone # Fax #    Josefina Lester Rogers [phone] [address] [address]        Equal Access to Services     [facility] AH: Hadii aad ku hadasho Soomaali, waaxda luqadaha, qaybta kaalmada adeegyada, waxay lucrecia bird. So [facility] [phone].    ATENCIÓN: Si habla español, tiene a morocho disposición servicios gratuitos de asistencia lingüística. Llame al [phone].    We comply with applicable federal civil rights laws and [place] laws. We do not discriminate on the basis of race, color, national origin, age, disability, sex, sexual orientation, or gender identity.            Thank you!     Thank you for choosing [facility]  for your care. Our goal is always to provide you with excellent care. Hearing back from our patients is one way we can continue to improve our services. Please take a few minutes to complete the written survey that you may receive in the mail after your visit with us. Thank you!             Your Updated Medication List - Protect others around you: Learn how to safely use, store and throw away your medicines at www.disposemymeds.org.          This list is accurate as of 2/2/18 10:19 AM.  Always use your most recent med list.                   Brand Name Dispense Instructions for use Diagnosis    losartan 50 MG tablet    COZAAR    90 tablet    Take 1 tablet (50 mg) by mouth daily    Benign essential hypertension       multivitamin, therapeutic with minerals Tabs tablet      Take 1 tablet by mouth daily        oxyCODONE IR 5 MG tablet    ROXICODONE    20 tablet    Take 1 tablet (5 mg) by mouth every 4 hours as needed for pain        sildenafil 20 MG tablet    REVATIO    9 tablet    Take 1 tablet (20 mg) by mouth three times daily for pulmonary hypertension.  Never use with nitroglycerin, terazosin or doxazosin.    Vasculogenic erectile dysfunction, unspecified vasculogenic erectile dysfunction type       simvastatin 40 MG tablet    ZOCOR    90 tablet    Take 1 tablet (40 mg) by mouth At Bedtime    Mixed hyperlipidemia

## 2018-02-02 NOTE — LETTER
2/2/2018         RE: Naif Howell Jr.  [address] [facility] [address]        Dear Colleague,    Thank you for referring your patient, Naif Howell Jr., to the [facility]. Please see a copy of my visit note below.    [facility]  Neurosurgery followup:    HPI: Mr. Howell is a 52 year old male that returns again today for neck and head pain. He states that the epidural injection helped while he was numb but no pain relief after. He feels that his neck pain is the worse on the right. He has intermittent numbness to his right pinky finger.  He is seeing  Dr. Virginia Medrano at [facility] as well for the headaches.     Exam:  Constitutional:  Alert, well nourished, NAD.  HEENT: Normocephalic, atraumatic.   Pulm:  Without shortness of breath   CV:  No pitting edema of BLE.     Neurological:  Awake  Alert  Oriented x 3  Motor exam:     Shoulder Abduction:  Right:  5/5    Left:  5/5  Biceps:                      Right:  5/5    Left:  5/5  Triceps:                     Right:  5/5    Left:  5/5  Wrist Extensors:       Right:  5/5    Left:  5/5  Wrist Flexors:           Right:  5/5    Left:  5/5  Intrinsics:                  Right:  5/5    Left:  5/5     Able to spontaneously move U/E bilaterally  Sensation intact throughout all U/E dermatomes  Pain with palpation to right cervical facet joints.      A/P:  Mr. Howell is a 52 year old male that returns again today for neck and head pain. He states that the epidural injection helped while he was numb but no pain relief after. He feels that his neck pain is the worse on the right. He has intermittent numbness to his right pinky finger.  He is seeing  Dr. Virginia Medrano at [facility] as well for the headaches. He has also been cleared by a neuro ophthalmologist.  At this time he would like to try more conservative care. He does have positive right cervical facet pain. We will have him try facet injections for his pain.      Patient  Instructions   1.  Please schedule your injection. Someone will contact you from the pain clinic within 24 hours to schedule.   If they are unable to perform injection call clinic    2. Please contact the clinic if pain persists at [phone].            Vanessa Li CNP  [facility]  [address] [address]    Tel [phone]  Pager [phone]      Again, thank you for allowing me to participate in the care of your patient.        Sincerely,        REBECA Hernández CNP

## 2018-02-02 NOTE — PROGRESS NOTES
[facility]  Neurosurgery followup:    HPI: Mr. Howell is a 52 year old male that returns again today for neck and head pain. He states that the epidural injection helped while he was numb but no pain relief after. He feels that his neck pain is the worse on the right. He has intermittent numbness to his right pinky finger.  He is seeing  Dr. Virginia Medrano at [facility] as well for the headaches.     Exam:  Constitutional:  Alert, well nourished, NAD.  HEENT: Normocephalic, atraumatic.   Pulm:  Without shortness of breath   CV:  No pitting edema of BLE.     Neurological:  Awake  Alert  Oriented x 3  Motor exam:     Shoulder Abduction:  Right:  5/5    Left:  5/5  Biceps:                      Right:  5/5    Left:  5/5  Triceps:                     Right:  5/5    Left:  5/5  Wrist Extensors:       Right:  5/5    Left:  5/5  Wrist Flexors:           Right:  5/5    Left:  5/5  Intrinsics:                  Right:  5/5    Left:  5/5     Able to spontaneously move U/E bilaterally  Sensation intact throughout all U/E dermatomes  Pain with palpation to right cervical facet joints.      A/P:  Mr. Howell is a 52 year old male that returns again today for neck and head pain. He states that the epidural injection helped while he was numb but no pain relief after. He feels that his neck pain is the worse on the right. He has intermittent numbness to his right pinky finger.  He is seeing  Dr. Virginia Medrano at [facility] as well for the headaches. He has also been cleared by a neuro ophthalmologist.  At this time he would like to try more conservative care. He does have positive right cervical facet pain. We will have him try facet injections for his pain.      Patient Instructions   1.  Please schedule your injection. Someone will contact you from the pain clinic within 24 hours to schedule.   If they are unable to perform injection call clinic    2. Please contact the clinic if pain persists at [phone].             Vanessa Li [facility]  [facility]  [address] [address]    Tel [phone]  Pager [phone]

## 2018-02-02 NOTE — TELEPHONE ENCOUNTER
Pre-screening Questions for Radiology Injections:    Injection to be done at which interventional clinic site? [facility]    Procedure ordered by Vanessa Li    Procedure ordered? Cervical facet joint injection    What insurance would patient like us to bill for this procedure?       Worker's comp or MVA (motor vehicle accident) -Any injection DO NOT SCHEDULE and route to Estelita Ling.      [employer] insurance - For SI joint injections, DO NOT SCHEDULE and route Lashell Ramos.      [employer]- MBB's must be scheduled at LEAST two weeks apart      [employer] - Any injection besides hip/shoulder/knee joint DO NOT SCHEDULE and route to Lashell Ramos. She will obtain PA and call pt back to schedule procedure or notify pt of denial.       HP [employer]-PA REQUIRED FOR NON-PADMINI OR Joint injections    Any chance of pregnancy? Not Applicable   If YES, do NOT schedule and route to RN pool    Is an  needed? No     Patient has a drive home? (mandatory) YES:     Is patient taking any blood thinners (plavix, coumadin, jantoven, warfarin, heparin, pradaxa or dabigatran )? No   If hold needed, do NOT schedule, route to RN pool     Is patient taking any aspirin products? No     If more than 325mg/day do NOT schedule; route to RN pool     For CERVICAL procedures, hold all aspirin products for 6 days.      Does the patient have a bleeding or clotting disorder? No     If YES, okay to schedule AND route to RN nurse pool    **For any patients with platelet count <100, must be forwarded to provider**    Is patient diabetic?  No  If YES, have them bring their glucometer.    Does patient have an active infection or treated for one within the past week? No     Is patient currently taking any antibiotics?  No     For patients on chronic, preventative, or prophylactic antibiotics, procedures may be scheduled.     For patients on antibiotics for active or recent infection:    Juan Sorenson Burton,  [place]-antibiotic course must have been completed for 4 days    Dr. Batista-antibiotic course must have been completed for 7 days    Is patient currently taking any steroid medications? (i.e. Prednisone, Medrol)  No     For patients on steroid medications:    Juan Sorenson Burton, [place]-steroid course must have been completed for 4 days    -steroid course must have been completed for 7 days    Reviewed with patient:  If you are started on any steroids or antibiotics between now and your appointment, you must contact us because it may affect our ability to perform your procedure.  Yes    Is patient actively being treated for cancer or immunocompromised? No  If YES, do NOT schedule and route to RN pool     Are you able to get on and off an exam table with minimal or no assistance? Yes  If NO, do NOT schedule and route to RN pool    Are you able to roll over and lay on your stomach with minimal or no assistance? Yes  If NO, do NOT schedule and route to RN pool     Any allergies to contrast dye, iodine, shellfish, or numbing and steroid medications? No  If YES, route to RN pool AND add allergy information to appointment notes    Allergies: Cefuroxime and Flagyl [metronidazole]      Has the patient had a flu shot or any other vaccinations within 7 days before or after the procedure.  No     Does patient have an MRI/CT?  YES: 12/7/17  (SI joint, hip injections, lumbar sympathetic blocks, and stellate ganglion blocks do not require an MRI)    Was the MRI done w/in the last 3 years?  Yes    Was MRI done at [place]? Yes      If not, where was it done? N/A       If MRI was not done at [place], [facility] or [facility] do NOT schedule and route to nursing.  If pt has an imaging disc, the injection may be scheduled but pt has to bring disc to appt. If they show up w/out disc the injection cannot be done    Reminders (please tell patient if applicable):       Instructed pt to arrive 30 minutes early for IV  start if this is for a cervical procedure, ALL sympathetic (stellate ganglion, hypogastric, or lumbar sympathetic block) and all sedation procedures (RFA, spinal cord stimulation trials).  Not Applicable   -IVs are not routinely placed for Dr. Rhodes cervical cases   -Dr. Schultz: IVs for cervical ESIs and cervical TBDs (not CMBBs/facet inj)      If NPO for sedation, informed patient that it is okay to take medications with sips of water (except if they are to hold blood thinners).  Not Applicable   *DO take blood pressure medication if it is prescribed*      If this is for a cervical PADMINI, informed patient that aspirin needs to be held for 6 days.   YES:       For all patients not having spinal cord stimulator (SCS) trials or radiofrequency ablations (RFAs), informed patient:    IV sedation is not provided for this procedure.  If you feel that an oral anti-anxiety medication is needed, you can discuss this further with your referring provider or primary care provider.  The Pain Clinic provider will discuss specifics of what the procedure includes at your appointment.  Most procedures last 10-20 minutes.  We use numbing medications to help with any discomfort during the procedure.  NO      Do not schedule procedures requiring IV placement in the first appointment of the day or first appointment after lunch.       For patients 85 or older we recommend having an adult stay w/ them for the remainder of the day.       Does the patient have any questions?  NO  Muriel Adams  [facility]

## 2018-02-02 NOTE — NURSING NOTE
"Naif Howell Jr. is a 52 year old male who presents for:  Chief Complaint   Patient presents with     Neurologic Problem     Cervicalgia        Initial Vitals:  /88 (BP Location: Right arm, Patient Position: Sitting, Cuff Size: Adult Large)  Pulse 84  Ht 6' 2\" (1.88 m)  Wt 209 lb (94.8 kg)  SpO2 97%  BMI 26.83 kg/m2 Estimated body mass index is 26.83 kg/(m^2) as calculated from the following:    Height as of this encounter: 6' 2\" (1.88 m).    Weight as of this encounter: 209 lb (94.8 kg).. Body surface area is 2.22 meters squared. BP completed using cuff size: large  Moderate Pain (4)    Do you feel safe in your environment?  Yes  Do you need any refills today? No    Nursing Comments: Cervicalgia.        5 min. nursing intake time  Jelly Mireles MA       Discharge plan: 1.  Please schedule your injection. Someone will contact you from the pain clinic within 24 hours to schedule.   If they are unable to perform injection call clinic    2. Please contact the clinic if pain persists at [phone].   2 min. nursing discharge time  Jelly Mireles MA        "

## 2018-02-05 NOTE — PROGRESS NOTES
[facility] - Procedure Note    Date of Visit: 2/7/2018    Pre procedure Diagnosis: facet arthropathy   Post procedure Diagnosis: Same  Procedure performed: Right C3-4, C4-5 facet joint injections  Anesthesia: none  Complications: none  Operators: Santa Rhodes MD & Amy Campbell MD (pain fellow)    Indications:   Naif Howell Jr. is a 52 year old male was sent by Vanessa Li CNP for right sided cervical facet joint injections.  They have a history of Right sided neck pain and headaches.  Exam shows pain with extension rotation bilaterally and they have tried conservative treatment including PT, medications and the following injections.    Injection history:   - C7-T1 PADMINI 1/3/2018 - one week of pain relief  - Right TON, C3 and C4 RFA 3/21/2018 - one week of pain relief  - C7-T1 PADMINI 5- - one week of pain relief  - T5- PADMINI 7/12/2016 - no relief    Options/alternatives, benefits and risks were discussed with the patient including bleeding, infection, flared pain, tissue trauma, exposure to radiation, reaction to medications including seizure, spinal cord injury, paralysis, weakness, numbness and headache.    Questions were answered to his satisfaction and he agrees to proceed. Voluntary informed consent was obtained and signed.     Vitals were reviewed: Yes  Allergies were reviewed:  Yes   Medications were reviewed:  Yes   Pre-procedure pain score: 2/10    Imaging:  MRI cervical spine 12/08/2017:   FINDINGS: There is normal alignment of the cervical vertebrae;  however, there is straightening of normal cervical lordosis. Vertebral  body heights of the cervical spine are normal. Marrow signal  throughout the cervical vertebrae is within normal limits. There is no  evidence for fracture or pathologic bony lesion of the cervical spine.      There is loss of disc height, disc desiccation and posterior disc  bulging to varying degrees at all levels of the cervical spine with  exception of the  normal appearing C7-T1 disc.      The cervical spinal cord is mildly deformed by degenerative changes at  the C5-C6 level. The cervical spinal cord is otherwise normal in  contour. There is no abnormal signal in the cervical spinal cord.  There is no evidence for intrathecal abnormality.     Level by level:     C2-C3: There is facet arthropathy bilaterally, uncinate hypertrophy  bilaterally and a posterior broad-based disc-osteophyte complex. These  findings result in moderate right neural foraminal stenosis but no  spinal canal or left foraminal stenosis. No change from the comparison  study.     C3-C4: There is facet arthropathy bilaterally, uncinate hypertrophy  bilaterally and a posterior broad-based disc-osteophyte complex. These  findings result in severe right foraminal stenosis, mild left  foraminal narrowing and minimal spinal canal narrowing. No change from  the comparison study.     C4-C5: There is facet arthropathy bilaterally, uncinate hypertrophy  bilaterally and a posterior broad-based disc-osteophyte complex. There  is no spinal canal or neural foraminal stenosis at this level. No  change from the comparison study.     C5-C6: There is facet arthropathy bilaterally, uncinate hypertrophy  bilaterally and a posterior broad-based disc-osteophyte complex with a  superimposed small right paracentral disc herniation (protrusion).  These findings result in moderate-severe right foraminal stenosis and  mild spinal canal narrowing but no left foraminal stenosis. No change  from the comparison study.     C6-C7: There is facet arthropathy bilaterally, uncinate hypertrophy  bilaterally and a posterior broad-based disc-osteophyte complex. These  findings result in mild bilateral neural foraminal narrowing but no  spinal canal stenosis. No change from the comparison study.     C7-T1: There is facet arthropathy and uncinate arthropathy  bilaterally. These findings result in mild-moderate left foraminal  narrowing  and mild right foraminal narrowing but no spinal canal  stenosis. No change from the comparison study.       IMPRESSION: Degenerative changes of the cervical spine as described  above.       Procedure:  After getting informed consent, patient was brought into the procedure suite and was placed in a prone position on the procedure table.   A Pause for the Cause was performed.  Patient was prepped and draped in sterile fashion.     Under AP fluoroscopic guidance the C3-4 and C4-5 facet joints on the Right side were identified, and the C-arm was rotated obliquely to the affected side to open the joint space. A total of 2 ml of 1% lidocaine was injected at the needle entry point and needle tract. Then a 25 gauge 3.5 inch quincke type spinal needle was inserted and advanced under fluoroscopic guidance targeting the superior articular pillar of each joint. Once the needle made a contact with SAP, it was rotated and was then advanced into the joint.    AP fluoroscopic views were obtained to confirm the needle placement. Then,  Omnipaque 300 contrast dye was injected after negative aspiration for heme and CSF in each joint, confirming appropriate placement.  A total of 0.4mL of Omnipaque was used and 9.6mL was wasted.    The injection was then accomplished using a solution containing 0.5ml of 0.5% bupivacaine mixed 10mg of dexamethasone, divided between the 2 joints. The needles were removed..     Hemostasis was achieved, the area was cleaned, and bandaids were placed when appropriate.  The patient tolerated the procedure well, and was taken to the recovery room.    Images were saved to PACS.    Post-procedure pain score: 0/10  Follow-up includes:   -f/u phone call in one week  -f/u with the referring provider  -If he does not get good pain relief from today's injection would consider Bilateral occipital nerve blocks.  Other options include repeat Right RFA vs repeat medial branch blocks on the left TON, C3 and C4 followed  by Bilateral cervical RFA.  Would start with the ONB's and see if he gets any headache relief from these as this seems more concerning to him than the neck pain.       Santa Rhodes MD   [facility]

## 2018-02-07 ENCOUNTER — RADIANT APPOINTMENT (OUTPATIENT)
Dept: GENERAL RADIOLOGY | Facility: CLINIC | Age: 53
End: 2018-02-07
Attending: ANESTHESIOLOGY
Payer: OTHER GOVERNMENT

## 2018-02-07 ENCOUNTER — RADIOLOGY INJECTION OFFICE VISIT (OUTPATIENT)
Dept: PALLIATIVE MEDICINE | Facility: CLINIC | Age: 53
End: 2018-02-07
Attending: NURSE PRACTITIONER
Payer: OTHER GOVERNMENT

## 2018-02-07 VITALS — DIASTOLIC BLOOD PRESSURE: 89 MMHG | OXYGEN SATURATION: 99 % | HEART RATE: 74 BPM | SYSTOLIC BLOOD PRESSURE: 137 MMHG

## 2018-02-07 DIAGNOSIS — M54.2 CERVICALGIA: ICD-10-CM

## 2018-02-07 DIAGNOSIS — M47.812 FACET ARTHROPATHY, CERVICAL: Primary | ICD-10-CM

## 2018-02-07 PROCEDURE — 64490 INJ PARAVERT F JNT C/T 1 LEV: CPT | Mod: RT | Performed by: ANESTHESIOLOGY

## 2018-02-07 PROCEDURE — 64491 INJ PARAVERT F JNT C/T 2 LEV: CPT | Mod: RT | Performed by: ANESTHESIOLOGY

## 2018-02-07 NOTE — PATIENT INSTRUCTIONS
[facility] Procedure Discharge Instructions    Today you saw:   Dr. Santa Rhodes         Your procedure:     Facet joint injection     Medications used:  Lidocaine (anesthetic)  Bupivacaine (anesthetic)     Dexamethasone (steroid)    Omnipaque (contrast)            Be cautious when walking as numbness and/or weakness in the legs may occur up to 6-8 hours after the procedure due to effect of the local anesthetic    Do not drive for 6 hours. The effect of the local anesthetic could slow your reflexes.     Avoid strenuous activity for the first 24 hours. You may resume your regular activities after that.     You may shower, however avoid swimming, tub baths or hot tubs for 24 hours following your procedure    You may have a mild to moderate increase in pain for several days following the injection.      You may use ice packs for 10-15 minutes, 3 to 4 times a day at the injection site for comfort    Do not use heat to painful areas for 6 to 8 hours. This will give the local anesthetic time to wear off and prevent you from accidentally burning your skin.    You may use anti-inflammatory medications (such as Ibuprofen/Advil or Aleve) or Tylenol for pain control if necessary    With diabetes, check your blood sugar more frequently than usual as your blood sugar may be higher than normal for 10-14 days following a steroid injection. Contact your doctor who manages your diabetes if your blood sugar is higher than usual    It may take up to 14 days for the steroid medication to start working although you may feel the effect as early as a few days after the procedure.     Follow up with your referring provider in 2-3 weeks      If you experience any of the following, call the pain center line during work hours at [phone] or on-call physician after hours at [phone]:  -Fever over 100 degree F  -Swelling, bleeding, redness, drainage, warmth at the injection site  -Progressive weakness or numbness in your legs  or arms  -Loss of bowel or bladder function  -Unusual headache that is not relieved by Tylenol or your regular headache medication  -Unusual new onset of pain that is not improving    Phone #s:  Nurse triage line for general questions: [phone]

## 2018-02-07 NOTE — NURSING NOTE
Discharge Information    IV Discontiued Time:  NA    Amount of Fluid Infused:  NA    Discharge Criteria = When patient returns to baseline or as per MD order    Consciousness:  Pt is fully awake    Circulation:  BP +/- 20% of pre-procedure level    Respiration:  Patient is able to breathe deeply    O2 Sat:  Patient is able to maintain O2 Sat >92% on room air    Activity:  Moves 4 extremities on command    Ambulation:  Patient is able to stand and walk or stand and pivot into wheelchair    Dressing:  Clean/dry or No Dressing    Notes:   Discharge instructions and AVS given to patient    Patient meets criteria for discharge?  YES    Admitted to PCU?  No    Responsible adult present to accompany patient home?  Yes    Signature/Title:    Helene Rodriguez RN Care Coordinator  [facility] [place]

## 2018-02-07 NOTE — MR AVS SNAPSHOT
After Visit Summary   2/7/2018    Naif Howell Jr.    MRN: [id number]           Patient Information     Date Of Birth          1965        Visit Information        Provider Department      2/7/2018 7:45 AM Santa Rhodes MD [facility]        Care Instructions    [facility] Procedure Discharge Instructions    Today you saw:   Dr. Santa Rhodes         Your procedure:     Facet joint injection     Medications used:  Lidocaine (anesthetic)  Bupivacaine (anesthetic)     Dexamethasone (steroid)    Omnipaque (contrast)            Be cautious when walking as numbness and/or weakness in the legs may occur up to 6-8 hours after the procedure due to effect of the local anesthetic    Do not drive for 6 hours. The effect of the local anesthetic could slow your reflexes.     Avoid strenuous activity for the first 24 hours. You may resume your regular activities after that.     You may shower, however avoid swimming, tub baths or hot tubs for 24 hours following your procedure    You may have a mild to moderate increase in pain for several days following the injection.      You may use ice packs for 10-15 minutes, 3 to 4 times a day at the injection site for comfort    Do not use heat to painful areas for 6 to 8 hours. This will give the local anesthetic time to wear off and prevent you from accidentally burning your skin.    You may use anti-inflammatory medications (such as Ibuprofen/Advil or Aleve) or Tylenol for pain control if necessary    With diabetes, check your blood sugar more frequently than usual as your blood sugar may be higher than normal for 10-14 days following a steroid injection. Contact your doctor who manages your diabetes if your blood sugar is higher than usual    It may take up to 14 days for the steroid medication to start working although you may feel the effect as early as a few days after the procedure.     Follow up with your referring provider in 2-3  weeks      If you experience any of the following, call the pain center line during work hours at [phone] or on-call physician after hours at [phone]:  -Fever over 100 degree F  -Swelling, bleeding, redness, drainage, warmth at the injection site  -Progressive weakness or numbness in your legs or arms  -Loss of bowel or bladder function  -Unusual headache that is not relieved by Tylenol or your regular headache medication  -Unusual new onset of pain that is not improving    Phone #s:  Nurse triage line for general questions: [phone]          Follow-ups after your visit        Your next 10 appointments already scheduled     Feb 22, 2018   Procedure with Corey Vang MD   [facility] (--)    [address]  [facility] [address]   [phone]            Feb 22, 2018  9:15 AM CST   [facility] Same Day Surgery with Corey Vang MD, Shantell Benson PA-C   [facility]    [facility] ([facility])   [phone]              Who to contact     If you have questions or need follow up information about today's clinic visit or your schedule please contact [facility] directly at [phone].  Normal or non-critical lab and imaging results will be communicated to you by Suiteyhart, letter or phone within 4 business days after the clinic has received the results. If you do not hear from us within 7 days, please contact the clinic through Suiteyhart or phone. If you have a critical or abnormal lab result, we will notify you by phone as soon as possible.  Submit refill requests through [email or web address] or call your pharmacy and they will forward the refill request to us. Please allow 3 business days for your refill to be completed.          Additional Information About Your Visit        [email or web address] Information     [email or web address] lets you send messages to your doctor, view your test  "results, renew your prescriptions, schedule appointments and more. To sign up, go to [email or web address] . Click on \"Log in\" on the left side of the screen, which will take you to the Welcome page. Then click on \"Sign up Now\" on the right side of the page.     You will be asked to enter the access code listed below, as well as some personal information. Please follow the directions to create your username and password.     Your access code is: [email or web address]  Expires: 4/3/2018  8:06 AM     Your access code will  in 90 days. If you need help or a new code, please call your [place] clinic or [phone].        Care EveryWhere ID     This is your Care EveryWhere ID. This could be used by other organizations to access your [place] medical records  [id number]         Blood Pressure from Last 3 Encounters:   18 138/88   18 143/86   18 133/80    Weight from Last 3 Encounters:   18 94.8 kg (209 lb)   18 90.7 kg (200 lb)   18 94 kg (207 lb 3.7 oz)              Today, you had the following     No orders found for display       Primary Care Provider Office Phone # Fax #    Josefina Lester Rogers [phone] [address] [facility] [address]        Equal Access to Services     [facility] : Hadii aad ku hadasho Soomaali, waaxda luqadaha, qaybta kaalmada adeegyada, tres greenberg . So [facility] [phone].    ATENCIÓN: Si habla español, tiene a morocho disposición servicios gratuitos de asistencia lingüística. Llame al [phone].    We comply with applicable federal civil rights laws and [place] laws. We do not discriminate on the basis of race, color, national origin, age, disability, sex, sexual orientation, or gender identity.            Thank you!     Thank you for choosing [facility]  for your care. Our goal is always to provide you with excellent care. Hearing back from our patients is one way we can continue " to improve our services. Please take a few minutes to complete the written survey that you may receive in the mail after your visit with us. Thank you!             Your Updated Medication List - Protect others around you: Learn how to safely use, store and throw away your medicines at www.disposemymeds.org.          This list is accurate as of 2/7/18  7:51 AM.  Always use your most recent med list.                   Brand Name Dispense Instructions for use Diagnosis    losartan 50 MG tablet    COZAAR    90 tablet    Take 1 tablet (50 mg) by mouth daily    Benign essential hypertension       multivitamin, therapeutic with minerals Tabs tablet      Take 1 tablet by mouth daily        oxyCODONE IR 5 MG tablet    ROXICODONE    20 tablet    Take 1 tablet (5 mg) by mouth every 4 hours as needed for pain        sildenafil 20 MG tablet    REVATIO    9 tablet    Take 1 tablet (20 mg) by mouth three times daily for pulmonary hypertension.  Never use with nitroglycerin, terazosin or doxazosin.    Vasculogenic erectile dysfunction, unspecified vasculogenic erectile dysfunction type       simvastatin 40 MG tablet    ZOCOR    90 tablet    Take 1 tablet (40 mg) by mouth At Bedtime    Mixed hyperlipidemia

## 2018-02-14 ENCOUNTER — TELEPHONE (OUTPATIENT)
Dept: PALLIATIVE MEDICINE | Facility: CLINIC | Age: 53
End: 2018-02-14

## 2018-02-15 NOTE — TELEPHONE ENCOUNTER
Patient had aRight C3-4, C4-5 facet joint injections on   2/7/18.  Called patient for an update.        Left message that we were calling for an update about how she was doing after the injection.  LM that if she has any problems or questions to call the nurse line at [phone].

## 2018-02-19 ENCOUNTER — OFFICE VISIT (OUTPATIENT)
Dept: FAMILY MEDICINE | Facility: CLINIC | Age: 53
End: 2018-02-19
Payer: OTHER GOVERNMENT

## 2018-02-19 VITALS
OXYGEN SATURATION: 96 % | BODY MASS INDEX: 26.82 KG/M2 | HEART RATE: 92 BPM | HEIGHT: 74 IN | DIASTOLIC BLOOD PRESSURE: 72 MMHG | SYSTOLIC BLOOD PRESSURE: 118 MMHG | TEMPERATURE: 97.6 F | RESPIRATION RATE: 16 BRPM | WEIGHT: 209 LBS

## 2018-02-19 DIAGNOSIS — Z01.818 PREOP GENERAL PHYSICAL EXAM: Primary | ICD-10-CM

## 2018-02-19 DIAGNOSIS — K43.2 INCISIONAL HERNIA, WITHOUT OBSTRUCTION OR GANGRENE: ICD-10-CM

## 2018-02-19 PROCEDURE — 99214 OFFICE O/P EST MOD 30 MIN: CPT | Performed by: NURSE PRACTITIONER

## 2018-02-19 NOTE — MR AVS SNAPSHOT
After Visit Summary   2/19/2018    Naif Howell Jr.    MRN: [id number]           Patient Information     Date Of Birth          1965        Visit Information        Provider Department      2/19/2018 7:30 AM Kiki Ly NP [facility]        Today's Diagnoses     Preop general physical exam    -  1      Care Instructions      Before Your Surgery      Call your surgeon if there is any change in your health. This includes signs of a cold or flu (such as a sore throat, runny nose, cough, rash or fever).    Do not smoke, drink alcohol or take over the counter medicine (unless your surgeon or primary care doctor tells you to) for the 24 hours before and after surgery.    If you take prescribed drugs: Follow your doctor s orders about which medicines to take and which to stop until after surgery.    Eating and drinking prior to surgery: follow the instructions from your surgeon    Take a shower or bath the night before surgery. Use the soap your surgeon gave you to gently clean your skin. If you do not have soap from your surgeon, use your regular soap. Do not shave or scrub the surgery site.  Wear clean pajamas and have clean sheets on your bed.           Follow-ups after your visit        Your next 10 appointments already scheduled     Feb 22, 2018   Procedure with Corey Vang MD   [facility] PeriOp Services (--)    [address] [facility] [place] [facility] [address]   [phone] Feb 22, 2018  9:15 AM [facility] Same Day Surgery with Corey Vang MD, Shantell Benson PA-C   [facility] Surgery Scheduling ([facility])    [facility] Surgery Scheduling ([facility])   [phone]              Who to contact     If you have questions or need follow up information about today's clinic visit or your schedule please contact [facility] directly at [phone].  Normal or non-critical lab and  "imaging results will be communicated to you by MyChart, letter or phone within 4 business days after the clinic has received the results. If you do not hear from us within 7 days, please contact the clinic through [employer] or phone. If you have a critical or abnormal lab result, we will notify you by phone as soon as possible.  Submit refill requests through [employer] or call your pharmacy and they will forward the refill request to us. Please allow 3 business days for your refill to be completed.          Additional Information About Your Visit        [employer] Information     [employer] lets you send messages to your doctor, view your test results, renew your prescriptions, schedule appointments and more. To sign up, go to [email or web address] . Click on \"Log in\" on the left side of the screen, which will take you to the Welcome page. Then click on \"Sign up Now\" on the right side of the page.     You will be asked to enter the access code listed below, as well as some personal information. Please follow the directions to create your username and password.     Your access code is: [email or web address]  Expires: 4/3/2018  8:06 AM     Your access code will  in 90 days. If you need help or a new code, please call your [place] clinic or [phone].        Care EveryWhere ID     This is your Care EveryWhere ID. This could be used by other organizations to access your [place] medical records  [id number]        Your Vitals Were     Pulse Temperature Respirations Height Pulse Oximetry BMI (Body Mass Index)    92 97.6  F (36.4  C) (Oral) 16 6' 2\" (1.88 m) 96% 26.83 kg/m2       Blood Pressure from Last 3 Encounters:   18 118/72   18 137/89   18 138/88    Weight from Last 3 Encounters:   18 209 lb (94.8 kg)   18 209 lb (94.8 kg)   18 200 lb (90.7 kg)              Today, you had the following     No orders found for display       Primary Care Provider Office Phone # Fax #    Josefina Batista " MD Ken [phone]       [address] [address] [facility] [address]        Equal Access to Services     JENNIFER MARQUEZ : Hadii aad ku hadaurelianoadan Mccann, wamarielda cuatejohnathanha, qamary louta katoribioda francisco, tres currie irvinkam ny laValeriyfilippo bird. So [facility] [phone].    ATENCIÓN: Si habla español, tiene a morocho disposición servicios gratuitos de asistencia lingüística. Llame al [phone].    We comply with applicable federal civil rights laws and [place] laws. We do not discriminate on the basis of race, color, national origin, age, disability, sex, sexual orientation, or gender identity.            Thank you!     Thank you for choosing [facility]  for your care. Our goal is always to provide you with excellent care. Hearing back from our patients is one way we can continue to improve our services. Please take a few minutes to complete the written survey that you may receive in the mail after your visit with us. Thank you!             Your Updated Medication List - Protect others around you: Learn how to safely use, store and throw away your medicines at www.disposemymeds.org.          This list is accurate as of 2/19/18  7:52 AM.  Always use your most recent med list.                   Brand Name Dispense Instructions for use Diagnosis    losartan 50 MG tablet    COZAAR    90 tablet    Take 1 tablet (50 mg) by mouth daily    Benign essential hypertension       multivitamin, therapeutic with minerals Tabs tablet      Take 1 tablet by mouth daily        oxyCODONE IR 5 MG tablet    ROXICODONE    20 tablet    Take 1 tablet (5 mg) by mouth every 4 hours as needed for pain        sildenafil 20 MG tablet    REVATIO    9 tablet    Take 1 tablet (20 mg) by mouth three times daily for pulmonary hypertension.  Never use with nitroglycerin, terazosin or doxazosin.    Vasculogenic erectile dysfunction, unspecified vasculogenic erectile dysfunction type       simvastatin 40 MG tablet    ZOCOR    90 tablet     Take 1 tablet (40 mg) by mouth At Bedtime    Mixed hyperlipidemia

## 2018-02-19 NOTE — NURSING NOTE
"Chief Complaint   Patient presents with     Pre-Op Exam       Initial /72 (BP Location: Right arm, Patient Position: Left side, Cuff Size: Adult Regular)  Pulse 92  Temp 97.6  F (36.4  C) (Oral)  Resp 16  Ht 6' 2\" (1.88 m)  Wt 209 lb (94.8 kg)  SpO2 96%  BMI 26.83 kg/m2 Estimated body mass index is 26.83 kg/(m^2) as calculated from the following:    Height as of this encounter: 6' 2\" (1.88 m).    Weight as of this encounter: 209 lb (94.8 kg).  Medication Reconciliation: complete     Emile Bloom CMA      "

## 2018-02-19 NOTE — PROGRESS NOTES
[facility]  [address] [place] [address]  [phone]  Dept: [phone]    PRE-OP EVALUATION:  Today's date: 2018    Naif Howell Jr. (: 1965) presents for pre-operative evaluation assessment as requested by Dr. Vang.  He requires evaluation and anesthesia risk assessment prior to undergoing surgery/procedure for treatment of Hernia    Date of Surgery/ Procedure: 2018  Time of Surgery/ Procedure: 0[facility]/Surgical Facility: [facility]  Fax number for surgical facility: not needed  Primary Physician: Josefina Rogers  Type of Anesthesia Anticipated: General    Patient has a Health Care Directive or Living Will:  NO    Preop Questions 2018   1.  Do you have a history of heart attack, stroke, stent, bypass or surgery on an artery in the head, neck, heart or legs? No   2.  Do you ever have any pain or discomfort in your chest? YES - some inflamation of the cartilige in his chest, had it for years   3.  Do you have a history of  Heart Failure? No   4.   Are you troubled by shortness of breath when:  walking on a level surface, or up a slight hill, or at night? No   5.  Do you currently have a cold, bronchitis or other respiratory infection? No   6.  Do you have a cough, shortness of breath, or wheezing? No   7.  Do you sometimes get pains in the calves of your legs when you walk? No   8. Do you or anyone in your family have previous history of blood clots? No   9.  Do you or does anyone in your family have a serious bleeding problem such as prolonged bleeding following surgeries or cuts? No   10. Have you ever had problems with anemia or been told to take iron pills? No   11. Have you had any abnormal blood loss such as black, tarry or bloody stools? No   12. Have you ever had a blood transfusion? No   13. Have you or any of your relatives ever had problems with anesthesia? No   14. Do you have sleep apnea, excessive snoring or daytime drowsiness? No    15. Do you have any prosthetic heart valves? No   16. Do you have prosthetic joints? No         HPI:                                                      Brief HPI related to upcoming procedure: incisional hernia after recent surgery          MEDICAL HISTORY:                                                      Patient Active Problem List    Diagnosis Date Noted     Ventral hernia without obstruction or gangrene 11/30/2017     Priority: Medium     Tobacco use disorder 06/22/2017     Priority: Medium     Health Care Home 05/25/2017     Priority: Medium     SBO (small bowel obstruction) 05/15/2017     Priority: Medium     Vestibular migraine 04/20/2017     Priority: Medium     Chronic midline thoracic back pain 07/26/2016     Priority: Medium     Benign essential hypertension 06/02/2016     Priority: Medium     HTN, goal below 140/90 06/02/2016     Priority: Medium     Dyslipidemia 04/02/2012     Priority: Medium     Aortic regurgitation 12/23/2011     Priority: Medium     GERD (gastroesophageal reflux disease) 11/28/2011     Priority: Medium     Aortic root dilatation (H) 09/14/2011     Priority: Medium     PTSD (post-traumatic stress disorder) 06/09/2011     Priority: Medium      Past Medical History:   Diagnosis Date     Anxiety     resolved     Aortic regurgitation      Back pain      Basal cell carcinoma     left forehead     Chronic neck pain      Degenerative disc disease, cervical     See MRI - 12/2017     GERD (gastroesophageal reflux disease)      Hypertension      Migraines      PTSD (post-traumatic stress disorder)      SBO (small bowel obstruction)      Vestibular migraine      Past Surgical History:   Procedure Laterality Date     LAPAROSCOPIC CHOLECYSTECTOMY WITH CHOLANGIOGRAMS N/A 5/12/2017    Procedure: LAPAROSCOPIC CHOLECYSTECTOMY WITH CHOLANGIOGRAMS;  LAPAROSCOPIC CHOLECYSTECTOMY WITH CHOLANGIOGRAMS ;  Surgeon: Corey Vang MD;  Location: [facility] OR     LAPAROSCOPIC LYSIS ADHESIONS N/A 5/16/2017     Procedure: LAPAROSCOPIC LYSIS ADHESIONS;;  Surgeon: Corey Vang MD;  Location: [facility] OR     LAPAROSCOPY DIAGNOSTIC (GENERAL) N/A 5/16/2017    Procedure: LAPAROSCOPY DIAGNOSTIC (GENERAL);  DIAGNOSTIC LAPAROSCOPY, LAPAROTOMY,  SMALL BOWEL RESECTION, LAPAROSCOPIC LYSIS OF ADHESIONS;  Surgeon: Corey Vang MD;  Location: [facility] OR     RESECT SMALL BOWEL WITHOUT OSTOMY N/A 5/16/2017    Procedure: RESECT SMALL BOWEL WITHOUT OSTOMY;;  Surgeon: Corey Vang MD;  Location: [facility] OR     Resection of basal cell carcinoma      Left forehead     Current Outpatient Prescriptions   Medication Sig Dispense Refill     oxyCODONE IR (ROXICODONE) 5 MG tablet Take 1 tablet (5 mg) by mouth every 4 hours as needed for pain 20 tablet 0     losartan (COZAAR) 50 MG tablet Take 1 tablet (50 mg) by mouth daily 90 tablet 1     simvastatin (ZOCOR) 40 MG tablet Take 1 tablet (40 mg) by mouth At Bedtime 90 tablet 3     sildenafil (REVATIO/VIAGRA) 20 MG tablet Take 1 tablet (20 mg) by mouth three times daily for pulmonary hypertension.  Never use with nitroglycerin, terazosin or doxazosin. 9 tablet 0     multivitamin, therapeutic with minerals (THERA-VIT-M) TABS tablet Take 1 tablet by mouth daily       OTC products: None, except as noted above    Allergies   Allergen Reactions     Cefuroxime Rash     Flagyl [Metronidazole] Swelling and Rash     Mild throat swelling      Latex Allergy: NO    Social History   Substance Use Topics     Smoking status: Current Every Day Smoker     Packs/day: 1.00     Types: Cigarettes     Smokeless tobacco: Never Used     Alcohol use No     History   Drug Use No       REVIEW OF SYSTEMS:                                                    Constitutional, HEENT, cardiovascular, pulmonary, gi and gu systems are negative, except as otherwise noted.    EXAM:                                                    /72 (BP Location: Right arm, Patient Position: Left side, Cuff Size: Adult Regular)  Pulse 92  Temp 97.6  F  "(36.4  C) (Oral)  Resp 16  Ht 6' 2\" (1.88 m)  Wt 209 lb (94.8 kg)  SpO2 96%  BMI 26.83 kg/m2    GENERAL APPEARANCE: healthy, alert and no distress     EYES: EOMI,  PERRL     HENT: ear canals and TM's normal and nose and mouth without ulcers or lesions     NECK: no adenopathy, no asymmetry, masses, or scars and thyroid normal to palpation     RESP: lungs clear to auscultation - no rales, rhonchi or wheezes     CV: regular rates and rhythm, normal S1 S2, no S3 or S4 and no murmur, click or rub     ABDOMEN:  soft, nontender, no HSM or masses and bowel sounds normal. Hernia visible midline.      MS: extremities normal- no gross deformities noted, no evidence of inflammation in joints, FROM in all extremities.     SKIN: no suspicious lesions or rashes     NEURO: Normal strength and tone, sensory exam grossly normal, mentation intact and speech normal     PSYCH: mentation appears normal. and affect normal/bright     LYMPHATICS: No axillary, cervical, or supraclavicular nodes    DIAGNOSTICS:                                                    EKG: appears normal, NSR, normal axis, normal intervals, no acute ST/T changes c/w ischemia, no LVH by voltage criteria, unchanged from previous tracings    Recent Labs   Lab Test  01/17/18   1640  01/15/18   0814  01/09/18   1327  06/22/17   0902   09/09/11   1150   HGB  16.0  17.3  17.0   --    < >  15.9   PLT  262  249  272   --    < >  222   INR   --    --    --    --    --   1.01   NA  140   --   137  139   < >  138   POTASSIUM  4.1   --   4.0  3.9   < >  3.8   CR  0.87   --   0.80  0.67   < >  0.82   A1C   --    --    --   5.3   --    --     < > = values in this interval not displayed.        IMPRESSION:                                                        The proposed surgical procedure is considered INTERMEDIATE risk.    REVISED CARDIAC RISK INDEX  The patient has the following serious cardiovascular risks for perioperative complications such as (MI, PE, VFib and 3  AV " Block):  No serious cardiac risks  INTERPRETATION: 1 risks: Class II (low risk - 0.9% complication rate)    The patient has the following additional risks for perioperative complications:  No identified additional risks      ICD-10-CM    1. Preop general physical exam Z01.818    2. Incisional hernia, without obstruction or gangrene K43.2        RECOMMENDATIONS:                                                          --Patient is to take all scheduled medications on the day of surgery EXCEPT for modifications listed below.    APPROVAL GIVEN to proceed with proposed procedure, without further diagnostic evaluation       Signed Electronically by: Kiki Ly NP    Copy of this evaluation report is provided to requesting physician.    [place] Preop Guidelines

## 2018-02-21 NOTE — H&P (VIEW-ONLY)
[facility]  [address] [place] [address]  [phone]  Dept: [phone]    PRE-OP EVALUATION:  Today's date: 2018    Naif Howell Jr. (: 1965) presents for pre-operative evaluation assessment as requested by Dr. Vang.  He requires evaluation and anesthesia risk assessment prior to undergoing surgery/procedure for treatment of Hernia    Date of Surgery/ Procedure: 2018  Time of Surgery/ Procedure: 0[facility]/Surgical Facility: [facility]  Fax number for surgical facility: not needed  Primary Physician: Josefina Rogers  Type of Anesthesia Anticipated: General    Patient has a Health Care Directive or Living Will:  NO    Preop Questions 2018   1.  Do you have a history of heart attack, stroke, stent, bypass or surgery on an artery in the head, neck, heart or legs? No   2.  Do you ever have any pain or discomfort in your chest? YES - some inflamation of the cartilige in his chest, had it for years   3.  Do you have a history of  Heart Failure? No   4.   Are you troubled by shortness of breath when:  walking on a level surface, or up a slight hill, or at night? No   5.  Do you currently have a cold, bronchitis or other respiratory infection? No   6.  Do you have a cough, shortness of breath, or wheezing? No   7.  Do you sometimes get pains in the calves of your legs when you walk? No   8. Do you or anyone in your family have previous history of blood clots? No   9.  Do you or does anyone in your family have a serious bleeding problem such as prolonged bleeding following surgeries or cuts? No   10. Have you ever had problems with anemia or been told to take iron pills? No   11. Have you had any abnormal blood loss such as black, tarry or bloody stools? No   12. Have you ever had a blood transfusion? No   13. Have you or any of your relatives ever had problems with anesthesia? No   14. Do you have sleep apnea, excessive snoring or daytime drowsiness? No    15. Do you have any prosthetic heart valves? No   16. Do you have prosthetic joints? No         HPI:                                                      Brief HPI related to upcoming procedure: incisional hernia after recent surgery          MEDICAL HISTORY:                                                      Patient Active Problem List    Diagnosis Date Noted     Ventral hernia without obstruction or gangrene 11/30/2017     Priority: Medium     Tobacco use disorder 06/22/2017     Priority: Medium     Health Care Home 05/25/2017     Priority: Medium     SBO (small bowel obstruction) 05/15/2017     Priority: Medium     Vestibular migraine 04/20/2017     Priority: Medium     Chronic midline thoracic back pain 07/26/2016     Priority: Medium     Benign essential hypertension 06/02/2016     Priority: Medium     HTN, goal below 140/90 06/02/2016     Priority: Medium     Dyslipidemia 04/02/2012     Priority: Medium     Aortic regurgitation 12/23/2011     Priority: Medium     GERD (gastroesophageal reflux disease) 11/28/2011     Priority: Medium     Aortic root dilatation (H) 09/14/2011     Priority: Medium     PTSD (post-traumatic stress disorder) 06/09/2011     Priority: Medium      Past Medical History:   Diagnosis Date     Anxiety     resolved     Aortic regurgitation      Back pain      Basal cell carcinoma     left forehead     Chronic neck pain      Degenerative disc disease, cervical     See MRI - 12/2017     GERD (gastroesophageal reflux disease)      Hypertension      Migraines      PTSD (post-traumatic stress disorder)      SBO (small bowel obstruction)      Vestibular migraine      Past Surgical History:   Procedure Laterality Date     LAPAROSCOPIC CHOLECYSTECTOMY WITH CHOLANGIOGRAMS N/A 5/12/2017    Procedure: LAPAROSCOPIC CHOLECYSTECTOMY WITH CHOLANGIOGRAMS;  LAPAROSCOPIC CHOLECYSTECTOMY WITH CHOLANGIOGRAMS ;  Surgeon: Corey Vang MD;  Location: [facility] OR     LAPAROSCOPIC LYSIS ADHESIONS N/A 5/16/2017     Procedure: LAPAROSCOPIC LYSIS ADHESIONS;;  Surgeon: Corey Vang MD;  Location: [facility] OR     LAPAROSCOPY DIAGNOSTIC (GENERAL) N/A 5/16/2017    Procedure: LAPAROSCOPY DIAGNOSTIC (GENERAL);  DIAGNOSTIC LAPAROSCOPY, LAPAROTOMY,  SMALL BOWEL RESECTION, LAPAROSCOPIC LYSIS OF ADHESIONS;  Surgeon: Corey Vang MD;  Location: [facility] OR     RESECT SMALL BOWEL WITHOUT OSTOMY N/A 5/16/2017    Procedure: RESECT SMALL BOWEL WITHOUT OSTOMY;;  Surgeon: Corey Vang MD;  Location: [facility] OR     Resection of basal cell carcinoma      Left forehead     Current Outpatient Prescriptions   Medication Sig Dispense Refill     oxyCODONE IR (ROXICODONE) 5 MG tablet Take 1 tablet (5 mg) by mouth every 4 hours as needed for pain 20 tablet 0     losartan (COZAAR) 50 MG tablet Take 1 tablet (50 mg) by mouth daily 90 tablet 1     simvastatin (ZOCOR) 40 MG tablet Take 1 tablet (40 mg) by mouth At Bedtime 90 tablet 3     sildenafil (REVATIO/VIAGRA) 20 MG tablet Take 1 tablet (20 mg) by mouth three times daily for pulmonary hypertension.  Never use with nitroglycerin, terazosin or doxazosin. 9 tablet 0     multivitamin, therapeutic with minerals (THERA-VIT-M) TABS tablet Take 1 tablet by mouth daily       OTC products: None, except as noted above    Allergies   Allergen Reactions     Cefuroxime Rash     Flagyl [Metronidazole] Swelling and Rash     Mild throat swelling      Latex Allergy: NO    Social History   Substance Use Topics     Smoking status: Current Every Day Smoker     Packs/day: 1.00     Types: Cigarettes     Smokeless tobacco: Never Used     Alcohol use No     History   Drug Use No       REVIEW OF SYSTEMS:                                                    Constitutional, HEENT, cardiovascular, pulmonary, gi and gu systems are negative, except as otherwise noted.    EXAM:                                                    /72 (BP Location: Right arm, Patient Position: Left side, Cuff Size: Adult Regular)  Pulse 92  Temp 97.6  F  "(36.4  C) (Oral)  Resp 16  Ht 6' 2\" (1.88 m)  Wt 209 lb (94.8 kg)  SpO2 96%  BMI 26.83 kg/m2    GENERAL APPEARANCE: healthy, alert and no distress     EYES: EOMI,  PERRL     HENT: ear canals and TM's normal and nose and mouth without ulcers or lesions     NECK: no adenopathy, no asymmetry, masses, or scars and thyroid normal to palpation     RESP: lungs clear to auscultation - no rales, rhonchi or wheezes     CV: regular rates and rhythm, normal S1 S2, no S3 or S4 and no murmur, click or rub     ABDOMEN:  soft, nontender, no HSM or masses and bowel sounds normal. Hernia visible midline.      MS: extremities normal- no gross deformities noted, no evidence of inflammation in joints, FROM in all extremities.     SKIN: no suspicious lesions or rashes     NEURO: Normal strength and tone, sensory exam grossly normal, mentation intact and speech normal     PSYCH: mentation appears normal. and affect normal/bright     LYMPHATICS: No axillary, cervical, or supraclavicular nodes    DIAGNOSTICS:                                                    EKG: appears normal, NSR, normal axis, normal intervals, no acute ST/T changes c/w ischemia, no LVH by voltage criteria, unchanged from previous tracings    Recent Labs   Lab Test  01/17/18   1640  01/15/18   0814  01/09/18   1327  06/22/17   0902   09/09/11   1150   HGB  16.0  17.3  17.0   --    < >  15.9   PLT  262  249  272   --    < >  222   INR   --    --    --    --    --   1.01   NA  140   --   137  139   < >  138   POTASSIUM  4.1   --   4.0  3.9   < >  3.8   CR  0.87   --   0.80  0.67   < >  0.82   A1C   --    --    --   5.3   --    --     < > = values in this interval not displayed.        IMPRESSION:                                                        The proposed surgical procedure is considered INTERMEDIATE risk.    REVISED CARDIAC RISK INDEX  The patient has the following serious cardiovascular risks for perioperative complications such as (MI, PE, VFib and 3  AV " Block):  No serious cardiac risks  INTERPRETATION: 1 risks: Class II (low risk - 0.9% complication rate)    The patient has the following additional risks for perioperative complications:  No identified additional risks      ICD-10-CM    1. Preop general physical exam Z01.818    2. Incisional hernia, without obstruction or gangrene K43.2        RECOMMENDATIONS:                                                          --Patient is to take all scheduled medications on the day of surgery EXCEPT for modifications listed below.    APPROVAL GIVEN to proceed with proposed procedure, without further diagnostic evaluation       Signed Electronically by: Kiki Ly NP    Copy of this evaluation report is provided to requesting physician.    [facility] Preop Guidelines

## 2018-02-22 ENCOUNTER — APPOINTMENT (OUTPATIENT)
Dept: SURGERY | Facility: PHYSICIAN GROUP | Age: 53
End: 2018-02-22
Payer: OTHER GOVERNMENT

## 2018-02-22 ENCOUNTER — ANESTHESIA EVENT (OUTPATIENT)
Dept: SURGERY | Facility: CLINIC | Age: 53
End: 2018-02-22
Payer: OTHER GOVERNMENT

## 2018-02-22 ENCOUNTER — ANESTHESIA (OUTPATIENT)
Dept: SURGERY | Facility: CLINIC | Age: 53
End: 2018-02-22
Payer: OTHER GOVERNMENT

## 2018-02-22 ENCOUNTER — HOSPITAL ENCOUNTER (OUTPATIENT)
Facility: CLINIC | Age: 53
Discharge: HOME | End: 2018-02-22
Attending: SURGERY | Admitting: SURGERY
Payer: OTHER GOVERNMENT

## 2018-02-22 VITALS
RESPIRATION RATE: 18 BRPM | SYSTOLIC BLOOD PRESSURE: 144 MMHG | WEIGHT: 208 LBS | DIASTOLIC BLOOD PRESSURE: 97 MMHG | BODY MASS INDEX: 26.69 KG/M2 | TEMPERATURE: 98.5 F | OXYGEN SATURATION: 95 % | HEIGHT: 74 IN | HEART RATE: 99 BPM

## 2018-02-22 DIAGNOSIS — K43.9 VENTRAL HERNIA WITHOUT OBSTRUCTION OR GANGRENE: Primary | ICD-10-CM

## 2018-02-22 LAB
CREAT SERPL-MCNC: 0.78 MG/DL (ref 0.66–1.25)
GFR SERPL CREATININE-BSD FRML MDRD: >90 ML/MIN/1.7M2
POTASSIUM SERPL-SCNC: 4.1 MMOL/L (ref 3.4–5.3)

## 2018-02-22 PROCEDURE — 84132 ASSAY OF SERUM POTASSIUM: CPT | Performed by: ANESTHESIOLOGY

## 2018-02-22 PROCEDURE — 25000125 ZZHC RX 250: Performed by: NURSE ANESTHETIST, CERTIFIED REGISTERED

## 2018-02-22 PROCEDURE — 49560 ZZHC REPAIR INCISIONAL HERNIA,REDUCIBLE: CPT | Performed by: SURGERY

## 2018-02-22 PROCEDURE — 71000012 ZZH RECOVERY PHASE 1 LEVEL 1 FIRST HR: Performed by: SURGERY

## 2018-02-22 PROCEDURE — 25000128 H RX IP 250 OP 636: Performed by: ANESTHESIOLOGY

## 2018-02-22 PROCEDURE — 36415 COLL VENOUS BLD VENIPUNCTURE: CPT | Performed by: ANESTHESIOLOGY

## 2018-02-22 PROCEDURE — 25000128 H RX IP 250 OP 636: Performed by: NURSE ANESTHETIST, CERTIFIED REGISTERED

## 2018-02-22 PROCEDURE — 25000128 H RX IP 250 OP 636: Performed by: SURGERY

## 2018-02-22 PROCEDURE — 40000306 ZZH STATISTIC PRE PROC ASSESS II: Performed by: SURGERY

## 2018-02-22 PROCEDURE — 71000027 ZZH RECOVERY PHASE 2 EACH 15 MINS: Performed by: SURGERY

## 2018-02-22 PROCEDURE — 25000132 ZZH RX MED GY IP 250 OP 250 PS 637: Performed by: ANESTHESIOLOGY

## 2018-02-22 PROCEDURE — 36000052 ZZH SURGERY LEVEL 2 EA 15 ADDTL MIN: Performed by: SURGERY

## 2018-02-22 PROCEDURE — 25000566 ZZH SEVOFLURANE, EA 15 MIN: Performed by: SURGERY

## 2018-02-22 PROCEDURE — 25000125 ZZHC RX 250: Performed by: ANESTHESIOLOGY

## 2018-02-22 PROCEDURE — 27210794 ZZH OR GENERAL SUPPLY STERILE: Performed by: SURGERY

## 2018-02-22 PROCEDURE — 82565 ASSAY OF CREATININE: CPT | Performed by: ANESTHESIOLOGY

## 2018-02-22 PROCEDURE — 49568 ZZHC REPAIR HERNIA WITH MESH: CPT | Performed by: SURGERY

## 2018-02-22 PROCEDURE — 25000132 ZZH RX MED GY IP 250 OP 250 PS 637: Performed by: SURGERY

## 2018-02-22 PROCEDURE — C1765 ADHESION BARRIER: HCPCS | Performed by: SURGERY

## 2018-02-22 PROCEDURE — 71000013 ZZH RECOVERY PHASE 1 LEVEL 1 EA ADDTL HR: Performed by: SURGERY

## 2018-02-22 PROCEDURE — 36000050 ZZH SURGERY LEVEL 2 1ST 30 MIN: Performed by: SURGERY

## 2018-02-22 PROCEDURE — 49560 ZZHC REPAIR INCISIONAL HERNIA,REDUCIBLE: CPT | Mod: AS | Performed by: PHYSICIAN ASSISTANT

## 2018-02-22 PROCEDURE — 37000008 ZZH ANESTHESIA TECHNICAL FEE, 1ST 30 MIN: Performed by: SURGERY

## 2018-02-22 PROCEDURE — 37000009 ZZH ANESTHESIA TECHNICAL FEE, EACH ADDTL 15 MIN: Performed by: SURGERY

## 2018-02-22 PROCEDURE — C1781 MESH (IMPLANTABLE): HCPCS | Performed by: SURGERY

## 2018-02-22 PROCEDURE — 49568 ZZHC REPAIR HERNIA WITH MESH: CPT | Mod: AS | Performed by: PHYSICIAN ASSISTANT

## 2018-02-22 PROCEDURE — 25000125 ZZHC RX 250: Performed by: SURGERY

## 2018-02-22 DEVICE — MESH VENTRIO ST HERNIA 3.1X4.7" OVAL SM 5950030: Type: IMPLANTABLE DEVICE | Site: ABDOMEN | Status: FUNCTIONAL

## 2018-02-22 RX ORDER — FENTANYL CITRATE 50 UG/ML
25-50 INJECTION, SOLUTION INTRAMUSCULAR; INTRAVENOUS
Status: DISCONTINUED | OUTPATIENT
Start: 2018-02-22 | End: 2018-02-22 | Stop reason: HOSPADM

## 2018-02-22 RX ORDER — ONDANSETRON 2 MG/ML
4 INJECTION INTRAMUSCULAR; INTRAVENOUS EVERY 6 HOURS PRN
Status: CANCELLED | OUTPATIENT
Start: 2018-02-22

## 2018-02-22 RX ORDER — OXYCODONE HYDROCHLORIDE 5 MG/1
5 TABLET ORAL ONCE
Status: COMPLETED | OUTPATIENT
Start: 2018-02-22 | End: 2018-02-22

## 2018-02-22 RX ORDER — PROCHLORPERAZINE MALEATE 10 MG
10 TABLET ORAL EVERY 6 HOURS PRN
Status: CANCELLED | OUTPATIENT
Start: 2018-02-22

## 2018-02-22 RX ORDER — NALOXONE HYDROCHLORIDE 0.4 MG/ML
.1-.4 INJECTION, SOLUTION INTRAMUSCULAR; INTRAVENOUS; SUBCUTANEOUS
Status: DISCONTINUED | OUTPATIENT
Start: 2018-02-22 | End: 2018-02-22 | Stop reason: HOSPADM

## 2018-02-22 RX ORDER — OXYCODONE HYDROCHLORIDE 5 MG/1
5-10 TABLET ORAL
Status: CANCELLED | OUTPATIENT
Start: 2018-02-22

## 2018-02-22 RX ORDER — GLYCINE 1.5 G/100ML
SOLUTION IRRIGATION PRN
Status: DISCONTINUED | OUTPATIENT
Start: 2018-02-22 | End: 2018-02-22 | Stop reason: HOSPADM

## 2018-02-22 RX ORDER — METOCLOPRAMIDE HYDROCHLORIDE 5 MG/ML
10 INJECTION INTRAMUSCULAR; INTRAVENOUS EVERY 6 HOURS PRN
Status: DISCONTINUED | OUTPATIENT
Start: 2018-02-22 | End: 2018-02-22 | Stop reason: HOSPADM

## 2018-02-22 RX ORDER — ONDANSETRON 2 MG/ML
INJECTION INTRAMUSCULAR; INTRAVENOUS PRN
Status: DISCONTINUED | OUTPATIENT
Start: 2018-02-22 | End: 2018-02-22

## 2018-02-22 RX ORDER — BUPIVACAINE HYDROCHLORIDE AND EPINEPHRINE 2.5; 5 MG/ML; UG/ML
INJECTION, SOLUTION EPIDURAL; INFILTRATION; INTRACAUDAL; PERINEURAL PRN
Status: DISCONTINUED | OUTPATIENT
Start: 2018-02-22 | End: 2018-02-22 | Stop reason: HOSPADM

## 2018-02-22 RX ORDER — OXYCODONE HYDROCHLORIDE 5 MG/1
5 TABLET ORAL
Status: COMPLETED | OUTPATIENT
Start: 2018-02-22 | End: 2018-02-22

## 2018-02-22 RX ORDER — LIDOCAINE 40 MG/G
CREAM TOPICAL
Status: CANCELLED | OUTPATIENT
Start: 2018-02-22

## 2018-02-22 RX ORDER — DEXAMETHASONE SODIUM PHOSPHATE 4 MG/ML
INJECTION, SOLUTION INTRA-ARTICULAR; INTRALESIONAL; INTRAMUSCULAR; INTRAVENOUS; SOFT TISSUE PRN
Status: DISCONTINUED | OUTPATIENT
Start: 2018-02-22 | End: 2018-02-22

## 2018-02-22 RX ORDER — NEOSTIGMINE METHYLSULFATE 1 MG/ML
VIAL (ML) INJECTION PRN
Status: DISCONTINUED | OUTPATIENT
Start: 2018-02-22 | End: 2018-02-22

## 2018-02-22 RX ORDER — LIDOCAINE 40 MG/G
CREAM TOPICAL
Status: DISCONTINUED | OUTPATIENT
Start: 2018-02-22 | End: 2018-02-22 | Stop reason: HOSPADM

## 2018-02-22 RX ORDER — DEXAMETHASONE SODIUM PHOSPHATE 4 MG/ML
4 INJECTION, SOLUTION INTRA-ARTICULAR; INTRALESIONAL; INTRAMUSCULAR; INTRAVENOUS; SOFT TISSUE EVERY 10 MIN PRN
Status: DISCONTINUED | OUTPATIENT
Start: 2018-02-22 | End: 2018-02-22 | Stop reason: HOSPADM

## 2018-02-22 RX ORDER — LEVOFLOXACIN 5 MG/ML
500 INJECTION, SOLUTION INTRAVENOUS ONCE
Status: CANCELLED | OUTPATIENT
Start: 2018-02-22

## 2018-02-22 RX ORDER — LOSARTAN POTASSIUM 50 MG/1
50 TABLET ORAL DAILY
Status: CANCELLED | OUTPATIENT
Start: 2018-02-22

## 2018-02-22 RX ORDER — KETOROLAC TROMETHAMINE 30 MG/ML
30 INJECTION, SOLUTION INTRAMUSCULAR; INTRAVENOUS EVERY 6 HOURS PRN
Status: DISCONTINUED | OUTPATIENT
Start: 2018-02-22 | End: 2018-02-22 | Stop reason: HOSPADM

## 2018-02-22 RX ORDER — ONDANSETRON 4 MG/1
4 TABLET, ORALLY DISINTEGRATING ORAL EVERY 6 HOURS PRN
Status: CANCELLED | OUTPATIENT
Start: 2018-02-22

## 2018-02-22 RX ORDER — HYDROMORPHONE HYDROCHLORIDE 1 MG/ML
.3-.5 INJECTION, SOLUTION INTRAMUSCULAR; INTRAVENOUS; SUBCUTANEOUS
Status: CANCELLED | OUTPATIENT
Start: 2018-02-22

## 2018-02-22 RX ORDER — FENTANYL CITRATE 50 UG/ML
INJECTION, SOLUTION INTRAMUSCULAR; INTRAVENOUS PRN
Status: DISCONTINUED | OUTPATIENT
Start: 2018-02-22 | End: 2018-02-22

## 2018-02-22 RX ORDER — ONDANSETRON 2 MG/ML
4 INJECTION INTRAMUSCULAR; INTRAVENOUS EVERY 30 MIN PRN
Status: DISCONTINUED | OUTPATIENT
Start: 2018-02-22 | End: 2018-02-22 | Stop reason: HOSPADM

## 2018-02-22 RX ORDER — OXYCODONE HYDROCHLORIDE 5 MG/1
5-10 TABLET ORAL
Qty: 30 TABLET | Refills: 0 | Status: SHIPPED | OUTPATIENT
Start: 2018-02-22 | End: 2018-12-21

## 2018-02-22 RX ORDER — HYDRALAZINE HYDROCHLORIDE 20 MG/ML
2.5-5 INJECTION INTRAMUSCULAR; INTRAVENOUS EVERY 10 MIN PRN
Status: DISCONTINUED | OUTPATIENT
Start: 2018-02-22 | End: 2018-02-22 | Stop reason: HOSPADM

## 2018-02-22 RX ORDER — HYDROMORPHONE HYDROCHLORIDE 1 MG/ML
.3-.5 INJECTION, SOLUTION INTRAMUSCULAR; INTRAVENOUS; SUBCUTANEOUS EVERY 10 MIN PRN
Status: DISCONTINUED | OUTPATIENT
Start: 2018-02-22 | End: 2018-02-22 | Stop reason: HOSPADM

## 2018-02-22 RX ORDER — ONDANSETRON 4 MG/1
4 TABLET, ORALLY DISINTEGRATING ORAL EVERY 30 MIN PRN
Status: DISCONTINUED | OUTPATIENT
Start: 2018-02-22 | End: 2018-02-22 | Stop reason: HOSPADM

## 2018-02-22 RX ORDER — SODIUM CHLORIDE 9 MG/ML
INJECTION, SOLUTION INTRAVENOUS CONTINUOUS
Status: CANCELLED | OUTPATIENT
Start: 2018-02-22

## 2018-02-22 RX ORDER — ACETAMINOPHEN 325 MG/1
650 TABLET ORAL EVERY 6 HOURS PRN
Status: CANCELLED | OUTPATIENT
Start: 2018-02-22

## 2018-02-22 RX ORDER — METOCLOPRAMIDE 10 MG/1
10 TABLET ORAL EVERY 6 HOURS PRN
Status: DISCONTINUED | OUTPATIENT
Start: 2018-02-22 | End: 2018-02-22 | Stop reason: HOSPADM

## 2018-02-22 RX ORDER — MEPERIDINE HYDROCHLORIDE 25 MG/ML
12.5 INJECTION INTRAMUSCULAR; INTRAVENOUS; SUBCUTANEOUS
Status: DISCONTINUED | OUTPATIENT
Start: 2018-02-22 | End: 2018-02-22 | Stop reason: HOSPADM

## 2018-02-22 RX ORDER — SODIUM CHLORIDE, SODIUM LACTATE, POTASSIUM CHLORIDE, CALCIUM CHLORIDE 600; 310; 30; 20 MG/100ML; MG/100ML; MG/100ML; MG/100ML
INJECTION, SOLUTION INTRAVENOUS CONTINUOUS
Status: DISCONTINUED | OUTPATIENT
Start: 2018-02-22 | End: 2018-02-22 | Stop reason: HOSPADM

## 2018-02-22 RX ORDER — DIMENHYDRINATE 50 MG/ML
25 INJECTION, SOLUTION INTRAMUSCULAR; INTRAVENOUS
Status: DISCONTINUED | OUTPATIENT
Start: 2018-02-22 | End: 2018-02-22 | Stop reason: HOSPADM

## 2018-02-22 RX ORDER — CEFAZOLIN SODIUM 1 G/3ML
1 INJECTION, POWDER, FOR SOLUTION INTRAMUSCULAR; INTRAVENOUS SEE ADMIN INSTRUCTIONS
Status: DISCONTINUED | OUTPATIENT
Start: 2018-02-22 | End: 2018-02-22 | Stop reason: HOSPADM

## 2018-02-22 RX ORDER — PROPOFOL 10 MG/ML
INJECTION, EMULSION INTRAVENOUS PRN
Status: DISCONTINUED | OUTPATIENT
Start: 2018-02-22 | End: 2018-02-22

## 2018-02-22 RX ORDER — GLYCOPYRROLATE 0.2 MG/ML
INJECTION, SOLUTION INTRAMUSCULAR; INTRAVENOUS PRN
Status: DISCONTINUED | OUTPATIENT
Start: 2018-02-22 | End: 2018-02-22

## 2018-02-22 RX ORDER — SODIUM CHLORIDE, SODIUM LACTATE, POTASSIUM CHLORIDE, CALCIUM CHLORIDE 600; 310; 30; 20 MG/100ML; MG/100ML; MG/100ML; MG/100ML
INJECTION, SOLUTION INTRAVENOUS CONTINUOUS PRN
Status: DISCONTINUED | OUTPATIENT
Start: 2018-02-22 | End: 2018-02-22

## 2018-02-22 RX ORDER — NALOXONE HYDROCHLORIDE 0.4 MG/ML
.1-.4 INJECTION, SOLUTION INTRAMUSCULAR; INTRAVENOUS; SUBCUTANEOUS
Status: CANCELLED | OUTPATIENT
Start: 2018-02-22

## 2018-02-22 RX ORDER — CEFAZOLIN SODIUM 2 G/100ML
2 INJECTION, SOLUTION INTRAVENOUS
Status: COMPLETED | OUTPATIENT
Start: 2018-02-22 | End: 2018-02-22

## 2018-02-22 RX ORDER — PROMETHAZINE HYDROCHLORIDE 25 MG/ML
12.5 INJECTION, SOLUTION INTRAMUSCULAR; INTRAVENOUS
Status: DISCONTINUED | OUTPATIENT
Start: 2018-02-22 | End: 2018-02-22 | Stop reason: HOSPADM

## 2018-02-22 RX ORDER — NORTRIPTYLINE HCL 10 MG
20 CAPSULE ORAL DAILY
Status: CANCELLED | OUTPATIENT
Start: 2018-02-22

## 2018-02-22 RX ADMIN — DEXAMETHASONE SODIUM PHOSPHATE 4 MG: 4 INJECTION, SOLUTION INTRA-ARTICULAR; INTRALESIONAL; INTRAMUSCULAR; INTRAVENOUS; SOFT TISSUE at 08:55

## 2018-02-22 RX ADMIN — LIDOCAINE HYDROCHLORIDE 50 MG: 10 INJECTION, SOLUTION EPIDURAL; INFILTRATION; INTRACAUDAL; PERINEURAL at 08:55

## 2018-02-22 RX ADMIN — KETOROLAC TROMETHAMINE 30 MG: 30 INJECTION, SOLUTION INTRAMUSCULAR at 14:50

## 2018-02-22 RX ADMIN — Medication 0.5 MG: at 11:15

## 2018-02-22 RX ADMIN — PROPOFOL 200 MG: 10 INJECTION, EMULSION INTRAVENOUS at 08:55

## 2018-02-22 RX ADMIN — OXYCODONE HYDROCHLORIDE 5 MG: 5 TABLET ORAL at 11:42

## 2018-02-22 RX ADMIN — CEFAZOLIN SODIUM 2 G: 2 INJECTION, SOLUTION INTRAVENOUS at 08:50

## 2018-02-22 RX ADMIN — HYDRALAZINE HYDROCHLORIDE 5 MG: 20 INJECTION INTRAMUSCULAR; INTRAVENOUS at 11:38

## 2018-02-22 RX ADMIN — FENTANYL CITRATE 50 MCG: 50 INJECTION INTRAMUSCULAR; INTRAVENOUS at 10:43

## 2018-02-22 RX ADMIN — FENTANYL CITRATE 50 MCG: 50 INJECTION INTRAMUSCULAR; INTRAVENOUS at 10:34

## 2018-02-22 RX ADMIN — GLYCOPYRROLATE 0.6 MG: 0.2 INJECTION, SOLUTION INTRAMUSCULAR; INTRAVENOUS at 10:07

## 2018-02-22 RX ADMIN — FENTANYL CITRATE 50 MCG: 50 INJECTION, SOLUTION INTRAMUSCULAR; INTRAVENOUS at 10:21

## 2018-02-22 RX ADMIN — FENTANYL CITRATE 100 MCG: 50 INJECTION, SOLUTION INTRAMUSCULAR; INTRAVENOUS at 08:55

## 2018-02-22 RX ADMIN — Medication 0.5 MG: at 10:51

## 2018-02-22 RX ADMIN — Medication 0.5 MG: at 11:26

## 2018-02-22 RX ADMIN — ROCURONIUM BROMIDE 5 MG: 10 INJECTION INTRAVENOUS at 09:29

## 2018-02-22 RX ADMIN — OXYCODONE HYDROCHLORIDE 5 MG: 5 TABLET ORAL at 13:51

## 2018-02-22 RX ADMIN — SODIUM CHLORIDE, POTASSIUM CHLORIDE, SODIUM LACTATE AND CALCIUM CHLORIDE: 600; 310; 30; 20 INJECTION, SOLUTION INTRAVENOUS at 10:06

## 2018-02-22 RX ADMIN — SODIUM CHLORIDE, POTASSIUM CHLORIDE, SODIUM LACTATE AND CALCIUM CHLORIDE: 600; 310; 30; 20 INJECTION, SOLUTION INTRAVENOUS at 08:14

## 2018-02-22 RX ADMIN — ROCURONIUM BROMIDE 40 MG: 10 INJECTION INTRAVENOUS at 08:55

## 2018-02-22 RX ADMIN — Medication 5 MG: at 10:07

## 2018-02-22 RX ADMIN — MIDAZOLAM 2 MG: 1 INJECTION INTRAMUSCULAR; INTRAVENOUS at 08:45

## 2018-02-22 RX ADMIN — HYDROMORPHONE HYDROCHLORIDE 0.5 MG: 1 INJECTION, SOLUTION INTRAMUSCULAR; INTRAVENOUS; SUBCUTANEOUS at 08:20

## 2018-02-22 RX ADMIN — ONDANSETRON 4 MG: 2 INJECTION INTRAMUSCULAR; INTRAVENOUS at 10:07

## 2018-02-22 RX ADMIN — GLYCOPYRROLATE 0.2 MG: 0.2 INJECTION, SOLUTION INTRAMUSCULAR; INTRAVENOUS at 08:55

## 2018-02-22 RX ADMIN — LIDOCAINE HYDROCHLORIDE 50 MG: 10 INJECTION, SOLUTION EPIDURAL; INFILTRATION; INTRACAUDAL; PERINEURAL at 10:13

## 2018-02-22 RX ADMIN — FENTANYL CITRATE 50 MCG: 50 INJECTION INTRAMUSCULAR; INTRAVENOUS at 14:50

## 2018-02-22 RX ADMIN — FENTANYL CITRATE 50 MCG: 50 INJECTION, SOLUTION INTRAMUSCULAR; INTRAVENOUS at 09:08

## 2018-02-22 ASSESSMENT — ENCOUNTER SYMPTOMS
SEIZURES: 0
DYSRHYTHMIAS: 0

## 2018-02-22 ASSESSMENT — LIFESTYLE VARIABLES: TOBACCO_USE: 0

## 2018-02-22 NOTE — IP AVS SNAPSHOT
MRN:[id number]                      After Visit Summary   2/22/2018    Naif Howell Jr.    MRN: [id number]           Thank you!     Thank you for choosing [facility] for your care. Our goal is always to provide you with excellent care. Hearing back from our patients is one way we can continue to improve our services. Please take a few minutes to complete the written survey that you may receive in the mail after you visit. If you would like to speak to someone directly about your visit please contact Patient Relations at [phone]. Thank you!          Patient Information     Date Of Birth          1965        About your hospital stay     You were admitted on:  February 22, 2018 You last received care in the:  [facility] PreOP/PostOP    You were discharged on:  February 22, 2018       Who to Call     For medical emergencies, please call 911.  For non-urgent questions about your medical care, please call your primary care provider or clinic, [phone]  For questions related to your surgery, please call your surgery clinic        Attending Provider     Provider Specialty    Corey Vang MD General Surgery       Primary Care Provider Office Phone # Fax #    Josefina Lester Rogers [phone]      Further instructions from your care team       HOME CARE FOLLOWING UMBILICAL/VENTRAL HERNIA REPAIR  KEVIN Calderon, LAMIN Garcia, MISSAEL Meyer    DIET:  No restrictions.  Increased fluid intake is recommended. While taking pain medications, increase dietary fiber or add a fiber supplementation like Metamucil or Citrucel to help prevent constipation - a possible side effect of pain medications.    NAUSEA:  If nauseated from the anesthetic/pain meds; rest in bed, get up cautiously with assistance, and drink clear liquids (juice, tea, broth).    ACTIVITY:  Light Activity -- you may immediately be up and about as tolerated.  Driving -- you may  "drive when comfortable and off narcotic pain medications.  Light Work -- resume when comfortable off pain medications.  (If you can drive, you probably can work.)  Strenuous Work/Activity -- limit lifting to 20 pounds for 3 weeks.  Active Sports (running, biking, etc.) -- cautiously resume after 4 weeks.    INCISIONAL CARE:    If you have a dressing in place, keep clean and dry for 48 hours after surgery.  After this timeframe, you may replace the gauze daily if it becomes soiled.    You may remove the dressing and shower 48 hours after surgery.  Do not submerse incision in water for 1 week.    Wear binder for comfort    Sutures will absorb and need not be removed.    If present, leave the steri-strips (white paper tapes) in place for 14 days after surgery.    Expect a variable amount of swelling/bruising/discoloration that may appear around or below the repair site.    Some numbness around the incision is common.    A lump/\"healing ridge\" under the incision is normal and will gradually resolve over the following 1-2 months.    DISCOMFORT:  Local anesthetic placed at surgery should provide relief for 4-8 hours.  Begin taking pain pills before discomfort is severe.  Take the pain medication with some food, when possible, to minimize side effects.  Intermittent use of ice packs to the hernia repair site may help during the first 1-3 weeks after surgery.  Expect gradual improvement.    Over-the-counter anti-inflammatory medications (i.e. Ibuprofen/Advil/Motrin or Naprosyn/Aleve) may be used per package instructions in addition to or while tapering off the narcotic pain medications to decrease swelling and sensitivity at the repair site.  DO NOT TAKE these Anti-inflammatory medications if your primary physician has advised against doing so, or if you have acid reflux, ulcer, or bleeding disorder, or take blood-thinner medications.  Call your primary physician or the surgery office if you have medication questions.  "     RETURN APPOINTMENT:  Schedule a follow-up visit 2-3 weeks post-op.  Office Phone:  [phone]     CONTACT US IF THE FOLLOWING DEVELOPS:   1. A fever that is above 101     2. If there is a large amount of drainage, bleeding, or swelling.   3. Severe pain that is not relieved by your prescription.   4. Drainage that is thick, cloudy, yellow, green or white.   5. Any other questions not answered by  Frequently Asked Questions  sheet.      FREQUENTLY ASKED QUESTIONS:    Q:  How should my incision look?    A:  Normally your incision will appear slightly swollen with light redness directly along the incision itself as it heals.  It may feel like a bump or ridge as the healing/scarring happens, and over time (3-4 months) this bump or ridge feeling should slowly go away.  In general, clear or pink watery drainage can be normal at first as your incision heals, but should decrease over time.    Q:  How do I know if my incision is infected?  A:  Look at your incision for signs of infection, like redness around the incision spreading to surrounding skin, or drainage of cloudy or foul-smelling drainage.  If you feel warm, check your temperature to see if you are running a fever.    **If any of these things occur, please notify the nurse at our office.  We may need you to come into the office for an incision check.      Q:  How do I take care of my incision?  A:  If you have a dressing in place - Starting the day after surgery, replace the dressing 1-2 times a day until there is no further drainage from the incision.  At that time, a dressing is no longer needed.  Try to minimize tape on the skin if irritation is occurring at the tape sites.  If you have significant irritation from tape on the skin, please call the office to discuss other method of dressing your incision.    Small pieces of tape called  steri-strips  may be present directly overlying your incision; these may be removed 10 days after surgery unless otherwise  specified by your surgeon.  If these tapes start to loosen at the ends, you may trim them back until they fall off or are removed.      Q:  There is a piece of tape or a sticky  lead  still on my skin.  Can I remove this?  A:  Sometimes the sticky  leads  used for monitoring during surgery or for evaluation in the emergency department are not all removed while you are in the hospital.  These sometimes have a tab or metal dot on them.  You can easily remove these on your own, like taking off a band-aid.  If there is a gel substance under the  lead , simply wipe/clean it off with a washcloth or paper towel.      Q:  What can I do to minimize constipation (very hard stools, or lack of stools)?  A:  Stay well hydrated.  Increase your dietary fiber intake or take a fiber supplement -with plenty of water.  Walk around frequently.  You may consider an over-the-counter stool-softener.  Your Pharmacist can assist you with choosing one that is stocked at your pharmacy.  Constipation is also one of the most common side effects of pain medication.  If you are using pain medication, be pro-active and try to PREVENT problems with constipation by taking the steps above BEFORE constipation becomes a problem.    Q:  What do I do if I need more pain medications?  A:  Call the office to receive refills.  Be aware that certain pain meds cannot be called into a pharmacy and actually require a paper prescription.  A change may be made in your pain med as you progress thru your recovery period or if you have side effects to certain meds.    --Pain meds are NOT refilled after 5pm on weekdays, and NOT AT ALL on the weekends, so please look ahead to prevent problems.      Q:  Why am I having a hard time sleeping now that I am at home?  A:  Many medications you receive while you are in the hospital can impact your sleep for a number of days after your surgery/hospitalization.  Decreased level of activity and naps during the day may also make  sleeping at night difficult.  Try to minimize day-time naps, and get up frequently during the day to walk around your home during your recovery time.  Sleep aides may be of some help, but are not recommended for long-term use.      Q:  I am having some back discomfort.  What should I do?  A:  This may be related to certain positioning that was required for your surgery, extended periods of time in bed, or other changes in your overall activity level.  You may try ice, heat, acetaminophen, or ibuprofen to treat this temporarily.  Note that many pain medications have acetaminophen in them and would state this on the prescription bottle.  Be sure not to exceed the maximum of 4000mg per day of acetaminophen.     **If the pain you are having does not resolve, is severe, or is a flare of back pain you have had on other occasions prior to surgery, please contact your primary physician for further recommendations or for an appointment to be examined at their office.    Q:  Why am I having headaches?  A:  Headaches can be caused by many things:  caffeine withdrawal, use of pain meds, dehydration, high blood pressure, lack of sleep, over-activity/exhaustion, flare-up of usual migraine headaches.  If you feel this is related to muscle tension (a band-like feeling around the head, or a pressure at the low-back of the head) you may try ice or heat to this area.  You may need to drink more fluids (try electrolyte drink like Gatorade), rest, or take your usual migraine medications.   **If your headaches do not resolve, worsen, are accompanied by other symptoms, or if your blood pressure is high, please call your primary physician for recommendation and/or examination.    Q:  I am unable to urinate.  What do I do?  A:  A small percentage of people can have difficulty urinating initially after surgery.  This includes being able to urinate only a very small amount at a time and feeling discomfort or pressure in the very low abdomen.   This is called  urinary retention , and is actually an urgent situation.  Proceed to your nearest Emergency department for evaluation (not an Urgent Care Center).  Sometimes the bladder does not work correctly after certain medications you receive during surgery, or related to certain procedures.  You may need to have a catheter placed until your bladder recovers.  When planning to go to an Emergency department, it may help to call the ER to let them know you are coming in for this problem after a surgery.  This may help you get in quicker to be evaluated.  **If you have symptoms of a urinary tract infection, please contact your primary physician for the proper evaluation and treatment.          If you have other questions, please call the office Monday thru Friday between 8am and 5pm to discuss with the nurse or physician assistant.  #[phone]    There is a surgeon ON CALL on weekday evenings and over the weekend in case of urgent need only, and may be contacted at the same number.    If you are having an emergency, call 911 or proceed to your nearest emergency department.        GENERAL ANESTHESIA OR SEDATION ADULT DISCHARGE INSTRUCTIONS   SPECIAL PRECAUTIONS FOR 24 HOURS AFTER SURGERY    IT IS NOT UNUSUAL TO FEEL LIGHT-HEADED OR FAINT, UP TO 24 HOURS AFTER SURGERY OR WHILE TAKING PAIN MEDICATION.  IF YOU HAVE THESE SYMPTOMS; SIT FOR A FEW MINUTES BEFORE STANDING AND HAVE SOMEONE ASSIST YOU WHEN YOU GET UP TO WALK OR USE THE BATHROOM.    YOU SHOULD REST AND RELAX FOR THE NEXT 24 HOURS AND YOU MUST MAKE ARRANGEMENTS TO HAVE SOMEONE STAY WITH YOU FOR AT LEAST 24 HOURS AFTER YOUR DISCHARGE.  AVOID HAZARDOUS AND STRENUOUS ACTIVITIES.  DO NOT MAKE IMPORTANT DECISIONS FOR 24 HOURS.    DO NOT DRIVE ANY VEHICLE OR OPERATE MECHANICAL EQUIPMENT FOR 24 HOURS FOLLOWING THE END OF YOUR SURGERY.  EVEN THOUGH YOU MAY FEEL NORMAL, YOUR REACTIONS MAY BE AFFECTED BY THE MEDICATION YOU HAVE RECEIVED.    DO NOT DRINK ALCOHOLIC  "BEVERAGES FOR 24 HOURS FOLLOWING YOUR SURGERY.    DRINK CLEAR LIQUIDS (APPLE JUICE, GINGER ALE, 7-UP, BROTH, ETC.).  PROGRESS TO YOUR REGULAR DIET AS YOU FEEL ABLE.    YOU MAY HAVE A DRY MOUTH, A SORE THROAT, MUSCLES ACHES OR TROUBLE SLEEPING.  THESE SHOULD GO AWAY AFTER 24 HOURS.    CALL YOUR DOCTOR FOR ANY OF THE FOLLOWING:  SIGNS OF INFECTION (FEVER, GROWING TENDERNESS AT THE SURGERY SITE, A LARGE AMOUNT OF DRAINAGE OR BLEEDING, SEVERE PAIN, FOUL-SMELLING DRAINAGE, REDNESS OR SWELLING.    IT HAS BEEN OVER 8 TO 10 HOURS SINCE SURGERY AND YOU ARE STILL NOT ABLE TO URINATE (PASS WATER).       Medications:  1 (5 mg tablet) of Oxycodone given at 11:45 AM --- 1 (5 mg tablet) of Oxycodone was taken at 1:50 PM  He can take 2 tablets at 4:50 PM.    You received Toradol, an IV form of ibuprofen (Motrin) at 2:50 PM.  Do not take any ibuprofen products until 8:50 PM.          Pending Results     No orders found from 2/20/2018 to 2/23/2018.            Admission Information     Date & Time Provider Department Dept. Phone    2/22/2018 Corey Vang MD [facility] PreOP/PostOP [phone]      Your Vitals Were     Blood Pressure Pulse Temperature Respirations Height Weight    153/103 99 97.1  F (36.2  C) (Temporal) 18 1.88 m (6' 2\") 94.3 kg (208 lb)    Pulse Oximetry BMI (Body Mass Index)                92% 26.71 kg/m2          [employer] Information     [email or web address] lets you send messages to your doctor, view your test results, renew your prescriptions, schedule appointments and more. To sign up, go to [email or web address] . Click on \"Log in\" on the left side of the screen, which will take you to the Welcome page. Then click on \"Sign up Now\" on the right side of the page.     You will be asked to enter the access code listed below, as well as some personal information. Please follow the directions to create your username and password.     Your access code is: [email or web address]  Expires: 4/3/2018  8:06 AM     Your access code will "  in 90 days. If you need help or a new code, please call your [place] clinic or [phone].        Care EveryWhere ID     This is your Care EveryWhere ID. This could be used by other organizations to access your [place] medical records  [id number]        Equal Access to Services     [facility] : Hadamrita salmeron hadaurelianoo Soomaali, waaxda luqadaha, qaybta kaalmada adeegyada, waxay idiin hayfrantzheather garcia ediskristen bird. [facility] [phone].    ATENCIÓN: Si habla español, tiene a morocho disposición servicios gratuitos de asistencia lingüística. Chelle al [phone].    We comply with applicable federal civil rights laws and [place] laws. We do not discriminate on the basis of race, color, national origin, age, disability, sex, sexual orientation, or gender identity.               Review of your medicines      CONTINUE these medicines which may have CHANGED, or have new prescriptions. If we are uncertain of the size of tablets/capsules you have at home, strength may be listed as something that might have changed.        Dose / Directions    * oxyCODONE IR 5 MG tablet   Commonly known as:  ROXICODONE   This may have changed:  Another medication with the same name was added. Make sure you understand how and when to take each.        Dose:  5 mg   Take 1 tablet (5 mg) by mouth every 4 hours as needed for pain   Quantity:  20 tablet   Refills:  0       * oxyCODONE IR 5 MG tablet   Commonly known as:  ROXICODONE   This may have changed:  You were already taking a medication with the same name, and this prescription was added. Make sure you understand how and when to take each.   Used for:  Ventral hernia without obstruction or gangrene        Dose:  5-10 mg   Take 1-2 tablets (5-10 mg) by mouth every 3 hours as needed for pain or other (Moderate to Severe)   Quantity:  30 tablet   Refills:  0       * Notice:  This list has 2 medication(s) that are the same as other medications prescribed for you. Read the directions  carefully, and ask your doctor or other care provider to review them with you.      CONTINUE these medicines which have NOT CHANGED        Dose / Directions    losartan 50 MG tablet   Commonly known as:  COZAAR   Used for:  Benign essential hypertension        Dose:  50 mg   Take 1 tablet (50 mg) by mouth daily   Quantity:  90 tablet   Refills:  1       multivitamin, therapeutic with minerals Tabs tablet        Dose:  1 tablet   Take 1 tablet by mouth daily   Refills:  0       NORTRIPTYLINE HCL PO        Dose:  20 mg   Take 20 mg by mouth daily   Refills:  0       sildenafil 20 MG tablet   Commonly known as:  REVATIO   Used for:  Vasculogenic erectile dysfunction, unspecified vasculogenic erectile dysfunction type        Take 1 tablet (20 mg) by mouth three times daily for pulmonary hypertension.  Never use with nitroglycerin, terazosin or doxazosin.   Quantity:  9 tablet   Refills:  0       simvastatin 40 MG tablet   Commonly known as:  ZOCOR   Used for:  Mixed hyperlipidemia        Dose:  40 mg   Take 1 tablet (40 mg) by mouth At Bedtime   Quantity:  90 tablet   Refills:  3       WELLBUTRIN PO        Take by mouth daily   Refills:  0            Where to get your medicines      Some of these will need a paper prescription and others can be bought over the counter. Ask your nurse if you have questions.     Bring a paper prescription for each of these medications     oxyCODONE IR 5 MG tablet                Protect others around you: Learn how to safely use, store and throw away your medicines at www.disposemymeds.org.        Information about OPIOIDS     PRESCRIPTION OPIOIDS: WHAT YOU NEED TO KNOW    Prescription opioids can be used to help relieve moderate to severe pain and are often prescribed following a surgery or injury, or for certain health conditions. These medications can be an important part of treatment but also come with serious risks. It is important to work with your health care provider to make sure  you are getting the safest, most effective care.    WHAT ARE THE RISKS AND SIDE EFFECTS OF OPIOID USE?  Prescription opioids carry serious risks of addiction and overdose, especially with prolonged use. An opioid overdose, often marked by slowed breathing can cause sudden death. The use of prescription opioids can have a number of side effects as well, even when taken as directed:      Tolerance - meaning you might need to take more of a medication for the same pain relief    Physical dependence - meaning you have symptoms of withdrawal when a medication is stopped    Increased sensitivity to pain    Constipation    Nausea, vomiting, and dry mouth    Sleepiness and dizziness    Confusion    Depression    Low levels of testosterone that can result in lower sex drive, energy, and strength    Itching and sweating    RISKS ARE GREATER WITH:    History of drug misuse, substance use disorder, or overdose    Mental health conditions (such as depression or anxiety)    Sleep apnea    Older age (65 years or older)    Pregnancy    Avoid alcohol while taking prescription opioids.   Also, unless specifically advised by your health care provider, medications to avoid include:    Benzodiazepines (such as Xanax or Valium)    Muscle relaxants (such as Soma or Flexeril)    Hypnotics (such as Ambien or Lunesta)    Other prescription opioids    KNOW YOUR OPTIONS:  Talk to your health care provider about ways to manage your pain that do not involve prescription opioids. Some of these options may actually work better and have fewer risks and side effects:    Pain relievers such as acetaminophen, ibuprofen, and naproxen    Some medications that are also used for depression or seizures    Physical therapy and exercise    Cognitive behavioral therapy, a psychological, goal-directed approach, in which patients learn how to modify physical, behavioral, and emotional triggers of pain and stress    IF YOU ARE PRESCRIBED OPIOIDS FOR  PAIN:    Never take opioids in greater amounts or more often than prescribed    Follow up with your primary health care provider and work together to create a plan on how to manage your pain.    Talk about ways to help manage your pain that do not involve prescription opioids    Talk about all concerns and side effects    Help prevent misuse and abuse    Never sell or share prescription opioids    Never use another person's prescription opioids    Store prescription opioids in a secure place and out of reach of others (this may include visitors, children, friends, and family)    Visit www.cdc.gov/drugoverdose to learn about risks of opioid abuse and overdose    If you believe you may be struggling with addiction, tell your health care provider and ask for guidance or call [facility]'s National Helpline at 7-782-037-HELP    LEARN MORE / www.cdc.gov/drugoverdose/prescribing/guideline.html    Safely dispose of unused prescription opioids: Find your local drug take-back programs and more information about the importance of safe disposal at www.doseofreality.[place].gov             Medication List: This is a list of all your medications and when to take them. Check marks below indicate your daily home schedule. Keep this list as a reference.      Medications           Morning Afternoon Evening Bedtime As Needed    losartan 50 MG tablet   Commonly known as:  COZAAR   Take 1 tablet (50 mg) by mouth daily                                multivitamin, therapeutic with minerals Tabs tablet   Take 1 tablet by mouth daily                                NORTRIPTYLINE HCL PO   Take 20 mg by mouth daily                                * oxyCODONE IR 5 MG tablet   Commonly known as:  ROXICODONE   Take 1 tablet (5 mg) by mouth every 4 hours as needed for pain   Last time this was given:  5 mg on 2/22/2018  1:51 PM                                * oxyCODONE IR 5 MG tablet   Commonly known as:  ROXICODONE   Take 1-2 tablets (5-10 mg) by mouth  every 3 hours as needed for pain or other (Moderate to Severe)   Last time this was given:  5 mg on 2/22/2018  1:51 PM                                sildenafil 20 MG tablet   Commonly known as:  REVATIO   Take 1 tablet (20 mg) by mouth three times daily for pulmonary hypertension.  Never use with nitroglycerin, terazosin or doxazosin.                                simvastatin 40 MG tablet   Commonly known as:  ZOCOR   Take 1 tablet (40 mg) by mouth At Bedtime                                WELLBUTRIN PO   Take by mouth daily                                * Notice:  This list has 2 medication(s) that are the same as other medications prescribed for you. Read the directions carefully, and ask your doctor or other care provider to review them with you.

## 2018-02-22 NOTE — OP NOTE
General Surgery Operative Note    Pre-operative diagnosis:  Incisional Hernia, diastases   Post-operative diagnosis:  Same plus adhesions to hernia sac   Procedure:  Incisional hernia repair with preperitoneal 8 x 12 cm (100 sq cm) oval mesh patch   Surgeon: Corey Vang MD   Assistant(s): Shantell Benson PA-C   Anesthesia: General    Estimated blood loss: 10 cc's   Drains placed: None   Complications:  None   Findings:   Small bowel adherent to the inner surface of the hernia sac   Specimens: * No specimens in log *    Indications: Patient is undergone a previous laparotomy and has developed a hernia at his incision site.  This is increasing in size and becoming painful for him.  He requests repair after discussion of the procedure, risks, benefits, complications, the use of mesh, potential infection of the mesh, potential need to remove the mesh it would become infected as well as postoperative limitations and expected healing.  He is reviewed literature on the subject, all his questions have been answered and he wishes to proceed.    Description: Patient is brought to the operating room and placed supine on the table and after induction of anesthetic the central abdomen was shaved prepped and draped in a sterile manner.  A pause is performed, the site had been marked with him in preinduction.  The previous skin incision is excised.  Dissection is carried out down to the hernia sac.  This sac could be reduced however there was palpable tissue within it.  We therefore opened the sac.  Adhesions of the small bowel to the inside of the sac were lysed.  The small bowel was then returned to the abdominal cavity.  Seprafilm was placed over the small bowel and the hernia sac is closed with 3-0 Vicryl suture.  We continued our dissection in the preperitoneal space bilaterally.  This exteded caudal to the umbilicus.  Once the preperitoneal dissection was complete, an 8 x 12 cm oval ventral ST mesh patch was soaked and  irricept and the operative field is also soaked and irricept.  After rinsing, the mesh was placed into the preperitoneal space.  We anchored the infraumbilical fascia with the trans-fascial suture to the lower margin of the mesh.  The upper margin was then anchored to the midline in a similar manner.  The posterior rectus sheath fascia on both sides was also anchored to the mesh with multiple interrupted 0 PDS sutures, bringing the rectus muscles to the midline.  The midline was then closed with multiple interrupted 0 PDS sutures, slightly imbricating the midline fascia.  To closing, Marcaine was placed for postop pain relief and the operative field was again irrigated with irricept.  The subcutaneous tissues were closed with 3-0 Vicryl after further anesthetizing them with Marcaine.  Skin was closed with subcuticular Vicryl followed by Steri-Strips, dressings and an abdominal binder.  He is then returned to the recovery room in excellent condition with all sponge and needle counts correct having tolerated the procedure well.    Corey Vang MD

## 2018-02-22 NOTE — ANESTHESIA CARE TRANSFER NOTE
Patient: Naif Howell Jr.    Procedure(s):  Incisional hernia Repair with Mesh  - Wound Class: I-Clean    Diagnosis: Incisional Hernia   Diagnosis Additional Information: No value filed.    Anesthesia Type:   General, ETT     Note:  Airway :Blow-by and Face Mask  Patient transferred to:PACU  Comments: VSS, awake and alert, no anesthetic complications. Handoff Report: Identifed the Patient, Identified the Reponsible Provider, Reviewed the pertinent medical history, Discussed the surgical course, Reviewed Intra-OP anesthesia mangement and issues during anesthesia and Allowed opportunity for questions and acknowledgement of understanding      Vitals: (Last set prior to Anesthesia Care Transfer)    CRNA VITALS  2/22/2018 0948 - 2/22/2018 1033      2/22/2018             NIBP: (!)  190/104    NIBP Mean: 133                Electronically Signed By: REBECA Anand CRNA  February 22, 2018  10:33 AM

## 2018-02-22 NOTE — ANESTHESIA POSTPROCEDURE EVALUATION
Patient: Naif Howell     Procedure(s):  Incisional hernia Repair with Mesh  - Wound Class: I-Clean    Diagnosis:Incisional Hernia   Diagnosis Additional Information: -operative diagnosis:  Incisional Hernia, diastases  Post-operative diagnosis:  Same plus adhesions to hernia sac  Procedure:  Incisional hernia repair with preperitoneal 8 x 12 cm (100 sq cm) oval mesh patch        Anesthesia Type:  General, ETT    Note:  Anesthesia Post Evaluation    Patient location during evaluation: Phase 2  Patient participation: Able to fully participate in evaluation  Level of consciousness: awake  Pain management: adequate  Airway patency: patent  Cardiovascular status: acceptable  Respiratory status: acceptable  Hydration status: euvolemic  PONV: controlled     Anesthetic complications: None          Last vitals:  Vitals:    02/22/18 1230 02/22/18 1251 02/22/18 1314   BP:  (!) 143/98 (!) 159/97   Pulse:  99    Resp:  16 18   Temp:  97.1  F (36.2  C)    SpO2: 93% 97% 94%         Electronically Signed By: Naif Mcdonough MD  February 22, 2018  1:45 PM

## 2018-02-22 NOTE — IP AVS SNAPSHOT
[facility] PreOP/PostOP    [address]    [facility] [address]    Phone:  [phone]    Fax:  [phone]                                       After Visit Summary   2/22/2018    Naif Howell Jr.    MRN: [id number]           After Visit Summary Signature Page     I have received my discharge instructions, and my questions have been answered. I have discussed any challenges I see with this plan with the nurse or doctor.    ..........................................................................................................................................  Patient/Patient Representative Signature      ..........................................................................................................................................  Patient Representative Print Name and Relationship to Patient    ..................................................               ................................................  Date                                            Time    ..........................................................................................................................................  Reviewed by Signature/Title    ...................................................              ..............................................  Date                                                            Time

## 2018-02-22 NOTE — ANESTHESIA PREPROCEDURE EVALUATION
Anesthesia Evaluation     .             ROS/MED HX    ENT/Pulmonary:      (-) tobacco use, asthma, sleep apnea and Other pulmonary disease   Neurologic:      (-) seizures, Multiple Sclerosis, Other neuro hx and Dementia   Cardiovascular:     (+) Dyslipidemia, hypertension----. : . . . :. valvular problems/murmurs [facility] noted on normal stress echo in 2011:.      (-) CAD, CHF, arrhythmias and pulmonary hypertension   METS/Exercise Tolerance:     Hematologic:        (-) anemia   Musculoskeletal:   (+) arthritis, , , other musculoskeletal- Chronic neck, thoracic and lumbar back pain      GI/Hepatic:     (+) GERD      (-) other GI/Hepatic   Renal/Genitourinary:      (-) renal disease   Endo:      (-) Type I DM, thyroid disease, chronic steroid usage, other endocrine disorder and obesity   Psychiatric:     (+) psychiatric history (PTSD) anxiety and depression      Infectious Disease:  - neg infectious disease ROS       Malignancy:         Other:    (+) H/O Chronic Pain,                   Physical Exam      Airway   Mallampati: II  TM distance: >3 FB  Neck ROM: full    Dental     Cardiovascular   Rhythm and rate: regular and normal  (-) no murmur    Pulmonary    breath sounds clear to auscultation    Other findings: Lab Test        01/17/18     01/15/18     01/09/18      --          09/09/11                       [facility]          0814          1327           --           1150          WBC          11.0         10.8         12.0*          < >        7.4           HGB          16.0         17.3         17.0           < >        15.9          MCV          85           85           84             < >        84            PLT          262          249          272            < >        222           INR           --           --           --           --          1.01           < > = values in this interval not displayed.                  Lab Test        01/17/18 01/09/18 06/22/17                       1640           1327          0902          NA           140          137          139           POTASSIUM    4.1          4.0          3.9           CHLORIDE     107          105          106           CO2          26           24           25            BUN          20           17           11            CR           0.87         0.80         0.67          ANIONGAP     7            8            8             ADELSO          8.7          8.5          8.8           GLC          98           96           102*                        Anesthesia Plan      History & Physical Review  History and physical reviewed and following examination; no interval change.    ASA Status:  3 .    NPO Status:  > 8 hours    Plan for General and ETT with Propofol induction. Maintenance will be Balanced.    PONV prophylaxis:  Ondansetron (or other 5HT-3) and Dexamethasone or Solumedrol       Postoperative Care  Postoperative pain management:  IV analgesics and Oral pain medications.      Consents  Anesthetic plan, risks, benefits and alternatives discussed with:  Patient.  Use of blood products discussed: Yes.   Use of blood products discussed with Patient.  Consented to blood products.  .                          .

## 2018-02-22 NOTE — DISCHARGE INSTRUCTIONS
"HOME CARE FOLLOWING UMBILICAL/VENTRAL HERNIA REPAIR  KEVIN Calderon, LAMIN Garcia, MISSAEL Meyer    DIET:  No restrictions.  Increased fluid intake is recommended. While taking pain medications, increase dietary fiber or add a fiber supplementation like Metamucil or Citrucel to help prevent constipation - a possible side effect of pain medications.    NAUSEA:  If nauseated from the anesthetic/pain meds; rest in bed, get up cautiously with assistance, and drink clear liquids (juice, tea, broth).    ACTIVITY:  Light Activity -- you may immediately be up and about as tolerated.  Driving -- you may drive when comfortable and off narcotic pain medications.  Light Work -- resume when comfortable off pain medications.  (If you can drive, you probably can work.)  Strenuous Work/Activity -- limit lifting to 20 pounds for 3 weeks.  Active Sports (running, biking, etc.) -- cautiously resume after 4 weeks.    INCISIONAL CARE:    If you have a dressing in place, keep clean and dry for 48 hours after surgery.  After this timeframe, you may replace the gauze daily if it becomes soiled.    You may remove the dressing and shower 48 hours after surgery.  Do not submerse incision in water for 1 week.    Wear binder for comfort    Sutures will absorb and need not be removed.    If present, leave the steri-strips (white paper tapes) in place for 14 days after surgery.    Expect a variable amount of swelling/bruising/discoloration that may appear around or below the repair site.    Some numbness around the incision is common.    A lump/\"healing ridge\" under the incision is normal and will gradually resolve over the following 1-2 months.    DISCOMFORT:  Local anesthetic placed at surgery should provide relief for 4-8 hours.  Begin taking pain pills before discomfort is severe.  Take the pain medication with some food, when possible, to minimize side effects.  Intermittent use of ice packs to the hernia repair site may " help during the first 1-3 weeks after surgery.  Expect gradual improvement.    Over-the-counter anti-inflammatory medications (i.e. Ibuprofen/Advil/Motrin or Naprosyn/Aleve) may be used per package instructions in addition to or while tapering off the narcotic pain medications to decrease swelling and sensitivity at the repair site.  DO NOT TAKE these Anti-inflammatory medications if your primary physician has advised against doing so, or if you have acid reflux, ulcer, or bleeding disorder, or take blood-thinner medications.  Call your primary physician or the surgery office if you have medication questions.      RETURN APPOINTMENT:  Schedule a follow-up visit 2-3 weeks post-op.  Office Phone:  [phone]     CONTACT US IF THE FOLLOWING DEVELOPS:   1. A fever that is above 101     2. If there is a large amount of drainage, bleeding, or swelling.   3. Severe pain that is not relieved by your prescription.   4. Drainage that is thick, cloudy, yellow, green or white.   5. Any other questions not answered by  Frequently Asked Questions  sheet.      FREQUENTLY ASKED QUESTIONS:    Q:  How should my incision look?    A:  Normally your incision will appear slightly swollen with light redness directly along the incision itself as it heals.  It may feel like a bump or ridge as the healing/scarring happens, and over time (3-4 months) this bump or ridge feeling should slowly go away.  In general, clear or pink watery drainage can be normal at first as your incision heals, but should decrease over time.    Q:  How do I know if my incision is infected?  A:  Look at your incision for signs of infection, like redness around the incision spreading to surrounding skin, or drainage of cloudy or foul-smelling drainage.  If you feel warm, check your temperature to see if you are running a fever.    **If any of these things occur, please notify the nurse at our office.  We may need you to come into the office for an incision check.       Q:  How do I take care of my incision?  A:  If you have a dressing in place - Starting the day after surgery, replace the dressing 1-2 times a day until there is no further drainage from the incision.  At that time, a dressing is no longer needed.  Try to minimize tape on the skin if irritation is occurring at the tape sites.  If you have significant irritation from tape on the skin, please call the office to discuss other method of dressing your incision.    Small pieces of tape called  steri-strips  may be present directly overlying your incision; these may be removed 10 days after surgery unless otherwise specified by your surgeon.  If these tapes start to loosen at the ends, you may trim them back until they fall off or are removed.      Q:  There is a piece of tape or a sticky  lead  still on my skin.  Can I remove this?  A:  Sometimes the sticky  leads  used for monitoring during surgery or for evaluation in the emergency department are not all removed while you are in the hospital.  These sometimes have a tab or metal dot on them.  You can easily remove these on your own, like taking off a band-aid.  If there is a gel substance under the  lead , simply wipe/clean it off with a washcloth or paper towel.      Q:  What can I do to minimize constipation (very hard stools, or lack of stools)?  A:  Stay well hydrated.  Increase your dietary fiber intake or take a fiber supplement -with plenty of water.  Walk around frequently.  You may consider an over-the-counter stool-softener.  Your Pharmacist can assist you with choosing one that is stocked at your pharmacy.  Constipation is also one of the most common side effects of pain medication.  If you are using pain medication, be pro-active and try to PREVENT problems with constipation by taking the steps above BEFORE constipation becomes a problem.    Q:  What do I do if I need more pain medications?  A:  Call the office to receive refills.  Be aware that certain  pain meds cannot be called into a pharmacy and actually require a paper prescription.  A change may be made in your pain med as you progress thru your recovery period or if you have side effects to certain meds.    --Pain meds are NOT refilled after 5pm on weekdays, and NOT AT ALL on the weekends, so please look ahead to prevent problems.      Q:  Why am I having a hard time sleeping now that I am at home?  A:  Many medications you receive while you are in the hospital can impact your sleep for a number of days after your surgery/hospitalization.  Decreased level of activity and naps during the day may also make sleeping at night difficult.  Try to minimize day-time naps, and get up frequently during the day to walk around your home during your recovery time.  Sleep aides may be of some help, but are not recommended for long-term use.      Q:  I am having some back discomfort.  What should I do?  A:  This may be related to certain positioning that was required for your surgery, extended periods of time in bed, or other changes in your overall activity level.  You may try ice, heat, acetaminophen, or ibuprofen to treat this temporarily.  Note that many pain medications have acetaminophen in them and would state this on the prescription bottle.  Be sure not to exceed the maximum of 4000mg per day of acetaminophen.     **If the pain you are having does not resolve, is severe, or is a flare of back pain you have had on other occasions prior to surgery, please contact your primary physician for further recommendations or for an appointment to be examined at their office.    Q:  Why am I having headaches?  A:  Headaches can be caused by many things:  caffeine withdrawal, use of pain meds, dehydration, high blood pressure, lack of sleep, over-activity/exhaustion, flare-up of usual migraine headaches.  If you feel this is related to muscle tension (a band-like feeling around the head, or a pressure at the low-back of the  head) you may try ice or heat to this area.  You may need to drink more fluids (try electrolyte drink like Gatorade), rest, or take your usual migraine medications.   **If your headaches do not resolve, worsen, are accompanied by other symptoms, or if your blood pressure is high, please call your primary physician for recommendation and/or examination.    Q:  I am unable to urinate.  What do I do?  A:  A small percentage of people can have difficulty urinating initially after surgery.  This includes being able to urinate only a very small amount at a time and feeling discomfort or pressure in the very low abdomen.  This is called  urinary retention , and is actually an urgent situation.  Proceed to your nearest Emergency department for evaluation (not an Urgent Care Center).  Sometimes the bladder does not work correctly after certain medications you receive during surgery, or related to certain procedures.  You may need to have a catheter placed until your bladder recovers.  When planning to go to an Emergency department, it may help to call the ER to let them know you are coming in for this problem after a surgery.  This may help you get in quicker to be evaluated.  **If you have symptoms of a urinary tract infection, please contact your primary physician for the proper evaluation and treatment.          If you have other questions, please call the office Monday thru Friday between 8am and 5pm to discuss with the nurse or physician assistant.  #[phone]    There is a surgeon ON CALL on weekday evenings and over the weekend in case of urgent need only, and may be contacted at the same number.    If you are having an emergency, call 911 or proceed to your nearest emergency department.        GENERAL ANESTHESIA OR SEDATION ADULT DISCHARGE INSTRUCTIONS   SPECIAL PRECAUTIONS FOR 24 HOURS AFTER SURGERY    IT IS NOT UNUSUAL TO FEEL LIGHT-HEADED OR FAINT, UP TO 24 HOURS AFTER SURGERY OR WHILE TAKING PAIN MEDICATION.   IF YOU HAVE THESE SYMPTOMS; SIT FOR A FEW MINUTES BEFORE STANDING AND HAVE SOMEONE ASSIST YOU WHEN YOU GET UP TO WALK OR USE THE BATHROOM.    YOU SHOULD REST AND RELAX FOR THE NEXT 24 HOURS AND YOU MUST MAKE ARRANGEMENTS TO HAVE SOMEONE STAY WITH YOU FOR AT LEAST 24 HOURS AFTER YOUR DISCHARGE.  AVOID HAZARDOUS AND STRENUOUS ACTIVITIES.  DO NOT MAKE IMPORTANT DECISIONS FOR 24 HOURS.    DO NOT DRIVE ANY VEHICLE OR OPERATE MECHANICAL EQUIPMENT FOR 24 HOURS FOLLOWING THE END OF YOUR SURGERY.  EVEN THOUGH YOU MAY FEEL NORMAL, YOUR REACTIONS MAY BE AFFECTED BY THE MEDICATION YOU HAVE RECEIVED.    DO NOT DRINK ALCOHOLIC BEVERAGES FOR 24 HOURS FOLLOWING YOUR SURGERY.    DRINK CLEAR LIQUIDS (APPLE JUICE, GINGER ALE, 7-UP, BROTH, ETC.).  PROGRESS TO YOUR REGULAR DIET AS YOU FEEL ABLE.    YOU MAY HAVE A DRY MOUTH, A SORE THROAT, MUSCLES ACHES OR TROUBLE SLEEPING.  THESE SHOULD GO AWAY AFTER 24 HOURS.    CALL YOUR DOCTOR FOR ANY OF THE FOLLOWING:  SIGNS OF INFECTION (FEVER, GROWING TENDERNESS AT THE SURGERY SITE, A LARGE AMOUNT OF DRAINAGE OR BLEEDING, SEVERE PAIN, FOUL-SMELLING DRAINAGE, REDNESS OR SWELLING.    IT HAS BEEN OVER 8 TO 10 HOURS SINCE SURGERY AND YOU ARE STILL NOT ABLE TO URINATE (PASS WATER).       Medications:  1 (5 mg tablet) of Oxycodone given at 11:45 AM --- 1 (5 mg tablet) of Oxycodone was taken at 1:50 PM  He can take 2 tablets at 4:50 PM.    You received Toradol, an IV form of ibuprofen (Motrin) at 2:50 PM.  Do not take any ibuprofen products until 8:50 PM.

## 2018-02-22 NOTE — OR NURSING
Pt. Requesting 2nd oxycodone.  Dr. Mcdonough updated on patient order to give 2nd 5mg dose.  Pt had small emesis from taking larger sips of water. States he feels better, pain is okay and will not give 2nd dose of medication at this time.  Pt updated and agrees with plan.

## 2018-02-26 ENCOUNTER — HOSPITAL ENCOUNTER (EMERGENCY)
Facility: CLINIC | Age: 53
Discharge: HOME | End: 2018-02-26
Attending: EMERGENCY MEDICINE | Admitting: EMERGENCY MEDICINE
Payer: OTHER GOVERNMENT

## 2018-02-26 ENCOUNTER — APPOINTMENT (OUTPATIENT)
Dept: GENERAL RADIOLOGY | Facility: CLINIC | Age: 53
End: 2018-02-26
Attending: EMERGENCY MEDICINE
Payer: OTHER GOVERNMENT

## 2018-02-26 VITALS
BODY MASS INDEX: 25.67 KG/M2 | SYSTOLIC BLOOD PRESSURE: 144 MMHG | TEMPERATURE: 98 F | HEIGHT: 74 IN | RESPIRATION RATE: 16 BRPM | WEIGHT: 200 LBS | DIASTOLIC BLOOD PRESSURE: 86 MMHG | OXYGEN SATURATION: 95 %

## 2018-02-26 DIAGNOSIS — R10.84 ABDOMINAL PAIN, GENERALIZED: ICD-10-CM

## 2018-02-26 DIAGNOSIS — R51.9 NONINTRACTABLE HEADACHE, UNSPECIFIED CHRONICITY PATTERN, UNSPECIFIED HEADACHE TYPE: ICD-10-CM

## 2018-02-26 LAB
ALBUMIN SERPL-MCNC: 3.1 G/DL (ref 3.4–5)
ALP SERPL-CCNC: 120 U/L (ref 40–150)
ALT SERPL W P-5'-P-CCNC: 39 U/L (ref 0–70)
ANION GAP SERPL CALCULATED.3IONS-SCNC: 5 MMOL/L (ref 3–14)
AST SERPL W P-5'-P-CCNC: 18 U/L (ref 0–45)
BASOPHILS # BLD AUTO: 0 10E9/L (ref 0–0.2)
BASOPHILS NFR BLD AUTO: 0.5 %
BILIRUB SERPL-MCNC: 0.8 MG/DL (ref 0.2–1.3)
BUN SERPL-MCNC: 21 MG/DL (ref 7–30)
CALCIUM SERPL-MCNC: 8.9 MG/DL (ref 8.5–10.1)
CHLORIDE SERPL-SCNC: 104 MMOL/L (ref 94–109)
CO2 SERPL-SCNC: 26 MMOL/L (ref 20–32)
CREAT SERPL-MCNC: 0.78 MG/DL (ref 0.66–1.25)
DIFFERENTIAL METHOD BLD: ABNORMAL
EOSINOPHIL # BLD AUTO: 0.2 10E9/L (ref 0–0.7)
EOSINOPHIL NFR BLD AUTO: 2.4 %
ERYTHROCYTE [DISTWIDTH] IN BLOOD BY AUTOMATED COUNT: 12.4 % (ref 10–15)
GFR SERPL CREATININE-BSD FRML MDRD: >90 ML/MIN/1.7M2
GLUCOSE SERPL-MCNC: 117 MG/DL (ref 70–99)
HCT VFR BLD AUTO: 49.6 % (ref 40–53)
HGB BLD-MCNC: 17.8 G/DL (ref 13.3–17.7)
IMM GRANULOCYTES # BLD: 0 10E9/L (ref 0–0.4)
IMM GRANULOCYTES NFR BLD: 0.4 %
LIPASE SERPL-CCNC: 135 U/L (ref 73–393)
LYMPHOCYTES # BLD AUTO: 1.9 10E9/L (ref 0.8–5.3)
LYMPHOCYTES NFR BLD AUTO: 24.2 %
MCH RBC QN AUTO: 29.9 PG (ref 26.5–33)
MCHC RBC AUTO-ENTMCNC: 35.9 G/DL (ref 31.5–36.5)
MCV RBC AUTO: 83 FL (ref 78–100)
MONOCYTES # BLD AUTO: 1 10E9/L (ref 0–1.3)
MONOCYTES NFR BLD AUTO: 12.1 %
NEUTROPHILS # BLD AUTO: 4.7 10E9/L (ref 1.6–8.3)
NEUTROPHILS NFR BLD AUTO: 60.4 %
NRBC # BLD AUTO: 0 10*3/UL
NRBC BLD AUTO-RTO: 0 /100
PLATELET # BLD AUTO: 310 10E9/L (ref 150–450)
POTASSIUM SERPL-SCNC: 3.9 MMOL/L (ref 3.4–5.3)
PROT SERPL-MCNC: 7.1 G/DL (ref 6.8–8.8)
RBC # BLD AUTO: 5.95 10E12/L (ref 4.4–5.9)
SODIUM SERPL-SCNC: 135 MMOL/L (ref 133–144)
WBC # BLD AUTO: 7.8 10E9/L (ref 4–11)

## 2018-02-26 PROCEDURE — 96375 TX/PRO/DX INJ NEW DRUG ADDON: CPT

## 2018-02-26 PROCEDURE — 74019 RADEX ABDOMEN 2 VIEWS: CPT

## 2018-02-26 PROCEDURE — 85025 COMPLETE CBC W/AUTO DIFF WBC: CPT | Performed by: EMERGENCY MEDICINE

## 2018-02-26 PROCEDURE — 83690 ASSAY OF LIPASE: CPT | Performed by: EMERGENCY MEDICINE

## 2018-02-26 PROCEDURE — 96374 THER/PROPH/DIAG INJ IV PUSH: CPT

## 2018-02-26 PROCEDURE — 99284 EMERGENCY DEPT VISIT MOD MDM: CPT | Mod: 25

## 2018-02-26 PROCEDURE — 80053 COMPREHEN METABOLIC PANEL: CPT | Performed by: EMERGENCY MEDICINE

## 2018-02-26 PROCEDURE — 25000128 H RX IP 250 OP 636: Performed by: EMERGENCY MEDICINE

## 2018-02-26 RX ORDER — SODIUM CHLORIDE 9 MG/ML
1000 INJECTION, SOLUTION INTRAVENOUS CONTINUOUS
Status: DISCONTINUED | OUTPATIENT
Start: 2018-02-26 | End: 2018-02-26

## 2018-02-26 RX ORDER — ONDANSETRON 2 MG/ML
4 INJECTION INTRAMUSCULAR; INTRAVENOUS ONCE
Status: COMPLETED | OUTPATIENT
Start: 2018-02-26 | End: 2018-02-26

## 2018-02-26 RX ORDER — ONDANSETRON 4 MG/1
4 TABLET, ORALLY DISINTEGRATING ORAL EVERY 8 HOURS PRN
Qty: 10 TABLET | Refills: 0 | Status: SHIPPED | OUTPATIENT
Start: 2018-02-26 | End: 2019-02-07

## 2018-02-26 RX ADMIN — SODIUM CHLORIDE 1000 ML: 9 INJECTION, SOLUTION INTRAVENOUS at 19:45

## 2018-02-26 RX ADMIN — ONDANSETRON 4 MG: 2 INJECTION INTRAMUSCULAR; INTRAVENOUS at 20:25

## 2018-02-26 RX ADMIN — SODIUM CHLORIDE 1000 ML: 9 INJECTION, SOLUTION INTRAVENOUS at 21:15

## 2018-02-26 ASSESSMENT — ENCOUNTER SYMPTOMS
VOMITING: 1
NAUSEA: 1
ABDOMINAL PAIN: 1
DIARRHEA: 0
CONSTIPATION: 0

## 2018-02-26 NOTE — ED AVS SNAPSHOT
Emergency Department    [address] [place] [address]    Phone:  [phone]    Fax:  [phone]                                       Naif Howell Jr.   MRN: [id number]    Department:   Emergency Department   Date of Visit:  2/26/2018           After Visit Summary Signature Page     I have received my discharge instructions, and my questions have been answered. I have discussed any challenges I see with this plan with the nurse or doctor.    ..........................................................................................................................................  Patient/Patient Representative Signature      ..........................................................................................................................................  Patient Representative Print Name and Relationship to Patient    ..................................................               ................................................  Date                                            Time    ..........................................................................................................................................  Reviewed by Signature/Title    ...................................................              ..............................................  Date                                                            Time

## 2018-02-26 NOTE — ED AVS SNAPSHOT
Emergency Department    [address] [facility] [address]    Phone:  [phone]    Fax:  [phone]                                       Naif Howell Jr.   MRN: [id number]    Department:   Emergency Department   Date of Visit:  2/26/2018           Patient Information     Date Of Birth          1965        Your diagnoses for this visit were:     Abdominal pain, generalized     Nonintractable headache, unspecified chronicity pattern, unspecified headache type        You were seen by Cullen Garcia MD.      Follow-up Information     Follow up with Josefina Rogers MD In 2 days.    Specialty:  Family Practice    Contact information:    [address]  [facility] [address] [phone]          Follow up with  Emergency Department.    Specialty:  EMERGENCY MEDICINE    Why:  As needed    Contact information:    [address] [facility] [address] [phone]        Discharge Instructions       Discharge Instructions  Headache    You were seen today for a headache. Headaches may be caused by many different things such as muscle tension, sinus inflammation, anxiety and stress, having too little sleep, too much alcohol, some medical conditions or injury. You may have a migraine, which is caused by changes in the blood vessels in your head.  At this time your provider does not find that your headache is a sign of anything dangerous or life-threatening.  However, sometimes the signs of serious illness do not show up right away.      Generally, every Emergency Department visit should have a follow-up clinic visit with either a primary or a specialty clinic/provider. Please follow-up as instructed by your emergency provider today.    Return to the Emergency Department if:    You get a new fever of 100.4 F or higher.    Your headache gets much worse.    You get a stiff neck with your headache.    You get a new headache that is significantly different or worse than  headaches you have had before.    You are vomiting (throwing up) and cannot keep food or water down.    You have blurry or double vision or other problems with your eyes.    You have a new weakness on one side of your body.    You have difficulty with balance which is new.    You or your family thinks you are confused.    You have a seizure.    What can I do to help myself?    Pain medications - You may take a pain medication such as Tylenol  (acetaminophen), Advil , Motrin  (ibuprofen) or Aleve  (naproxen).    Take a pain reliever as soon as you notice symptoms.  Starting medications as soon as you start to have symptoms may lessen the amount of pain you have.    Relaxing in a quiet, dark room may help.    Get enough sleep and eat meals regularly.    You may need to watch for certain foods or other things which may trigger your headaches.  Keeping a journal of your headaches and possible triggers may help you and your primary provider to identify things which you should avoid which may be causing your headaches.  If you were given a prescription for medicine here today, be sure to read all of the information (including the package insert) that comes with your prescription.  This will include important information about the medicine, its side effects, and any warnings that you need to know about.  The pharmacist who fills the prescription can provide more information and answer questions you may have about the medicine.  If you have questions or concerns that the pharmacist cannot address, please call or return to the Emergency Department.   Remember that you can always come back to the Emergency Department if you are not able to see your regular provider in the amount of time listed above, if you get any new symptoms, or if there is anything that worries you.     Discharge Instructions  Abdominal Pain    Abdominal pain (belly pain) can be caused by many things. Your evaluation today does not show the exact cause  for your pain. Your provider today has decided that it is unlikely your pain is due to a life threatening problem, or a problem requiring surgery or hospital admission. Sometimes those problems cannot be found right away, so it is very important that you follow up as directed.  Sometimes only the changes which occur over time allow the cause of your pain to be found.    Generally, every Emergency Department visit should have a follow-up clinic visit with either a primary or a specialty clinic/provider. Please follow-up as instructed by your emergency provider today. With abdominal pain, we often recommend very close follow-up, such as the following day.    ADULTS:  Return to the Emergency Department right away if:      You get an oral temperature above 102oF or as directed by your provider.    You have blood in your stools. This may be bright red or appear as black, tarry stools.      You keep vomiting (throwing up) or cannot drink liquids.    You see blood when you vomit.     You cannot have a bowel movement or you cannot pass gas.    Your stomach gets bloated or bigger.    Your skin or the whites of your eyes look yellow.    You faint.    You have bloody, frequent or painful urination (peeing).    You have new symptoms or anything that worries you.    CHILDREN:  Return to the Emergency Department right away if your child has any of the above-listed symptoms or the following:      Pushes your hand away or screams/cries when his/her belly is touched.    You notice your child is very fussy or weak.    Your child is very tired and is too tired to eat or drink.    Your child is dehydrated.  Signs of dehydration can be:  o Significant change in the amount of wet diapers/urine.  o Your infant or child starts to have dry mouth and lips, or no saliva (spit) or tears.    PREGNANT WOMEN:  Return to the Emergency Department right away if you have any of the above-listed symptoms or the following:      You have bleeding,  leaking fluid or passing tissue from the vagina.    You have worse pain or cramping, or pain in your shoulder or back.    You have vomiting that will not stop.    You have a temperature of 100oF or more.    Your baby is not moving as much as usual.    You faint.    You get a bad headache with or without eye problems and abdominal pain.    You have a seizure.    You have unusual discharge from your vagina and abdominal pain.    Abdominal pain is pretty common during pregnancy.  Your pain may or may not be related to your pregnancy. You should follow-up closely with your OB provider so they can evaluate you and your baby.  Until you follow-up with your regular provider, do the following:       Avoid sex and do not put anything in your vagina.    Drink clear fluids.    Only take medications approved by your provider.    MORE INFORMATION:    Appendicitis:  A possible cause of abdominal pain in any person who still has their appendix is acute appendicitis. Appendicitis is often hard to diagnose.  Testing does not always rule out early appendicitis or other causes of abdominal pain. Close follow-up with your provider and re-evaluations may be needed to figure out the reason for your abdominal pain.    Follow-up:  It is very important that you make an appointment with your clinic and go to the appointment.  If you do not follow-up with your primary provider, it may result in missing an important development which could result in permanent injury or disability and/or lasting pain.  If there is any problem keeping your appointment, call your provider or return to the Emergency Department.    Medications:  Take your medications as directed by your provider today.  Before using over-the-counter medications, ask your provider and make sure to take the medications as directed.  If you have any questions about medications, ask your provider.    Diet:  Resume your normal diet as much as possible, but do not eat fried, fatty or  "spicy foods while you have pain.  Do not drink alcohol or have caffeine.  Do not smoke tobacco.    Probiotics: If you have been given an antibiotic, you may want to also take a probiotic pill or eat yogurt with live cultures. Probiotics have \"good bacteria\" to help your intestines stay healthy. Studies have shown that probiotics help prevent diarrhea (loose stools) and other intestine problems (including C. diff infection) when you take antibiotics. You can buy these without a prescription in the pharmacy section of the store.     If you were given a prescription for medicine here today, be sure to read all of the information (including the package insert) that comes with your prescription.  This will include important information about the medicine, its side effects, and any warnings that you need to know about.  The pharmacist who fills the prescription can provide more information and answer questions you may have about the medicine.  If you have questions or concerns that the pharmacist cannot address, please call or return to the Emergency Department.       Remember that you can always come back to the Emergency Department if you are not able to see your regular provider in the amount of time listed above, if you get any new symptoms, or if there is anything that worries you.      24 Hour Appointment Hotline       To make an appointment at any [facility], call [phone] ([phone]). If you don't have a family doctor or clinic, we will help you find one. [facility] clinics are conveniently located to serve the needs of you and your family.             Review of your medicines      START taking        Dose / Directions Last dose taken    ondansetron 4 MG ODT tab   Commonly known as:  ZOFRAN ODT   Dose:  4 mg   Quantity:  10 tablet        Take 1 tablet (4 mg) by mouth every 8 hours as needed   Refills:  0          Our records show that you are taking the medicines listed below. If these are incorrect, " please call your family doctor or clinic.        Dose / Directions Last dose taken    losartan 50 MG tablet   Commonly known as:  COZAAR   Dose:  50 mg   Quantity:  90 tablet        Take 1 tablet (50 mg) by mouth daily   Refills:  1        multivitamin, therapeutic with minerals Tabs tablet   Dose:  1 tablet        Take 1 tablet by mouth daily   Refills:  0        NORTRIPTYLINE HCL PO   Dose:  20 mg        Take 20 mg by mouth daily   Refills:  0        * oxyCODONE IR 5 MG tablet   Commonly known as:  ROXICODONE   Dose:  5 mg   Quantity:  20 tablet        Take 1 tablet (5 mg) by mouth every 4 hours as needed for pain   Refills:  0        * oxyCODONE IR 5 MG tablet   Commonly known as:  ROXICODONE   Dose:  5-10 mg   Quantity:  30 tablet        Take 1-2 tablets (5-10 mg) by mouth every 3 hours as needed for pain or other (Moderate to Severe)   Refills:  0        sildenafil 20 MG tablet   Commonly known as:  REVATIO   Quantity:  9 tablet        Take 1 tablet (20 mg) by mouth three times daily for pulmonary hypertension.  Never use with nitroglycerin, terazosin or doxazosin.   Refills:  0        simvastatin 40 MG tablet   Commonly known as:  ZOCOR   Dose:  40 mg   Quantity:  90 tablet        Take 1 tablet (40 mg) by mouth At Bedtime   Refills:  3        WELLBUTRIN PO        Take by mouth daily   Refills:  0        * Notice:  This list has 2 medication(s) that are the same as other medications prescribed for you. Read the directions carefully, and ask your doctor or other care provider to review them with you.            Prescriptions were sent or printed at these locations (1 Prescription)                   Other Prescriptions                Printed at Department/Unit printer (1 of 1)         ondansetron (ZOFRAN ODT) 4 MG ODT tab                Procedures and tests performed during your visit     CBC with platelets differential    Comprehensive metabolic panel    Lipase    Peripheral IV catheter    XR Abdomen 2 Views       Orders Needing Specimen Collection     None      Pending Results     No orders found from 2/24/2018 to 2/27/2018.            Pending Culture Results     No orders found from 2/24/2018 to 2/27/2018.            Pending Results Instructions     If you had any lab results that were not finalized at the time of your Discharge, you can call the ED Lab Result RN at [phone]. You will be contacted by this team for any positive Lab results or changes in treatment. The nurses are available 7 days a week from 10A to 6:30P.  You can leave a message 24 hours per day and they will return your call.        Test Results From Your Hospital Stay        2/26/2018  7:55 PM      Component Results     Component Value Ref Range & Units Status    WBC 7.8 4.0 - 11.0 10e9/L Final    RBC Count 5.95 (H) 4.4 - 5.9 10e12/L Final    Hemoglobin 17.8 (H) 13.3 - 17.7 g/dL Final    Hematocrit 49.6 40.0 - 53.0 % Final    MCV 83 78 - 100 fl Final    MCH 29.9 26.5 - 33.0 pg Final    MCHC 35.9 31.5 - 36.5 g/dL Final    RDW 12.4 10.0 - 15.0 % Final    Platelet Count 310 150 - 450 10e9/L Final    Diff Method Automated Method  Final    % Neutrophils 60.4 % Final    % Lymphocytes 24.2 % Final    % Monocytes 12.1 % Final    % Eosinophils 2.4 % Final    % Basophils 0.5 % Final    % Immature Granulocytes 0.4 % Final    Nucleated RBCs 0 0 /100 Final    Absolute Neutrophil 4.7 1.6 - 8.3 10e9/L Final    Absolute Lymphocytes 1.9 0.8 - 5.3 10e9/L Final    Absolute Monocytes 1.0 0.0 - 1.3 10e9/L Final    Absolute Eosinophils 0.2 0.0 - 0.7 10e9/L Final    Absolute Basophils 0.0 0.0 - 0.2 10e9/L Final    Abs Immature Granulocytes 0.0 0 - 0.4 10e9/L Final    Absolute Nucleated RBC 0.0  Final         2/26/2018  8:16 PM      Component Results     Component Value Ref Range & Units Status    Sodium 135 133 - 144 mmol/L Final    Potassium 3.9 3.4 - 5.3 mmol/L Final    Chloride 104 94 - 109 mmol/L Final    Carbon Dioxide 26 20 - 32 mmol/L Final    Anion Gap 5 3 - 14  mmol/L Final    Glucose 117 (H) 70 - 99 mg/dL Final    Urea Nitrogen 21 7 - 30 mg/dL Final    Creatinine 0.78 0.66 - 1.25 mg/dL Final    GFR Estimate >90 >60 mL/min/1.7m2 Final    Non  GFR Calc    GFR Estimate If Black >90 >60 mL/min/1.7m2 Final    African American GFR Calc    Calcium 8.9 8.5 - 10.1 mg/dL Final    Bilirubin Total 0.8 0.2 - 1.3 mg/dL Final    Albumin 3.1 (L) 3.4 - 5.0 g/dL Final    Protein Total 7.1 6.8 - 8.8 g/dL Final    Alkaline Phosphatase 120 40 - 150 U/L Final    ALT 39 0 - 70 U/L Final    AST 18 0 - 45 U/L Final         2/26/2018  8:16 PM      Component Results     Component Value Ref Range & Units Status    Lipase 135 73 - 393 U/L Final         2/26/2018  8:42 PM      Narrative     ABDOMEN TWO VIEWS 2/26/2018  7:58 PM     HISTORY: Vomiting, recent surgery, screen for obstruction.    COMPARISON: 5/19/2017        Impression     IMPRESSION: Markedly dilated small bowel loops with air-fluid levels  are seen in the midabdomen. There is still gas in the distal colon and  rectum. Surgical clips are seen in the right upper quadrant. No  evidence for free air. The bowel gas pattern could be due to partial  small-bowel obstruction or ileus.    BRITTANY MYERS MD                Clinical Quality Measure: Blood Pressure Screening     Your blood pressure was checked while you were in the emergency department today. The last reading we obtained was  BP: 144/86 . Please read the guidelines below about what these numbers mean and what you should do about them.  If your systolic blood pressure (the top number) is less than 120 and your diastolic blood pressure (the bottom number) is less than 80, then your blood pressure is normal. There is nothing more that you need to do about it.  If your systolic blood pressure (the top number) is 120-139 or your diastolic blood pressure (the bottom number) is 80-89, your blood pressure may be higher than it should be. You should have your blood pressure  "rechecked within a year by a primary care provider.  If your systolic blood pressure (the top number) is 140 or greater or your diastolic blood pressure (the bottom number) is 90 or greater, you may have high blood pressure. High blood pressure is treatable, but if left untreated over time it can put you at risk for heart attack, stroke, or kidney failure. You should have your blood pressure rechecked by a primary care provider within the next 4 weeks.  If your provider in the emergency department today gave you specific instructions to follow-up with your doctor or provider even sooner than that, you should follow that instruction and not wait for up to 4 weeks for your follow-up visit.        Thank you for choosing [facility]       Thank you for choosing [facility] for your care. Our goal is always to provide you with excellent care. Hearing back from our patients is one way we can continue to improve our services. Please take a few minutes to complete the written survey that you may receive in the mail after you visit with us. Thank you!        [employer] [employer] Information     [facility] lets you send messages to your doctor, view your test results, renew your prescriptions, schedule appointments and more. To sign up, go to [email or web address] . Click on \"Log in\" on the left side of the screen, which will take you to the Welcome page. Then click on \"Sign up Now\" on the right side of the page.     You will be asked to enter the access code listed below, as well as some personal information. Please follow the directions to create your username and password.     Your access code is: [email or web address]  Expires: 4/3/2018  8:06 AM     Your access code will  in 90 days. If you need help or a new code, please call your [facility] clinic or [phone].        Care EveryWhere ID     This is your Care EveryWhere ID. This could be used by other organizations to access your [facility] medical records  [id number]        Equal Access to " Services     [facility]: Brooklyn Mccann, wamarielda luqadaha, qaybta katres dill. So [facility] [phone].    ATENCIÓN: Si habla español, tiene a morocho disposición servicios gratuitos de asistencia lingüística. Llame al [phone].    We comply with applicable federal civil rights laws and [place] laws. We do not discriminate on the basis of race, color, national origin, age, disability, sex, sexual orientation, or gender identity.            After Visit Summary       This is your record. Keep this with you and show to your community pharmacist(s) and doctor(s) at your next visit.

## 2018-02-27 NOTE — ED PROVIDER NOTES
History     Chief Complaint:  Abdominal pain    HPI   Naif Howell Jr. is a 53 year old male who presents with abdominal pain. The patient reports a history of chronic neck pain and vestibular migraines for which he is currently seeing Neurology. He had a hernia repair performed 4 days ago. He now presents to the ED this evening after onset of abdominal pain immediately after his pain medication from surgery wore off. This abdominal pain has persisted since that time. He also notes that he has been experiencing nausea and vomiting after drinking water. He denies any nausea associated with other liquids, stating that drinking juice and milk has gone fine and that only water makes him nauseous. For this reason he feels that he is dehydrated. Finally, the patient notes a headache that has been present for several days now. He believes this may be in part due his dehydration. His normal headaches and migraines can last up to several days, and he is yet to find a medication that alleviates his headache symptoms well.     Allergies:  Cefuroxime  Flagyl     Medications:    Roxicodone  Nortriptyline  Wellbutrin  Cozaar  Zocor  Slidenafil    Past Medical History:    Anxiety  Aortic regurgitation  Back pain  Chronic neck pain  Degenerative disc disease, cervical   GERD  Hypertension  Migraines  PTSD  SBO  Vestibular migraines    Past Surgical History:    Herniorrhaphy incisional   Laparoscopic cholecystectomy  Laparoscopic lysis adhesions  Laparoscopy diagnostic  Resect small bowel without ostomy  Resection of basal cell carcinoma    Family History:    Hypertension  Cancer    Social History:  The patient was accompanied to the ED by his wife.  Smoking Status: Yes  Smokeless Tobacco: No  Alcohol Use: No  Marital Status:  Legally      Review of Systems   Gastrointestinal: Positive for abdominal pain, nausea and vomiting. Negative for constipation and diarrhea.   Genitourinary: Negative.    All other systems reviewed  "and are negative.    Physical Exam   Vitals:  Patient Vitals for the past 24 hrs:   BP Temp Temp src Heart Rate Resp SpO2 Height Weight   02/26/18 2220 144/86 - - 99 16 95 % - -   02/26/18 2115 145/86 - - 95 16 96 % - -   02/26/18 2010 (!) 139/96 - - 97 16 93 % - -   02/26/18 1913 148/89 98  F (36.7  C) Oral 111 16 96 % 1.88 m (6' 2\") 90.7 kg (200 lb)     Physical Exam  Eyes:  The pupils are equal and round    Conjunctivae and sclerae are normal  ENT:    The nose is normal    Pinnae are normal    The oropharynx is dry  CV:  Regular rate and rhythm     No edema  Resp:  Lungs are clear    Non-labored    No rales    No wheezing   GI:  Abdomen is soft with mild tenderness around the surgical site, there is no rigidity    No distension    Surgical wound appears to be healing appropriately. No surrounding erythema.  MS:  Normal muscular tone    No asymmetric leg swelling  Skin:  No rash or acute skin lesions noted  Neuro:   Awake, alert.      Speech is normal and fluent.    Face is symmetric.     Moves all extremities    Emergency Department Course     Imaging:  Radiology findings were communicated with the patient who voiced understanding of the findings.  XR Abdomen 2 views:  Markedly dilated small bowel loops with air-fluid levels are seen in the midabdomen. There is still gas in the distal colon and rectum. Surgical clips are seen in the right upper quadrant. No evidence for free air. The bowel gas pattern could be due to partial small-bowel obstruction or ileus.  Per Radiologist.     Laboratory:  Laboratory findings were communicated with the patient who voiced understanding of the findings.  CBC: HGB: 17.8 (H) o/w WNL. (WBC 7.8, )   CMP: Glucose: 117 (H), Albumin: 3.1 (L) o/w WNL (Creatinine 0.78)  Lipase: 135    Interventions:  2025 Normal Saline 1000 mL IV  2025 Zofran, 4 mg, IV  2115 Normal Saline 1000 mL IV     Emergency Department Course:  Nursing notes and vitals reviewed.  1927 I had my initial " encounter with the patient.  I performed an exam of the patient as documented above.   IV was inserted and blood was drawn for laboratory testing, results above.  The patient was sent for a XR while in the emergency department, results above.     2222 I discussed the treatment plan with the patient. They expressed understanding of this plan and consented to discharge. They will be discharged home with instructions for care and follow up. In addition, the patient will return to the emergency department if their symptoms persist, worsen, if new symptoms arise or if there is any concern.  All questions were answered.    Impression & Plan      Medical Decision Making:  Naif Howell Jr. is a 53 year old male who presents to the emergency department today with abdominal discomfort and headache. He has a history of migraines which he says no medications help him with in the past. He notes that he recently had abdominal surgery to repair a hernia and he has had some abdominal discomfort and nausea. He has had some vomiting, but only when he drinks water. He has had a stool today and has been passing gas. I discussed medications to help treat his symptoms and he was given IV fluids as well as Zofran. These did help his nausea. Consideration was given for Reglan here in the ED due to his headache and so abdominal x ray was obtained to screen for obstruction. X ray did not reveal any obstruction, but he did have dilated loops of small bowel. There was gas into the colon. I think that likely he has an ileus. After Zofran here he was able to drink water and keep it down. He did not want a CT scan and given his exam and how he was feeling, I think that is reasonable. He was given IV fluids and reported his headache was improved as well. I think at this point he feels well enough to go home and that it is reasonable for him to go home. I did give him return precautions for abdominal pain and bowel obstruction. He verbalized  understanding. He was given a prescription for Zofran.    Diagnosis:    ICD-10-CM    1. Abdominal pain, generalized R10.84    2. Nonintractable headache, unspecified chronicity pattern, unspecified headache type R51      Disposition:   Discharge    Discharge Medications:  Discharge Medication List as of 2/26/2018 10:28 PM      START taking these medications    Details   ondansetron (ZOFRAN ODT) 4 MG ODT tab Take 1 tablet (4 mg) by mouth every 8 hours as needed, Disp-10 tablet, R-0, Local Print           Scribe Disclosure:  I, Steven Espinosa, am serving as a scribe at 7:26 PM on 2/26/2018 to document services personally performed by Cullen Garcia MD, based on my observations and the provider's statements to me.    2/26/2018    EMERGENCY DEPARTMENT       Cullen Garcia MD  02/27/18 0015

## 2018-02-27 NOTE — DISCHARGE INSTRUCTIONS
Discharge Instructions  Headache    You were seen today for a headache. Headaches may be caused by many different things such as muscle tension, sinus inflammation, anxiety and stress, having too little sleep, too much alcohol, some medical conditions or injury. You may have a migraine, which is caused by changes in the blood vessels in your head.  At this time your provider does not find that your headache is a sign of anything dangerous or life-threatening.  However, sometimes the signs of serious illness do not show up right away.      Generally, every Emergency Department visit should have a follow-up clinic visit with either a primary or a specialty clinic/provider. Please follow-up as instructed by your emergency provider today.    Return to the Emergency Department if:    You get a new fever of 100.4 F or higher.    Your headache gets much worse.    You get a stiff neck with your headache.    You get a new headache that is significantly different or worse than headaches you have had before.    You are vomiting (throwing up) and cannot keep food or water down.    You have blurry or double vision or other problems with your eyes.    You have a new weakness on one side of your body.    You have difficulty with balance which is new.    You or your family thinks you are confused.    You have a seizure.    What can I do to help myself?    Pain medications - You may take a pain medication such as Tylenol  (acetaminophen), Advil , Motrin  (ibuprofen) or Aleve  (naproxen).    Take a pain reliever as soon as you notice symptoms.  Starting medications as soon as you start to have symptoms may lessen the amount of pain you have.    Relaxing in a quiet, dark room may help.    Get enough sleep and eat meals regularly.    You may need to watch for certain foods or other things which may trigger your headaches.  Keeping a journal of your headaches and possible triggers may help you and your primary provider to identify  things which you should avoid which may be causing your headaches.  If you were given a prescription for medicine here today, be sure to read all of the information (including the package insert) that comes with your prescription.  This will include important information about the medicine, its side effects, and any warnings that you need to know about.  The pharmacist who fills the prescription can provide more information and answer questions you may have about the medicine.  If you have questions or concerns that the pharmacist cannot address, please call or return to the Emergency Department.   Remember that you can always come back to the Emergency Department if you are not able to see your regular provider in the amount of time listed above, if you get any new symptoms, or if there is anything that worries you.     Discharge Instructions  Abdominal Pain    Abdominal pain (belly pain) can be caused by many things. Your evaluation today does not show the exact cause for your pain. Your provider today has decided that it is unlikely your pain is due to a life threatening problem, or a problem requiring surgery or hospital admission. Sometimes those problems cannot be found right away, so it is very important that you follow up as directed.  Sometimes only the changes which occur over time allow the cause of your pain to be found.    Generally, every Emergency Department visit should have a follow-up clinic visit with either a primary or a specialty clinic/provider. Please follow-up as instructed by your emergency provider today. With abdominal pain, we often recommend very close follow-up, such as the following day.    ADULTS:  Return to the Emergency Department right away if:      You get an oral temperature above 102oF or as directed by your provider.    You have blood in your stools. This may be bright red or appear as black, tarry stools.      You keep vomiting (throwing up) or cannot drink liquids.    You  see blood when you vomit.     You cannot have a bowel movement or you cannot pass gas.    Your stomach gets bloated or bigger.    Your skin or the whites of your eyes look yellow.    You faint.    You have bloody, frequent or painful urination (peeing).    You have new symptoms or anything that worries you.    CHILDREN:  Return to the Emergency Department right away if your child has any of the above-listed symptoms or the following:      Pushes your hand away or screams/cries when his/her belly is touched.    You notice your child is very fussy or weak.    Your child is very tired and is too tired to eat or drink.    Your child is dehydrated.  Signs of dehydration can be:  o Significant change in the amount of wet diapers/urine.  o Your infant or child starts to have dry mouth and lips, or no saliva (spit) or tears.    PREGNANT WOMEN:  Return to the Emergency Department right away if you have any of the above-listed symptoms or the following:      You have bleeding, leaking fluid or passing tissue from the vagina.    You have worse pain or cramping, or pain in your shoulder or back.    You have vomiting that will not stop.    You have a temperature of 100oF or more.    Your baby is not moving as much as usual.    You faint.    You get a bad headache with or without eye problems and abdominal pain.    You have a seizure.    You have unusual discharge from your vagina and abdominal pain.    Abdominal pain is pretty common during pregnancy.  Your pain may or may not be related to your pregnancy. You should follow-up closely with your OB provider so they can evaluate you and your baby.  Until you follow-up with your regular provider, do the following:       Avoid sex and do not put anything in your vagina.    Drink clear fluids.    Only take medications approved by your provider.    MORE INFORMATION:    Appendicitis:  A possible cause of abdominal pain in any person who still has their appendix is acute appendicitis.  "Appendicitis is often hard to diagnose.  Testing does not always rule out early appendicitis or other causes of abdominal pain. Close follow-up with your provider and re-evaluations may be needed to figure out the reason for your abdominal pain.    Follow-up:  It is very important that you make an appointment with your clinic and go to the appointment.  If you do not follow-up with your primary provider, it may result in missing an important development which could result in permanent injury or disability and/or lasting pain.  If there is any problem keeping your appointment, call your provider or return to the Emergency Department.    Medications:  Take your medications as directed by your provider today.  Before using over-the-counter medications, ask your provider and make sure to take the medications as directed.  If you have any questions about medications, ask your provider.    Diet:  Resume your normal diet as much as possible, but do not eat fried, fatty or spicy foods while you have pain.  Do not drink alcohol or have caffeine.  Do not smoke tobacco.    Probiotics: If you have been given an antibiotic, you may want to also take a probiotic pill or eat yogurt with live cultures. Probiotics have \"good bacteria\" to help your intestines stay healthy. Studies have shown that probiotics help prevent diarrhea (loose stools) and other intestine problems (including C. diff infection) when you take antibiotics. You can buy these without a prescription in the pharmacy section of the store.     If you were given a prescription for medicine here today, be sure to read all of the information (including the package insert) that comes with your prescription.  This will include important information about the medicine, its side effects, and any warnings that you need to know about.  The pharmacist who fills the prescription can provide more information and answer questions you may have about the medicine.  If you have " questions or concerns that the pharmacist cannot address, please call or return to the Emergency Department.       Remember that you can always come back to the Emergency Department if you are not able to see your regular provider in the amount of time listed above, if you get any new symptoms, or if there is anything that worries you.

## 2018-04-01 ENCOUNTER — HOSPITAL ENCOUNTER (EMERGENCY)
Facility: CLINIC | Age: 53
Discharge: HOME | End: 2018-04-01
Attending: EMERGENCY MEDICINE | Admitting: EMERGENCY MEDICINE
Payer: OTHER GOVERNMENT

## 2018-04-01 VITALS
WEIGHT: 200 LBS | OXYGEN SATURATION: 97 % | SYSTOLIC BLOOD PRESSURE: 162 MMHG | RESPIRATION RATE: 16 BRPM | DIASTOLIC BLOOD PRESSURE: 100 MMHG | HEART RATE: 87 BPM | BODY MASS INDEX: 25.67 KG/M2 | TEMPERATURE: 97.4 F | HEIGHT: 74 IN

## 2018-04-01 DIAGNOSIS — M54.2 CERVICALGIA: ICD-10-CM

## 2018-04-01 PROCEDURE — 25000132 ZZH RX MED GY IP 250 OP 250 PS 637: Performed by: EMERGENCY MEDICINE

## 2018-04-01 PROCEDURE — 25000125 ZZHC RX 250: Performed by: EMERGENCY MEDICINE

## 2018-04-01 PROCEDURE — 99283 EMERGENCY DEPT VISIT LOW MDM: CPT

## 2018-04-01 RX ORDER — DIAZEPAM 5 MG
5 TABLET ORAL ONCE
Status: COMPLETED | OUTPATIENT
Start: 2018-04-01 | End: 2018-04-01

## 2018-04-01 RX ORDER — DIAZEPAM 5 MG
5 TABLET ORAL EVERY 6 HOURS PRN
Qty: 20 TABLET | Refills: 0 | Status: SHIPPED | OUTPATIENT
Start: 2018-04-01 | End: 2019-02-07

## 2018-04-01 RX ORDER — METHYLPREDNISOLONE 4 MG
TABLET, DOSE PACK ORAL
Qty: 21 TABLET | Refills: 0 | Status: SHIPPED | OUTPATIENT
Start: 2018-04-01 | End: 2018-12-21

## 2018-04-01 RX ORDER — OXYCODONE HYDROCHLORIDE 5 MG/1
10 TABLET ORAL ONCE
Status: COMPLETED | OUTPATIENT
Start: 2018-04-01 | End: 2018-04-01

## 2018-04-01 RX ORDER — PREDNISONE 20 MG/1
40 TABLET ORAL ONCE
Status: COMPLETED | OUTPATIENT
Start: 2018-04-01 | End: 2018-04-01

## 2018-04-01 RX ADMIN — PREDNISONE 40 MG: 20 TABLET ORAL at 08:17

## 2018-04-01 RX ADMIN — OXYCODONE HYDROCHLORIDE 10 MG: 5 TABLET ORAL at 08:17

## 2018-04-01 RX ADMIN — DIAZEPAM 5 MG: 5 TABLET ORAL at 08:17

## 2018-04-01 ASSESSMENT — ENCOUNTER SYMPTOMS
NECK PAIN: 1
WEAKNESS: 1
NUMBNESS: 1
FEVER: 0
FREQUENCY: 0

## 2018-04-01 NOTE — ED AVS SNAPSHOT
Emergency Department    [address] [place] [id number]    Phone:  [phone]    Fax:  [phone]                                       Naif Howell Jr.   MRN: [id number]    Department:   Emergency Department   Date of Visit:  4/1/2018           After Visit Summary Signature Page     I have received my discharge instructions, and my questions have been answered. I have discussed any challenges I see with this plan with the nurse or doctor.    ..........................................................................................................................................  Patient/Patient Representative Signature      ..........................................................................................................................................  Patient Representative Print Name and Relationship to Patient    ..................................................               ................................................  Date                                            Time    ..........................................................................................................................................  Reviewed by Signature/Title    ...................................................              ..............................................  Date                                                            Time

## 2018-04-01 NOTE — ED AVS SNAPSHOT
Emergency Department    [address] [facility] [address]    Phone:  [phone]    Fax:  [phone]                                       Naif Howell Jr.   MRN: [id number]    Department:   Emergency Department   Date of Visit:  4/1/2018           Patient Information     Date Of Birth          1965        Your diagnoses for this visit were:     Cervicalgia        You were seen by Robert Heredia MD.      Follow-up Information     Schedule an appointment as soon as possible for a visit with Josefina Rogers MD.    Specialty:  Family Practice    Contact information:    [address]  [facility] [id number] [phone]          Discharge Instructions         General Neck and Back Pain    Both neck and back pain are usually caused by injury to the muscles or ligaments of the spine. Sometimes the disks that separate each bone of the spine may cause pain by pressing on a nearby nerve. Back and neck pain may appear after a sudden twisting or bending force (such as in a car accident), or sometimes after a simple awkward movement. In either case, muscle spasm is often present and adds to the pain.  Acute neck and back pain usually gets better in 1 to 2 weeks. Pain related to disk disease, arthritis in the spinal joints or spinal stenosis (narrowing of the spinal canal) can become chronic and last for months or years.  Back and neck pain are common problems. Most people feel better in 1 or 2 weeks, and most of the rest in 1 to 2 months. Most people can remain active.  People experience and describe pain differently.    Pain can be sharp, stabbing, shooting, aching, cramping, or burning    Movement, standing, bending, lifting, sitting, or walking may worsen the pain    Pain can be localized to one spot or area, or it can be more generalized    Pain can spread or radiate upwards, downwards, to the front, or go down your arms    Muscle spasm may occur.  Most of the time mechanical  problems with the muscles or spine cause the pain. it is usually caused by an injury, whether known or not, to the muscles or ligaments. While illnesses can cause back pain, it is usually not caused by a serious illness. Pain is usually related to physical activity, whether sports, exercise, work, or normal activity. Sometimes it can occur without an identifiable cause. This can happen simply by stretching or moving wrong, without noting pain at the time. Other causes include:    Overexertion, lifting, pushing, pulling incorrectly or too aggressively.    Sudden twisting, bending or stretching from an accident (car or fall), or accidental movement.    Poor posture    Poor conditioning, lack of regular exercise    Spinal disc disease or arthritis    Stress    Pregnancy, or illness like appendicitis, bladder or kidney infection, pelvic infections   Home care    For neck pain: Use a comfortable pillow that supports the head and keeps the spine in a neutral position. The position of the head should not be tilted forward or backward.    When in bed, try to find a position of comfort. A firm mattress is best. Try lying flat on your back with pillows under your knees. You can also try lying on your side with your knees bent up towards your chest and a pillow between your knees.    At first, do not try to stretch out the sore spots. If there is a strain, it is not like the good soreness you get after exercising without an injury. In this case, stretching may make it worse.    Avoid prolonged sitting, long car rides or travel. This puts more stress on the lower back than standing or walking.    During the first 24 to 72 hours after an injury, apply an ice pack to the painful area for 20 minutes and then remove it for 20 minutes over a period of 60 to 90 minutes or several times a day.     You can alternate ice and heat therapies. Talk with your healthcare provider about the best treatment for your back or neck pain. As a  safety precaution, do not use a heating pad at bedtime. Sleeping with a heating pad can lead to skin burns or tissue damage.    Therapeutic massage can help relax the back and neck muscles without stretching them.    Be aware of safe lifting methods and do not lift anything over 15 pounds until all the pain is gone.  Medications  Talk to your healthcare provider before using medicine, especially if you have other medical problems or are taking other medicines.    You may use over-the-counter medicine to control pain, unless another pain medicine was prescribed. If you have chronic conditions like diabetes, liver or kidney disease, stomach ulcers,  gastrointestinal bleeding, or are taking blood thinner medicines.    Be careful if you are given pain medicines, narcotics, or medicine for muscle spasm. They can cause drowsiness, and can affect your coordination, reflexes, and judgment. Do not drive or operate heavy machinery.  Follow-up care  Follow up with your healthcare provider, or as advised. Physical therapy or further tests may be needed.  If X-rays were taken, you will be notified of any new findings that may affect your care.  Call 911  Seek emergency medical care if any of the following occur:    Trouble breathing    Confusion    Very drowsy or trouble awakening    Fainting or loss of consciousness    Rapid or very slow heart rate    Loss of bowel or bladder control  When to seek medical advice  Call your healthcare provider right away if any of these occur:    Pain becomes worse or spreads into your arms or legs    Weakness, numbness or pain in one or both arms or legs    Numbness in the groin area    Difficulty walking    Fever of 100.4 F (38 C) or higher, or as directed by your healthcare provider  Date Last Reviewed: 7/1/2016 2000-2017 [employer]. [address] [place] [address], [place] [address]. All rights reserved. This information is not intended as a substitute for professional medical care.  Always follow your healthcare professional's instructions.          24 Hour Appointment Hotline       To make an appointment at any [place] clinic, call [phone] ([phone]). If you don't have a family doctor or clinic, we will help you find one. [place] clinics are conveniently located to serve the needs of you and your family.             Review of your medicines      START taking        Dose / Directions Last dose taken    diazepam 5 MG tablet   Commonly known as:  VALIUM   Dose:  5 mg   Quantity:  20 tablet        Take 1 tablet (5 mg) by mouth every 6 hours as needed for anxiety or sleep (MUSCLE SPASM)   Refills:  0        methylPREDNISolone 4 MG tablet   Commonly known as:  MEDROL DOSEPAK   Quantity:  21 tablet        Follow package instructions   Refills:  0          Our records show that you are taking the medicines listed below. If these are incorrect, please call your family doctor or clinic.        Dose / Directions Last dose taken    losartan 50 MG tablet   Commonly known as:  COZAAR   Dose:  50 mg   Quantity:  90 tablet        Take 1 tablet (50 mg) by mouth daily   Refills:  1        multivitamin, therapeutic with minerals Tabs tablet   Dose:  1 tablet        Take 1 tablet by mouth daily   Refills:  0        NORTRIPTYLINE HCL PO   Dose:  20 mg        Take 20 mg by mouth daily   Refills:  0        * oxyCODONE IR 5 MG tablet   Commonly known as:  ROXICODONE   Dose:  5 mg   Quantity:  20 tablet        Take 1 tablet (5 mg) by mouth every 4 hours as needed for pain   Refills:  0        * oxyCODONE IR 5 MG tablet   Commonly known as:  ROXICODONE   Dose:  5-10 mg   Quantity:  30 tablet        Take 1-2 tablets (5-10 mg) by mouth every 3 hours as needed for pain or other (Moderate to Severe)   Refills:  0        sildenafil 20 MG tablet   Commonly known as:  REVATIO   Quantity:  9 tablet        Take 1 tablet (20 mg) by mouth three times daily for pulmonary hypertension.  Never use with nitroglycerin,  terazosin or doxazosin.   Refills:  0        simvastatin 40 MG tablet   Commonly known as:  ZOCOR   Dose:  40 mg   Quantity:  90 tablet        Take 1 tablet (40 mg) by mouth At Bedtime   Refills:  3        * Notice:  This list has 2 medication(s) that are the same as other medications prescribed for you. Read the directions carefully, and ask your doctor or other care provider to review them with you.            Prescriptions were sent or printed at these locations (2 Prescriptions)                   Other Prescriptions                Printed at Department/Unit printer (2 of 2)         diazepam (VALIUM) 5 MG tablet               methylPREDNISolone (MEDROL DOSEPAK) 4 MG tablet                Orders Needing Specimen Collection     None      Pending Results     No orders found from 3/30/2018 to 4/2/2018.            Pending Culture Results     No orders found from 3/30/2018 to 4/2/2018.            Pending Results Instructions     If you had any lab results that were not finalized at the time of your Discharge, you can call the ED Lab Result RN at [phone]. You will be contacted by this team for any positive Lab results or changes in treatment. The nurses are available 7 days a week from 10A to 6:30P.  You can leave a message 24 hours per day and they will return your call.        Test Results From Your Hospital Stay               Clinical Quality Measure: Blood Pressure Screening     Your blood pressure was checked while you were in the emergency department today. The last reading we obtained was  BP: (!) 162/100 . Please read the guidelines below about what these numbers mean and what you should do about them.  If your systolic blood pressure (the top number) is less than 120 and your diastolic blood pressure (the bottom number) is less than 80, then your blood pressure is normal. There is nothing more that you need to do about it.  If your systolic blood pressure (the top number) is 120-139 or your diastolic blood  "pressure (the bottom number) is 80-89, your blood pressure may be higher than it should be. You should have your blood pressure rechecked within a year by a primary care provider.  If your systolic blood pressure (the top number) is 140 or greater or your diastolic blood pressure (the bottom number) is 90 or greater, you may have high blood pressure. High blood pressure is treatable, but if left untreated over time it can put you at risk for heart attack, stroke, or kidney failure. You should have your blood pressure rechecked by a primary care provider within the next 4 weeks.  If your provider in the emergency department today gave you specific instructions to follow-up with your doctor or provider even sooner than that, you should follow that instruction and not wait for up to 4 weeks for your follow-up visit.        Thank you for choosing [facility]       Thank you for choosing [facility] for your care. Our goal is always to provide you with excellent care. Hearing back from our patients is one way we can continue to improve our services. Please take a few minutes to complete the written survey that you may receive in the mail after you visit with us. Thank you!        [employer] Information     [employer] lets you send messages to your doctor, view your test results, renew your prescriptions, schedule appointments and more. To sign up, go to [email or web address] . Click on \"Log in\" on the left side of the screen, which will take you to the Welcome page. Then click on \"Sign up Now\" on the right side of the page.     You will be asked to enter the access code listed below, as well as some personal information. Please follow the directions to create your username and password.     Your access code is: [email or web address]  Expires: 4/3/2018  9:06 AM     Your access code will  in 90 days. If you need help or a new code, please call your [facility] clinic or [phone].        Care EveryWhere ID     This is your Care " EveryWhere ID. This could be used by other organizations to access your [facility] medical records  [id number]        Equal Access to Services     JENNIFER MARQUEZ : Brooklyn Mccann, giselle hoover, freddy singh, tres bird. So [facility] [phone].    ATENCIÓN: Si habla español, tiene a morocho disposición servicios gratuitos de asistencia lingüística. Llame al [phone].    We comply with applicable federal civil rights laws and [place] laws. We do not discriminate on the basis of race, color, national origin, age, disability, sex, sexual orientation, or gender identity.            After Visit Summary       This is your record. Keep this with you and show to your community pharmacist(s) and doctor(s) at your next visit.

## 2018-04-01 NOTE — ED PROVIDER NOTES
History     Chief Complaint:  Neck Pain    HPI   Naif Howell Jr. is a 53 year old male with a history of migraines, hypertension, and chronic neck pain who presents to the emergency department today for evaluation of neck pain. The patient reports an onset of a neck pain flare starting at the base of his skull with associated numbness in his arms and weakness in his arms for the past three days similar to his chronic neck pain. He has had CT scans and MRIs  in the past for his neck pain with his last imaging a year ago all with no source of the pain. He follows up with a pain clinic, neurologist, ENT, and ophthalmology for this chronic pain. He recently saw his neurologist on Tuesday. He took ibuprofen yesterday morning with no relief. The pain worsened last night and he took two oxycodone with no relief. He notes that this morning, this pain is the worst it has been prompting his visit to the emergency department. He denies fever, frequency, urgency, incontinence,  bowel problems, and recent injury. Of note, the patient hasn't used muscle relaxers for his pain for a long time.     Allergies:  Cefuroxime  Flagyl [Metronidazole]    Medications:    NORTRIPTYLINE HCL PO  BuPROPion HCl (WELLBUTRIN PO)  oxyCODONE IR (ROXICODONE) 5 MG tablet  losartan (COZAAR) 50 MG tablet  simvastatin (ZOCOR) 40 MG tablet  sildenafil (REVATIO/VIAGRA) 20 MG tablet  multivitamin, therapeutic with minerals (THERA-VIT-M) TABS tablet    Past Medical History:    Anxiety   Aortic regurgitation   Back pain   Basal cell carcinoma   Chronic neck pain   Degenerative disc disease, cervical   GERD (gastroesophageal reflux disease)   Hypertension   Migraines   PTSD (post-traumatic stress disorder)   SBO (small bowel obstruction)   Vestibular migraine     Past Surgical History:    HERNIORRHAPHY INCISIONAL (LOCATION)   LAPAROSCOPIC CHOLECYSTECTOMY WITH CHOLANGIOGRAMS   LAPAROSCOPIC LYSIS ADHESIONS   LAPAROSCOPY DIAGNOSTIC (GENERAL)   RESECT SMALL  "BOWEL WITHOUT OSTOMY   Resection of basal cell carcinoma    Family History:    Hypertension  Cancer    Social History:  The patient was accompanied by family.  Smoking Status: Current Every Day Smoker  Smokeless Tobacco: Never  Alcohol Use: No  Marital Status:  Legally      Review of Systems   Constitutional: Negative for fever.   Genitourinary: Negative for frequency and urgency.        Negative incontinence   Musculoskeletal: Positive for neck pain.   Neurological: Positive for weakness and numbness.   All other systems reviewed and are negative.    Physical Exam     Patient Vitals for the past 24 hrs:   BP Temp Temp src Pulse Resp SpO2 Height Weight   04/01/18 0759 (!) 162/100 97.4  F (36.3  C) Oral 87 16 97 % 1.88 m (6' 2\") 90.7 kg (200 lb)       Physical Exam  Constitutional: White male supine  HENT: No signs of trauma.    Eyes: EOM are normal. Pupils are equal, round, and reactive to light.   Neck: Point tenderness in upper cervical spine.  Normal range of motion. No JVD present. No cervical adenopathy.  Cardiovascular: Regular rhythm.  Exam reveals no gallop and no friction rub.    No murmur heard.  Pulmonary/Chest: Bilateral breath sounds normal. No wheezes, rhonchi or rales.  Abdominal: Soft. No tenderness. No rebound or guarding.   Musculoskeletal: No edema. No tenderness.   Lymphadenopathy: No lymphadenopathy.   Neurological: Alert and oriented to person, place, and time. Normal strength. Coordination normal. Fluent speech. No facial asymmetry. Normal sensation. Reflexes symmetric.  Skin: Skin is warm and dry. No rash noted. No erythema.     Emergency Department Course   Interventions:  0817 oxycodone 10 mg PO  0817 prednisone 40 mg PO  0817 Valium 5 mg PO    Emergency Department Course:  Nursing notes and vitals reviewed.  0805: I performed an exam of the patient as documented above.   Findings and plan explained to the Patient and fmaily. Patient discharged home with instructions regarding " supportive care, medications, and reasons to return. The importance of close follow-up was reviewed. The patient was prescribed Valium and Medrol Dosepak.  I personally reviewed answered all related questions with the patient and family prior to discharge.    Impression & Plan    Medical Decision Making:  Naif Howell is a 53 year old male with an exacerbation of his chronic neck pain. He states that over the last three days it has quite a bit worse. He has some tingling in his fingers but no focal numbness or weakness. No bladder or bowel problems. He has had no fevers or shaking chills. He denies any new injury. He just saw his neurologist a few days ago. He has had previous CT scans and MRIs on his neck and is followed at the pain clinic. He tried his oxycodone earlier today and some ibuprofen yesterday without relief. On exam, there was localized point tenderness in his cervical spine. He neurologically intact. Heart and lung exam is also normal. Per pain protocol, patient can receive two pain pills at this time. We will also start him on Valium for spasms and prednisone for inflammation. I have recommended that he make a follow up with his neurologist or primary care doctor this week and use cold compresses. There is no sign of acute spinal cord injury, epidermal hematoma or abscess, or discitis.     Diagnosis:    ICD-10-CM    1. Acute exacerbation of chronic neck pain M54.2        Disposition:  discharged to home    Discharge Medications:  New Prescriptions    DIAZEPAM (VALIUM) 5 MG TABLET    Take 1 tablet (5 mg) by mouth every 6 hours as needed for anxiety or sleep (MUSCLE SPASM)    METHYLPREDNISOLONE (MEDROL DOSEPAK) 4 MG TABLET    Follow package instructions       Scribe Disclosure:  Ron FORRESTER, am serving as a scribe at 8:00 AM on 4/1/2018 to document services personally performed by Robert Heredia MD based on my observations and the provider's statements to me.     4/1/2018    EMERGENCY  DEPARTMENT       Robert Heredia MD  04/01/18 5579

## 2018-04-01 NOTE — DISCHARGE INSTRUCTIONS

## 2018-07-12 DIAGNOSIS — I10 BENIGN ESSENTIAL HYPERTENSION: ICD-10-CM

## 2018-07-12 RX ORDER — LOSARTAN POTASSIUM 50 MG/1
50 TABLET ORAL DAILY
Qty: 90 TABLET | Refills: 1 | Status: SHIPPED | OUTPATIENT
Start: 2018-07-12 | End: 2019-01-25

## 2018-07-12 NOTE — TELEPHONE ENCOUNTER
Routing refill request to provider for review/approval because:  Labs out of range:  BP  These were taken in the ER   Do you want a BP only check or OV with you?    Kenisha Goodwin RN, BSN

## 2018-07-12 NOTE — TELEPHONE ENCOUNTER
"Requested Prescriptions   Pending Prescriptions Disp Refills     losartan (COZAAR) 50 MG tablet 90 tablet 1    Last Written Prescription Date:  01/02/2018  Last Fill Quantity: 90 tablet,  # refills: 1   Last office visit: 2/19/2018 with prescribing provider:  02/19/2018   Future Office Visit:     Sig: Take 1 tablet (50 mg) by mouth daily    Angiotensin-II Receptors Failed    7/12/2018  7:17 AM       Failed - Blood pressure under 140/90 in past 12 months    BP Readings from Last 3 Encounters:   04/01/18 (!) 162/100   02/26/18 144/86   02/22/18 (!) 144/97                Passed - Recent (12 mo) or future (30 days) visit within the authorizing provider's specialty    Patient had office visit in the last 12 months or has a visit in the next 30 days with authorizing provider or within the authorizing provider's specialty.  See \"Patient Info\" tab in inbasket, or \"Choose Columns\" in Meds & Orders section of the refill encounter.           Passed - Patient is age 18 or older       Passed - Normal serum creatinine on file in past 12 months    Recent Labs   Lab Test  02/26/18 1942   CR  0.78            Passed - Normal serum potassium on file in past 12 months    Recent Labs   Lab Test  02/26/18 1942   POTASSIUM  3.9                      Aj MANE  "

## 2018-12-07 NOTE — INTERVAL H&P NOTE
Patient, Rosangela Fragoso calling for medication refill. Medication(s) submitted for a refill request and is pending approval from the Provider.    Caller has been advised that their call does not guarantee an immediate refill. This refill will be reviewed within 24-72 hours by a qualified provider who will determine whether he or she can refill the medication.    Patient has contacted the pharmacy?  No    Call Back Number:   Telephone Information:   Mobile [phone]       Can a detailed message be left? Yes_No_---: Yes    Additional information:     Patient has 3 days left of medication. Please advise.     Patient’s preferred pharmacy has been noted and populated.       [facility] [address] [place] [id number]  Phone: [phone] Fax: [phone]       This note is for the purpose of making the H&P performed in clinic within the last 30 days available in the hospital surgical encounter.

## 2018-12-21 ENCOUNTER — APPOINTMENT (OUTPATIENT)
Dept: GENERAL RADIOLOGY | Facility: CLINIC | Age: 53
End: 2018-12-21
Attending: EMERGENCY MEDICINE
Payer: OTHER GOVERNMENT

## 2018-12-21 ENCOUNTER — HOSPITAL ENCOUNTER (EMERGENCY)
Facility: CLINIC | Age: 53
Discharge: HOME | End: 2018-12-21
Attending: EMERGENCY MEDICINE | Admitting: EMERGENCY MEDICINE
Payer: OTHER GOVERNMENT

## 2018-12-21 VITALS
DIASTOLIC BLOOD PRESSURE: 100 MMHG | TEMPERATURE: 97.9 F | WEIGHT: 210 LBS | HEART RATE: 92 BPM | BODY MASS INDEX: 26.95 KG/M2 | HEIGHT: 74 IN | RESPIRATION RATE: 18 BRPM | SYSTOLIC BLOOD PRESSURE: 152 MMHG | OXYGEN SATURATION: 95 %

## 2018-12-21 DIAGNOSIS — M54.6 ACUTE RIGHT-SIDED THORACIC BACK PAIN: ICD-10-CM

## 2018-12-21 LAB
ALBUMIN SERPL-MCNC: 3.4 G/DL (ref 3.4–5)
ALBUMIN UR-MCNC: 30 MG/DL
ALP SERPL-CCNC: 155 U/L (ref 40–150)
ALT SERPL W P-5'-P-CCNC: 44 U/L (ref 0–70)
ANION GAP SERPL CALCULATED.3IONS-SCNC: 7 MMOL/L (ref 3–14)
APPEARANCE UR: CLEAR
AST SERPL W P-5'-P-CCNC: 51 U/L (ref 0–45)
BACTERIA #/AREA URNS HPF: ABNORMAL /HPF
BASOPHILS # BLD AUTO: 0.1 10E9/L (ref 0–0.2)
BASOPHILS NFR BLD AUTO: 0.6 %
BILIRUB SERPL-MCNC: 0.3 MG/DL (ref 0.2–1.3)
BILIRUB UR QL STRIP: NEGATIVE
BUN SERPL-MCNC: 14 MG/DL (ref 7–30)
CALCIUM SERPL-MCNC: 8.3 MG/DL (ref 8.5–10.1)
CHLORIDE SERPL-SCNC: 107 MMOL/L (ref 94–109)
CO2 SERPL-SCNC: 24 MMOL/L (ref 20–32)
COLOR UR AUTO: YELLOW
CREAT SERPL-MCNC: 0.82 MG/DL (ref 0.66–1.25)
D DIMER PPP FEU-MCNC: 0.4 UG/ML FEU (ref 0–0.5)
DIFFERENTIAL METHOD BLD: ABNORMAL
EOSINOPHIL # BLD AUTO: 0.2 10E9/L (ref 0–0.7)
EOSINOPHIL NFR BLD AUTO: 2.6 %
ERYTHROCYTE [DISTWIDTH] IN BLOOD BY AUTOMATED COUNT: 12.5 % (ref 10–15)
GFR SERPL CREATININE-BSD FRML MDRD: >90 ML/MIN/{1.73_M2}
GLUCOSE SERPL-MCNC: 107 MG/DL (ref 70–99)
GLUCOSE UR STRIP-MCNC: NEGATIVE MG/DL
HCT VFR BLD AUTO: 50.3 % (ref 40–53)
HGB BLD-MCNC: 17.1 G/DL (ref 13.3–17.7)
HGB UR QL STRIP: NEGATIVE
IMM GRANULOCYTES # BLD: 0 10E9/L (ref 0–0.4)
IMM GRANULOCYTES NFR BLD: 0.3 %
KETONES UR STRIP-MCNC: NEGATIVE MG/DL
LEUKOCYTE ESTERASE UR QL STRIP: ABNORMAL
LYMPHOCYTES # BLD AUTO: 2.8 10E9/L (ref 0.8–5.3)
LYMPHOCYTES NFR BLD AUTO: 30.4 %
MCH RBC QN AUTO: 28.7 PG (ref 26.5–33)
MCHC RBC AUTO-ENTMCNC: 34 G/DL (ref 31.5–36.5)
MCV RBC AUTO: 85 FL (ref 78–100)
MONOCYTES # BLD AUTO: 0.6 10E9/L (ref 0–1.3)
MONOCYTES NFR BLD AUTO: 6.8 %
MUCOUS THREADS #/AREA URNS LPF: PRESENT /LPF
NEUTROPHILS # BLD AUTO: 5.5 10E9/L (ref 1.6–8.3)
NEUTROPHILS NFR BLD AUTO: 59.3 %
NITRATE UR QL: NEGATIVE
NRBC # BLD AUTO: 0 10*3/UL
NRBC BLD AUTO-RTO: 0 /100
PH UR STRIP: 6 PH (ref 5–7)
PLATELET # BLD AUTO: 254 10E9/L (ref 150–450)
POTASSIUM SERPL-SCNC: 3.9 MMOL/L (ref 3.4–5.3)
PROT SERPL-MCNC: 6.8 G/DL (ref 6.8–8.8)
RBC # BLD AUTO: 5.95 10E12/L (ref 4.4–5.9)
RBC #/AREA URNS AUTO: 2 /HPF (ref 0–2)
SODIUM SERPL-SCNC: 138 MMOL/L (ref 133–144)
SOURCE: ABNORMAL
SP GR UR STRIP: 1.02 (ref 1–1.03)
TROPONIN I SERPL-MCNC: <0.015 UG/L (ref 0–0.04)
UROBILINOGEN UR STRIP-MCNC: 0.2 MG/DL (ref 0–2)
WBC # BLD AUTO: 9.3 10E9/L (ref 4–11)
WBC #/AREA URNS AUTO: 8 /HPF (ref 0–5)

## 2018-12-21 PROCEDURE — 96372 THER/PROPH/DIAG INJ SC/IM: CPT

## 2018-12-21 PROCEDURE — 80053 COMPREHEN METABOLIC PANEL: CPT | Performed by: EMERGENCY MEDICINE

## 2018-12-21 PROCEDURE — 85025 COMPLETE CBC W/AUTO DIFF WBC: CPT | Performed by: EMERGENCY MEDICINE

## 2018-12-21 PROCEDURE — 25000125 ZZHC RX 250: Performed by: EMERGENCY MEDICINE

## 2018-12-21 PROCEDURE — 81001 URINALYSIS AUTO W/SCOPE: CPT | Performed by: EMERGENCY MEDICINE

## 2018-12-21 PROCEDURE — 84484 ASSAY OF TROPONIN QUANT: CPT | Performed by: EMERGENCY MEDICINE

## 2018-12-21 PROCEDURE — 25000128 H RX IP 250 OP 636: Performed by: EMERGENCY MEDICINE

## 2018-12-21 PROCEDURE — 71046 X-RAY EXAM CHEST 2 VIEWS: CPT

## 2018-12-21 PROCEDURE — 99285 EMERGENCY DEPT VISIT HI MDM: CPT | Mod: 25

## 2018-12-21 PROCEDURE — 85379 FIBRIN DEGRADATION QUANT: CPT | Performed by: EMERGENCY MEDICINE

## 2018-12-21 PROCEDURE — 25000132 ZZH RX MED GY IP 250 OP 250 PS 637: Performed by: EMERGENCY MEDICINE

## 2018-12-21 RX ORDER — PREDNISONE 20 MG/1
40 TABLET ORAL ONCE
Status: COMPLETED | OUTPATIENT
Start: 2018-12-21 | End: 2018-12-21

## 2018-12-21 RX ORDER — METHYLPREDNISOLONE 4 MG
TABLET, DOSE PACK ORAL
Qty: 21 TABLET | Refills: 0 | Status: SHIPPED | OUTPATIENT
Start: 2018-12-21 | End: 2019-01-04

## 2018-12-21 RX ORDER — OXYCODONE HYDROCHLORIDE 5 MG/1
5 TABLET ORAL EVERY 6 HOURS PRN
Qty: 14 TABLET | Refills: 0 | Status: SHIPPED | OUTPATIENT
Start: 2018-12-21 | End: 2019-02-07

## 2018-12-21 RX ORDER — OXYCODONE HYDROCHLORIDE 5 MG/1
10 TABLET ORAL ONCE
Status: COMPLETED | OUTPATIENT
Start: 2018-12-21 | End: 2018-12-21

## 2018-12-21 RX ORDER — CYCLOBENZAPRINE HCL 10 MG
10 TABLET ORAL ONCE
Status: COMPLETED | OUTPATIENT
Start: 2018-12-21 | End: 2018-12-21

## 2018-12-21 RX ORDER — CYCLOBENZAPRINE HCL 10 MG
10 TABLET ORAL 3 TIMES DAILY PRN
Qty: 15 TABLET | Refills: 0 | Status: SHIPPED | OUTPATIENT
Start: 2018-12-21 | End: 2019-02-07

## 2018-12-21 RX ADMIN — HYDROMORPHONE HYDROCHLORIDE 1 MG: 1 INJECTION, SOLUTION INTRAMUSCULAR; INTRAVENOUS; SUBCUTANEOUS at 17:19

## 2018-12-21 RX ADMIN — OXYCODONE HYDROCHLORIDE 10 MG: 5 TABLET ORAL at 18:27

## 2018-12-21 RX ADMIN — PREDNISONE 40 MG: 20 TABLET ORAL at 17:15

## 2018-12-21 RX ADMIN — CYCLOBENZAPRINE HYDROCHLORIDE 10 MG: 10 TABLET, FILM COATED ORAL at 17:16

## 2018-12-21 ASSESSMENT — ENCOUNTER SYMPTOMS
BACK PAIN: 1
NUMBNESS: 1

## 2018-12-21 ASSESSMENT — MIFFLIN-ST. JEOR: SCORE: 1857.3

## 2018-12-21 NOTE — ED AVS SNAPSHOT
[facility]  [address]  [facility] [address]  Phone:  [phone]  Fax:  [phone]                                    Naif Howell Jr.   MRN: [id number]    Department:  [facility]   Date of Visit:  12/21/2018           After Visit Summary Signature Page    I have received my discharge instructions, and my questions have been answered. I have discussed any challenges I see with this plan with the nurse or doctor.    ..........................................................................................................................................  Patient/Patient Representative Signature      ..........................................................................................................................................  Patient Representative Print Name and Relationship to Patient    ..................................................               ................................................  Date                                   Time    ..........................................................................................................................................  Reviewed by Signature/Title    ...................................................              ..............................................  Date                                               Time          22EPIC Rev 08/18

## 2018-12-21 NOTE — ED PROVIDER NOTES
History     Chief Complaint:  Back Pain      HPI   Naif Howell Jr. is a 53 year old male with a history of hyperlipidemia, hypertension, chronic neck pain, and chronic thoracic back pain who presents to the ED for evaluation of back pain. The patient reports that he was sitting at the computer this afternoon, when he suddenly experienced the onset of right upper back pain inferior to his scapula. This reportedly radiates toward his spine, chest, neck, and teeth. The patient describes this as a sharp pain that is intermittent in nature. He does note a history of chronic neck and back problems, although he denies that his usual pain is sharp. He took ibuprofen without relief of his discomfort and presented to the ED for evaluation. Here, the patient endorses continued pain, which is reportedly better with lying down. He also notes some numbness down his right arm, although he believes this is secondary to his chronic neck problems.     Allergies:  Cefuroxime  Flagyl [Metronidazole]     Medications:    Losartan  Oxycodone  Simvastatin  Nortriptyline  Methylprednisolone  Viagra     Past Medical History:    Anxiety  Aortic regurgitation  Basal cell carcinoma  Chronic neck pain  GERD  DDD, cervical  Migraines  PTSD  SBO  Aortic root dilatation  Hyperlipidemia  Hypertension  Chronic thoracic back pain  Ventral hernia    Past Surgical History:    Incisional herniorrhaphy  Laparoscopic cholecystectomy   Laparoscopic lysis adhesions  Diagnostic laparoscopy  Small bowel resection without ostomy  Resection of basal cell carcinoma    Family History:    Hypertension  Cancer    Social History:  Current every day smoker  Negative for alcohol use.   Marital Status:  Legally  [3]     Review of Systems   Constitutional: Negative for fever.   Genitourinary: Negative for difficulty urinating.        No incontinence urine/stool.    Musculoskeletal: Positive for back pain.   Neurological: Positive for numbness.   All other  "systems reviewed and are negative.      Physical Exam     Patient Vitals for the past 24 hrs:   BP Temp Temp src Pulse Resp SpO2 Height Weight   12/21/18 2110 -- -- -- -- -- 95 % -- --   12/21/18 2100 (!) 152/100 -- -- -- -- -- -- --   12/21/18 2030 (!) 146/101 -- -- 92 -- -- -- --   12/21/18 2020 -- -- -- -- -- 95 % -- --   12/21/18 1910 -- -- -- -- -- 92 % -- --   12/21/18 1900 (!) 147/99 -- -- 86 -- 92 % -- --   12/21/18 1850 -- -- -- -- -- 97 % -- --   12/21/18 1800 (!) 179/108 -- -- 96 -- 96 % -- --   12/21/18 1730 (!) 148/95 -- -- 92 -- 97 % -- --   12/21/18 1620 (!) 170/114 97.9  F (36.6  C) Oral 109 18 99 % -- --   12/21/18 1619 -- -- -- -- -- -- 1.88 m (6' 2\") 95.3 kg (210 lb)        Physical Exam  Nursing note and vitals reviewed.  Constitutional: Cooperative.   HENT:   Mouth/Throat: Moist mucous membranes.   Eyes: EOMI, nonicteric sclera  Cardiovascular: Normal rate, regular rhythm, no murmurs, rubs, or gallops  Pulmonary/Chest: Effort normal and breath sounds normal. No respiratory distress. No wheezes. No rales.   Abdominal: Soft. Nontender, nondistended, no guarding or rigidity. BS present.   Musculoskeletal: Normal range of motion. Pain to palpation with palpable muscle spasm medial border of right scapula.    Neurological: Alert. Moves all extremities spontaneously.   Sensation intact & equal in median, ulnar, and radial nerve distributions bilaterally.  Pt able to perform 'OK' sign, thumb/little finger opposition, cross finger adduction, finger abduction, 3rd finger extension, thumb flexion/extension.   Skin: Skin is warm and dry. No rash noted.   Psychiatric: Normal mood and affect.      Emergency Department Course   Imaging:  Radiographic findings were communicated with the patient who voiced understanding of the findings.  XR Chest PA & LAT:   No convincing infectious infiltrates, as per radiology.     Imaging independently reviewed and agree with radiologist interpretation.  "     Laboratory:  CBC: WBC: 9.3, HGB: 17.1, PLT: 254  CMP: Glucose 107 (H), Calcium 8.3 (L), AST 51 (H), AlkPhos 155 (H), o/w WNL (Creatinine: 0.82)    1853 Troponin: <0.015      D dimer: 0.4    UA with Microscopic: Leukocyte esterase trace (A), Bacteria few (A), Mucous present (A), WBC 8 (H), Protein albumin 30 (A), o/w WNL     Interventions:  1715 Deltasone 40 mg PO  1716 Flexeril 10 mg PO  1719 Dilaudid, 1 mg IM  1827 Oxycodone 10 mg PO    Emergency Department Course:  Nursing notes and vitals reviewed. (1659) I performed an exam of the patient as documented above.     Medicine administered as documented above.    (1758) I rechecked the patient and discussed the results of his workup thus far.     (1856) The patient became flushed and reported the onset of abdominal pain after being given oxycodone. An IV was inserted and blood drawn. This was sent to the lab for further testing, results above.    The patient provided a urine sample here in the emergency department. This was sent for laboratory testing, findings above.      (1903) I reevaluated the patient and provided an update in regards to his ED course.      The patient was sent for a chest x-ray while in the emergency department, findings above.     (2126) I reevaluated the patient and provided an update in regards to his ED course.       Findings and plan explained to the Patient. Patient discharged home with instructions regarding supportive care, medications, and reasons to return. The importance of close follow-up was reviewed. The patient was prescribed Flexeril, Norco, and methylprednisolone.    I personally reviewed the laboratory results with the Patient and answered all related questions prior to discharge.      Impression & Plan    Medical Decision Making:  Pt presents with right-sided thoracic back pain. While musculoskeletal etiology was most likely based on history and exam, considered broad differential including ACS, PE, cervical radiculopathy,  among other etiologies. Workup negative for emergent causes. Pain is improved after intervention.  reviewed and is without concerning patterns. Rx narcotics for home based on this despite care plan which appears to be dated from several years in the past. He is in stable condition at the time of discharge, indications for return to the ED were discussed as well as follow up. All questions were answered and he is in agreement with the plan.      Diagnosis:    ICD-10-CM    1. Acute right-sided thoracic back pain M54.6 CBC with platelets differential     Comprehensive metabolic panel     Troponin I     UA with Microscopic reflex to Culture     D dimer quantitative       Disposition:  discharged to home    Discharge Medications:     Medication List      Started    cyclobenzaprine 10 MG tablet  Commonly known as:  FLEXERIL  10 mg, Oral, 3 TIMES DAILY PRN        Modified    methylPREDNISolone 4 MG tablet therapy pack  Commonly known as:  MEDROL DOSEPAK  Follow Package Directions  What changed:  additional instructions     oxyCODONE 5 MG tablet  Commonly known as:  ROXICODONE  5 mg, Oral, EVERY 6 HOURS PRN  What changed:      when to take this    Another medication with the same name was removed. Continue taking this medication, and follow the directions you see here.            Scribe Disclosure:  I, Keira Fritz, am serving as a scribe on 12/21/2018 at 4:59 PM to personally document services performed by Kody Ryder MD based on my observations and the provider's statements to me.      Keira Fritz  12/21/2018   [facility] EMERGENCY DEPARTMENT       Kody Ryder MD  12/24/18 0327

## 2018-12-22 NOTE — DISCHARGE INSTRUCTIONS
Discharge Instructions  Back Pain  You were seen today for back pain. Back pain can have many causes, but most will get better without surgery or other specific treatment. Sometimes there is a herniated (?slipped?) disc. We do not usually do MRI scans to look for these right away, since most herniated discs will get better on their own with time.  Today, we did not find any evidence that your back pain was caused by a serious condition. However, sometimes symptoms develop over time and cannot be found during an emergency visit, so it is very important that you follow up with your primary provider.  Generally, every Emergency Department visit should have a follow-up clinic visit with either a primary or a specialty clinic/provider. Please follow-up as instructed by your emergency provider today.    Return to the Emergency Department if:  You develop a fever with your back pain.   You have weakness or change in sensation in one or both legs.  You lose control of your bowels or bladder, or cannot empty your bladder (cannot pee).  Your pain gets much worse.     Follow-up with your provider:  Unless your pain has completely gone away, please make an appointment with your provider within one week. Most of the routine care for back pain is available in a clinic and not the Emergency Department. You may need further management of your back pain, such as more pain medication, imaging such as an X-ray or MRI, or physical therapy.    What can I do to help myself?  Remain Active -- People are often afraid that they will hurt their back further or delay recovery by remaining active, but this is one of the best things you can do for your back. In fact, staying in bed for a long time to rest is not recommended. Studies have shown that people with low back pain recover faster when they remain active. Movement helps to bring blood flow to the muscles and relieve muscle spasms as well as preventing loss of muscle strength.  Heat --  Using a heating pad can help with low back pain during the first few weeks. Do not sleep with a heating pad, as you can be burned.   Pain medications - You may take a pain medication such as Tylenol  (acetaminophen), Advil , Motrin  (ibuprofen) or Aleve  (naproxen).  If you were given a prescription for medicine here today, be sure to read all of the information (including the package insert) that comes with your prescription.  This will include important information about the medicine, its side effects, and any warnings that you need to know about.  The pharmacist who fills the prescription can provide more information and answer questions you may have about the medicine.  If you have questions or concerns that the pharmacist cannot address, please call or return to the Emergency Department.   Remember that you can always come back to the Emergency Department if you are not able to see your regular provider in the amount of time listed above, if you get any new symptoms, or if there is anything that worries you.

## 2018-12-24 ASSESSMENT — ENCOUNTER SYMPTOMS
FEVER: 0
DIFFICULTY URINATING: 0

## 2019-01-04 ENCOUNTER — OFFICE VISIT (OUTPATIENT)
Dept: FAMILY MEDICINE | Facility: CLINIC | Age: 54
End: 2019-01-04
Payer: OTHER GOVERNMENT

## 2019-01-04 ENCOUNTER — TELEPHONE (OUTPATIENT)
Dept: FAMILY MEDICINE | Facility: CLINIC | Age: 54
End: 2019-01-04

## 2019-01-04 VITALS
DIASTOLIC BLOOD PRESSURE: 80 MMHG | RESPIRATION RATE: 16 BRPM | TEMPERATURE: 97.9 F | SYSTOLIC BLOOD PRESSURE: 128 MMHG | HEIGHT: 74 IN | OXYGEN SATURATION: 99 % | BODY MASS INDEX: 28.85 KG/M2 | HEART RATE: 113 BPM | WEIGHT: 224.8 LBS

## 2019-01-04 DIAGNOSIS — H60.503 ACUTE OTITIS EXTERNA OF BOTH EARS, UNSPECIFIED TYPE: ICD-10-CM

## 2019-01-04 DIAGNOSIS — G89.29 CHRONIC MIDLINE THORACIC BACK PAIN: ICD-10-CM

## 2019-01-04 DIAGNOSIS — M54.6 CHRONIC MIDLINE THORACIC BACK PAIN: ICD-10-CM

## 2019-01-04 LAB
ALBUMIN UR-MCNC: NEGATIVE MG/DL
APPEARANCE UR: CLEAR
BILIRUB UR QL STRIP: NEGATIVE
COLOR UR AUTO: YELLOW
GLUCOSE UR STRIP-MCNC: NEGATIVE MG/DL
HGB UR QL STRIP: ABNORMAL
KETONES UR STRIP-MCNC: NEGATIVE MG/DL
LEUKOCYTE ESTERASE UR QL STRIP: NEGATIVE
NITRATE UR QL: NEGATIVE
PH UR STRIP: 5.5 PH (ref 5–7)
RBC #/AREA URNS AUTO: ABNORMAL /HPF
SOURCE: ABNORMAL
SP GR UR STRIP: 1.02 (ref 1–1.03)
UROBILINOGEN UR STRIP-ACNC: 0.2 EU/DL (ref 0.2–1)
WBC #/AREA URNS AUTO: ABNORMAL /HPF

## 2019-01-04 PROCEDURE — 81001 URINALYSIS AUTO W/SCOPE: CPT | Performed by: FAMILY MEDICINE

## 2019-01-04 PROCEDURE — 99214 OFFICE O/P EST MOD 30 MIN: CPT | Performed by: FAMILY MEDICINE

## 2019-01-04 RX ORDER — CYCLOBENZAPRINE HCL 10 MG
10 TABLET ORAL 3 TIMES DAILY PRN
COMMUNITY
Start: 2019-01-04 | End: 2019-05-06

## 2019-01-04 RX ORDER — OFLOXACIN 3 MG/ML
10 SOLUTION AURICULAR (OTIC) DAILY
Qty: 4 ML | Refills: 0 | Status: SHIPPED | OUTPATIENT
Start: 2019-01-04 | End: 2019-02-07

## 2019-01-04 RX ORDER — OXYCODONE HYDROCHLORIDE 5 MG/1
5 CAPSULE ORAL EVERY 6 HOURS PRN
Refills: 0 | COMMUNITY
End: 2019-02-07

## 2019-01-04 RX ORDER — NORTRIPTYLINE HCL 10 MG
30 CAPSULE ORAL DAILY
COMMUNITY
Start: 2019-01-04 | End: 2019-10-08

## 2019-01-04 ASSESSMENT — MIFFLIN-ST. JEOR: SCORE: 1924.44

## 2019-01-04 NOTE — TELEPHONE ENCOUNTER
LVM-please inform patient the message below.      Notes recorded by Tammy Ward MD on 1/4/2019 at 11:10 AM CST  Please call patient with his negative/normal Urinalysis result.  Thank you.      Shantell Gordon

## 2019-01-04 NOTE — PATIENT INSTRUCTIONS
Use Flexeril and Oxycodone sparingly for pain, as previously prescribed.    Follow-up with the Pain Clinic, as discussed at the time of your exam.    Follow-up if worsening or emergent symptoms, as discussed at the time of your exam.      Floxin otic solution, as prescribed    Recheck if continued ear pain after 48 hours.    Follow-up sooner if worsening symptoms, as discussed.

## 2019-01-04 NOTE — PROGRESS NOTES
SUBJECTIVE:   Naif Howell Jr. is a 53 year old male presenting with a chief complaint of   Chief Complaint   Patient presents with     ER F/U       He is an established patient of [facility].    HPI: The patient is a 53-year-old male, who was seen in the ER 12/21/18.  Note was reviewed in Epic.  Patient was diagnosed with acute right-sided thoracic back pain, discharged with a Medrol Dosepak and Flexeril.  Chest x-ray was within normal limits.   CMP showed borderline elevated Alkaline Phosphatase (155) and AST (51), with borderline low Calcium (8.3).  CMP and CBC were otherwise within normal limits, with negative Troponin and D-dimer studies.  Urinalysis was mildly positive, but a Urine Culture was not performed.  Patient was treated with Deltasone, Flexeril, Dilaudid, and Oxycodone during his ER visit.  He experienced abdominal pain after receiving the Oxycodone.    Chart review suggests the patient has a history of chronic midline thoracic back pain.  Patient states that his chronic back pain typically centers about his spine.  It was previously worse on the left, but it has recently been worse on the right.  Right thoracic back pain is currently 1-2/10 severity, noticeably improved post recent Medrol Dosepak treatment.  Patient notes occasional radicular symptoms involving his right arm, but no current radicular symptoms reported.  Occasional right hand paresthesias, but no current paresthesias reported.  No history of weakness. Recent neck pain and migraines are being evaluated by Neurology.  Patient denies fever, chills, nausea, or vomiting.  He has gained a few pounds in the recent past, which he relates to inactivity.  No chest pain, shortness of breath, wheezing, or orthopnea reported.  Patient still has a few Flexeril and Oxycodone left over from his recent ER visit, declining refills today.  He is not interested in Physical Therapy, as he states that it was not helpful in the past.  Patient states that he  is here to request a referral to the Pain Clinic in [place], [place], where he has received previous injections for his chronic back pain.    Patient is incidentally concerned about discomfort involving his ears, first noticed 4-5 days ago.  Possible drainage from the ears, with some pruritus noted.  No trauma or previous TM rupture noted.  No hearing concerns.  Mild nasal congestion, but no cough or other URI symptoms.  Patient states that he has required treatment for otitis externa in the past, with similar symptoms experienced in the past.    Review of Systems  No abdominal pain or bowel/bladder changes.  No incontinence.    Patient Active Problem List   Diagnosis     PTSD (post-traumatic stress disorder)     Aortic root dilatation (H)     GERD (gastroesophageal reflux disease)     Aortic regurgitation     Dyslipidemia     Benign essential hypertension     HTN, goal below 140/90     Chronic midline thoracic back pain     Vestibular migraine     SBO (small bowel obstruction) (H)     Health Care Home     Tobacco use disorder     Ventral hernia without obstruction or gangrene     Past Medical History:   Diagnosis Date     Anxiety     resolved     Aortic regurgitation      Back pain      Basal cell carcinoma     left forehead     Chronic neck pain      Degenerative disc disease, cervical     See MRI - 2017     GERD (gastroesophageal reflux disease)      Hypertension      Migraines      PTSD (post-traumatic stress disorder)      SBO (small bowel obstruction) (H)      Vestibular migraine      Allergies   Allergen Reactions     Cefdinir Diarrhea     Cefuroxime Rash     Flagyl [Metronidazole] Swelling and Rash     Mild throat swelling     Family History   Problem Relation Age of Onset     Hypertension Mother         alive     Cancer Father         chest tumor-not sure about details,  at age of 71     Family History Negative Sister         2     Hypertension Sister      Family History Negative Brother          "1     Family History Negative Maternal Grandmother      Hypertension Maternal Grandmother      Family History Negative Maternal Grandfather      Family History Negative Paternal Grandmother      Family History Negative Paternal Grandfather      Cancer - colorectal No family hx of      Prostate Cancer No family hx of      Glaucoma No family hx of      Macular Degeneration No family hx of      Current Outpatient Medications   Medication Sig Dispense Refill     cyclobenzaprine (FLEXERIL) 10 MG tablet Take 1 tablet (10 mg) by mouth 3 times daily as needed for muscle spasms Do not drive within 8 hours of taking this medication.       losartan (COZAAR) 50 MG tablet Take 1 tablet (50 mg) by mouth daily 90 tablet 1     multivitamin, therapeutic with minerals (THERA-VIT-M) TABS tablet Take 1 tablet by mouth daily       nortriptyline (PAMELOR) 10 MG capsule Take 3 capsules (30 mg) by mouth daily              oxyCODONE (OXY-IR) 5 MG capsule Take 1 capsule (5 mg) by mouth every 6 hours as needed for severe pain  0     sildenafil (REVATIO/VIAGRA) 20 MG tablet Take 1 tablet (20 mg) by mouth three times daily for pulmonary hypertension.  Never use with nitroglycerin, terazosin or doxazosin. 9 tablet 0     simvastatin (ZOCOR) 40 MG tablet Take 1 tablet (40 mg) by mouth At Bedtime 90 tablet 3     Social History     Tobacco Use     Smoking status: Current Every Day Smoker     Packs/day: 1.00     Types: Cigarettes     Smokeless tobacco: Never Used     Tobacco comment: trying   Substance Use Topics     Alcohol use: No       OBJECTIVE  /80 (BP Location: Right arm, Patient Position: Chair, Cuff Size: Adult Regular)   Pulse 113   Temp 97.9  F (36.6  C) (Oral)   Resp 16   Ht 1.88 m (6' 2\")   Wt 102 kg (224 lb 12.8 oz)   SpO2 99%   BMI 28.86 kg/m      Physical Exam    GENERAL APPEARANCE:  Awake, alert, and in no acute distress.  PSYCHIATRIC:  Pleasant, smiling affect.  No obvious depression or anxiety.  HEENT:  Sclera " anicteric.  No conjunctivitis.  PERRLA.  Extraocular movements are intact.  Bilateral TM's are within normal limits, but there is subtle narrowing noted involving the outer ear canals, with tenderness noted with palpation over the tragus and with retraction of the pinna bilaterally. There is a 1-2 mm hemorrhagic crust noted overlying the tragus on the left, but no acute bleeding.  No obvious nasal congestion.  No erythema, edema, or exudates of the oral mucosa or posterior pharynx.  Mucous membranes moist.  NECK:  Spontaneous full range of motion.  No thyromegaly or mass.  No lymphadenopathy.  Tenderness is noted involving the spinous processes, with muscle spasm noted involving the paraspinous muscles.  HEART:  Normal S1, S2.  Regular rate and rhythm.  No murmurs, rubs, or gallops.  LUNGS:  No respiratory distress.  No wheezes, rales, or rhonchi.  ABDOMEN:  Not distended.  Soft.  Not tender.  No mass.  BACK: Tenderness is noted involving the T4 through T8 spinous processes and right paraspinous muscles.  The remainder of the back is not tender to palpation.  No CVA tenderness noted.  Straight leg raise negative bilaterally.  EXTREMITIES:  Moves 4 extremities.   5/5 strength and 2/5 symmetric reflexes x4 extremities.  No edema or calf tenderness.  NEUROLOGIC:  Gait within normal limits.  No facial droop or acute neurologic deficits.    Labs:  Results for orders placed or performed in visit on 01/04/19 (from the past 24 hour(s))   UA with Microscopic reflex to Culture   Result Value Ref Range    Color Urine Yellow     Appearance Urine Clear     Glucose Urine Negative NEG^Negative mg/dL    Bilirubin Urine Negative NEG^Negative    Ketones Urine Negative NEG^Negative mg/dL    Specific Gravity Urine 1.020 1.003 - 1.035    pH Urine 5.5 5.0 - 7.0 pH    Protein Albumin Urine Negative NEG^Negative mg/dL    Urobilinogen Urine 0.2 0.2 - 1.0 EU/dL    Nitrite Urine Negative NEG^Negative    Blood Urine Trace (A) NEG^Negative     Leukocyte Esterase Urine Negative NEG^Negative    Source Midstream Urine     WBC Urine 0 - 5 OTO5^0 - 5 /HPF    RBC Urine O - 2 OTO2^O - 2 /HPF     ASSESSMENT:      ICD-10-CM    1. Chronic midline thoracic back pain M54.6 PAIN MANAGEMENT REFERRAL    G89.29 UA with Microscopic reflex to Culture   2. Acute otitis externa of both ears, unspecified type H60.503 ofloxacin (FLOXIN) 0.3 % otic solution      PLAN:    Patient Instructions     Use Flexeril and Oxycodone sparingly for pain, as previously prescribed.    Follow-up with the Pain Clinic, as discussed at the time of your exam.    Follow-up if worsening or emergent symptoms, as discussed at the time of your exam.      Floxin otic solution, as prescribed    Recheck if continued ear pain after 48 hours.    Follow-up sooner if worsening symptoms, as discussed.    Discussed risks and benefits of treatment strategies, as noted in the Assessment and Plan sections.    The patient was discharged ambulatory and in stable condition post discussion of follow up.     Tammy Ward MD  [facility]

## 2019-01-15 ENCOUNTER — TELEPHONE (OUTPATIENT)
Dept: FAMILY MEDICINE | Facility: CLINIC | Age: 54
End: 2019-01-15

## 2019-01-15 DIAGNOSIS — G89.29 CHRONIC MIDLINE THORACIC BACK PAIN: Primary | ICD-10-CM

## 2019-01-15 DIAGNOSIS — M54.6 CHRONIC MIDLINE THORACIC BACK PAIN: Primary | ICD-10-CM

## 2019-01-15 NOTE — TELEPHONE ENCOUNTER
[facility]    Pt will need to check with his insurance to see who is in his network    Kenisha Goodwin RN, BSN

## 2019-01-15 NOTE — TELEPHONE ENCOUNTER
Patient just found out that the [facility] doesn't take his insurance. Is there another clinic patient can go?    Shantell Gordon

## 2019-01-16 ENCOUNTER — TELEPHONE (OUTPATIENT)
Dept: PALLIATIVE MEDICINE | Facility: CLINIC | Age: 54
End: 2019-01-16

## 2019-01-17 ENCOUNTER — TRANSFERRED RECORDS (OUTPATIENT)
Dept: HEALTH INFORMATION MANAGEMENT | Facility: CLINIC | Age: 54
End: 2019-01-17

## 2019-01-22 ENCOUNTER — OFFICE VISIT (OUTPATIENT)
Dept: PALLIATIVE MEDICINE | Facility: CLINIC | Age: 54
End: 2019-01-22
Payer: OTHER GOVERNMENT

## 2019-01-22 VITALS
BODY MASS INDEX: 28.76 KG/M2 | WEIGHT: 224 LBS | HEART RATE: 100 BPM | OXYGEN SATURATION: 96 % | DIASTOLIC BLOOD PRESSURE: 103 MMHG | SYSTOLIC BLOOD PRESSURE: 170 MMHG

## 2019-01-22 DIAGNOSIS — M79.18 MYOFASCIAL PAIN SYNDROME, CERVICAL: Primary | ICD-10-CM

## 2019-01-22 PROCEDURE — 99215 OFFICE O/P EST HI 40 MIN: CPT | Performed by: PHYSICAL MEDICINE & REHABILITATION

## 2019-01-22 ASSESSMENT — PAIN SCALES - GENERAL: PAINLEVEL: MILD PAIN (2)

## 2019-01-22 NOTE — PROGRESS NOTES
[facility] Consultation    Date of visit: 1/22/2019    Reason for consultation:    Naif Howell Jr. is a 53 year old male who is seen in consultation today at the request of his primary care physician, Josefina Rogers.     Consultation and Evaluation for: neck pain    Please see the [facility] health questionnaire which the patient completed and reviewed with me in detail.    Review of [place] Prescription Monitoring Program (): No concern for abuse or misuse of controlled medications based on this report.     Pain medications are being prescribed by NA.     Naif Howell Jr. has been seen at a pain clinic in the past.  He was seen at [facility] in 2016 by Dr. Mckeon.     Chief Complaint:    Chief Complaint   Patient presents with     Pain     Pain history:  Naif Howell Jr. is a 53 year old male who presents for initial evaluation of chief pain complaint of neck pain.     Patient reports having problems with neck, mid back and low back pain. However, at this time his neck pain is the most severe so would like to address this during today's visit. He has a history of vestibular migraines and follows with neurology for management of those. He has been seen in our clinic in the past by Dr. Mckeon for neck pain. He underwent several injections including cervical epidurals which provided only about 1 week of benefit and cervical RFA which he is unsure if it provided benefit. Currently, he states that his pain is more superior than what it was at that time. Pain has been worse over the past 3-4 months. Pain is located bilaterally. It is sharp in quality and sometimes radiates anteriorly onto the head. It is constant but varies in intensity. It is aggravated by placing pressure over the area, standing and too much sitting. It is somewhat relieved with sleep and medication. He also has paresthesias into bilateral upper extremities which are  "in the ulnar distribution. The paraesthesias are worse with bending his arms and improved when he straightens them.  Severity/Intensity: 2/10 at best, 8/10 at worst, 2-3/10 on average.     The patient otherwise denies bowel or bladder incontinence, weakness, saddle anesthesia, unintentional weight loss, or fever/chills/sweats.     Pain Treatments:  (H--helped; HI--Helped initially; [facility]--Somewhat helpful; NH--No help; W--worse; SE--side effects; ?--Unsure if helpful)   1. Medications:       Current pain medications:   Cyclobenzaprine 10 mg TID prn - [facility]   Nortriptyline 30 mg at bedtime - H for sleep   Lidocaine patches - NH     Current calculated MME: 0    1. Previous Pain Relevant Medications:  NOTE: This medication information taken from patient's intake form, not medical records.    Opiates: oxycodone   NSAIDS: ibuprofen    Muscle Relaxants: Valium, methocarbamol   Anti-migraine mediations: none   Anti-depressants: none   Sleep aids:none   Anxiolytics: none   Neuropathics: Topamax, gabapentin    Topicals: none   Other medications not covered above: none    2. Physical Therapy: Has tried for back and neck 3 separate times.   3. Pain Psychology: None  4. Surgery: None  5. Injections:   - Right C3-4 and C4-5 facet joint injections on 2/7/2018   - C7-T1 ILESI on 1/3/2018, 3/21/2017, 5/10/16  - right TON, C3,4 RFA on 3/21/2017  - T5-6 ILESI on 7/12/16 - no relief    6. Chiropractic: no  7. Acupuncture: no  8. TENS Unit: no  9. Other: heating pad and ice packs    Diagnostic tests:  MRI of Cervical Spine was completed on 12/8/2017 was reviewed with the patient and shows:  \"FINDINGS: There is normal alignment of the cervical vertebrae;  however, there is straightening of normal cervical lordosis. Vertebral  body heights of the cervical spine are normal. Marrow signal  throughout the cervical vertebrae is within normal limits. There is no  evidence for fracture or pathologic bony lesion of the cervical spine.        There " is loss of disc height, disc desiccation and posterior disc  bulging to varying degrees at all levels of the cervical spine with  exception of the normal appearing C7-T1 disc.      The cervical spinal cord is mildly deformed by degenerative changes at  the C5-C6 level. The cervical spinal cord is otherwise normal in  contour. There is no abnormal signal in the cervical spinal cord.  There is no evidence for intrathecal abnormality.     Level by level:     C2-C3: There is facet arthropathy bilaterally, uncinate hypertrophy  bilaterally and a posterior broad-based disc-osteophyte complex. These  findings result in moderate right neural foraminal stenosis but no  spinal canal or left foraminal stenosis. No change from the comparison  study.     C3-C4: There is facet arthropathy bilaterally, uncinate hypertrophy  bilaterally and a posterior broad-based disc-osteophyte complex. These  findings result in severe right foraminal stenosis, mild left  foraminal narrowing and minimal spinal canal narrowing. No change from  the comparison study.     C4-C5: There is facet arthropathy bilaterally, uncinate hypertrophy  bilaterally and a posterior broad-based disc-osteophyte complex. There  is no spinal canal or neural foraminal stenosis at this level. No  change from the comparison study.     C5-C6: There is facet arthropathy bilaterally, uncinate hypertrophy  bilaterally and a posterior broad-based disc-osteophyte complex with a  superimposed small right paracentral disc herniation (protrusion).  These findings result in moderate-severe right foraminal stenosis and  mild spinal canal narrowing but no left foraminal stenosis. No change  from the comparison study.     C6-C7: There is facet arthropathy bilaterally, uncinate hypertrophy  bilaterally and a posterior broad-based disc-osteophyte complex. These  findings result in mild bilateral neural foraminal narrowing but no  spinal canal stenosis. No change from the comparison  "study.     C7-T1: There is facet arthropathy and uncinate arthropathy  bilaterally. These findings result in mild-moderate left foraminal  narrowing and mild right foraminal narrowing but no spinal canal  stenosis. No change from the comparison study.                                                                      IMPRESSION: Degenerative changes of the cervical spine as described  Above.\"     MRI of Thoracic Spine was completed on 6/10/2016 which showed:  \"FINDINGS: Vertebral body alignment is normal. No fracture is seen.  There is a just under 2 cm benign-appearing hemangioma within the T5  vertebral body. There is a similar-appearing less than 1 cm diameter  hemangioma within the T10 vertebral body and a 1 cm hemangioma in the  T12 vertebral body. No other abnormal marrow signal intensity is  identified. No spinal cord or intrathecal abnormality is seen. The  adjacent soft tissues are unremarkable.     The discs of the thoracic spine are well hydrated and their heights  are well-preserved. No disc bulge or herniation is seen and no  stenosis is identified. The facet joints are unremarkable.                                                                       IMPRESSION: Unremarkable MRI of the thoracic spine.\"        EMG/Testing:  None    Labs:   Creatinine 0.82  GFR > 90    Past Medical History:  Past Medical History:   Diagnosis Date     Anxiety     resolved     Aortic regurgitation      Back pain      Basal cell carcinoma     left forehead     Chronic neck pain      Degenerative disc disease, cervical     See MRI - 12/2017     GERD (gastroesophageal reflux disease)      Hypertension      Migraines      PTSD (post-traumatic stress disorder)      SBO (small bowel obstruction) (H)      Vestibular migraine        Past Surgical History:  Past Surgical History:   Procedure Laterality Date     HERNIORRHAPHY INCISIONAL (LOCATION) N/A 2/22/2018    Procedure: HERNIORRHAPHY INCISIONAL (LOCATION);  Incisional hernia " Repair with Mesh ;  Surgeon: Corey Vang MD;  Location: [facility] OR     LAPAROSCOPIC CHOLECYSTECTOMY WITH CHOLANGIOGRAMS N/A 5/12/2017    Procedure: LAPAROSCOPIC CHOLECYSTECTOMY WITH CHOLANGIOGRAMS;  LAPAROSCOPIC CHOLECYSTECTOMY WITH CHOLANGIOGRAMS ;  Surgeon: Corey Vang MD;  Location: [facility] OR     LAPAROSCOPIC LYSIS ADHESIONS N/A 5/16/2017    Procedure: LAPAROSCOPIC LYSIS ADHESIONS;;  Surgeon: Corey Vang MD;  Location: [facility] OR     LAPAROSCOPY DIAGNOSTIC (GENERAL) N/A 5/16/2017    Procedure: LAPAROSCOPY DIAGNOSTIC (GENERAL);  DIAGNOSTIC LAPAROSCOPY, LAPAROTOMY,  SMALL BOWEL RESECTION, LAPAROSCOPIC LYSIS OF ADHESIONS;  Surgeon: Corey Vang MD;  Location: [facility] OR     RESECT SMALL BOWEL WITHOUT OSTOMY N/A 5/16/2017    Procedure: RESECT SMALL BOWEL WITHOUT OSTOMY;;  Surgeon: Corey Vang MD;  Location: [facility] OR     Resection of basal cell carcinoma      Left forehead       Medications:  Current Outpatient Medications   Medication Sig Dispense Refill     cyclobenzaprine (FLEXERIL) 10 MG tablet Take 1 tablet (10 mg) by mouth 3 times daily as needed for muscle spasms Do not drive within 8 hours of taking this medication.       losartan (COZAAR) 50 MG tablet Take 1 tablet (50 mg) by mouth daily 90 tablet 1     multivitamin, therapeutic with minerals (THERA-VIT-M) TABS tablet Take 1 tablet by mouth daily       nortriptyline (PAMELOR) 10 MG capsule Take 3 capsules (30 mg) by mouth daily       oxyCODONE (OXY-IR) 5 MG capsule Take 1 capsule (5 mg) by mouth every 6 hours as needed for severe pain  0     sildenafil (REVATIO/VIAGRA) 20 MG tablet Take 1 tablet (20 mg) by mouth three times daily for pulmonary hypertension.  Never use with nitroglycerin, terazosin or doxazosin. 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   Tobacco use: smokes about 1 ppd. Trying to quit.   Drug use: None  Alcohol use: None  History of chemical dependency treatment: none  Mental health admissions: None    Family history:  Family History   Problem Relation Age of Onset     Hypertension Mother         alive     Cancer Father         chest tumor-not sure about details,  at age of 71     Family History Negative Sister         2     Hypertension Sister      Family History Negative Brother         1     Family History Negative Maternal Grandmother      Hypertension Maternal Grandmother      Family History Negative Maternal Grandfather      Family History Negative Paternal Grandmother      Family History Negative Paternal Grandfather      Cancer - colorectal No family hx of      Prostate Cancer No family hx of      Glaucoma No family hx of      Macular Degeneration No family hx of        Review of Systems:    POSTIVE IN BOLD  GENERAL: fever/chills, fatigue, general unwell feeling, weight gain/loss.  HEAD/EYES:  headache, dizziness, or vision changes.    EARS/NOSE/THROAT:  Nosebleeds, hearing loss, sinus infection, earache, tinnitus.  IMMUNE:  Allergies, cancer, immune deficiency, or infections.  SKIN:  Urticaria, rash, hives  HEME/Lymphatic:   anemia, easy bruising, easy bleeding.  RESPIRATORY:  cough, wheezing, or shortness of breath  CARDIOVASCULAR/Circulation:  Extremity edema, syncope, hypertension, tachycardia, or angina.  GASTROINTESTINAL:  abdominal pain, nausea/emesis, diarrhea, constipation,  hematochezia, or melena.  ENDOCRINE:  Diabetes, steroid use,  thyroid disease or osteoporosis.  MUSCULOSKELETAL: neck pain, back pain, arthralgia, arthritis, or gout.  GENITOURINARY:  frequency, urgency, dysuria, difficulty voiding, hematuria or incontinence.  NEUROLOGIC:  weakness, numbness, paresthesias, seizure,  tremor, stroke or memory loss.  PSYCHIATRIC:  depression, anxiety, stress, suicidal thoughts or mood swings.     Physical Exam:  Vitals:    01/22/19 1423 01/22/19 1432   BP: (!) 165/103 (!) 170/103   Pulse: 100    SpO2: 96%    Weight: 101.6 kg (224 lb)      Exam:  Constitutional: Well developed, well nourished, appears stated age.  HEENT: Head atraumatic, normocephalic. Eyes without conjunctival injection or jaundice. Oropharynx clear. Neck supple. No obvious neck masses.  Respiratory: Breathing unlabored on room air  Gastrointestinal:  Abdomen soft, non-tender, without guarding/rebound.  Skin: No rash, lesions, or petechiae of exposed skin.   Extremities: Peripheral pulses intact. No clubbing, cyanosis, or edema.  Psychiatric/mental status: Alert, without lethargy or stupor. Speech fluent. Appropriate affect. Mood normal. Able to follow commands without difficulty.     Musculoskeletal exam:  Gait/Station/Posture:   Normal stance, arm swing, and stride; no antalgia or Trendelenburg  Normal bulk and tone. Unremarkable spinal curvature. ASIS heights even.     Cervical spine:  Range of motion within normal limits   Myofascial tenderness: Significant tenderness in bilateral upper cervical paraspinals. Active trigger points palpated bilaterally.   No focal spinous process tenderness.   Spurling's negative bilaterally.      Shoulder exam:   No shoulder girdle wasting or scapular dyskinesis. No palmar muscle wasting. Shoulder range of motion within normal limits.     Tinel's positive at the elbow bilaterally.     Neurologic exam:  CN:  Cranial nerves 2-12 are grossly intact  Motor Strength:  5/5 symmetric UE and LE strength      Reflexes:     Biceps C5:     R:  2/4 L: 2/4   Brachioradialis  C6: R:  2/4 L: 2/4   Triceps C7:  R:  2/4 L: 2/4   Patella L4:  R:  2/4 L: 2/4   Achilles S1:  R:  2/4 L: 2/4    Sensory:  (upper and lower extremities):   Light touch: normal    Allodynia: absent    Hyperalgesia: absent      Assessment:  Naif Howell Jr. is a 53 year old male with a past medical history significant for vestibular migraine, SBO, GERD, aortic regurgitation, HTN, PTSD, anxiety, dylipidemia who presents with complaints of neck pain    1. Neck pain: Etiology of his pain is most consistent with myofascial pain. There are active trigger points palpated on exam which reproduces his symptoms. He has paresthesias into bilateral upper extremities in ulnar distribution. Most likely due to ulnar neuropathy. There is positive tinel's at the elbow bilaterally.     2. Hx of migraines: being managed by neurology    3. Chronic mid back and low back pain: not evaluated today.     Plan:  The following recommendations were given to the patient. Diagnosis, treatment options, risks, benefits, and alternatives were discussed, and all questions were answered. The patient expressed understanding of the plan for management.     I am recommending a multidisciplinary treatment plan to help this patient better manage his pain.  This includes:     1. Therapy: Order placed for PT to try dry needling of trigger points.   2. Clinical Health Pain Psychologist: Not at this time.   3. Diagnostic Studies: No new imaging needed.  4. Medication Management:   1. Continue cyclobenzaprine   2. Try OTC topicals such as bengay, icy/hot, lidocaine...  5. Further procedures recommended: Can consider trigger point injections in the future.  6. Other: An order was placed to start acupuncture  7. Follow up: 4-8 weeks in clinic  8. Patient to follow up with Primary Care provider regarding elevated blood pressure.    Review of Electronic Chart: Today I have also reviewed available medical information in the patient's medical record at [facility] ([facility]), including relevant provider notes, laboratory work, and imaging.     I spent 60 minutes of time face to face with the patient.  Greater than 50% of this time was spent in patient counseling and/or coordination of care  regarding principles of multidisciplinary care, medication management, and treatment options as discussed above.     Gail Berry MD  [facility]

## 2019-01-22 NOTE — PATIENT INSTRUCTIONS
Thank you for coming to [facility] for your care. It is my goal to partner with you to help you reach your optimal state of health.     You were seen today for your neck pain. As discussed during your visit these are the recommendations:    1. Medications:   - Continue using the muscle relaxants that you have.  - You can try other topicals which include bengay, icy hot ...  2. Therapies:  - An order was placed for PT to do dry needling.  3. Procedures:   - In the future we can consider trigger point injections.  4. Imaging/Diagnostic Studies: No new imaging at this time.  5. Other: An order was placed to start acupuncture.  6. Follow up in 4-8 weeks        ----------------------------------------------------------------  Clinic Number:  [phone]   Call this number with any questions about your care and for scheduling assistance. Calls are returned Monday through Friday between 8 AM and 4:30 PM. We usually get back to you within 2 business days depending on the issue/request.       Medication refills:    For non-narcotic medications, call your pharmacy directly to request a refill. The pharmacy will contact the [facility] for authorization. Please allow 3-4 days for these refills to be processed.     For narcotic refills, call the clinic number or send a [email or web address] message. Please contact us 7-10 days before your refill is due. The message MUST include the name of the specific medication(s) requested and how you would like to receive the prescription(s). The options are as follows:    Pain Clinic staff can mail the prescription to your pharmacy. Please tell us the name of the pharmacy.    You may pick the prescription up at the Pain Clinic (tell us the location) or during a clinic visit with your pain provider    Pain Clinic staff can deliver the prescription to the [facility] pharmacy in the clinic building. Please tell us the location.      We believe regular attendance is key to  your success in our program.    Any time you are unable to keep your appointment we ask that you call us at least 24 hours in advance to let us know. This will allow us to offer the appointment time to another patient.

## 2019-01-25 ENCOUNTER — THERAPY VISIT (OUTPATIENT)
Dept: PHYSICAL THERAPY | Facility: CLINIC | Age: 54
End: 2019-01-25
Payer: OTHER GOVERNMENT

## 2019-01-25 DIAGNOSIS — M54.2 NECK PAIN: ICD-10-CM

## 2019-01-25 DIAGNOSIS — I10 BENIGN ESSENTIAL HYPERTENSION: ICD-10-CM

## 2019-01-25 DIAGNOSIS — M79.18 MYOFASCIAL PAIN SYNDROME, CERVICAL: ICD-10-CM

## 2019-01-25 PROCEDURE — 97110 THERAPEUTIC EXERCISES: CPT | Mod: GP | Performed by: PHYSICAL THERAPIST

## 2019-01-25 PROCEDURE — 97161 PT EVAL LOW COMPLEX 20 MIN: CPT | Mod: GP | Performed by: PHYSICAL THERAPIST

## 2019-01-25 PROCEDURE — 99207 C STAT DRY NEEDLING - IAM: CPT | Mod: 25 | Performed by: PHYSICAL THERAPIST

## 2019-01-25 PROCEDURE — 97032 APPL MODALITY 1+ESTIM EA 15: CPT | Mod: GP | Performed by: PHYSICAL THERAPIST

## 2019-01-25 RX ORDER — LOSARTAN POTASSIUM 50 MG/1
50 TABLET ORAL DAILY
Qty: 30 TABLET | Refills: 0 | Status: SHIPPED | OUTPATIENT
Start: 2019-01-25 | End: 2019-02-07

## 2019-01-25 NOTE — TELEPHONE ENCOUNTER
Routing refill request to provider for review/approval because:  Labs out of range:  PT with very elevated BP and over due for OV    He has not been seen with a provider other than [facility] in over 1 year.   LOV with PCP 1/12/18     LM for call back needs OV - do you want to give limited refill?    BP Readings from Last 3 Encounters:   01/22/19 (!) 170/103   01/04/19 128/80   12/21/18 (!) 152/100       Shannan Ivy RN

## 2019-01-25 NOTE — TELEPHONE ENCOUNTER
"Requested Prescriptions   Pending Prescriptions Disp Refills     losartan (COZAAR) 50 MG tablet 90 tablet 1    Last Written Prescription Date:  07/12/2018  Last Fill Quantity: 90 tablet,  # refills: 1   Last office visit: 1/4/2019 with prescribing provider:  01/04/2019   Future Office Visit:   Next 5 appointments (look out 90 days)    Feb 25, 2019  1:00 PM CST  Return Visit with Gail Berry MD  [facility] ([facility]) [address] [address] [address]  [phone]          Sig: Take 1 tablet (50 mg) by mouth daily    Angiotensin-II Receptors Failed - 1/25/2019  1:49 PM       Failed - Blood pressure under 140/90 in past 12 months    BP Readings from Last 3 Encounters:   01/22/19 (!) 170/103   01/04/19 128/80   12/21/18 (!) 152/100                Passed - Recent (12 mo) or future (30 days) visit within the authorizing provider's specialty    Patient had office visit in the last 12 months or has a visit in the next 30 days with authorizing provider or within the authorizing provider's specialty.  See \"Patient Info\" tab in inbasket, or \"Choose Columns\" in Meds & Orders section of the refill encounter.             Passed - Medication is active on med list       Passed - Patient is age 18 or older       Passed - Normal serum creatinine on file in past 12 months    Recent Labs   Lab Test 12/21/18  1853  11/06/16  1842   CR 0.82   < >  --    CREAT  --   --  1.0    < > = values in this interval not displayed.            Passed - Normal serum potassium on file in past 12 months    Recent Labs   Lab Test 12/21/18  1853   POTASSIUM 3.9                    Aj SOLT  "

## 2019-01-25 NOTE — PROGRESS NOTES
[facility] Initial Evaluation  Naif Howell Jr. is a 53 year old  male referred to physical therapy by Dr. Berry for treatment of neck with Precautions/Restrictions/MD instructions eval and treat     Physical Therapy Initial Evaluation: Subjective History      Injury/Condition Details:  Presenting Complaint Neck pain   Onset Timing/Date Chronic pain for 2+ years, however, pain has become worse since November 2018   Mechanism Insidious onset without acute precipitating event      Symptom Behavior Details    Primary Pain Symptoms Location: Upper cervical pain with radiating pain anteriorly onto head  Quality: sharp, headaches   Frequency: Constant   Worst Pain 6/10   Best Pain 2/10   Symptom Provocators Sitting in recliner, sleeping   Symptom Relievers Position avoidance   Time of day dependent? Worse in the morning   Recent symptom change? None      Prior Testing/Intervention for current condition:  Prior Tests MRI of cervical spine December 2017 indicating multilevel degenerative changes C2-T1   Prior Treatment Injections C3-C4 and C4-C5 February 2018 with some benefit.       Lifestyle & General Medical History:  General Health Reported by Patient good   Employment    Usual physical activities  (within past year) Walking, stair climbing   Orthopaedic history None   Notable medical history Cancer, high blood pressure, migraines/headaches, smoking       HPI                    Objective:  Standing Alignment:    Cervical/Thoracic:  Forward head  Shoulder/UE:  Rounded shoulders                                  Cervical/Thoracic Evaluation    AROM:  AROM Cervical:    Flexion:            90%, moderate pain  Extension:       50%, moderate pain  Rotation:         Left: 75%, mild pain     Right: 75%, mild pain  Side Bend:      Left: 50%, mild pain     Right:  50%, mild pain      Headaches: cervical  Cervical Myotomes:  normal                        Cervical Palpation:    Tenderness present at  Left:    Upper Trap; Levator and Suboccipitals  Tenderness present at Right:    Upper Trap; Levator and Suboccipitals      Spinal Segmental Conclusions:    Level:  at C6, C7, T1, T2, T3, T4, T5 and T6                                                General     ROS    Assessment/Plan:    Patient is a 53 year old male with cervical complaints.    Patient has the following significant findings with corresponding treatment plan.                Diagnosis 1:  Neck pain  Pain -  manual therapy, splint/taping/bracing/orthotics, self management, education, directional preference exercise and home program  Decreased ROM/flexibility - manual therapy, therapeutic exercise, therapeutic activity and home program  Decreased joint mobility - manual therapy, therapeutic exercise, therapeutic activity and home program  Decreased strength - therapeutic exercise, therapeutic activities and home program  Decreased proprioception - neuro re-education, therapeutic activities and home program  Impaired muscle performance - neuro re-education and home program    Therapy Evaluation Codes:   1) History comprised of:   Personal factors that impact the plan of care:      None.    Comorbidity factors that impact the plan of care are:      Cancer, High blood pressure, Migraines/headaches and Smoking.     Medications impacting care: High blood pressure and Muscle relaxant.  2) Examination of Body Systems comprised of:   Body structures and functions that impact the plan of care:      Cervical spine.   Activity limitations that impact the plan of care are:      Bending, Driving, Dressing, Lifting, Sitting, Standing, Working and Sleeping.  3) Clinical presentation characteristics are:   Stable/Uncomplicated.  4) Decision-Making    Low complexity using standardized patient assessment instrument and/or measureable assessment of functional outcome.  Cumulative Therapy Evaluation is: Low complexity.    Previous and current functional limitations:  (See  Goal Flow Sheet for this information)    Short term and Long term goals: (See Goal Flow Sheet for this information)     Communication ability:  Patient appears to be able to clearly communicate and understand verbal and written communication and follow directions correctly.  Treatment Explanation - The following has been discussed with the patient:   RX ordered/plan of care  Anticipated outcomes  Possible risks and side effects  This patient would benefit from PT intervention to resume normal activities.   Rehab potential is good.    Frequency:  1 X week, once daily  Duration:  for 6 weeks  Discharge Plan:  Achieve all LTG.  Independent in home treatment program.  Reach maximal therapeutic benefit.    Please refer to the daily flowsheet for treatment today, total treatment time and time spent performing 1:1 timed codes.

## 2019-01-28 NOTE — PROGRESS NOTES
11/29/17 0747   Signing Clinician's Name / Credentials   Signing clinician's name / credentials Gato Navarro DPT   Session Number   Session Number DISCHARGE: Episode of care 10/4/17-11/29/17. Pt participated in 7 vestibular PT visits including evaluation to address Subjective dizziness and visual sensitivity/strain. Final visit 11/29/17 - plan to f/u with PCP and spine specialist, obtain home cervical traction unit, will call us if any further needs within 4 wks. Pt has not called to schedule appointments and is being discharged at this time. See below for details of final session.    Progress Note/Recertification   Recertification Due Date 03/19/18   Goal 1   Goal Identifier DHI   Goal Description Pt will report score <10/100 on the DHI for improved dizziness symptoms and return to improved daily activity (baseline 48/100)   Target Date 11/10/17   Goal 2   Goal Identifier Gait stability   Goal Description Pt will score 12/12 on the 4-item DGI and/or 22/24 or greater on the full DGI to indicate improved dynamic gait stability, tolerance with quick head turns while walking, and minimized risk for falls.   Target Date 11/10/17   Goal 3   Goal Identifier HEP   Goal Description Pt will be indep with continued HEP for management of visual/balance symptoms and improved QOL/tolerance with daily/work related tasks.   Target Date 11/10/17   Subjective Report   Subjective Report Naif reports he is having a good day today, but had a very bad day on Monday.  He noted his pain/visual strain/HA was worse after he did not sleep well Sunday evening, only tolerated work x 1 hr, then went home and slept nearly 6 hours, felt much better afterward.  He is unsure why his sleep pattern is being affected, but feels his lack of sleep is definitely playing a role in his symptoms.  Today, he feels well, pain rated 1/10.  He states he has a f/u with his PCP tomorrow and is planning to ask for a referral to a spine specialist.  He still  wants to pursue obtaining a home cervical traction unit for trial when he is having a flare of his symptoms.   Objective Measure 1   Objective Measure Palpation   Details TTP R sided cervical paraspinals, suboccipitals, and lower thoracic spine scapular muscles   Objective Measure 2   Objective Measure DVA   Details 2-3 line loss DVA at 2hz head movement (1-2 line loss normal)   Objective Measure 3   Objective Measure 4-item DGI   Details 10/12 on 4-item DGI = low fall risk   System Outcome Measures   Dizziness Handicap Inventory (score out of 100) A decrease in score by 17.18 or greater indicates a clinically significant change in symptoms. (pt forgot to fill out DHI upon leaving clinic)   Mechanical Traction   Skilled Intervention set up, ed on proper placement and use of controls   Patient Response tolerating well, per pt HA improves slightly with use but only lasts a short time, had increased symptoms upon sitting back up afterward today   Treatment Detail [facility] home cervical traction unit education/training and trial x 12', ed to utilize pump to attain 10-12# pull, release function prn for intermittent traction as feels comfortable.  Ed/cues for proper set up and angle of pull, as he feels lower angles (10-15 deg) feel better. Ed on likelihood of targeting upper cervical regions with lower angles.   Progress short term temporary improvement in HA symptoms with use of cervical traction, but poor/limited overall progress thus far   Therapeutic Procedure/exercise   Skilled Intervention monitoring symptoms, ed on HEP, cues/demo for technique   Patient Response voicing/demonstrating understanding, tolerating well today, fatigue in cervical spine with deep cervical flexor retraining exercises   Treatment Detail Ed pt on importance of incorporating regular low load aerobic exercise into his schedule (stationary cycle, TM, etc) to assist with improved sleep and blood flow benefits for healing/function.  Sci-fit  machine x 8' today, tolerated well, advised working at 3-5/10 intensity level x 20-30 min 5x/wk if able.  Reviewed/instructed in correct supine deep cervical flexor retraining chin tucks and chin tucks plus lift, cues/faciliation for technique.     Progress little progress thus far due to continual recurrence of HA/visual strain and pain, appears cervicogenic in nature but likely some component effected by lack of good sleep, will benefit from more regular aerobic exercise program.  Continued modifications with ergonomics, postural/body mechanics modifications with daily activity   Neuromuscular Re-education   Skilled Intervention DVA assessment, 4-item DGI, guarding for safety with gait challenges, ed on HEP   Patient Response challenged during gait with horiz head turns, feels he is listing to the L with gait skills following treatment today   Treatment Detail 4-item DGI completed, scored 10/12 indicating low risk for falls.  But pt had much difficulty with horiz head turns, had to stop completely, no overt LOB. Pt feels as if he is listing to the L with gait skills following treatment today.  Ed on findings, advised working on walking in quiet environments progressing to busier environments as tolerated. Worked on giat in hallways with slow horiz and vertical head turns today, SBA.  DVA assessment showed 2-3 line loss at 2 hz head movement.   Progress low fall risk per 10/12 4-item DGI assessment but still impaired significantly with horiz head turns during gait   Manual Therapy   Skilled Intervention supine suboccipital release 2x45'', supine cervical lateral glide assessment and gentle mobilizations, lateral cervical sidebend with rotation manual glides in supine, supine mid-thoracic thrust manipulation trial but pt tensed up and difficult to fully relax   Patient Response difficult for pt to fully relax with thoracic manipulation trial, TTP along R suboccipitals and cervical paraspinal musculature, increased  HA symptoms with palpation to these structures   Progress little progress, continued pain and TTP along suboccipitals and cervical musculature, appears cervicogenic component to pain and symptoms but not responding well to manual therapy techniques   Education   Learner Patient   Readiness Acceptance   Method Explanation;Demonstration   Response Verbalizes Understanding;Demonstrates Understanding   Education Comments HEP, home cervical traction unit, see above   Plan   Plan for next session plan to f/u with PCP and spine specialist, obtain home cervical traction unit, will call us if any further needs within 4 wks   Comments   Comments DISCHARGE   Total Session Time   Timed Code Treatment Minutes 30   Total Treatment Time (sum of timed and untimed services) 44   AMBULATORY CLINICS ONLY-MEDICAL AND TREATMENT DIAGNOSIS   Medical Diagnosis Dizziness   PT Diagnosis Subjective dizziness and visual sensitivity/strain

## 2019-01-28 NOTE — ADDENDUM NOTE
Encounter addended by: Keira Gastelum, PT on: 1/28/2019 12:48 PM   Actions taken: Sign clinical note, Flowsheet accepted, Episode resolved

## 2019-01-29 ENCOUNTER — THERAPY VISIT (OUTPATIENT)
Dept: PHYSICAL THERAPY | Facility: CLINIC | Age: 54
End: 2019-01-29
Payer: OTHER GOVERNMENT

## 2019-01-29 DIAGNOSIS — M54.2 NECK PAIN: ICD-10-CM

## 2019-01-29 PROCEDURE — 97110 THERAPEUTIC EXERCISES: CPT | Mod: GP | Performed by: PHYSICAL THERAPIST

## 2019-01-29 PROCEDURE — 99207 C STAT DRY NEEDLING - IAM: CPT | Mod: 25 | Performed by: PHYSICAL THERAPIST

## 2019-01-29 PROCEDURE — 97112 NEUROMUSCULAR REEDUCATION: CPT | Mod: GP | Performed by: PHYSICAL THERAPIST

## 2019-02-05 ENCOUNTER — THERAPY VISIT (OUTPATIENT)
Dept: CHIROPRACTIC MEDICINE | Facility: CLINIC | Age: 54
End: 2019-02-05
Payer: OTHER GOVERNMENT

## 2019-02-05 DIAGNOSIS — M54.2 CERVICALGIA: ICD-10-CM

## 2019-02-05 DIAGNOSIS — M99.02 THORACIC SEGMENT DYSFUNCTION: ICD-10-CM

## 2019-02-05 DIAGNOSIS — R51.9 CEPHALGIA: ICD-10-CM

## 2019-02-05 DIAGNOSIS — M99.00 HEAD REGION SOMATIC DYSFUNCTION: ICD-10-CM

## 2019-02-05 DIAGNOSIS — M40.00 KYPHOSIS (ACQUIRED) (POSTURAL): ICD-10-CM

## 2019-02-05 DIAGNOSIS — M99.01 CERVICAL SEGMENT DYSFUNCTION: Primary | ICD-10-CM

## 2019-02-05 PROCEDURE — 97112 NEUROMUSCULAR REEDUCATION: CPT | Mod: 59 | Performed by: CHIROPRACTOR

## 2019-02-05 PROCEDURE — 98940 CHIROPRACT MANJ 1-2 REGIONS: CPT | Mod: AT | Performed by: CHIROPRACTOR

## 2019-02-05 PROCEDURE — 99203 OFFICE O/P NEW LOW 30 MIN: CPT | Mod: 25 | Performed by: CHIROPRACTOR

## 2019-02-06 ENCOUNTER — THERAPY VISIT (OUTPATIENT)
Dept: PHYSICAL THERAPY | Facility: CLINIC | Age: 54
End: 2019-02-06
Payer: OTHER GOVERNMENT

## 2019-02-06 DIAGNOSIS — M54.2 NECK PAIN: ICD-10-CM

## 2019-02-06 PROCEDURE — 97110 THERAPEUTIC EXERCISES: CPT | Mod: GP | Performed by: PHYSICAL THERAPIST

## 2019-02-06 PROCEDURE — 99207 C STAT DRY NEEDLING - IAM: CPT | Mod: 25 | Performed by: PHYSICAL THERAPIST

## 2019-02-06 PROCEDURE — 97112 NEUROMUSCULAR REEDUCATION: CPT | Mod: GP | Performed by: PHYSICAL THERAPIST

## 2019-02-07 ENCOUNTER — OFFICE VISIT (OUTPATIENT)
Dept: FAMILY MEDICINE | Facility: CLINIC | Age: 54
End: 2019-02-07
Payer: OTHER GOVERNMENT

## 2019-02-07 VITALS
SYSTOLIC BLOOD PRESSURE: 138 MMHG | BODY MASS INDEX: 29.44 KG/M2 | WEIGHT: 229.4 LBS | HEART RATE: 103 BPM | TEMPERATURE: 97.9 F | OXYGEN SATURATION: 99 % | RESPIRATION RATE: 16 BRPM | HEIGHT: 74 IN | DIASTOLIC BLOOD PRESSURE: 88 MMHG

## 2019-02-07 DIAGNOSIS — F17.200 TOBACCO USE DISORDER: ICD-10-CM

## 2019-02-07 DIAGNOSIS — E78.5 DYSLIPIDEMIA: ICD-10-CM

## 2019-02-07 DIAGNOSIS — Z13.1 SCREENING FOR DIABETES MELLITUS: ICD-10-CM

## 2019-02-07 DIAGNOSIS — I10 BENIGN ESSENTIAL HYPERTENSION: Primary | ICD-10-CM

## 2019-02-07 DIAGNOSIS — D17.1 LIPOMA OF TORSO: ICD-10-CM

## 2019-02-07 PROCEDURE — 99214 OFFICE O/P EST MOD 30 MIN: CPT | Performed by: FAMILY MEDICINE

## 2019-02-07 RX ORDER — LOSARTAN POTASSIUM 100 MG/1
100 TABLET ORAL DAILY
Qty: 90 TABLET | Refills: 3 | Status: SHIPPED | OUTPATIENT
Start: 2019-02-07 | End: 2020-02-06

## 2019-02-07 ASSESSMENT — MIFFLIN-ST. JEOR: SCORE: 1945.3

## 2019-02-07 NOTE — PATIENT INSTRUCTIONS
Increase losartan dose to 100 mg     Check BP a few times weekly for the next 2 weeks    Return to clinic for lab and BP recheck in 2-3 weeks    Take simvastatin consistently    (stop smoking)

## 2019-02-07 NOTE — PROGRESS NOTES
SUBJECTIVE:   Naif Howell Jr. is a 53 year old male who presents to clinic today for the following health issues:      Hypertension Follow-up      Outpatient blood pressures are not being checked.    Low Salt Diet: no added salt    Amount of exercise or physical activity: nothing consistent      Problems taking medications regularly: No    Medication side effects: none    Diet: regular (no restrictions)      Taking losartan 50 mg daily.   Had BP checked yesterday at PT, SBP was 170s. This has him concerned.     He is contemplative on smoking cessation, finding it hard with his chronic back issues.     Hx of aortic root dilatation. Hx of HLD and HTN.       Has a lump on the left shoulder, noticed a week ago. Not changing in size, not painful. No previous trauma in this area.         Problem list and histories reviewed & adjusted, as indicated.  Additional history: none    Patient Active Problem List   Diagnosis     PTSD (post-traumatic stress disorder)     Aortic root dilatation (H)     GERD (gastroesophageal reflux disease)     Aortic regurgitation     Dyslipidemia     Benign essential hypertension     HTN, goal below 140/90     Chronic midline thoracic back pain     Vestibular migraine     SBO (small bowel obstruction) (H)     Health Care Home     Tobacco use disorder     Ventral hernia without obstruction or gangrene     Neck pain     Past Surgical History:   Procedure Laterality Date     HERNIORRHAPHY INCISIONAL (LOCATION) N/A 2/22/2018    Procedure: HERNIORRHAPHY INCISIONAL (LOCATION);  Incisional hernia Repair with Mesh ;  Surgeon: Corey Vang MD;  Location: [facility] OR     LAPAROSCOPIC CHOLECYSTECTOMY WITH CHOLANGIOGRAMS N/A 5/12/2017    Procedure: LAPAROSCOPIC CHOLECYSTECTOMY WITH CHOLANGIOGRAMS;  LAPAROSCOPIC CHOLECYSTECTOMY WITH CHOLANGIOGRAMS ;  Surgeon: Corey Vang MD;  Location: [facility] OR     LAPAROSCOPIC LYSIS ADHESIONS N/A 5/16/2017    Procedure: LAPAROSCOPIC LYSIS ADHESIONS;;  Surgeon: Taj  "Corey MATAMOROS MD;  Location: [facility] OR     LAPAROSCOPY DIAGNOSTIC (GENERAL) N/A 2017    Procedure: LAPAROSCOPY DIAGNOSTIC (GENERAL);  DIAGNOSTIC LAPAROSCOPY, LAPAROTOMY,  SMALL BOWEL RESECTION, LAPAROSCOPIC LYSIS OF ADHESIONS;  Surgeon: Corey Vang MD;  Location: [facility] OR     RESECT SMALL BOWEL WITHOUT OSTOMY N/A 2017    Procedure: RESECT SMALL BOWEL WITHOUT OSTOMY;;  Surgeon: Corey Vang MD;  Location: [facility] OR     Resection of basal cell carcinoma      Left forehead       Social History     Tobacco Use     Smoking status: Current Every Day Smoker     Packs/day: 1.00     Types: Cigarettes     Smokeless tobacco: Never Used     Tobacco comment: trying   Substance Use Topics     Alcohol use: No     Family History   Problem Relation Age of Onset     Hypertension Mother         alive     Cancer Father         chest tumor-not sure about details,  at age of 71     Family History Negative Sister         2     Hypertension Sister      Family History Negative Brother         1     Family History Negative Maternal Grandmother      Hypertension Maternal Grandmother      Family History Negative Maternal Grandfather      Family History Negative Paternal Grandmother      Family History Negative Paternal Grandfather      Cancer - colorectal No family hx of      Prostate Cancer No family hx of      Glaucoma No family hx of      Macular Degeneration No family hx of            Reviewed and updated as needed this visit by clinical staff  Tobacco  Allergies  Meds  Med Hx  Surg Hx  Fam Hx  Soc Hx      Reviewed and updated as needed this visit by Provider         ROS:  Constitutional, HEENT, cardiovascular, pulmonary, gi and gu systems are negative, except as otherwise noted.    OBJECTIVE:     /88   Pulse 103   Temp 97.9  F (36.6  C) (Oral)   Resp 16   Ht 1.88 m (6' 2\")   Wt 104.1 kg (229 lb 6.4 oz)   SpO2 99%   BMI 29.45 kg/m    Body mass index is 29.45 kg/m .  GENERAL: healthy, alert and no distress  RESP: " lungs clear to auscultation - no rales, rhonchi or wheezes  CV: regular rate and rhythm, normal S1 S2, no S3 or S4, no murmur, click or rub, no peripheral edema and peripheral pulses strong  SKIN: 1.5-2 inch lipoma on the left medial shoulder    Diagnostic Test Results:  none     ASSESSMENT/PLAN:     1. Benign essential hypertension - will increase BP medication given high BP readings out of clinic and borderline high BP in clinic. Gave script for BP cuff, advised check BP a few times weekly, RTC in 2 weeks for lab work and BP recheck.   - losartan (COZAAR) 100 MG tablet; Take 1 tablet (100 mg) by mouth daily  Dispense: 90 tablet; Refill: 3  - order for DME; Equipment being ordered: home blood pressure monitor with extra large cuff  Dispense: 1 Units; Refill: 0  - **Basic metabolic panel FUTURE 2mo; Future    2. Tobacco use disorder - contemplative, not ready at this time    3. Dyslipidemia - recheck, has not been taking statin consistently as his last script was given 7/2017 and has lasted this long. Reviewed importance of med compliance to reduce risk of heart attack and stroke.   - Lipid panel reflex to direct LDL Fasting; Future    4. Screening for diabetes mellitus  - **A1C FUTURE 3mo; Future    5. Lipoma of torso - discussed diagnosis, no need to treat but can be removed if he desires.       Josefina Rogers MD  [facility]

## 2019-02-09 PROBLEM — M99.02 THORACIC SEGMENT DYSFUNCTION: Status: ACTIVE | Noted: 2019-02-09

## 2019-02-09 PROBLEM — M99.01 CERVICAL SEGMENT DYSFUNCTION: Status: ACTIVE | Noted: 2019-01-25

## 2019-02-09 PROBLEM — M40.00 KYPHOSIS (ACQUIRED) (POSTURAL): Status: ACTIVE | Noted: 2019-02-09

## 2019-02-09 PROBLEM — M99.00 HEAD REGION SOMATIC DYSFUNCTION: Status: ACTIVE | Noted: 2019-02-09

## 2019-02-09 PROBLEM — R51.9 CEPHALGIA: Status: ACTIVE | Noted: 2019-02-09

## 2019-02-10 NOTE — PROGRESS NOTES
Chiropractic Clinic Visit    PCP: Josefina Rogers    Naif Howell  is a 53 year old male who is seen  in consultation at the request of  Gail Berry M.D. presenting with chronic neck pain . Patient reports that the onset was 2 years ago.  When asked, patient denies:, falling, slipping, bending and reaching or sleeping awkwardly. The pt reports chronic neck pain and migraine HA sx that started 2 years ago gradually. There was no specific incident that could have caused the px. The pt sits for prolonged periods. He grades the px a 1-10/10 on VAS. The pt has had injections in the base of the head, PT and dry needling. Currently he denies improvement. The pt denies weakness in the extremities or other unusual sx.  Prior to onset, the patient was able to sit for 8 hours and sleep for 6-8 hours. Patient notes that due to symptoms, they can only sleep with interruption. Naif Griffithskurt Elliott notes   sitting rated at a 6/10 painful, difficult and prior to this incident it was 1/10.    Injury: There was no injury associated with this episode     Location of Pain: bilateral neck  at the following level(s) C2 , C7 , T4  and T7   Duration of Pain: 2 years    Rating of Pain at worst: 10/10  Rating of Pain Currently: 1/10  Symptoms are better with: some PT, dry needling, and pain medication  Symptoms are worse with: sitting and sleeping   Additional Features: migraines        Health History  as reported by the patient:    How does the patient rate their own health:   Good    Current or past medical history:   Cancer, Chest pain, High blood pressure, Migraines/headaches and Smoking    Medical allergies  None    Past Traumas/Surgeries  Cancer    Family History  Family History   Problem Relation Age of Onset     Hypertension Mother         alive     Cancer Father         chest tumor-not sure about details,  at age of 71     Family History Negative Sister         2     Hypertension Sister      Family History Negative Brother          1     Family History Negative Maternal Grandmother      Hypertension Maternal Grandmother      Family History Negative Maternal Grandfather      Family History Negative Paternal Grandmother      Family History Negative Paternal Grandfather      Cancer - colorectal No family hx of      Prostate Cancer No family hx of      Glaucoma No family hx of      Macular Degeneration No family hx of        Medications:  High blood pressure and Muscle relaxants    Occupation:  [employer]     Primary job tasks:   Computer work and Prolonged sitting    Barriers as home/work:   none    Additional health Issues:     PTSD      Review of Systems  Musculoskeletal: as above  Remainder of review of systems is negative including constitutional, CV, pulmonary, GI, Skin and Neurologic except as noted in HPI or medical history.    Past Medical History:   Diagnosis Date     Anxiety     resolved     Aortic regurgitation      Back pain      Basal cell carcinoma     left forehead     Chronic neck pain      Degenerative disc disease, cervical     See MRI - 12/2017     GERD (gastroesophageal reflux disease)      Hypertension      Migraines      PTSD (post-traumatic stress disorder)      SBO (small bowel obstruction) (H)      Vestibular migraine      Past Surgical History:   Procedure Laterality Date     HERNIORRHAPHY INCISIONAL (LOCATION) N/A 2/22/2018    Procedure: HERNIORRHAPHY INCISIONAL (LOCATION);  Incisional hernia Repair with Mesh ;  Surgeon: Corey Vang MD;  Location: [facility]     LAPAROSCOPIC CHOLECYSTECTOMY WITH CHOLANGIOGRAMS N/A 5/12/2017    Procedure: LAPAROSCOPIC CHOLECYSTECTOMY WITH CHOLANGIOGRAMS;  LAPAROSCOPIC CHOLECYSTECTOMY WITH CHOLANGIOGRAMS ;  Surgeon: Corey Vang MD;  Location: [facility]     LAPAROSCOPIC LYSIS ADHESIONS N/A 5/16/2017    Procedure: LAPAROSCOPIC LYSIS ADHESIONS;;  Surgeon: Corey Vang MD;  Location: [facility]     LAPAROSCOPY DIAGNOSTIC (GENERAL) N/A 5/16/2017    Procedure: LAPAROSCOPY DIAGNOSTIC  (GENERAL);  DIAGNOSTIC LAPAROSCOPY, LAPAROTOMY,  SMALL BOWEL RESECTION, LAPAROSCOPIC LYSIS OF ADHESIONS;  Surgeon: Corey Vang MD;  Location: [facility]     RESECT SMALL BOWEL WITHOUT OSTOMY N/A 5/16/2017    Procedure: RESECT SMALL BOWEL WITHOUT OSTOMY;;  Surgeon: Corey Vang MD;  Location: [facility]     Resection of basal cell carcinoma      Left forehead       Objective  There were no vitals taken for this visit.    GENERAL APPEARANCE: healthy, alert and no distress   GAIT: NORMAL  SKIN: no suspicious lesions or rashes  NEURO: Normal strength and tone, mentation intact and speech normal  PSYCH:  mentation appears normal and affect normal/bright    Naif was asked to complete the Neck Disability Index, today in the office. NDI Disability score: 46%; pain severity scale: 10/10..    Cervical Spine Exam    Range of Motion:         Decreased active and passive ROM forward flexion extension, lateral rotation, lateral flexion with pain at all end ranges     Inspection:         No visible deformity        normal lordotic curvature maintained  Anterior neck carriage, slumped seated posture, poor posture, increased kyphosis      Tender:        upper border of trapezius       B suboccipitals, B longus coli    Non-Tender:        remainder of cervical spine area    Muscle strength:       C4 (shoulder shrug)  symmetric 5/5 Normal       C5 (shoulder abduction) symmetric 5/5 Normal       C6 (elbow flexion) symmetric 5/5 Normal       C7 (elbow extension) symmetric 5/5 Normal       C8 (finger abduction, thumb flexion) symmetric 5/5 Normal    Reflexes:        C5 (biceps) symmetric 2 bilaterally       C6 (supinator) symmetric 2 bilaterally       C7 (triceps) symmetric 2 bilaterally    Sensation:       grossly intact througout bilateral upper extremities    Special Tests:       positive (+) Spurling  Wolf's- positive, VBI- negative and Denny Jj - negative    Lymphatics:        no edema noted in the upper extremities        Segmental spinal dysfunction/restrictions found at:  C1 , C7 , T1 , T6  and T9       The following soft tissue hypotonicities were observed:Lev scapulae: ache and dull pain, referred pain: no  Sub-occiput: ache and dull pain, referred pain: no    Trigger points were found in:none     Muscle spasm found in:Levator scapulae and Sub-occipital      Radiology:  Impression     IMPRESSION: Degenerative changes of the cervical spine as described  above.    KAREL KOCH MD         Assessment:    1. Cervical segment dysfunction    2. Cervicalgia    3. Thoracic segment dysfunction    4. Cephalgia    5. Head region somatic dysfunction    6. Kyphosis (acquired) (postural)        RX ordered/plan of care  Anticipated outcomes  Possible risks and side effects    After discussing the risk and benefits of care, patient consented to treatment    Patient's condition:  Patient had restrictions pre-manipulation    Treatment effectiveness:  Post manipulation there is better intersegmental movement and Patient claims to feel looser post manipulation    Plan:    Procedures:    Evaluation and Management:  51522 Moderate level exam 30 min    CMT:  66351 Chiropractic manipulative treatment 1-2 regions performed   Cervical: Diversified, C2, C7 , Prone, Supine  Thoracic: Diversified, T1, T6, T9, Prone    Modalities:  None performed this visit    Therapeutic procedures:  18963: Neurological re-education/proprioception training and proper long term sitting posture: Corrected patient's seated posture when sitting for longer than 20 minutes or seated at the computer related to work duties for over 8 hours per day. Fit patient with Camille lumbar support for postural re-training with demonstration. Showed patient how to place the support correctly when seated and to increase usage by 2-3 hour increments per day until they are able to sit full time without spinal irritation. Related improper vs. proper sitting to optimal spinal biomechanics  using the spine model with demonstration with the purpose of PREVENTING premature spinal degeneration from cumulative static motion. Demonstrated the increase in load and shearing forces on the spine in addition to the cumulative degenerative effects of axial compression on a spine that is chronically slumped and in an 'unlocked' position vs a 'locked' position. Demonstrated the use of a lap top table for lap top computers to prevent excessive cervical flexion of the neck. Demonstrated 'hip hinging' to access the computer when seated rather than thoracic flexion. Gave cervical retraction for proper cervical alignment, anterior deep cervical flexor strengthening and cervical proprioception training with demonstration. Per 10-12 minutes total        Response to Treatment  Reduction in symptoms as reported by patient    Prognosis: Good      Treatment plan and goals:  Goals:  SLEEPING: the patient specific goal is to attain his pre-injury status of 6-8 hours comfortably  SITTING: the patient specific goal is to attain pre-injury status of  6-8 hours comfortably    Frequency of care  Duration of care is estimated to be 6-8 weeks, from the initial treatment.  It is estimated that the patient will need a total of 6-8 visits to resolve this episode.  For the initial therapeutic trial of care, the frequency is recommended at 1 X week, once daily.  A reevaluation would be clinically appropriate in 6-8 visits, to determine progress and further course of care.    In-Office Treatment  Evaluation  Spinal Chiropractic Manipulative Therapy  Postural correction      Recommendations:    Instructions:  use lumbar supoprt at all times     Follow-up:  Return to care in 1 week with US therapy  ACP after dry needling .     Disclaimer: This note consists of symbols derived from keyboarding, dictation and/or voice recognition software. As a result, there may be errors in the script that have gone undetected. Please consider this when  interpreting information found in this chart.

## 2019-02-12 ENCOUNTER — THERAPY VISIT (OUTPATIENT)
Dept: CHIROPRACTIC MEDICINE | Facility: CLINIC | Age: 54
End: 2019-02-12
Payer: OTHER GOVERNMENT

## 2019-02-12 DIAGNOSIS — M40.00 KYPHOSIS (ACQUIRED) (POSTURAL): ICD-10-CM

## 2019-02-12 DIAGNOSIS — M54.2 CERVICALGIA: ICD-10-CM

## 2019-02-12 DIAGNOSIS — M99.00 HEAD REGION SOMATIC DYSFUNCTION: ICD-10-CM

## 2019-02-12 DIAGNOSIS — M99.02 THORACIC SEGMENT DYSFUNCTION: ICD-10-CM

## 2019-02-12 DIAGNOSIS — M99.01 CERVICAL SEGMENT DYSFUNCTION: Primary | ICD-10-CM

## 2019-02-12 DIAGNOSIS — R51.9 CEPHALGIA: ICD-10-CM

## 2019-02-12 PROCEDURE — 98940 CHIROPRACT MANJ 1-2 REGIONS: CPT | Mod: AT | Performed by: CHIROPRACTOR

## 2019-02-12 PROCEDURE — 97035 APP MDLTY 1+ULTRASOUND EA 15: CPT | Performed by: CHIROPRACTOR

## 2019-02-12 NOTE — PROGRESS NOTES
Visit #:  2    Subjective:  Naif Howell Jr. is a 53 year old male who is seen in f/u up for:        Cervical segment dysfunction  Cervicalgia  Thoracic segment dysfunction  Cephalgia  Head region somatic dysfunction  Kyphosis (acquired) (postural).     Since last visit on 2/5/2019,  Naif Howell Jr. reports:    Area of chief complaint:  Cervical and Thoracic :  Symptoms are graded at 5/10. The quality is described as stiff, achey, dull.  Motion has increased, but is still not normal. The pt reports soreness in the mid back and neck area from using the lumbar support. He denies HA sx for one week. He denies soreness in the neck post treatment. The pt denies weakness or other unusual sx. Patient feels that they are improved due to a reduction in symptoms.     Since last visit the patient feels that they are 0-10 percent  improved from last visit.       Objective:  The following was observed:    P: palpatory tenderness Levator scapulae and Sub-occipital Bilaterally  A: static palpation demonstrates intersegmental asymmetry   R: motion palpation notes restricted motion  T: hypertonicity at: Levator scapulae and Sub-occipital Bilaterally    Segmental spinal dysfunction/restrictions found at:  C2 , C7 , T1 , T7  and T9       Assessment:    Diagnoses:      1. Cervical segment dysfunction    2. Cervicalgia    3. Thoracic segment dysfunction    4. Cephalgia    5. Head region somatic dysfunction    6. Kyphosis (acquired) (postural)        Patient's condition:  Patient had restrictions pre-manipulation    Treatment effectiveness:  Post manipulation there is better intersegmental movement and Patient claims to feel looser post manipulation      Procedures:  CMT:  28150 Chiropractic manipulative treatment 1-2 regions performed   Cervical: Diversified, C2, C7 , Prone, Supine   Thoracic: Diversified, T1, T8 prone     Modalities:  16179: US:  1.6 Llanos/cm squared for 8  minutes at 1 mhz  Location: B levator     Therapeutic  procedures:  None     Response to Treatment  No change in sx     Prognosis: Guarded    Progress towards Goals: Patient is making progress towards the goal.     Recommendations:    Instructions:  Continue to use lumbar support     Follow-up:    Return to care in 1 week with ACP.

## 2019-02-15 ENCOUNTER — THERAPY VISIT (OUTPATIENT)
Dept: PHYSICAL THERAPY | Facility: CLINIC | Age: 54
End: 2019-02-15
Payer: OTHER GOVERNMENT

## 2019-02-15 DIAGNOSIS — M54.2 CERVICALGIA: ICD-10-CM

## 2019-02-15 PROCEDURE — 99207 C STAT DRY NEEDLING - IAM: CPT | Mod: 25 | Performed by: PHYSICAL THERAPIST

## 2019-02-15 PROCEDURE — 97140 MANUAL THERAPY 1/> REGIONS: CPT | Mod: GP | Performed by: PHYSICAL THERAPIST

## 2019-02-21 ENCOUNTER — THERAPY VISIT (OUTPATIENT)
Dept: CHIROPRACTIC MEDICINE | Facility: CLINIC | Age: 54
End: 2019-02-21
Payer: OTHER GOVERNMENT

## 2019-02-21 ENCOUNTER — TELEPHONE (OUTPATIENT)
Dept: FAMILY MEDICINE | Facility: CLINIC | Age: 54
End: 2019-02-21

## 2019-02-21 ENCOUNTER — ALLIED HEALTH/NURSE VISIT (OUTPATIENT)
Dept: NURSING | Facility: CLINIC | Age: 54
End: 2019-02-21
Payer: OTHER GOVERNMENT

## 2019-02-21 VITALS — DIASTOLIC BLOOD PRESSURE: 78 MMHG | HEART RATE: 117 BPM | SYSTOLIC BLOOD PRESSURE: 110 MMHG

## 2019-02-21 DIAGNOSIS — I10 BENIGN ESSENTIAL HYPERTENSION: ICD-10-CM

## 2019-02-21 DIAGNOSIS — R51.9 CEPHALGIA: ICD-10-CM

## 2019-02-21 DIAGNOSIS — M54.2 CERVICALGIA: ICD-10-CM

## 2019-02-21 DIAGNOSIS — M99.02 THORACIC SEGMENT DYSFUNCTION: ICD-10-CM

## 2019-02-21 DIAGNOSIS — E78.5 DYSLIPIDEMIA: ICD-10-CM

## 2019-02-21 DIAGNOSIS — M99.01 CERVICAL SEGMENT DYSFUNCTION: Primary | ICD-10-CM

## 2019-02-21 DIAGNOSIS — Z01.30 BP CHECK: Primary | ICD-10-CM

## 2019-02-21 DIAGNOSIS — Z13.1 SCREENING FOR DIABETES MELLITUS: ICD-10-CM

## 2019-02-21 DIAGNOSIS — M99.00 HEAD REGION SOMATIC DYSFUNCTION: ICD-10-CM

## 2019-02-21 LAB
ANION GAP SERPL CALCULATED.3IONS-SCNC: 4 MMOL/L (ref 3–14)
BUN SERPL-MCNC: 15 MG/DL (ref 7–30)
CALCIUM SERPL-MCNC: 9.5 MG/DL (ref 8.5–10.1)
CHLORIDE SERPL-SCNC: 105 MMOL/L (ref 94–109)
CHOLEST SERPL-MCNC: 197 MG/DL
CO2 SERPL-SCNC: 26 MMOL/L (ref 20–32)
CREAT SERPL-MCNC: 1.08 MG/DL (ref 0.66–1.25)
GFR SERPL CREATININE-BSD FRML MDRD: 76 ML/MIN/{1.73_M2}
GLUCOSE SERPL-MCNC: 99 MG/DL (ref 70–99)
HBA1C MFR BLD: 5.5 % (ref 0–5.6)
HDLC SERPL-MCNC: 34 MG/DL
LDLC SERPL CALC-MCNC: 141 MG/DL
NONHDLC SERPL-MCNC: 163 MG/DL
POTASSIUM SERPL-SCNC: 4.5 MMOL/L (ref 3.4–5.3)
SODIUM SERPL-SCNC: 135 MMOL/L (ref 133–144)
TRIGL SERPL-MCNC: 108 MG/DL

## 2019-02-21 PROCEDURE — 80048 BASIC METABOLIC PNL TOTAL CA: CPT | Performed by: FAMILY MEDICINE

## 2019-02-21 PROCEDURE — 98940 CHIROPRACT MANJ 1-2 REGIONS: CPT | Mod: AT | Performed by: CHIROPRACTOR

## 2019-02-21 PROCEDURE — 80061 LIPID PANEL: CPT | Performed by: FAMILY MEDICINE

## 2019-02-21 PROCEDURE — 83036 HEMOGLOBIN GLYCOSYLATED A1C: CPT | Performed by: FAMILY MEDICINE

## 2019-02-21 PROCEDURE — 97810 ACUP 1/> WO ESTIM 1ST 15 MIN: CPT | Mod: GA | Performed by: CHIROPRACTOR

## 2019-02-21 PROCEDURE — 36415 COLL VENOUS BLD VENIPUNCTURE: CPT | Performed by: FAMILY MEDICINE

## 2019-02-21 NOTE — TELEPHONE ENCOUNTER
Per PCP:    BP looks great, continue losartan at current dosing. Has refills per last script. HEATHER Pace

## 2019-02-21 NOTE — NURSING NOTE
Naif Howell  is a 53 year old patient who comes in today for a Blood Pressure check.  Initial BP:  /78 (BP Location: Right arm, Patient Position: Chair, Cuff Size: Adult Regular)   Pulse 117      117  Disposition: results routed to provider        BP Readings from Last 6 Encounters:   02/21/19 110/78   02/07/19 138/88   01/22/19 (!) 170/103   01/04/19 128/80   12/21/18 (!) 152/100   04/01/18 (!) 162/100     BRANDEE Uribe

## 2019-02-21 NOTE — PROGRESS NOTES
Visit #:  3    Subjective:  Naif Howell Jr. is a 53 year old male who is seen in f/u up for:        Cervical segment dysfunction  Cervicalgia  Thoracic segment dysfunction  Head region somatic dysfunction  Cephalgia.     Since last visit,  Naif Howell Jr. reports:    Area of chief complaint:  Cervical and Thoracic :  Symptoms are graded at 5/10. The quality is described as stiff, achey, dull.  Motion has increased, but is still not normal. The pt reports overall 25% with sx reduction with the PT and Chiropractic. He is using the lumbar support with good results. ROM of the cervical spine seems to be decreasing. He denies weakness or other unusual sx.     Since last visit the patient feels that they are 25 percent  improved from last visit.       Objective:  The following was observed:    P: palpatory tenderness Levator scapulae and Sub-occipital Bilaterally  A: static palpation demonstrates intersegmental asymmetry   R: motion palpation notes restricted motion  T: hypertonicity at: Levator scapulae and Sub-occipital Bilaterally    Segmental spinal dysfunction/restrictions found at:  C2 , C7 , T1 , T7  and T9       Assessment:    Diagnoses:      1. Cervical segment dysfunction    2. Cervicalgia    3. Thoracic segment dysfunction    4. Head region somatic dysfunction    5. Cephalgia        Patient's condition:  Patient improving with all treatments     Treatment effectiveness:  Post manipulation there is better intersegmental movement and Patient claims to feel looser post manipulation      Procedures:  CMT:  22833 Chiropractic manipulative treatment 1-2 regions performed   Cervical: Diversified, C2, C7 , Prone, Supine   Thoracic: Diversified, T1, T8 prone   OCC: R OCC    Modalities:  ACP: Good points to the cervical spine, carmen points to the upper shoulders  15 minutes prone    Therapeutic procedures:  None     Response to Treatment  No change in sx     Prognosis: Guarded    Progress towards Goals: Patient  is making progress towards the goal.     Recommendations:    Instructions:  Continue to use lumbar support     Follow-up:    Return to care in 1 week with ACP.

## 2019-02-22 ENCOUNTER — THERAPY VISIT (OUTPATIENT)
Dept: PHYSICAL THERAPY | Facility: CLINIC | Age: 54
End: 2019-02-22
Payer: OTHER GOVERNMENT

## 2019-02-22 DIAGNOSIS — M99.01 CERVICAL SEGMENT DYSFUNCTION: ICD-10-CM

## 2019-02-22 PROCEDURE — 97140 MANUAL THERAPY 1/> REGIONS: CPT | Mod: GP | Performed by: PHYSICAL THERAPIST

## 2019-02-24 NOTE — PROGRESS NOTES
"[facility]    Date of visit: 2/25/2019    Chief complaint:   Chief Complaint   Patient presents with     Pain     Interval history:  Naif Howell Jr. is a 54 year old male last seen by me on 1/22/2019.      Recommendations/plan at the last visit included:  1. Therapy: Order placed for PT to try dry needling of trigger points.   2. Clinical Health Pain Psychologist: Not at this time.   3. Diagnostic Studies: No new imaging needed.  4. Medication Management:   1. Continue cyclobenzaprine   2. Try OTC topicals such as bengay, icy/hot, lidocaine...  5. Further procedures recommended: Can consider trigger point injections in the future.  6. Other: An order was placed to start acupuncture  7. Follow up: 4-8 weeks in clinic  8. Patient to follow up with Primary Care provider regarding elevated blood pressure.    Since his last visit, Naif Howell Jr. reports:  -his pain is better than it was at last visit.   - He is still going to PT and getting dry needling. Has exercises to work on at home.  - Chiropractic has also been helpful. Has had one session of acupuncture thus far. He is unsure how helpful this will be but is going to try some more sessions.  - He uses flexeril sparingly. Over the past month has used 3 tablets. It does provide benefit when pain is severe.  - He is using icy/hot but it does not seem to be helpful for neck pain. It is more helpful for low back pain.    - His headaches have been well managed recently. Has not had a \"bad\" headache for quite some time now.      Pain Information:   Pain quality: Throbbing    Pain timing: Constant     Pain rating: intensity ranges from 1/10 to 8/10, and averages 3/10 on a 0-10 scale. Currently, it is 1/10.   Aggravating factors include: placing pressure over the area, standing and sitting too much   Relieving factors include: sleep and medication    Current pain treatments:     Current pain medications:              Cyclobenzaprine 10 mg TID prn - " [facility]              Nortriptyline 30 mg at bedtime - H for sleep              PT with dry needling  chiropractic    Current MME: 0    Review of [place] Prescription Monitoring Program (): No concern for abuse or misuse of controlled medications based on this report.     Annual Controlled Substance Agreement/UDS due date: NA    Past pain treatments:  1. Previous Pain Relevant Medications:  NOTE: This medication information taken from patient's intake form, not medical records.               Opiates: oxycodone              NSAIDS: ibuprofen              Muscle Relaxants: Valium, methocarbamol              Anti-migraine mediations: none              Anti-depressants: none              Sleep aids:none              Anxiolytics: none              Neuropathics: Topamax, gabapentin               Topicals: Lidocaine patches - [facility]              Other medications not covered above: none     2. Physical Therapy: Has tried for back and neck 3 separate times.   3. Pain Psychology: None  4. Surgery: None  5. Injections:   - Right C3-4 and C4-5 facet joint injections on 2/7/2018   - C7-T1 ILESI on 1/3/2018, 3/21/2017, 5/10/16  - right TON, C3,4 RFA on 3/21/2017  - T5-6 ILESI on 7/12/16 - no relief     6. Chiropractic: no  7. Acupuncture: no  8. TENS Unit: no  9. Other: heating pad and ice packs    Medications:  Current Outpatient Medications   Medication Sig Dispense Refill     ciprofloxacin-hydrocortisone (CIPRO HC) 0.2-1 % otic suspension 3 drops       cyclobenzaprine (FLEXERIL) 10 MG tablet Take 1 tablet (10 mg) by mouth 3 times daily as needed for muscle spasms Do not drive within 8 hours of taking this medication.       losartan (COZAAR) 100 MG tablet Take 1 tablet (100 mg) by mouth daily 90 tablet 3     multivitamin, therapeutic with minerals (THERA-VIT-M) TABS tablet Take 1 tablet by mouth daily       nortriptyline (PAMELOR) 10 MG capsule Take 3 capsules (30 mg) by mouth daily       order for DME Equipment being ordered:  "home blood pressure monitor with extra large cuff 1 Units 0     sildenafil (REVATIO/VIAGRA) 20 MG tablet Take 1 tablet (20 mg) by mouth three times daily for pulmonary hypertension.  Never use with nitroglycerin, terazosin or doxazosin. 9 tablet 0     simvastatin (ZOCOR) 40 MG tablet Take 1 tablet (40 mg) by mouth At Bedtime 90 tablet 3       Medical History: any changes in medical history since they were last seen? No    Review of Systems:  The 14 system ROS was reviewed from the intake questionnaire, and is positive for: weight gain, ringing in ears, high blood pressure, back pain, neck pain  Any bowel or bladder problems: none  Mood: anxiety    Physical Exam:  Blood pressure 141/86, pulse 102, weight 99.8 kg (220 lb), SpO2 97 %.  General: A&O x 3, in no distress  Gait: Normal  Neuro: strength 5/5 in BUE and BLE    Imaging:  MRI of Cervical Spine was completed on 12/8/2017 shows:  \"FINDINGS: There is normal alignment of the cervical vertebrae;  however, there is straightening of normal cervical lordosis. Vertebral  body heights of the cervical spine are normal. Marrow signal  throughout the cervical vertebrae is within normal limits. There is no  evidence for fracture or pathologic bony lesion of the cervical spine.        There is loss of disc height, disc desiccation and posterior disc  bulging to varying degrees at all levels of the cervical spine with  exception of the normal appearing C7-T1 disc.      The cervical spinal cord is mildly deformed by degenerative changes at  the C5-C6 level. The cervical spinal cord is otherwise normal in  contour. There is no abnormal signal in the cervical spinal cord.  There is no evidence for intrathecal abnormality.     Level by level:     C2-C3: There is facet arthropathy bilaterally, uncinate hypertrophy  bilaterally and a posterior broad-based disc-osteophyte complex. These  findings result in moderate right neural foraminal stenosis but no  spinal canal or left " "foraminal stenosis. No change from the comparison  study.     C3-C4: There is facet arthropathy bilaterally, uncinate hypertrophy  bilaterally and a posterior broad-based disc-osteophyte complex. These  findings result in severe right foraminal stenosis, mild left  foraminal narrowing and minimal spinal canal narrowing. No change from  the comparison study.     C4-C5: There is facet arthropathy bilaterally, uncinate hypertrophy  bilaterally and a posterior broad-based disc-osteophyte complex. There  is no spinal canal or neural foraminal stenosis at this level. No  change from the comparison study.     C5-C6: There is facet arthropathy bilaterally, uncinate hypertrophy  bilaterally and a posterior broad-based disc-osteophyte complex with a  superimposed small right paracentral disc herniation (protrusion).  These findings result in moderate-severe right foraminal stenosis and  mild spinal canal narrowing but no left foraminal stenosis. No change  from the comparison study.     C6-C7: There is facet arthropathy bilaterally, uncinate hypertrophy  bilaterally and a posterior broad-based disc-osteophyte complex. These  findings result in mild bilateral neural foraminal narrowing but no  spinal canal stenosis. No change from the comparison study.     C7-T1: There is facet arthropathy and uncinate arthropathy  bilaterally. These findings result in mild-moderate left foraminal  narrowing and mild right foraminal narrowing but no spinal canal  stenosis. No change from the comparison study.        IMPRESSION: Degenerative changes of the cervical spine as described  Above.\"     MRI of Thoracic Spine was completed on 6/10/2016 which showed:  \"FINDINGS: Vertebral body alignment is normal. No fracture is seen.  There is a just under 2 cm benign-appearing hemangioma within the T5  vertebral body. There is a similar-appearing less than 1 cm diameter  hemangioma within the T10 vertebral body and a 1 cm hemangioma in the  T12 " "vertebral body. No other abnormal marrow signal intensity is  identified. No spinal cord or intrathecal abnormality is seen. The  adjacent soft tissues are unremarkable.     The discs of the thoracic spine are well hydrated and their heights  are well-preserved. No disc bulge or herniation is seen and no  stenosis is identified. The facet joints are unremarkable.         IMPRESSION: Unremarkable MRI of the thoracic spine.\"       Assessment:   Naif Howell Jr. is a 53 year old male with a past medical history significant for vestibular migraine, SBO, GERD, aortic regurgitation, HTN, PTSD, anxiety, dylipidemia who presents with complaints of neck pain     1. Neck pain: Etiology of his pain is most consistent with myofascial pain. There are active trigger points palpated on exam which reproduces his symptoms. He has paresthesias into bilateral upper extremities in ulnar distribution. Most likely due to ulnar neuropathy. There is positive tinel's at the elbow bilaterally.      2. Hx of migraines: being managed by neurology       Plan:  1. Therapy: Continue PT and dry needling. He is noticing benefit from this.    2. Clinical Health Pain Psychologist: Not at this time. He is coping well.   3. Diagnostic Studies: No new imaging needed.  4. Medication Management:   1. Continue cyclobenzaprine prn.  2. Discussed that he can try OTC CBD oil or cream to see if this provides some benefit.  5. Further procedures recommended: Can consider trigger point injections in the future.  6. Complementary treatments: Continue chiropractic and acupuncture.    7. Other:  1. Discussed purchasing a TENS unit to try. He can take this to PT to learn how to use it if needed.   2. Discussed purchasing a theracane to see if massaging the muscles will provide some benefit.   8. Follow up: 8-12 weeks in clinic  9. Naif to follow up with Primary Care provider regarding elevated blood pressure.     I spent 30 minutes of time face to face with the " patient.  Greater than 50% of this time was spent in patient counseling and/or coordination of care.    Gail Berry MD  [facility]

## 2019-02-25 ENCOUNTER — OFFICE VISIT (OUTPATIENT)
Dept: PALLIATIVE MEDICINE | Facility: CLINIC | Age: 54
End: 2019-02-25
Payer: OTHER GOVERNMENT

## 2019-02-25 VITALS
DIASTOLIC BLOOD PRESSURE: 86 MMHG | SYSTOLIC BLOOD PRESSURE: 141 MMHG | OXYGEN SATURATION: 97 % | WEIGHT: 220 LBS | HEART RATE: 102 BPM | BODY MASS INDEX: 28.25 KG/M2

## 2019-02-25 DIAGNOSIS — M79.18 MYOFASCIAL PAIN SYNDROME, CERVICAL: Primary | ICD-10-CM

## 2019-02-25 PROCEDURE — 99214 OFFICE O/P EST MOD 30 MIN: CPT | Performed by: PHYSICAL MEDICINE & REHABILITATION

## 2019-02-25 ASSESSMENT — PAIN SCALES - GENERAL: PAINLEVEL: NO PAIN (1)

## 2019-02-25 NOTE — PATIENT INSTRUCTIONS
"1. Consider purchasing a theracane which can provide some deep tissue massage. You can purchase this online.  2. Consider purchasing a TENS unit. You can purchase this online. If you have questions on how to use it you can take the unit to PT  3. Consider trial of Hemp oils or topicals - check \"nothing but hemp\" stores, [facility]...  4. Continue with PT and chiropractic  5. Follow up in 8-12 weeks in clinic  ----------------------------------------------------------------  Clinic Number:  [phone]   Call this number with any questions about your care and for scheduling assistance. Calls are returned Monday through Friday between 8 AM and 4:30 PM. We usually get back to you within 2 business days depending on the issue/request.       Medication refills:    For non-narcotic medications, call your pharmacy directly to request a refill. The pharmacy will contact the [facility] for authorization. Please allow 3-4 days for these refills to be processed.     For narcotic refills, call the clinic number or send a [employer] message. Please contact us 7-10 days before your refill is due. The message MUST include the name of the specific medication(s) requested and how you would like to receive the prescription(s). The options are as follows:    Pain Clinic staff can mail the prescription to your pharmacy. Please tell us the name of the pharmacy.    You may pick the prescription up at the Pain Clinic (tell us the location) or during a clinic visit with your pain provider    Pain Clinic staff can deliver the prescription to the [facility] pharmacy in the clinic building. Please tell us the location.      We believe regular attendance is key to your success in our program.    Any time you are unable to keep your appointment we ask that you call us at least 24 hours in advance to let us know. This will allow us to offer the appointment time to another patient.       "

## 2019-02-28 ENCOUNTER — THERAPY VISIT (OUTPATIENT)
Dept: CHIROPRACTIC MEDICINE | Facility: CLINIC | Age: 54
End: 2019-02-28
Payer: OTHER GOVERNMENT

## 2019-02-28 DIAGNOSIS — M54.2 CERVICALGIA: ICD-10-CM

## 2019-02-28 DIAGNOSIS — M99.02 THORACIC SEGMENT DYSFUNCTION: ICD-10-CM

## 2019-02-28 DIAGNOSIS — R51.9 CEPHALGIA: ICD-10-CM

## 2019-02-28 DIAGNOSIS — M99.00 HEAD REGION SOMATIC DYSFUNCTION: ICD-10-CM

## 2019-02-28 DIAGNOSIS — M99.01 CERVICAL SEGMENT DYSFUNCTION: Primary | ICD-10-CM

## 2019-02-28 PROCEDURE — 98940 CHIROPRACT MANJ 1-2 REGIONS: CPT | Mod: AT | Performed by: CHIROPRACTOR

## 2019-02-28 PROCEDURE — 97810 ACUP 1/> WO ESTIM 1ST 15 MIN: CPT | Mod: GA | Performed by: CHIROPRACTOR

## 2019-02-28 NOTE — PROGRESS NOTES
Visit #:  4    Subjective:  Naif Howell Jr. is a 53 year old male who is seen in f/u up for:        Cervical segment dysfunction  Cervicalgia  Thoracic segment dysfunction  Head region somatic dysfunction  Cephalgia.     Since last visit,  Naif Howell Jr. reports:    Area of chief complaint:  Cervical and Thoracic :  Symptoms are graded at 5/10. The quality is described as stiff, achey, dull.  Motion has increased, but is still not normal. The pt reports overall 25%-30 with sx reduction with the PT and Chiropractic. He is using the lumbar support with good results. He feels HA sx are decreasing and responding to medication. He continues with the PT with good results. The pt denies weakness or other unusual sx.       Since last visit the patient feels that they are 25-30 percent  improved from last visit-pt responding well to therapy       Objective:  The following was observed:    P: palpatory tenderness Levator scapulae and Sub-occipital Bilaterally  A: static palpation demonstrates intersegmental asymmetry   R: motion palpation notes restricted motion  T: hypertonicity at: Levator scapulae and Sub-occipital Bilaterally    Segmental spinal dysfunction/restrictions found at:  C2 , C7 , T1 , T7  and T9       Assessment:    Diagnoses:      1. Cervical segment dysfunction    2. Cervicalgia    3. Thoracic segment dysfunction    4. Head region somatic dysfunction    5. Cephalgia        Patient's condition:  Patient improving with all treatments     Treatment effectiveness:  Post manipulation there is better intersegmental movement and Patient claims to feel looser post manipulation      Procedures:  CMT:  91280 Chiropractic manipulative treatment 1-2 regions performed   Cervical: Diversified, C2, C7 , Prone, Supine   Thoracic: Diversified, T1, T8 prone   OCC: R OCC    Modalities:  ACP: Good points to the cervical spine, carmen points to the upper shoulders  30  minutes prone    Therapeutic procedures:  None      Response to Treatment  No change in sx     Prognosis: Guarded    Progress towards Goals: Patient is making progress towards the goal.     Recommendations:    Instructions:  Continue to use lumbar support     Follow-up:    Return to care in 1 week with ACP.

## 2019-03-01 ENCOUNTER — THERAPY VISIT (OUTPATIENT)
Dept: PHYSICAL THERAPY | Facility: CLINIC | Age: 54
End: 2019-03-01
Payer: OTHER GOVERNMENT

## 2019-03-01 DIAGNOSIS — M54.2 CERVICALGIA: ICD-10-CM

## 2019-03-01 PROCEDURE — 97112 NEUROMUSCULAR REEDUCATION: CPT | Mod: GP | Performed by: PHYSICAL THERAPIST

## 2019-03-01 PROCEDURE — 97140 MANUAL THERAPY 1/> REGIONS: CPT | Mod: GP | Performed by: PHYSICAL THERAPIST

## 2019-03-07 ENCOUNTER — THERAPY VISIT (OUTPATIENT)
Dept: CHIROPRACTIC MEDICINE | Facility: CLINIC | Age: 54
End: 2019-03-07
Payer: OTHER GOVERNMENT

## 2019-03-07 DIAGNOSIS — M99.02 THORACIC SEGMENT DYSFUNCTION: ICD-10-CM

## 2019-03-07 DIAGNOSIS — M99.00 HEAD REGION SOMATIC DYSFUNCTION: ICD-10-CM

## 2019-03-07 DIAGNOSIS — M54.2 CERVICALGIA: ICD-10-CM

## 2019-03-07 DIAGNOSIS — M99.01 CERVICAL SEGMENT DYSFUNCTION: Primary | ICD-10-CM

## 2019-03-07 DIAGNOSIS — M54.50 LUMBAGO: ICD-10-CM

## 2019-03-07 DIAGNOSIS — R51.9 CEPHALGIA: ICD-10-CM

## 2019-03-07 DIAGNOSIS — M99.03 SOMATIC DYSFUNCTION OF LUMBAR REGION: ICD-10-CM

## 2019-03-07 PROCEDURE — 99212 OFFICE O/P EST SF 10 MIN: CPT | Mod: 25 | Performed by: CHIROPRACTOR

## 2019-03-07 PROCEDURE — 98941 CHIROPRACT MANJ 3-4 REGIONS: CPT | Mod: AT | Performed by: CHIROPRACTOR

## 2019-03-07 PROCEDURE — 97810 ACUP 1/> WO ESTIM 1ST 15 MIN: CPT | Mod: 51 | Performed by: CHIROPRACTOR

## 2019-03-08 ENCOUNTER — THERAPY VISIT (OUTPATIENT)
Dept: PHYSICAL THERAPY | Facility: CLINIC | Age: 54
End: 2019-03-08
Payer: OTHER GOVERNMENT

## 2019-03-08 ENCOUNTER — TELEPHONE (OUTPATIENT)
Dept: FAMILY MEDICINE | Facility: CLINIC | Age: 54
End: 2019-03-08

## 2019-03-08 DIAGNOSIS — M99.01 CERVICAL SEGMENT DYSFUNCTION: ICD-10-CM

## 2019-03-08 PROCEDURE — 99207 C STAT DRY NEEDLING - IAM: CPT | Mod: GP | Performed by: PHYSICAL THERAPIST

## 2019-03-08 ASSESSMENT — ANXIETY QUESTIONNAIRES
5. BEING SO RESTLESS THAT IT IS HARD TO SIT STILL: NOT AT ALL
1. FEELING NERVOUS, ANXIOUS, OR ON EDGE: SEVERAL DAYS
6. BECOMING EASILY ANNOYED OR IRRITABLE: NOT AT ALL
3. WORRYING TOO MUCH ABOUT DIFFERENT THINGS: NOT AT ALL
7. FEELING AFRAID AS IF SOMETHING AWFUL MIGHT HAPPEN: NOT AT ALL
IF YOU CHECKED OFF ANY PROBLEMS ON THIS QUESTIONNAIRE, HOW DIFFICULT HAVE THESE PROBLEMS MADE IT FOR YOU TO DO YOUR WORK, TAKE CARE OF THINGS AT HOME, OR GET ALONG WITH OTHER PEOPLE: NOT DIFFICULT AT ALL
2. NOT BEING ABLE TO STOP OR CONTROL WORRYING: NOT AT ALL
GAD7 TOTAL SCORE: 2

## 2019-03-08 ASSESSMENT — PATIENT HEALTH QUESTIONNAIRE - PHQ9
5. POOR APPETITE OR OVEREATING: SEVERAL DAYS
SUM OF ALL RESPONSES TO PHQ QUESTIONS 1-9: 4

## 2019-03-08 NOTE — PROGRESS NOTES
Visit #:  5  Ne Episode: Acute low back pain    Subjective:  Naif Howell Jr. is a 53 year old male who is seen in f/u up for:        Cervical segment dysfunction  Cervicalgia  Thoracic segment dysfunction  Head region somatic dysfunction  Cephalgia  Somatic dysfunction of lumbar region  Lumbago.     Since last visit,  Naif Howell Jr. reports:    Area of chief complaint:  Cervical and Thoracic :  Symptoms are graded at 4/10. The quality is described as stiff, achey, dull.  Motion has increased, but is still not normal. The pt reports overall 50% pain reduction and increased motion in the neck area. HA are subsiding. He woke up today with moderate pain in the pelvic and low back pain and is unable to walk. It seemed the low back and sacral area was under spasm and he was unable to sit at work. He denies radiation of pain in the extremities, bowel/bladder issues or other unusual sx. Pain is focused in the R SI joint. Ambulation is difficult. The pt denies weakness in the extremities or other unusual sx.     Since last visit the patient feels that they are 50 percent  improved from last visit-exacerbation in the low back area        Objective:     Lumbar orthopaedic tests: Kemps: B +  SLR: + B, Patricks: B hip pain and low back pain    ISD: L5, R PSIS     Tenderness to palpation: B QL, B lumbar paraspinals         The following was observed:    P: palpatory tenderness Levator scapulae and Sub-occipital Bilaterally  A: static palpation demonstrates intersegmental asymmetry   R: motion palpation notes restricted motion  T: hypertonicity at: Levator scapulae and Sub-occipital Bilaterally    Segmental spinal dysfunction/restrictions found at:  C2 , C7 , T1 , T7  and T9 , R PSIS       Assessment:    Diagnoses:      1. Cervical segment dysfunction    2. Cervicalgia    3. Thoracic segment dysfunction    4. Head region somatic dysfunction    5. Cephalgia    6. Somatic dysfunction of lumbar region    7. Lumbago         Patient's condition:  Patient improving with all treatments     Treatment effectiveness:  Post manipulation there is better intersegmental movement and Patient claims to feel looser post manipulation      Procedures:    35135: examination for lumbar spine  CMT:  63437 Chiropractic manipulative treatment 3-4 regions performed   Cervical: Diversified, C2, C7 , Prone, Supine   Thoracic: Diversified, T1, T8 prone   Pelvic: R PSIS, L PSIS drop table    OCC: R OCC activator adjustment -prone     Modalities:  ACP: Good points to the cervical spine, carmen points to the upper shoulders-added points to the lumbar spine and legs   30  minutes prone    Therapeutic procedures:  None     Response to Treatment  No change in sx     Prognosis: Guarded    Progress towards Goals: Patient is making progress towards the goal.     Recommendations:    Instructions:  Continue to use lumbar support     Follow-up:    Return to care in 1 week with ACP.   Press-ups  Cervical stretching

## 2019-03-08 NOTE — TELEPHONE ENCOUNTER
Panel Management Review      Patient has the following on his problem list:     Hypertension   Last three blood pressure readings:  BP Readings from Last 3 Encounters:   02/25/19 141/86   02/21/19 110/78   02/07/19 138/88     Blood pressure: FAILED    HTN Guidelines:  Age 18-59 BP range:  Less than 140/90  Age 60-85 with Diabetes:  Less than 140/90  Age 60-85 without Diabetes:  less than 150/90      Composite cancer screening  Chart review shows that this patient is due/due soon for the following None  Summary:    Patient is due/failing the following:   PHQ9    Action needed:   Patient needs to do PHQ9.    Type of outreach:    Phone, spoke to patient.  phq and sathya completed     Questions for provider review:    None                                                                                                                                    Danis Sanders [facility]           Chart routed to none .

## 2019-03-09 ASSESSMENT — ANXIETY QUESTIONNAIRES: GAD7 TOTAL SCORE: 2

## 2019-03-14 ENCOUNTER — THERAPY VISIT (OUTPATIENT)
Dept: CHIROPRACTIC MEDICINE | Facility: CLINIC | Age: 54
End: 2019-03-14
Payer: OTHER GOVERNMENT

## 2019-03-14 DIAGNOSIS — R51.9 CEPHALGIA: ICD-10-CM

## 2019-03-14 DIAGNOSIS — M54.50 BILATERAL LOW BACK PAIN WITHOUT SCIATICA, UNSPECIFIED CHRONICITY: ICD-10-CM

## 2019-03-14 DIAGNOSIS — M99.00 HEAD REGION SOMATIC DYSFUNCTION: ICD-10-CM

## 2019-03-14 DIAGNOSIS — M99.03 SOMATIC DYSFUNCTION OF LUMBAR REGION: ICD-10-CM

## 2019-03-14 DIAGNOSIS — M54.2 CERVICALGIA: ICD-10-CM

## 2019-03-14 DIAGNOSIS — M99.01 CERVICAL SEGMENT DYSFUNCTION: Primary | ICD-10-CM

## 2019-03-14 DIAGNOSIS — M99.02 THORACIC SEGMENT DYSFUNCTION: ICD-10-CM

## 2019-03-14 PROCEDURE — 97810 ACUP 1/> WO ESTIM 1ST 15 MIN: CPT | Mod: GA | Performed by: CHIROPRACTOR

## 2019-03-14 PROCEDURE — 98941 CHIROPRACT MANJ 3-4 REGIONS: CPT | Mod: AT | Performed by: CHIROPRACTOR

## 2019-03-14 NOTE — PROGRESS NOTES
Visit #:  6      Subjective:  Naif Howell Jr. is a 53 year old male who is seen in f/u up for:        Cervical segment dysfunction  Cervicalgia  Thoracic segment dysfunction  Head region somatic dysfunction  Somatic dysfunction of lumbar region  Cephalgia  Bilateral low back pain without sciatica, unspecified chronicity.     Since last visit,  Naif Howell Jr. reports:    Area of chief complaint:  Cervical and Thoracic :  Symptoms are graded at 3/10. The quality is described as stiff, achey, dull.  Motion has increased, but is still not normal. The pt reports overall 75% pain reduction in the neck area. He is waking with only mild HA sx however the px resolves quickly. ROM in the cervical spine is much better. The pt reports 50% reduction in the low back. He states he was driving for 6 hours yesterday and it seemed to increase his pain. He also cleaned up water that flooded an area and he was slightly sore afterwards. He denies radiation of pain in the extremities.     Since last visit the patient feels that they are 75 percent  improved from last visit       Objective:     Lumbar orthopaedic tests: Kemps: B +  SLR: + B, Patricks: B hip pain and low back pain    ISD: L5, R PSIS     Tenderness to palpation: B QL, B lumbar paraspinals         The following was observed:    P: palpatory tenderness Levator scapulae and Sub-occipital Bilaterally  A: static palpation demonstrates intersegmental asymmetry   R: motion palpation notes restricted motion  T: hypertonicity at: Levator scapulae and Sub-occipital Bilaterally    Segmental spinal dysfunction/restrictions found at:  C2 , C7 , T1 , T7  and T9 , R PSIS       Assessment:    Diagnoses:      1. Cervical segment dysfunction    2. Cervicalgia    3. Thoracic segment dysfunction    4. Head region somatic dysfunction    5. Somatic dysfunction of lumbar region    6. Cephalgia    7. Bilateral low back pain without sciatica, unspecified chronicity        Patient's  condition:  Patient improving with all treatments     Treatment effectiveness:  Post manipulation there is better intersegmental movement and Patient claims to feel looser post manipulation      Procedures:      CMT:  82002 Chiropractic manipulative treatment 3-4 regions performed   Cervical: Diversified, C2, C7 , Prone, Supine   Thoracic: Diversified, T1, T8 prone   Pelvic: R PSIS, L PSIS drop table    OCC: R OCC activator adjustment -prone   Lumbar: L5, diversified, side posture    Modalities:  ACP: Good points to the cervical spine, carmen points to the upper shoulders-added points to the lumbar spine and legs   30  minutes prone    Therapeutic procedures:  None     Response to Treatment  No change in sx     Prognosis: Guarded    Progress towards Goals: Patient is making progress towards the goal.     Recommendations:    Instructions:  Continue to use lumbar support     Follow-up:    Return to care in 1 week with ACP.   Press-ups  Cervical stretching

## 2019-03-15 ENCOUNTER — THERAPY VISIT (OUTPATIENT)
Dept: PHYSICAL THERAPY | Facility: CLINIC | Age: 54
End: 2019-03-15
Payer: OTHER GOVERNMENT

## 2019-03-15 DIAGNOSIS — M54.2 CERVICALGIA: ICD-10-CM

## 2019-03-15 PROCEDURE — 99207 C STAT DRY NEEDLING - IAM: CPT | Mod: GP | Performed by: PHYSICAL THERAPIST

## 2019-03-22 ENCOUNTER — THERAPY VISIT (OUTPATIENT)
Dept: PHYSICAL THERAPY | Facility: CLINIC | Age: 54
End: 2019-03-22
Payer: OTHER GOVERNMENT

## 2019-03-22 DIAGNOSIS — M54.2 CERVICALGIA: ICD-10-CM

## 2019-03-22 PROCEDURE — 97032 APPL MODALITY 1+ESTIM EA 15: CPT | Mod: GP | Performed by: PHYSICAL THERAPIST

## 2019-03-22 PROCEDURE — 99207 C STAT DRY NEEDLING - IAM: CPT | Mod: 25 | Performed by: PHYSICAL THERAPIST

## 2019-03-26 ENCOUNTER — THERAPY VISIT (OUTPATIENT)
Dept: CHIROPRACTIC MEDICINE | Facility: CLINIC | Age: 54
End: 2019-03-26
Payer: OTHER GOVERNMENT

## 2019-03-26 DIAGNOSIS — M99.02 THORACIC SEGMENT DYSFUNCTION: ICD-10-CM

## 2019-03-26 DIAGNOSIS — M99.00 HEAD REGION SOMATIC DYSFUNCTION: ICD-10-CM

## 2019-03-26 DIAGNOSIS — M99.04 SOMATIC DYSFUNCTION OF SACRAL REGION: ICD-10-CM

## 2019-03-26 DIAGNOSIS — M54.2 CERVICALGIA: ICD-10-CM

## 2019-03-26 DIAGNOSIS — M99.01 CERVICAL SEGMENT DYSFUNCTION: Primary | ICD-10-CM

## 2019-03-26 DIAGNOSIS — R51.9 CEPHALGIA: ICD-10-CM

## 2019-03-26 PROCEDURE — 97810 ACUP 1/> WO ESTIM 1ST 15 MIN: CPT | Mod: GA | Performed by: CHIROPRACTOR

## 2019-03-26 PROCEDURE — 98941 CHIROPRACT MANJ 3-4 REGIONS: CPT | Mod: AT | Performed by: CHIROPRACTOR

## 2019-03-28 NOTE — PROGRESS NOTES
Visit #:  7      Subjective:  Naif Howell Jr. is a 53 year old male who is seen in f/u up for:        Cervical segment dysfunction  Cervicalgia  Thoracic segment dysfunction  Head region somatic dysfunction  Cephalgia  Somatic dysfunction of sacral region.     Since last visit,  Naif Howell Jr. reports:    Area of chief complaint:  Cervical and Thoracic :  Symptoms are graded at 3/10. The quality is described as stiff, achey, dull.  Motion has increased, but is still not normal. The pt reports overall 75% pain reduction in the neck area. He is waking with only mild HA sx however the px resolves quickly. ROM in the cervical spine is much better. The pt denies pain in the low back area. He reports mid back tension today and moderate R occipital pain. He is improving overall and pleased with his progress. The pt denies weakness or other unusual sx.     Since last visit the patient feels that they are 75 percent  improved from last visit       Objective:     Lumbar orthopaedic tests: Kemps: B +  SLR: + B, Patricks: B hip pain and low back pain    ISD: L5, R PSIS     Tenderness to palpation: B QL, B lumbar paraspinals         The following was observed:    P: palpatory tenderness Levator scapulae and Sub-occipital Bilaterally  A: static palpation demonstrates intersegmental asymmetry   R: motion palpation notes restricted motion  T: hypertonicity at: Levator scapulae and Sub-occipital Bilaterally    Segmental spinal dysfunction/restrictions found at:  C2 , C7 , T1 , T7  and T9 , R PSIS       Assessment:    Diagnoses:      1. Cervical segment dysfunction    2. Cervicalgia    3. Thoracic segment dysfunction    4. Head region somatic dysfunction    5. Cephalgia    6. Somatic dysfunction of sacral region        Patient's condition:  Patient improving with all treatments     Treatment effectiveness:  Post manipulation there is better intersegmental movement and Patient claims to feel looser post  manipulation      Procedures:      CMT:  13628 Chiropractic manipulative treatment 3-4 regions performed   Cervical: Diversified, C2, C7 , Prone, Supine   Thoracic: Diversified, T1, T8 prone   Pelvic: R PSIS, L PSIS drop table    OCC: R OCC activator adjustment -prone       Modalities:  ACP: [place] points to the cervical spine, carmen points to the upper shoulders-added points to the lumbar spine and legs   30  minutes prone    Therapeutic procedures:  None     Response to Treatment  No change in sx     Prognosis: Guarded    Progress towards Goals: Patient is making progress towards the goal.  Goals:  SLEEPING: the patient specific goal is to attain his pre-injury status of 6-8 hours comfortably  SITTING: the patient specific goal is to attain pre-injury status of  6-8 hours comfortably       Recommendations:    Instructions:  Continue to use lumbar support     Follow-up:    Return to care in 1 week with ACP.   Press-ups  Cervical stretching

## 2019-04-04 ENCOUNTER — THERAPY VISIT (OUTPATIENT)
Dept: CHIROPRACTIC MEDICINE | Facility: CLINIC | Age: 54
End: 2019-04-04
Payer: OTHER GOVERNMENT

## 2019-04-04 DIAGNOSIS — M99.00 HEAD REGION SOMATIC DYSFUNCTION: ICD-10-CM

## 2019-04-04 DIAGNOSIS — R51.9 CEPHALGIA: ICD-10-CM

## 2019-04-04 DIAGNOSIS — M99.02 THORACIC SEGMENT DYSFUNCTION: ICD-10-CM

## 2019-04-04 DIAGNOSIS — M54.2 CERVICALGIA: ICD-10-CM

## 2019-04-04 DIAGNOSIS — M99.01 CERVICAL SEGMENT DYSFUNCTION: Primary | ICD-10-CM

## 2019-04-04 PROCEDURE — 97810 ACUP 1/> WO ESTIM 1ST 15 MIN: CPT | Mod: GA | Performed by: CHIROPRACTOR

## 2019-04-04 PROCEDURE — 97110 THERAPEUTIC EXERCISES: CPT | Performed by: CHIROPRACTOR

## 2019-04-04 PROCEDURE — 98940 CHIROPRACT MANJ 1-2 REGIONS: CPT | Mod: AT | Performed by: CHIROPRACTOR

## 2019-04-04 NOTE — PROGRESS NOTES
Visit #:  8      Subjective:  Naif Howell Jr. is a 53 year old male who is seen in f/u up for:        Cervical segment dysfunction  Cervicalgia  Thoracic segment dysfunction  Head region somatic dysfunction  Cephalgia.     Since last visit,  Naif Howell Jr. reports:    Area of chief complaint:  Cervical and Thoracic :  Symptoms are graded at 3/10. The quality is described as stiff, achey, dull.  Motion has increased, but is still not normal. The pt reports overall 75% pain reduction in the neck area. He denied neck pain and HA for 5 days post treatment however today he reports R sided suboccipital pain and mild tension. He denies weakness or other unusual sx.     Since last visit the patient feels that they are 75 percent  improved from last visit-no change        Objective:     Lumbar orthopaedic tests: Kemps: B +  SLR: + B, Patricks: B hip pain and low back pain    ISD: L5, R PSIS     Tenderness to palpation: B QL, B lumbar paraspinals         The following was observed:    P: palpatory tenderness Levator scapulae and Sub-occipital Bilaterally  A: static palpation demonstrates intersegmental asymmetry   R: motion palpation notes restricted motion  T: hypertonicity at: Levator scapulae and Sub-occipital Bilaterally    Segmental spinal dysfunction/restrictions found at:  C2 , C7 , T1 , T7  and T9 , R PSIS       Assessment:    Diagnoses:      1. Cervical segment dysfunction    2. Cervicalgia    3. Thoracic segment dysfunction    4. Head region somatic dysfunction    5. Cephalgia        Patient's condition:  Patient improving with all treatments     Treatment effectiveness:  Post manipulation there is better intersegmental movement and Patient claims to feel looser post manipulation      Procedures:      CMT:  58805 Chiropractic manipulative treatment 1-2 egions performed   Cervical: Diversified, C2, C7 , Prone, Supine   Thoracic: Diversified, T1, T8 prone   OCC: R OCC activator adjustment -prone        Modalities:  ACP: Good points to the cervical spine, carmen points to the upper shoulders-added points to the lumbar spine and legs   30  minutes prone    Therapeutic procedures:  Gave press-ups with demonstration  Gave seated and supine cervical flexion with demonstration  Gave levator scapula stretch with demonstration. Gave levator with rotation at differing angles with demonstration as per protocol.   Per 8 minutes     Response to Treatment  No change in sx     Prognosis: Guarded    Progress towards Goals: Patient is making progress towards the goal.  Goals:  SLEEPING: the patient specific goal is to attain his pre-injury status of 6-8 hours comfortably  SITTING: the patient specific goal is to attain pre-injury status of  6-8 hours comfortably       Recommendations:    Instructions:  Continue to use lumbar support     Follow-up:    Return to care in 1 week with ACP.

## 2019-05-03 NOTE — PROGRESS NOTES
[facility]    Date of visit: 5/6/2019    Chief complaint:   Chief Complaint   Patient presents with     Pain     Interval history:  Naif Howell Jr. is a 54 year old male last seen by me on 2/25/2019.      Recommendations/plan at the last visit included:  1. Therapy: Continue PT and dry needling. He is noticing benefit from this.    2. Clinical Health Pain Psychologist: Not at this time. He is coping well.   3. Diagnostic Studies: No new imaging needed.  4. Medication Management:   1. Continue cyclobenzaprine prn.  2. Discussed that he can try OTC CBD oil or cream to see if this provides some benefit.  5. Further procedures recommended: Can consider trigger point injections in the future.  6. Complementary treatments: Continue chiropractic and acupuncture.    7. Other:  1. Discussed purchasing a TENS unit to try. He can take this to PT to learn how to use it if needed.   2. Discussed purchasing a theracane to see if massaging the muscles will provide some benefit.   8. Follow up: 8-12 weeks in clinic  9. Naif to follow up with Primary Care provider regarding elevated blood pressure.    Since his last visit, Naif Howell Jr. reports:  - Pain is better than at last visit. He has noticed some improvement in headaches as well.  - There are times still about twice a month when he has pain up to a 7/10 which can last for most of the day.   - He is no longer going to PT or doing dry needling. He feels that he has longer lasting benefit with acupuncture so is continuing to do that. He is doing his HEP 2-3 times per day.   - In addition to acupuncture he is getting manipulation which is somewhat helpful.   - Overall, he has noticed about 50% improvement in pain.   - He is happy with the reduction in pain thus far but feels there is still room for improvement.       Pain Information:   Pain quality: Burning    Pain timing: Constant     Pain rating: intensity ranges from 1/10 to 7/10, and averages 3/10 on  a 0-10 scale. Currently, it is 3/10.   Aggravating factors include: placing pressure over the area, standing and sitting too much   Relieving factors include: sleep and medication    Current pain treatments:     Current pain medications:              Nortriptyline 30 mg at bedtime - H for sleep   Tylenol prn- [facility]   Advil PM - [facility]              Current MME: 0    Review of [place] Prescription Monitoring Program (): No concern for abuse or misuse of controlled medications based on this report.     Annual Controlled Substance Agreement/UDS due date: NA    Past pain treatments:  1. Previous Pain Relevant Medications:  NOTE: This medication information taken from patient's intake form, not medical records.               Opiates: oxycodone              NSAIDS: ibuprofen              Muscle Relaxants: Valium, methocarbamol - NH, cyclobenzaprine - NH              Anti-migraine mediations: none              Anti-depressants: none              Sleep aids:none              Anxiolytics: none              Neuropathics: Topamax, gabapentin               Topicals: Lidocaine patches - NH              Other medications not covered above: none     2. Physical Therapy: Has tried for back and neck 3 separate times.   3. Pain Psychology: None  4. Surgery: None  5. Injections:   - Right C3-4 and C4-5 facet joint injections on 2/7/2018   - C7-T1 ILESI on 1/3/2018, 3/21/2017, 5/10/16  - right TON, C3,4 RFA on 3/21/2017  - T5-6 ILESI on 7/12/16 - no relief     6. Chiropractic: no  7. Acupuncture: no  8. TENS Unit: no  9. Other: heating pad and ice packs    Medications:  Current Outpatient Medications   Medication Sig Dispense Refill     ciprofloxacin-hydrocortisone (CIPRO HC) 0.2-1 % otic suspension 3 drops       cyclobenzaprine (FLEXERIL) 10 MG tablet Take 1 tablet (10 mg) by mouth 3 times daily as needed for muscle spasms Do not drive within 8 hours of taking this medication.       losartan (COZAAR) 100 MG tablet Take 1 tablet (100  "mg) by mouth daily 90 tablet 3     multivitamin, therapeutic with minerals (THERA-VIT-M) TABS tablet Take 1 tablet by mouth daily       nortriptyline (PAMELOR) 10 MG capsule Take 3 capsules (30 mg) by mouth daily       order for DME Equipment being ordered: home blood pressure monitor with extra large cuff 1 Units 0     sildenafil (REVATIO/VIAGRA) 20 MG tablet Take 1 tablet (20 mg) by mouth three times daily for pulmonary hypertension.  Never use with nitroglycerin, terazosin or doxazosin. 9 tablet 0     simvastatin (ZOCOR) 40 MG tablet Take 1 tablet (40 mg) by mouth At Bedtime 90 tablet 3       Medical History: any changes in medical history since they were last seen? No    Review of Systems:  The 14 system ROS was reviewed from the intake questionnaire, and is positive for: headache, dizziness, vision changes, earache, ringing in ears, high blood pressure, back pain, neck pain.  Any bowel or bladder problems: none  Mood: anxiety    Physical Exam:  Blood pressure (!) 160/106, pulse 93, weight 103 kg (227 lb), SpO2 98 %.  General: A&O x 3, in no distress  Gait: Normal  Neuro: strength 5/5 in BUE and BLE  MSK: tender to palpation in right trapezius and right rhomboids.     Imaging:  MRI of Cervical Spine was completed on 12/8/2017 shows:  \"FINDINGS: There is normal alignment of the cervical vertebrae;  however, there is straightening of normal cervical lordosis. Vertebral  body heights of the cervical spine are normal. Marrow signal  throughout the cervical vertebrae is within normal limits. There is no  evidence for fracture or pathologic bony lesion of the cervical spine.        There is loss of disc height, disc desiccation and posterior disc  bulging to varying degrees at all levels of the cervical spine with  exception of the normal appearing C7-T1 disc.      The cervical spinal cord is mildly deformed by degenerative changes at  the C5-C6 level. The cervical spinal cord is otherwise normal in  contour. There is " no abnormal signal in the cervical spinal cord.  There is no evidence for intrathecal abnormality.     Level by level:     C2-C3: There is facet arthropathy bilaterally, uncinate hypertrophy  bilaterally and a posterior broad-based disc-osteophyte complex. These  findings result in moderate right neural foraminal stenosis but no  spinal canal or left foraminal stenosis. No change from the comparison  study.     C3-C4: There is facet arthropathy bilaterally, uncinate hypertrophy  bilaterally and a posterior broad-based disc-osteophyte complex. These  findings result in severe right foraminal stenosis, mild left  foraminal narrowing and minimal spinal canal narrowing. No change from  the comparison study.     C4-C5: There is facet arthropathy bilaterally, uncinate hypertrophy  bilaterally and a posterior broad-based disc-osteophyte complex. There  is no spinal canal or neural foraminal stenosis at this level. No  change from the comparison study.     C5-C6: There is facet arthropathy bilaterally, uncinate hypertrophy  bilaterally and a posterior broad-based disc-osteophyte complex with a  superimposed small right paracentral disc herniation (protrusion).  These findings result in moderate-severe right foraminal stenosis and  mild spinal canal narrowing but no left foraminal stenosis. No change  from the comparison study.     C6-C7: There is facet arthropathy bilaterally, uncinate hypertrophy  bilaterally and a posterior broad-based disc-osteophyte complex. These  findings result in mild bilateral neural foraminal narrowing but no  spinal canal stenosis. No change from the comparison study.     C7-T1: There is facet arthropathy and uncinate arthropathy  bilaterally. These findings result in mild-moderate left foraminal  narrowing and mild right foraminal narrowing but no spinal canal  stenosis. No change from the comparison study.        IMPRESSION: Degenerative changes of the cervical spine as  "described  Above.\"     MRI of Thoracic Spine was completed on 6/10/2016 which showed:  \"FINDINGS: Vertebral body alignment is normal. No fracture is seen.  There is a just under 2 cm benign-appearing hemangioma within the T5  vertebral body. There is a similar-appearing less than 1 cm diameter  hemangioma within the T10 vertebral body and a 1 cm hemangioma in the  T12 vertebral body. No other abnormal marrow signal intensity is  identified. No spinal cord or intrathecal abnormality is seen. The  adjacent soft tissues are unremarkable.     The discs of the thoracic spine are well hydrated and their heights  are well-preserved. No disc bulge or herniation is seen and no  stenosis is identified. The facet joints are unremarkable.         IMPRESSION: Unremarkable MRI of the thoracic spine.\"       Assessment:   Naif Howell Jr. is a 53 year old male with a past medical history significant for vestibular migraine, SBO, GERD, aortic regurgitation, HTN, PTSD, anxiety, dylipidemia who presents with complaints of neck pain.     1. Neck pain: Etiology of his pain is most consistent with myofascial pain. There are active trigger points palpated on exam which reproduces his symptoms. He has paresthesias into bilateral upper extremities in ulnar distribution. Most likely due to ulnar neuropathy. There is positive tinel's at the elbow bilaterally. He has underlying cervical spondylosis and facet arthropathy.    2. Thoracic pain: symptoms are mostly on the right side. Pain is mostly myofascial with tenderness to palpation of the right rhomboids.     3. Hx of migraines: being managed by neurology       Plan:  1. Therapy: Continue HEP. He is doing this regularly.    2. Clinical Health Pain Psychologist: Not at this time. He is coping well.   3. Diagnostic Studies: No new imaging needed.  4. Medication Management:   1. Start Cymbalta 30 mg daily x 7 days and then increase to 60 mg daily if well tolerated.  2. Stop cyclobenzaprine " since not providing benefit  3. Discussed that he can try OTC CBD oil or cream or arnica gel to see if this provides some benefit.  5. Further procedures recommended: Can consider trigger point injections in the future when pain is more severe.   6. Complementary treatments: Continue chiropractic and acupuncture. Discussed he can try spacing out appointments.   7. Other:  1. Discussed purchasing a TENS unit to try.   2. Discussed purchasing a theracane to see if massaging the muscles will provide some benefit.   8. Follow up: 8-12 weeks  9. Naif to follow up with Primary Care provider regarding elevated blood pressure.    I spent 30 minutes of time face to face with the patient.  Greater than 50% of this time was spent in patient counseling and/or coordination of care.    Gail Berry MD  [facility]

## 2019-05-05 ENCOUNTER — MYC REFILL (OUTPATIENT)
Dept: CARE COORDINATION | Facility: CLINIC | Age: 54
End: 2019-05-05

## 2019-05-05 DIAGNOSIS — E78.2 MIXED HYPERLIPIDEMIA: ICD-10-CM

## 2019-05-06 ENCOUNTER — OFFICE VISIT (OUTPATIENT)
Dept: PALLIATIVE MEDICINE | Facility: CLINIC | Age: 54
End: 2019-05-06
Payer: OTHER GOVERNMENT

## 2019-05-06 ENCOUNTER — TELEPHONE (OUTPATIENT)
Dept: PALLIATIVE MEDICINE | Facility: CLINIC | Age: 54
End: 2019-05-06

## 2019-05-06 VITALS
WEIGHT: 227 LBS | HEART RATE: 93 BPM | DIASTOLIC BLOOD PRESSURE: 106 MMHG | OXYGEN SATURATION: 98 % | SYSTOLIC BLOOD PRESSURE: 160 MMHG | BODY MASS INDEX: 29.15 KG/M2

## 2019-05-06 DIAGNOSIS — M54.6 CHRONIC RIGHT-SIDED THORACIC BACK PAIN: ICD-10-CM

## 2019-05-06 DIAGNOSIS — M47.812 FACET ARTHROPATHY, CERVICAL: Primary | ICD-10-CM

## 2019-05-06 DIAGNOSIS — G89.29 CHRONIC RIGHT-SIDED THORACIC BACK PAIN: ICD-10-CM

## 2019-05-06 DIAGNOSIS — M79.18 MYOFASCIAL PAIN SYNDROME, CERVICAL: ICD-10-CM

## 2019-05-06 PROCEDURE — 99214 OFFICE O/P EST MOD 30 MIN: CPT | Performed by: PHYSICAL MEDICINE & REHABILITATION

## 2019-05-06 RX ORDER — DULOXETIN HYDROCHLORIDE 30 MG/1
30 CAPSULE, DELAYED RELEASE ORAL DAILY
Qty: 60 CAPSULE | Refills: 0 | Status: SHIPPED | OUTPATIENT
Start: 2019-05-06 | End: 2019-07-22

## 2019-05-06 ASSESSMENT — PAIN SCALES - GENERAL: PAINLEVEL: MILD PAIN (3)

## 2019-05-06 NOTE — TELEPHONE ENCOUNTER
I saw Naif in clinic today but forgot to have him schedule a follow up appointment with me on his way out. Please call and have him schedule a follow up in 8-12 weeks.    Gail Berry MD  [facility]

## 2019-05-06 NOTE — PATIENT INSTRUCTIONS
1. Start Cymbalta 30 mg daily for 7 days and increase to 60 mg if well tolerated. We will be starting duloxetine also known as cymbalta.    Common side effects include: Nausea, drowsiness, dry mouth, dizziness, constipation, decreased appetite and decreased libido.    Avoid activities requiring mental alertness or coordination until you know the drugs effects on you as there is a risk for dizziness and sleepiness.    If you are experiencing drowsiness, consider taking the medication at night instead.    There is a risk for worsening depression with this medication. Immediately report worsening mood symptoms, suicidal ideation or changes in your behavior.    Report any new rash that develops as there has been cases of severe and life threatening rashes reported.     Do not suddenly discontinue this medication as you may have withdrawal symptoms, speak with your primary physician or pain provider prior to discontinuing.    Avoid heavy alcohol intake with this medication as it may lead to liver injury.    If you have symptoms of an allergic reaction (swelling, difficulty breathing, hives, itching, rash) immediately stop the medication and contact a health care provider.    2. You can purchase a theracane and a TENS unit online to see if they provide some benefit.  3. Continue acupuncture treatments. You can try spacing them out to every 2 weeks.  4. Trigger point injections are something we can try when pain worsens.   5. Continue using tylenol and/or ibuprofen prn. Do not exceed 3,000 mg of tylenol and ibuprofen 2,400 mg daily.  6. You can try Arnica gel, CBD oil/cream (Nothing but Hemp) to see if it provides some benefit.  ----------------------------------------------------------------  Clinic Number:  [phone]   Call this number with any questions about your care and for scheduling assistance. Calls are returned Monday through Friday between 8 AM and 4:30 PM. We usually get back to you within 2 business days  depending on the issue/request.       Medication refills:    For non-opioid medications, call your pharmacy directly to request a refill. The pharmacy will contact the [facility] for authorization. Please allow 3-4 days for these refills to be processed.     For opioid refills, call the clinic number or send a [employer] message. Please contact us 7-10 days before your refill is due. The message MUST include the name of the specific medication(s) requested and how you would like to receive the prescription(s). The options are as follows:    Pain Clinic staff can mail the prescription to your pharmacy. Please tell us the name of the pharmacy.    You may pick the prescription up at the Pain Clinic (tell us the location) or during a clinic visit with your pain provider    Pain Clinic staff can deliver the prescription to the [facility] pharmacy in the clinic building. Please tell us the location.      We believe regular attendance is key to your success in our program.    Any time you are unable to keep your appointment we ask that you call us at least 24 hours in advance to let us know. This will allow us to offer the appointment time to another patient.

## 2019-05-06 NOTE — TELEPHONE ENCOUNTER
Mychart sent to see if pt has been taking based on last date prescribed   Kenisha Goodwin RN, BSN

## 2019-05-06 NOTE — TELEPHONE ENCOUNTER
"Requested Prescriptions   Pending Prescriptions Disp Refills     simvastatin (ZOCOR) 40 MG tablet 90 tablet 3     Sig: Take 1 tablet (40 mg) by mouth At Bedtime     Last Written Prescription Date:  07/18/2017  Last Fill Quantity: 90 tablet,  # refills: 3   Last office visit: Department has no specialty with prescribing provider:  02/07/2019   Future Office Visit:   Next 5 appointments (look out 90 days)    May 06, 2019  1:00 PM CDT  Return Visit with Gail Berry MD  [facility] ([facility]) [address] [facility]  [address] [place] [id number]  [phone]               Statins Protocol Passed - 5/6/2019  7:22 AM        Passed - LDL on file in past 12 months     Recent Labs   Lab Test 02/21/19  0942   *             Passed - No abnormal creatine kinase in past 12 months     No lab results found.             Passed - Recent (12 mo) or future (30 days) visit within the authorizing provider's specialty     Patient had office visit in the last 12 months or has a visit in the next 30 days with authorizing provider or within the authorizing provider's specialty.  See \"Patient Info\" tab in inbasket, or \"Choose Columns\" in Meds & Orders section of the refill encounter.              Passed - Medication is active on med list        Passed - Patient is age 18 or older          Aj MANE  "

## 2019-05-06 NOTE — TELEPHONE ENCOUNTER
Per dell pt has not been taking them daily as prescribed but has been now.  Please advise    Kenisha Goodwin RN, BSN

## 2019-05-08 RX ORDER — SIMVASTATIN 40 MG
40 TABLET ORAL AT BEDTIME
Qty: 90 TABLET | Refills: 3 | Status: SHIPPED | OUTPATIENT
Start: 2019-05-08 | End: 2020-05-05

## 2019-05-08 NOTE — TELEPHONE ENCOUNTER
CAMILA for patient to schedule follow up appointment        Marilia Kingsley    [facility]

## 2019-05-14 ENCOUNTER — THERAPY VISIT (OUTPATIENT)
Dept: CHIROPRACTIC MEDICINE | Facility: CLINIC | Age: 54
End: 2019-05-14
Payer: OTHER GOVERNMENT

## 2019-05-14 DIAGNOSIS — R51.9 CEPHALGIA: ICD-10-CM

## 2019-05-14 DIAGNOSIS — M99.01 CERVICAL SEGMENT DYSFUNCTION: Primary | ICD-10-CM

## 2019-05-14 DIAGNOSIS — M99.00 HEAD REGION SOMATIC DYSFUNCTION: ICD-10-CM

## 2019-05-14 DIAGNOSIS — M99.02 THORACIC SEGMENT DYSFUNCTION: ICD-10-CM

## 2019-05-14 DIAGNOSIS — M54.2 CERVICALGIA: ICD-10-CM

## 2019-05-14 PROCEDURE — 97810 ACUP 1/> WO ESTIM 1ST 15 MIN: CPT | Mod: GA | Performed by: CHIROPRACTOR

## 2019-05-14 PROCEDURE — 98940 CHIROPRACT MANJ 1-2 REGIONS: CPT | Mod: AT | Performed by: CHIROPRACTOR

## 2019-05-14 NOTE — PROGRESS NOTES
Visit #:  9      Subjective:  Naif Howell Jr. is a 53 year old male who is seen in f/u up for:        Cervical segment dysfunction  Cervicalgia  Thoracic segment dysfunction  Head region somatic dysfunction  Cephalgia.     Since last visit,  Naif Howell Jr. reports:    Area of chief complaint:  Cervical and Thoracic :  Symptoms are graded at 3/10. The quality is described as stiff, achey, dull.  Motion has increased, but is still not normal. The pt reports overall 75% pain reduction in the neck area. He has been doing well and is much more stable. He has HA sx intermittently throughout the week however not very often and they resolve quickly. He states he was performing heavy lifting and repetitious yard work activities without pain afterwards. He states he was sore however he denied specific HA or neck pain at the time. Yesterday he mowed the lawn and did not experience an exacerbation. He is pleased with his progress. Today he notes R sided upper neck pain in the suboccipital region with reduced ROM. He denies HA sx.    Since last visit the patient feels that they are 75 percent  improved from last visit-pt is stable      Objective:     Lumbar orthopaedic tests: Kemps: B +  SLR: + B, Patricks: B hip pain and low back pain    ISD: L5, R PSIS     Tenderness to palpation: B QL, B lumbar paraspinals         The following was observed:    P: palpatory tenderness Levator scapulae and Sub-occipital Bilaterally  A: static palpation demonstrates intersegmental asymmetry   R: motion palpation notes restricted motion  T: hypertonicity at: Levator scapulae and Sub-occipital Bilaterally    Segmental spinal dysfunction/restrictions found at:  C2 , C7 , T1 , T7  and T9 , R PSIS       Assessment:    Diagnoses:      1. Cervical segment dysfunction    2. Cervicalgia    3. Thoracic segment dysfunction    4. Head region somatic dysfunction    5. Cephalgia        Patient's condition:  Patient improving with all treatments      Treatment effectiveness:  Post manipulation there is better intersegmental movement and Patient claims to feel looser post manipulation      Procedures:      CMT:  15244 Chiropractic manipulative treatment 1-2 egions performed   Cervical: Diversified, C2, C7 , Prone, Supine   Thoracic: Diversified, T1, T8 prone   OCC: R OCC activator adjustment -prone       Modalities:  ACP: [place] points to the cervical spine, carmen points to the upper shoulders-added points to the lumbar spine and legs   30  minutes prone    Therapeutic procedures:  None     Response to Treatment  No change in sx     Prognosis: Guarded    Progress towards Goals: Patient is making progress towards the goal.  Goals:  SLEEPING: the patient specific goal is to attain his pre-injury status of 6-8 hours comfortably  SITTING: the patient specific goal is to attain pre-injury status of  6-8 hours comfortably       Recommendations:    Instructions:  Continue to use lumbar support     Follow-up:    Return to care in 2 week with ACP.

## 2019-05-30 ENCOUNTER — THERAPY VISIT (OUTPATIENT)
Dept: CHIROPRACTIC MEDICINE | Facility: CLINIC | Age: 54
End: 2019-05-30
Payer: OTHER GOVERNMENT

## 2019-05-30 DIAGNOSIS — R51.9 CEPHALGIA: ICD-10-CM

## 2019-05-30 DIAGNOSIS — M99.00 HEAD REGION SOMATIC DYSFUNCTION: ICD-10-CM

## 2019-05-30 DIAGNOSIS — M99.02 THORACIC SEGMENT DYSFUNCTION: ICD-10-CM

## 2019-05-30 DIAGNOSIS — M99.01 CERVICAL SEGMENT DYSFUNCTION: Primary | ICD-10-CM

## 2019-05-30 DIAGNOSIS — M54.2 CERVICALGIA: ICD-10-CM

## 2019-05-30 PROCEDURE — 98940 CHIROPRACT MANJ 1-2 REGIONS: CPT | Mod: AT | Performed by: CHIROPRACTOR

## 2019-05-30 PROCEDURE — 97810 ACUP 1/> WO ESTIM 1ST 15 MIN: CPT | Mod: GA | Performed by: CHIROPRACTOR

## 2019-05-30 NOTE — PROGRESS NOTES
Visit #:  10      Subjective:  Naif Howell Jr. is a 53 year old male who is seen in f/u up for:        Cervical segment dysfunction  Cervicalgia  Thoracic segment dysfunction  Head region somatic dysfunction  Cephalgia.     Since last visit,  Naif Howell Jr. reports:    Area of chief complaint:  Cervical and Thoracic :  Symptoms are graded at 4/10. The quality is described as stiff, achey, dull.  Motion has increased, but is still not normal. The pt reports overall 75% pain reduction in the neck area. He denies HA sx for 2 weeks however just the past 2 days he noted a moderate HA unresponsive to medication. He reports stress in the past 2 days that may have contributed. The pain is located on the R side of the neck. He reports decreased ROM in the cervical spine as well. The pt denies weakness in the extremities or other unusual sx    Since last visit the patient feels that they are 75 percent  improved from last visit-no change, slight exacerbation   Objective:     Lumbar orthopaedic tests: Kemps: B +  SLR: + B, Patricks: B hip pain and low back pain    ISD: L5, R PSIS     Tenderness to palpation: B QL, B lumbar paraspinals         The following was observed:    P: palpatory tenderness Levator scapulae and Sub-occipital Bilaterally  A: static palpation demonstrates intersegmental asymmetry   R: motion palpation notes restricted motion  T: hypertonicity at: Levator scapulae and Sub-occipital Bilaterally    Segmental spinal dysfunction/restrictions found at:  C2 , C7 , T1 , T7  and T9 , R PSIS       Assessment:    Diagnoses:      1. Cervical segment dysfunction    2. Cervicalgia    3. Thoracic segment dysfunction    4. Head region somatic dysfunction    5. Cephalgia        Patient's condition:  Patient improving with all treatments     Treatment effectiveness:  Post manipulation there is better intersegmental movement and Patient claims to feel looser post manipulation      Procedures:      CMT:  70026 Chiropractic  manipulative treatment 1-2 egions performed   Cervical: Diversified, C2, C7 , Prone, Supine   Thoracic: Diversified, T1, T8 prone   OCC: R OCC activator adjustment -prone       Modalities:  ACP: Good points to the cervical spine, carmen points to the upper shoulders-added points to the lumbar spine and legs   30  minutes prone    Therapeutic procedures:  None     Response to Treatment  No change in sx     Prognosis: Guarded    Progress towards Goals: Patient is making progress towards the goal.  Goals:  SLEEPING: the patient specific goal is to attain his pre-injury status of 6-8 hours comfortably  SITTING: the patient specific goal is to attain pre-injury status of  6-8 hours comfortably       Recommendations:    Instructions:  Continue to use lumbar support     Follow-up:    Return to care in 2 week with ACP.

## 2019-06-26 ENCOUNTER — OFFICE VISIT (OUTPATIENT)
Dept: URGENT CARE | Facility: URGENT CARE | Age: 54
End: 2019-06-26
Payer: OTHER GOVERNMENT

## 2019-06-26 VITALS
HEART RATE: 104 BPM | RESPIRATION RATE: 16 BRPM | TEMPERATURE: 97.9 F | DIASTOLIC BLOOD PRESSURE: 72 MMHG | BODY MASS INDEX: 29.13 KG/M2 | WEIGHT: 227 LBS | HEIGHT: 74 IN | SYSTOLIC BLOOD PRESSURE: 124 MMHG

## 2019-06-26 DIAGNOSIS — T14.8XXA PUNCTURE WOUND: Primary | ICD-10-CM

## 2019-06-26 PROCEDURE — 99213 OFFICE O/P EST LOW 20 MIN: CPT | Performed by: PHYSICIAN ASSISTANT

## 2019-06-26 RX ORDER — AMOXICILLIN 875 MG
875 TABLET ORAL 2 TIMES DAILY
Qty: 10 TABLET | Refills: 0 | Status: SHIPPED | OUTPATIENT
Start: 2019-06-26 | End: 2019-08-14

## 2019-06-26 ASSESSMENT — ENCOUNTER SYMPTOMS
FEVER: 0
CHILLS: 0
WOUND: 1

## 2019-06-26 ASSESSMENT — MIFFLIN-ST. JEOR: SCORE: 1929.42

## 2019-06-26 NOTE — PROGRESS NOTES
SUBJECTIVE:   Naif Howell Jr. is a 54 year old male presenting with a chief complaint of   Chief Complaint   Patient presents with     Finger     left thumb injury at 1pm today, pucture wound, wrapped       He is an established patient of [facility].    He is presenting to urgent care today with a complaint of left thumb injury.  He was working on his deck and inadvertently sustained a puncture wound with the screwdriver.  The wound was bleeding prior to arrival, reason which prompted his visit today. He did put pressure dressing on prior to arrival.  He notes his tetanus is up-to-date.      Review of Systems   Constitutional: Negative for chills and fever.   Skin: Positive for wound.       Past Medical History:   Diagnosis Date     Anxiety     resolved     Aortic regurgitation      Back pain      Basal cell carcinoma     left forehead     Chronic neck pain      Degenerative disc disease, cervical     See MRI - 2017     GERD (gastroesophageal reflux disease)      Hypertension      Migraines      PTSD (post-traumatic stress disorder)      SBO (small bowel obstruction) (H)      Vestibular migraine      Family History   Problem Relation Age of Onset     Hypertension Mother         alive     Cancer Father         chest tumor-not sure about details,  at age of 71     Family History Negative Sister         2     Hypertension Sister      Family History Negative Brother         1     Family History Negative Maternal Grandmother      Hypertension Maternal Grandmother      Family History Negative Maternal Grandfather      Family History Negative Paternal Grandmother      Family History Negative Paternal Grandfather      Cancer - colorectal No family hx of      Prostate Cancer No family hx of      Glaucoma No family hx of      Macular Degeneration No family hx of      Current Outpatient Medications   Medication Sig Dispense Refill     amoxicillin (AMOXIL) 875 MG tablet Take 1 tablet (875 mg) by mouth 2 times daily for 5  "days 10 tablet 0     DULoxetine (CYMBALTA) 30 MG capsule Take 1 capsule (30 mg) by mouth daily X 7 days and then increase to 2 capsules (60 mg) daily. 60 capsule 0     losartan (COZAAR) 100 MG tablet Take 1 tablet (100 mg) by mouth daily 90 tablet 3     multivitamin, therapeutic with minerals (THERA-VIT-M) TABS tablet Take 1 tablet by mouth daily       nortriptyline (PAMELOR) 10 MG capsule Take 3 capsules (30 mg) by mouth daily       order for DME Equipment being ordered: home blood pressure monitor with extra large cuff 1 Units 0     sildenafil (REVATIO/VIAGRA) 20 MG tablet Take 1 tablet (20 mg) by mouth three times daily for pulmonary hypertension.  Never use with nitroglycerin, terazosin or doxazosin. 9 tablet 0     simvastatin (ZOCOR) 40 MG tablet Take 1 tablet (40 mg) by mouth At Bedtime 90 tablet 3     ciprofloxacin-hydrocortisone (CIPRO HC) 0.2-1 % otic suspension 3 drops       Social History     Tobacco Use     Smoking status: Current Every Day Smoker     Packs/day: 1.00     Types: Cigarettes     Smokeless tobacco: Never Used     Tobacco comment: trying   Substance Use Topics     Alcohol use: No       OBJECTIVE  /72 (BP Location: Right arm, Patient Position: Chair, Cuff Size: Adult Regular)   Pulse 104   Temp 97.9  F (36.6  C) (Oral)   Resp 16   Ht 1.88 m (6' 2\")   Wt 103 kg (227 lb)   BMI 29.15 kg/m      Physical Exam   Constitutional: He appears well-developed and well-nourished. No distress.   HENT:   Head: Normocephalic.   Eyes: Conjunctivae are normal.   Pulmonary/Chest: Effort normal. No respiratory distress.   Musculoskeletal: He exhibits no deformity.   Left thumb exam: Range of motion is normal.  No evidence of tendon injury.   Neurological: He is alert.   Skin: Skin is warm and dry.   Puncture wound noted left lateral thumb, about 2 mm in size. No foreign body. Bleeding is controlled.  Nail is intact.  No need for sutures.       Labs:  No results found for this or any previous visit " (from the past 24 hour(s)).    X-Ray was not done.    ASSESSMENT:      ICD-10-CM    1. Puncture wound T14.8XXA amoxicillin (AMOXIL) 875 MG tablet          PLAN:    Puncture wound left thumb: Per record patient last tetanus is from 2007.  Patient is adamant he has had his tetanus updated 3 years ago in the .  Tdap deferred today.  Puncture wound, no need for sutures today.  The wound is wrapped with nonadherent dressing and then coban.  Wound care instructions are given. Amoxicillin is prescribed for infection prophylaxis.  Advised to keep the affected area clean.  Follow-up if any worsening symptoms.  He agrees with the plan.    Followup:    If not improving or if condition worsens, follow up with your Primary Care Provider

## 2019-07-19 DIAGNOSIS — M47.812 FACET ARTHROPATHY, CERVICAL: ICD-10-CM

## 2019-07-19 DIAGNOSIS — M54.6 CHRONIC RIGHT-SIDED THORACIC BACK PAIN: ICD-10-CM

## 2019-07-19 DIAGNOSIS — G89.29 CHRONIC RIGHT-SIDED THORACIC BACK PAIN: ICD-10-CM

## 2019-07-19 RX ORDER — DULOXETIN HYDROCHLORIDE 30 MG/1
30 CAPSULE, DELAYED RELEASE ORAL DAILY
Qty: 60 CAPSULE | Refills: 0 | Status: CANCELLED | OUTPATIENT
Start: 2019-07-19

## 2019-07-19 NOTE — TELEPHONE ENCOUNTER
Please call and see what dose of Cymbalta Naif has been taking. Is he still using 30 mg per day or did he increase to 60 mg per day?    Gail Berry MD  [facility]

## 2019-07-19 NOTE — TELEPHONE ENCOUNTER
Received fax request from   [facility] [address] [place], [place] - [address] AT [facility] [id number]  [facility] [address]  Phone: [phone] Fax: [phone]    Pharmacy requesting refill(s) for DULoxetine (CYMBALTA) 30 MG capsule    Last refilled on 05/06/19    Pt last seen on 05/06/19    Next appt scheduled for 08/14/19    Will facilitate refill.    Nataly Cesepdes [facility]  [place]

## 2019-07-22 RX ORDER — DULOXETIN HYDROCHLORIDE 30 MG/1
30 CAPSULE, DELAYED RELEASE ORAL DAILY
Qty: 30 CAPSULE | Refills: 1 | Status: SHIPPED | OUTPATIENT
Start: 2019-07-22 | End: 2019-09-20

## 2019-07-22 NOTE — TELEPHONE ENCOUNTER
Signed Prescriptions:                        Disp   Refills    DULoxetine (CYMBALTA) 30 MG capsule        30 cap*1        Sig: Take 1 capsule (30 mg) by mouth daily  Authorizing Provider: ANNIE JAMES MD  [facility]

## 2019-07-22 NOTE — TELEPHONE ENCOUNTER
Called patient to gather information. Patient states he is taking 30mg daily of Cymbalta. He said it is working at this dose and doesn't want to increase if he doesn't have to.     Script prepped for cymbalta 30mg daily.     Helene WHITMANN-RN Care Coordinator  [facility]

## 2019-08-14 ENCOUNTER — OFFICE VISIT (OUTPATIENT)
Dept: PALLIATIVE MEDICINE | Facility: CLINIC | Age: 54
End: 2019-08-14
Payer: OTHER GOVERNMENT

## 2019-08-14 VITALS
OXYGEN SATURATION: 97 % | SYSTOLIC BLOOD PRESSURE: 117 MMHG | DIASTOLIC BLOOD PRESSURE: 82 MMHG | HEART RATE: 106 BPM | WEIGHT: 227 LBS | BODY MASS INDEX: 29.13 KG/M2 | HEIGHT: 74 IN

## 2019-08-14 DIAGNOSIS — M79.18 MYOFASCIAL PAIN SYNDROME, CERVICAL: Primary | ICD-10-CM

## 2019-08-14 PROCEDURE — 99214 OFFICE O/P EST MOD 30 MIN: CPT | Performed by: PHYSICAL MEDICINE & REHABILITATION

## 2019-08-14 RX ORDER — TIZANIDINE 2 MG/1
2 TABLET ORAL 3 TIMES DAILY PRN
Qty: 15 TABLET | Refills: 1 | Status: SHIPPED | OUTPATIENT
Start: 2019-08-14 | End: 2019-10-08

## 2019-08-14 ASSESSMENT — MIFFLIN-ST. JEOR: SCORE: 1929.42

## 2019-08-14 NOTE — PATIENT INSTRUCTIONS
1. Start tizanidine 2 mg three times daily as needed. If helpful and well tolerated you can increase to 4 mg three times daily as needed. Try this when you are at home to see how you tolerate the first time you use it.   2. Try a TENS unit (TENS 7000 2nd Edition Digital TENS Unit) to see if this provides some benefit. You can purchase this online.  3. Also you can try a theracane to see if it is helpful. This can also be purchased online.   4. If you want to try trigger point injections you can call the clinic and I will place that order.   5. Continue Cymbalta at 30 mg daily.   6. Can consider using CBD in the future.   7. Follow up in 12 weeks or sooner for trigger point injections.  ----------------------------------------------------------------  Clinic Number:  [phone]     Call with any questions about your care and for scheduling assistance.     Calls are returned Monday through Friday between 8 AM and 4:30 PM. We usually get back to you within 2 business days depending on the issue/request.    If we are prescribing your medications:    For opioid medication refills, call the clinic or send a Anki message 7 days in advance.  Please include:    Name of requested medication    Name of the pharmacy.    For non-opioid medications, call your pharmacy directly to request a refill. Please allow 3-4 days to be processed.     Per [place] State Law:    All controlled substance prescriptions must be filled within 30 days of being written.      For those controlled substances allowing refills, pickup must occur within 30 days of last fill.      We believe regular attendance is key to your success in our program!      Any time you are unable to keep your appointment we ask that you call us at least 24 hours in advance to cancel.This will allow us to offer the appointment time to another patient.     Multiple missed appointments may lead to dismissal from the clinic.

## 2019-08-14 NOTE — PROGRESS NOTES
"[facility]    Date of visit: 8/14/2019    Chief complaint:   Chief Complaint   Patient presents with     Pain     Interval history:  Naif Howell Jr. is a 54 year old male last seen by me on 5/6/2019.      Recommendations/plan at the last visit included:  1. Therapy: Continue HEP. He is doing this regularly.    2. Clinical Health Pain Psychologist: Not at this time. He is coping well.   3. Diagnostic Studies: No new imaging needed.  4. Medication Management:   1. Start Cymbalta 30 mg daily x 7 days and then increase to 60 mg daily if well tolerated.  2. Stop cyclobenzaprine since not providing benefit  3. Discussed that he can try OTC CBD oil or cream or arnica gel to see if this provides some benefit.  5. Further procedures recommended: Can consider trigger point injections in the future when pain is more severe.   6. Complementary treatments: Continue chiropractic and acupuncture. Discussed he can try spacing out appointments.   7. Other:  1. Discussed purchasing a TENS unit to try.   2. Discussed purchasing a theracane to see if massaging the muscles will provide some benefit.   8. Follow up: 8-12 weeks  9. Naif to follow up with Primary Care provider regarding elevated blood pressure.    Since his last visit, Naif Howell Jr. reports:  - Pain is better than it was at last visit.  - Still having some degree of headaches every morning. The more he is moving the better it gets. More tension type in quality.    - is taking Cymbalta 30 mg daily. Using at night. Wakes up with some \"fogginess\" in the mornings but is SWH.    Pain Information:   Pain quality: Nagging    Pain timing: Constant     Pain rating: intensity ranges from 1/10 to 6/10, and averages 3/10 on a 0-10 scale. Currently, it is 2/10.   Aggravating factors include: placing pressure over the area, standing and sitting too much   Relieving factors include: sleep and medication    Current pain treatments:     Current pain medications:    "           Nortriptyline 30 mg at bedtime - H for sleep   Tylenol prn- [facility]   Advil PM - [facility]   Cymbalta 30 mg daily - [facility]              Current MME: 0    Review of [place] Prescription Monitoring Program (): No concern for abuse or misuse of controlled medications based on this report.     Annual Controlled Substance Agreement/UDS due date: NA    Past pain treatments:  1. Previous Pain Relevant Medications:  NOTE: This medication information taken from patient's intake form, not medical records.               Opiates: oxycodone              NSAIDS: ibuprofen              Muscle Relaxants: Valium, methocarbamol - [facility], cyclobenzaprine - [facility]               Anti-migraine mediations: none              Anti-depressants: none              Sleep aids:none              Anxiolytics: none              Neuropathics: Topamax, gabapentin               Topicals: Lidocaine patches - [facility]              Other medications not covered above: none     2. Physical Therapy: Has tried for back and neck 3 separate times.   3. Pain Psychology: None  4. Surgery: None  5. Injections:   - Right C3-4 and C4-5 facet joint injections on 2/7/2018   - C7-T1 ILESI on 1/3/2018, 3/21/2017, 5/10/16  - right TON, C3,4 RFA on 3/21/2017  - T5-6 ILESI on 7/12/16 - no relief  6. Chiropractic: no  7. Acupuncture: no  8. TENS Unit: no  9. Other: heating pad and ice packs    Medications:  Current Outpatient Medications   Medication Sig Dispense Refill     ciprofloxacin-hydrocortisone (CIPRO HC) 0.2-1 % otic suspension 3 drops       DULoxetine (CYMBALTA) 30 MG capsule Take 1 capsule (30 mg) by mouth daily 30 capsule 1     losartan (COZAAR) 100 MG tablet Take 1 tablet (100 mg) by mouth daily 90 tablet 3     multivitamin, therapeutic with minerals (THERA-VIT-M) TABS tablet Take 1 tablet by mouth daily       nortriptyline (PAMELOR) 10 MG capsule Take 3 capsules (30 mg) by mouth daily       order for DME Equipment being ordered: home blood pressure monitor with extra  "large cuff 1 Units 0     sildenafil (REVATIO/VIAGRA) 20 MG tablet Take 1 tablet (20 mg) by mouth three times daily for pulmonary hypertension.  Never use with nitroglycerin, terazosin or doxazosin. 9 tablet 0     simvastatin (ZOCOR) 40 MG tablet Take 1 tablet (40 mg) by mouth At Bedtime 90 tablet 3       Medical History: any changes in medical history since they were last seen? No    Review of Systems:  The 14 system ROS was reviewed from the intake questionnaire, and is positive for:weight gain, headache, dizziness, earache, ringing in ears, high blood pressure, back pain, neck pain, numbness/tingling  Any bowel or bladder problems: none  Mood: anxiety    Physical Exam:  Blood pressure 117/82, pulse 106, height 1.88 m (6' 2\"), weight 103 kg (227 lb), SpO2 97 %.  General: A&O x 3, in no distress  Gait: Normal  Neuro: strength 5/5 in BUE and BLE    Imaging:  MRI of Cervical Spine was completed on 12/8/2017 shows:  \"FINDINGS: There is normal alignment of the cervical vertebrae;  however, there is straightening of normal cervical lordosis. Vertebral  body heights of the cervical spine are normal. Marrow signal  throughout the cervical vertebrae is within normal limits. There is no  evidence for fracture or pathologic bony lesion of the cervical spine.        There is loss of disc height, disc desiccation and posterior disc  bulging to varying degrees at all levels of the cervical spine with  exception of the normal appearing C7-T1 disc.      The cervical spinal cord is mildly deformed by degenerative changes at  the C5-C6 level. The cervical spinal cord is otherwise normal in  contour. There is no abnormal signal in the cervical spinal cord.  There is no evidence for intrathecal abnormality.     Level by level:     C2-C3: There is facet arthropathy bilaterally, uncinate hypertrophy  bilaterally and a posterior broad-based disc-osteophyte complex. These  findings result in moderate right neural foraminal stenosis but " "no  spinal canal or left foraminal stenosis. No change from the comparison  study.     C3-C4: There is facet arthropathy bilaterally, uncinate hypertrophy  bilaterally and a posterior broad-based disc-osteophyte complex. These  findings result in severe right foraminal stenosis, mild left  foraminal narrowing and minimal spinal canal narrowing. No change from  the comparison study.     C4-C5: There is facet arthropathy bilaterally, uncinate hypertrophy  bilaterally and a posterior broad-based disc-osteophyte complex. There  is no spinal canal or neural foraminal stenosis at this level. No  change from the comparison study.     C5-C6: There is facet arthropathy bilaterally, uncinate hypertrophy  bilaterally and a posterior broad-based disc-osteophyte complex with a  superimposed small right paracentral disc herniation (protrusion).  These findings result in moderate-severe right foraminal stenosis and  mild spinal canal narrowing but no left foraminal stenosis. No change  from the comparison study.     C6-C7: There is facet arthropathy bilaterally, uncinate hypertrophy  bilaterally and a posterior broad-based disc-osteophyte complex. These  findings result in mild bilateral neural foraminal narrowing but no  spinal canal stenosis. No change from the comparison study.     C7-T1: There is facet arthropathy and uncinate arthropathy  bilaterally. These findings result in mild-moderate left foraminal  narrowing and mild right foraminal narrowing but no spinal canal  stenosis. No change from the comparison study.        IMPRESSION: Degenerative changes of the cervical spine as described  Above.\"     MRI of Thoracic Spine was completed on 6/10/2016 which showed:  \"FINDINGS: Vertebral body alignment is normal. No fracture is seen.  There is a just under 2 cm benign-appearing hemangioma within the T5  vertebral body. There is a similar-appearing less than 1 cm diameter  hemangioma within the T10 vertebral body and a 1 cm " "hemangioma in the  T12 vertebral body. No other abnormal marrow signal intensity is  identified. No spinal cord or intrathecal abnormality is seen. The  adjacent soft tissues are unremarkable.     The discs of the thoracic spine are well hydrated and their heights  are well-preserved. No disc bulge or herniation is seen and no  stenosis is identified. The facet joints are unremarkable.         IMPRESSION: Unremarkable MRI of the thoracic spine.\"       Assessment:   Naif Howell Jr. is a 53 year old male with a past medical history significant for vestibular migraine, SBO, GERD, aortic regurgitation, HTN, PTSD, anxiety, dylipidemia who presents with complaints of neck pain.     1. Neck pain: Etiology of his pain is most consistent with myofascial pain. There are active trigger points palpated on exam which reproduces his symptoms. He has underlying cervical spondylosis and facet arthropathy.    2. Thoracic pain: symptoms are mostly on the right side. Pain is mostly myofascial with tenderness to palpation of the right rhomboids.     3. Hx of migraines: being managed by neurology       Plan:  1. Therapy: Continue HEP.     2. Clinical Health Pain Psychologist: Not at this time. He is coping well.   3. Diagnostic Studies: No new imaging needed.  4. Medication Management:   1. Start tizanidine 2 mg TID prn and can increase to 4 mg if well tolerated.   2. Continue Cymbalta 30 mg daily. Feels that this dose is SWH. Has some \"fogginess\" with this so does not want to increase the dose.    3. Discussed that he can try OTC CBD oil or cream or arnica gel to see if this provides some benefit. Cannot use CBD/Hemp products while working. He is retiring in the near future so will consider trying it at that time.   5. Further procedures recommended: Can consider trigger point injections in the future when pain is more severe. He can call the clinic if interested in trying that.   6. Complementary treatments: Continue chiropractic and " acupuncture if helpful  7. Other:  1. Discussed again purchasing a TENS unit to try.   2. Discussed again purchasing a theracane to see if massaging the muscles will provide some benefit.   8. Follow up: 12 weeks or sooner if he wants to try trigger point injections    I spent 30 minutes of time face to face with the patient.  Greater than 50% of this time was spent in patient counseling and/or coordination of care.    Gail Berry MD  [facility]

## 2019-09-17 ENCOUNTER — THERAPY VISIT (OUTPATIENT)
Dept: CHIROPRACTIC MEDICINE | Facility: CLINIC | Age: 54
End: 2019-09-17
Payer: OTHER GOVERNMENT

## 2019-09-17 DIAGNOSIS — M99.02 THORACIC SEGMENT DYSFUNCTION: ICD-10-CM

## 2019-09-17 DIAGNOSIS — M54.2 CERVICALGIA: ICD-10-CM

## 2019-09-17 DIAGNOSIS — M99.00 HEAD REGION SOMATIC DYSFUNCTION: ICD-10-CM

## 2019-09-17 DIAGNOSIS — M99.01 CERVICAL SEGMENT DYSFUNCTION: Primary | ICD-10-CM

## 2019-09-17 DIAGNOSIS — R51.9 CEPHALGIA: ICD-10-CM

## 2019-09-17 PROCEDURE — 98940 CHIROPRACT MANJ 1-2 REGIONS: CPT | Mod: AT | Performed by: CHIROPRACTOR

## 2019-09-17 PROCEDURE — 97035 APP MDLTY 1+ULTRASOUND EA 15: CPT | Performed by: CHIROPRACTOR

## 2019-09-17 NOTE — PROGRESS NOTES
Visit #:  11      Subjective:  Naif Howell Jr. is a 53 year old male who is seen in f/u up for:        Cervical segment dysfunction  Cervicalgia  Thoracic segment dysfunction  Head region somatic dysfunction  Cephalgia.     Since last visit,  Naif Howell Jr. reports:    Area of chief complaint:  Cervical and Thoracic :  Symptoms are graded at 6/10. The quality is described as stiff, achey, dull.  Motion has increased, but is still not normal. The pt reports overall less than 75% pain reduction in the neck area. In the past 3 months he reports an increase in stress and HA sx. The pt states he has some R sided shoulder blade pain when seated for prolonged periods. He has been travelling for several months. HA sx have increased in the past month. The pt denies weakness or other unusual sx.     Since last visit the patient feels that they are less than 75 percent  improved from last visit-exacerbation/regression    Objective:     Lumbar orthopaedic tests: Kemps: B +  SLR: + B, Patricks: B hip pain and low back pain    ISD: L5, R PSIS     Tenderness to palpation: B QL, B lumbar paraspinals         The following was observed:    P: palpatory tenderness Levator scapulae and Sub-occipital Bilaterally  A: static palpation demonstrates intersegmental asymmetry   R: motion palpation notes restricted motion  T: hypertonicity at: Levator scapulae and Sub-occipital Bilaterally    Segmental spinal dysfunction/restrictions found at:  C2 , C7 , T1 , T7  and T9 , R PSIS       Assessment:    Diagnoses:      1. Cervical segment dysfunction    2. Cervicalgia    3. Thoracic segment dysfunction    4. Head region somatic dysfunction    5. Cephalgia        Patient's condition:  Patient improving with all treatments     Treatment effectiveness:  Post manipulation there is better intersegmental movement and Patient claims to feel looser post manipulation      Procedures:      CMT:  73219 Chiropractic manipulative treatment 1-2 egions  performed   Cervical: Diversified, C2, C7 , Prone, Supine   Thoracic: Diversified, T1, T8 prone   OCC: R OCC activator adjustment -prone       Modalities:  US therapy: 1.5 pulsed US to the suboccipitals and R rhomboid  @ 1 MHZ 8 minutes     Therapeutic procedures:  None     Response to Treatment  No change in sx     Prognosis: Guarded    Progress towards Goals: Patient is making progress towards the goal.  Goals:  SLEEPING: the patient specific goal is to attain his pre-injury status of 6-8 hours comfortably  SITTING: the patient specific goal is to attain pre-injury status of  6-8 hours comfortably       Recommendations:    Instructions:  Continue to use lumbar support     Follow-up:    Next available for US or ACP

## 2019-09-20 DIAGNOSIS — M54.6 CHRONIC RIGHT-SIDED THORACIC BACK PAIN: ICD-10-CM

## 2019-09-20 DIAGNOSIS — M47.812 FACET ARTHROPATHY, CERVICAL: ICD-10-CM

## 2019-09-20 DIAGNOSIS — G89.29 CHRONIC RIGHT-SIDED THORACIC BACK PAIN: ICD-10-CM

## 2019-09-20 RX ORDER — DULOXETIN HYDROCHLORIDE 30 MG/1
30 CAPSULE, DELAYED RELEASE ORAL DAILY
Qty: 30 CAPSULE | Refills: 1 | Status: SHIPPED | OUTPATIENT
Start: 2019-09-20 | End: 2019-12-19 | Stop reason: SINTOL

## 2019-09-20 NOTE — TELEPHONE ENCOUNTER
Received fax request from     [id number] - [place], [place] - [address] AT [facility] [id number]  [facility] [address]  Phone: [phone] Fax: [phone]    Pharmacy requesting refill(s) for DULoxetine (CYMBALTA) 30 MG capsule    Last refilled on 08/26/19    Pt last seen on 08/14/19    Next appt scheduled for 11/06/19    Will facilitate refill.    Nataly Cespedes [facility]  [place]

## 2019-09-20 NOTE — TELEPHONE ENCOUNTER
Signed Prescriptions:                        Disp   Refills    DULoxetine (CYMBALTA) 30 MG capsule        30 cap*1        Sig: Take 1 capsule (30 mg) by mouth daily  Authorizing Provider: ANNIE JAMES MD  [facility]

## 2019-10-08 ENCOUNTER — HOSPITAL ENCOUNTER (EMERGENCY)
Facility: CLINIC | Age: 54
Discharge: HOME | End: 2019-10-08
Attending: EMERGENCY MEDICINE | Admitting: EMERGENCY MEDICINE
Payer: OTHER GOVERNMENT

## 2019-10-08 ENCOUNTER — APPOINTMENT (OUTPATIENT)
Dept: GENERAL RADIOLOGY | Facility: CLINIC | Age: 54
End: 2019-10-08
Attending: EMERGENCY MEDICINE
Payer: OTHER GOVERNMENT

## 2019-10-08 VITALS
SYSTOLIC BLOOD PRESSURE: 141 MMHG | RESPIRATION RATE: 13 BRPM | OXYGEN SATURATION: 94 % | DIASTOLIC BLOOD PRESSURE: 88 MMHG | BODY MASS INDEX: 26.95 KG/M2 | HEART RATE: 65 BPM | TEMPERATURE: 97.6 F | HEIGHT: 74 IN | WEIGHT: 210 LBS

## 2019-10-08 DIAGNOSIS — M54.6 MIDLINE THORACIC BACK PAIN, UNSPECIFIED CHRONICITY: ICD-10-CM

## 2019-10-08 DIAGNOSIS — I15.9 SECONDARY HYPERTENSION: ICD-10-CM

## 2019-10-08 DIAGNOSIS — R07.89 OTHER CHEST PAIN: ICD-10-CM

## 2019-10-08 LAB
ALBUMIN SERPL-MCNC: 4 G/DL (ref 3.4–5)
ALP SERPL-CCNC: 114 U/L (ref 40–150)
ALT SERPL W P-5'-P-CCNC: 25 U/L (ref 0–70)
ANION GAP SERPL CALCULATED.3IONS-SCNC: 6 MMOL/L (ref 3–14)
AST SERPL W P-5'-P-CCNC: 17 U/L (ref 0–45)
BASOPHILS # BLD AUTO: 0.1 10E9/L (ref 0–0.2)
BASOPHILS NFR BLD AUTO: 0.8 %
BILIRUB DIRECT SERPL-MCNC: <0.1 MG/DL (ref 0–0.2)
BILIRUB SERPL-MCNC: 0.4 MG/DL (ref 0.2–1.3)
BUN SERPL-MCNC: 21 MG/DL (ref 7–30)
CALCIUM SERPL-MCNC: 9.3 MG/DL (ref 8.5–10.1)
CHLORIDE SERPL-SCNC: 108 MMOL/L (ref 94–109)
CO2 SERPL-SCNC: 25 MMOL/L (ref 20–32)
CREAT SERPL-MCNC: 0.94 MG/DL (ref 0.66–1.25)
D DIMER PPP FEU-MCNC: 0.4 UG/ML FEU (ref 0–0.5)
DIFFERENTIAL METHOD BLD: NORMAL
EOSINOPHIL # BLD AUTO: 0.2 10E9/L (ref 0–0.7)
EOSINOPHIL NFR BLD AUTO: 2.7 %
ERYTHROCYTE [DISTWIDTH] IN BLOOD BY AUTOMATED COUNT: 13 % (ref 10–15)
GFR SERPL CREATININE-BSD FRML MDRD: 89 ML/MIN/{1.73_M2}
GLUCOSE SERPL-MCNC: 97 MG/DL (ref 70–99)
HCT VFR BLD AUTO: 49.6 % (ref 40–53)
HGB BLD-MCNC: 17.2 G/DL (ref 13.3–17.7)
IMM GRANULOCYTES # BLD: 0 10E9/L (ref 0–0.4)
IMM GRANULOCYTES NFR BLD: 0.2 %
INR PPP: 1.05 (ref 0.86–1.14)
LIPASE SERPL-CCNC: 94 U/L (ref 73–393)
LYMPHOCYTES # BLD AUTO: 3 10E9/L (ref 0.8–5.3)
LYMPHOCYTES NFR BLD AUTO: 35.4 %
MCH RBC QN AUTO: 29.4 PG (ref 26.5–33)
MCHC RBC AUTO-ENTMCNC: 34.7 G/DL (ref 31.5–36.5)
MCV RBC AUTO: 85 FL (ref 78–100)
MONOCYTES # BLD AUTO: 0.6 10E9/L (ref 0–1.3)
MONOCYTES NFR BLD AUTO: 7 %
NEUTROPHILS # BLD AUTO: 4.5 10E9/L (ref 1.6–8.3)
NEUTROPHILS NFR BLD AUTO: 53.9 %
NRBC # BLD AUTO: 0 10*3/UL
NRBC BLD AUTO-RTO: 0 /100
PLATELET # BLD AUTO: 241 10E9/L (ref 150–450)
POTASSIUM SERPL-SCNC: 4 MMOL/L (ref 3.4–5.3)
PROT SERPL-MCNC: 7 G/DL (ref 6.8–8.8)
RBC # BLD AUTO: 5.86 10E12/L (ref 4.4–5.9)
SODIUM SERPL-SCNC: 139 MMOL/L (ref 133–144)
TROPONIN I SERPL-MCNC: <0.015 UG/L (ref 0–0.04)
TROPONIN I SERPL-MCNC: <0.015 UG/L (ref 0–0.04)
WBC # BLD AUTO: 8.4 10E9/L (ref 4–11)

## 2019-10-08 PROCEDURE — 96374 THER/PROPH/DIAG INJ IV PUSH: CPT

## 2019-10-08 PROCEDURE — 85610 PROTHROMBIN TIME: CPT | Performed by: EMERGENCY MEDICINE

## 2019-10-08 PROCEDURE — 85379 FIBRIN DEGRADATION QUANT: CPT | Performed by: EMERGENCY MEDICINE

## 2019-10-08 PROCEDURE — 71046 X-RAY EXAM CHEST 2 VIEWS: CPT

## 2019-10-08 PROCEDURE — 84484 ASSAY OF TROPONIN QUANT: CPT | Performed by: EMERGENCY MEDICINE

## 2019-10-08 PROCEDURE — 80048 BASIC METABOLIC PNL TOTAL CA: CPT | Performed by: EMERGENCY MEDICINE

## 2019-10-08 PROCEDURE — 80076 HEPATIC FUNCTION PANEL: CPT | Performed by: EMERGENCY MEDICINE

## 2019-10-08 PROCEDURE — 93005 ELECTROCARDIOGRAM TRACING: CPT

## 2019-10-08 PROCEDURE — 83690 ASSAY OF LIPASE: CPT | Performed by: EMERGENCY MEDICINE

## 2019-10-08 PROCEDURE — 25000128 H RX IP 250 OP 636: Performed by: EMERGENCY MEDICINE

## 2019-10-08 PROCEDURE — 96366 THER/PROPH/DIAG IV INF ADDON: CPT

## 2019-10-08 PROCEDURE — 25000132 ZZH RX MED GY IP 250 OP 250 PS 637: Performed by: EMERGENCY MEDICINE

## 2019-10-08 PROCEDURE — 99285 EMERGENCY DEPT VISIT HI MDM: CPT | Mod: 25

## 2019-10-08 PROCEDURE — 85025 COMPLETE CBC W/AUTO DIFF WBC: CPT | Performed by: EMERGENCY MEDICINE

## 2019-10-08 RX ORDER — ASPIRIN 81 MG/1
324 TABLET, CHEWABLE ORAL ONCE
Status: COMPLETED | OUTPATIENT
Start: 2019-10-08 | End: 2019-10-08

## 2019-10-08 RX ORDER — KETOROLAC TROMETHAMINE 30 MG/ML
30 INJECTION, SOLUTION INTRAMUSCULAR; INTRAVENOUS ONCE
Status: COMPLETED | OUTPATIENT
Start: 2019-10-08 | End: 2019-10-08

## 2019-10-08 RX ORDER — SODIUM CHLORIDE 9 MG/ML
1000 INJECTION, SOLUTION INTRAVENOUS CONTINUOUS
Status: DISCONTINUED | OUTPATIENT
Start: 2019-10-08 | End: 2019-10-08 | Stop reason: HOSPADM

## 2019-10-08 RX ADMIN — ASPIRIN 81 MG 324 MG: 81 TABLET ORAL at 18:29

## 2019-10-08 RX ADMIN — SODIUM CHLORIDE 1000 ML: 9 INJECTION, SOLUTION INTRAVENOUS at 18:29

## 2019-10-08 RX ADMIN — KETOROLAC TROMETHAMINE 30 MG: 30 INJECTION, SOLUTION INTRAMUSCULAR at 19:40

## 2019-10-08 ASSESSMENT — ENCOUNTER SYMPTOMS
DIARRHEA: 1
VOMITING: 0
LIGHT-HEADEDNESS: 1
CONSTIPATION: 0
NAUSEA: 1
FATIGUE: 1
BACK PAIN: 1
NECK PAIN: 1
BLOOD IN STOOL: 0

## 2019-10-08 ASSESSMENT — MIFFLIN-ST. JEOR: SCORE: 1852.3

## 2019-10-08 NOTE — ED PROVIDER NOTES
History     Chief Complaint:  Chest Pain    HPI  Naif Howell Jr. is a 54 year old male with a history of an aortic root dilation who presents to the emergency department today for evaluation of chest pain. He has been having this chest pain and neck pain intermittently for months with daily episodes but reports he never had both symptoms at the same time. He is concerned today because an hour prior to being seen he had an episode of chest pain, neck pain, back pain, and light headedness all at once. The chest pain was sternal and he states this moved up into his neck and then through to his back. He then began to feel nauseous and fatigued. His only other complaint over the last few days is some diarrhea but he denies any other bowel or urinary symptoms. The patient denies any trauma to his chest or heavy lifting.       Allergies:  Cefdinir  Cefuroxime  Flagyl [Metronidazole]    Medications:    Cymbalta  Losartan  Zocor  Sildenafil     Past Medical History:    PTSD  Aortic root dilatation  GERD  Aortic regurgitation  Dyslipidemia  Benign essential hypertension  Chronic midline thoracic back pain  Vestibular migraine  SBO  Tobacco use disorder  Ventral hernia without obstruction or gangrene  Cervicalgia  Thoracic segment dysfunction  Cephalgia  Head region somatic dysfunction  Kyphosis  Cervical segment dysfunction   Anxiety  Basal cell carcinoma  Migraines    Past Surgical History:    Resection of basal cell carcinoma  Laparoscopic cholecystectomy  Resect small bowel without ostomy  lysis adhesions  Herniorrhaphy incisional    Family History:    The patient's family history includes Cancer in his father; Family History Negative in his brother, maternal grandfather, maternal grandmother, paternal grandfather, paternal grandmother, and sister; Hypertension in his maternal grandmother, mother, and sister.    Social History:  The patient reports that he has been smoking cigarettes. He has been smoking about 1.00 pack  "per day. He has never used smokeless tobacco. He reports that he does not drink alcohol or use drugs.   PCP: Josefina Rogers  Marital Status: Legally  [3]      Review of Systems   Constitutional: Positive for fatigue.   Cardiovascular: Positive for chest pain.   Gastrointestinal: Positive for diarrhea and nausea. Negative for blood in stool, constipation and vomiting.   Musculoskeletal: Positive for back pain and neck pain.   Neurological: Positive for light-headedness.   All other systems reviewed and are negative.      Physical Exam     Patient Vitals for the past 24 hrs:   BP Temp Temp src Pulse Heart Rate Resp SpO2 Height Weight   10/08/19 2100 (!) 141/88 -- -- 65 68 13 94 % -- --   10/08/19 2030 138/84 -- -- 71 73 16 95 % -- --   10/08/19 2000 (!) 146/84 -- -- 74 69 20 94 % -- --   10/08/19 1930 (!) 149/88 -- -- 74 -- -- 95 % -- --   10/08/19 1900 (!) 147/87 -- -- 79 -- -- -- -- --   10/08/19 1830 (!) 153/89 -- -- 77 77 -- -- -- --   10/08/19 1800 124/87 -- -- 77 76 9 96 % -- --   10/08/19 1746 (!) 187/97 97.6  F (36.4  C) Oral -- 93 20 98 % 1.88 m (6' 2\") 95.3 kg (210 lb)     Physical Exam  General: The patient is alert, in no respiratory distress.    HENT: Mucous membranes moist.    Cardiovascular: Regular rate and rhythm. Good pulses in all four extremities. Normal capillary refill and skin turgor.     Respiratory: Lungs are clear. No nasal flaring. No retractions. No wheezing, no crackles.    Gastrointestinal: Abdomen soft. No guarding, no rebound. No palpable hernias. Epigastric tenderness     Musculoskeletal: No gross deformity. Tenderness over upper back.     Skin: No rashes or petechiae.     Neurologic: The patient is alert and oriented x3. GCS 15. No testable cranial nerve deficit. Follows commands with clear and appropriate speech. Gives appropriate answers. Good strength in all extremities. No gross neurologic deficit. Gross sensation intact. Pupils are round and reactive. No " meningismus.     Lymphatic: No cervical adenopathy. No lower extremity swelling.    Psychiatric: The patient is non-tearful.    Emergency Department Course     ECG:  ECG taken at 1744, ECG read at 1745  Normal sinus rhythm  Cannot rule out inferior infarct  Normal ECG  Rate 82 bpm. ND interval 164 ms. QRS duration 100 ms. QT/QTc 372/434 ms. P-R-T axes 55 -1 46.    ECG taken at 1824, ECG read at 1825  Normal sinus rhythm  Inferior infarct age undetermined  Rate 76 bpm. ND interval 168. QRS duration 104. QT/QTc 386/434. P-R-T axes 45 -12 40    Imaging:  Radiology findings were communicated with the patient who voiced understanding of the findings.  Chest XR PA  & LAT  IMPRESSION: Negative chest. Reading per radiology    Laboratory:  Laboratory findings were communicated with the patient who voiced understanding of the findings.    CBC:  o/w WNL. (WBC 8.4, HGB 17.2, )   BMP: Glucose 97, o/w WNL (Creatinine: 0.94)    Troponin (Collected 1749): <0.015  Troponin (Collected 2002): <0.015  Hepatic panel WNL  D Dimer  0.4  INR: 1.05  Lipase: 94    Interventions:  1829 Asprin 324 mg PO  1824 NS 1L IV Bolus  1940 Toradol, 30 mg, IV    Emergency Department Course:  Past medical records, nursing notes, and vitals reviewed.  1807: I performed an exam of the patient and obtained history, as documented above.   Discussed CT scan vs D-dimer with the patient who is in agreement to wait on a CT until his blood work results.     IV was inserted and blood was drawn for laboratory testing, results above.  The patient was sent for a chest XR while in the emergency department, findings above.    1950: I rechecked the patient. Explained findings to patient.    I personally reviewed the laboratory/imaging results with the Patient and answered all related questions prior to discharge.    2125: I rechecked the patient.  Findings and plan explained to the Patient. Patient discharged home with instructions regarding supportive care,  medications, and reasons to return. The importance of close follow-up was reviewed.     Impression & Plan        Medical Decision Making:  Naif Howell Jr. is a 54 year old male who presents to the emergency department today for evaluation of chest and back pain.  The patient has been extensively worked up for this same pain.  He is Parker had a CT scan this year which did not show signs of dissection.  The patient said he has had all of the same symptoms in the past but never together.  His first blood pressure was elevated leading to think of dissection however his next blood pressures were all normal.  The pain is not reproducible he has no cold symptoms I did consider pneumothorax PE and dissection be on the differential.  After long discussion with the patient about risks and benefits we elected to go with the plan of checking a d-dimer which was negative and then a chest x-ray.  His pain has improved with nonsteroidals.  I think this is more likely chest wall with back pain.  The back pain is reproducible with spasm.  He is aware that even though his troponin is negative he is low risk not no risk for heart disease and agrees to follow-up as an outpatient.  And there is no current signs of ischemia.    Diagnosis:    ICD-10-CM   1. Other chest pain R07.89   2. Midline thoracic back pain, unspecified chronicity M54.6   3. Secondary hypertension I15.9       Disposition:   discharged to home      Scribe Disclosure:  I, Hannah Huizar, am serving as a scribe at 6:07 PM on 10/8/2019 to document services personally performed by Audie Gonzalez MD based on my observations and the provider's statements to me.    [facility] EMERGENCY DEPARTMENT       Audie Gonzalez MD  10/09/19 4815

## 2019-10-08 NOTE — ED TRIAGE NOTES
Pt presents with midsternal chest pain that started an hour PTA while pt was on the computer. Pain radiates in to the back and up the neck, rated 5/10 and sharp. Pain is worse when lying flat. Hx of anxiety and costochondritis.

## 2019-10-08 NOTE — ED AVS SNAPSHOT
[facility] Emergency Department  [address]  [facility] [address]  Phone:  [phone]  Fax:  [phone]                                    Naif Howell Jr.   MRN: [id number]    Department:  [facility] Emergency Department   Date of Visit:  10/8/2019           After Visit Summary Signature Page    I have received my discharge instructions, and my questions have been answered. I have discussed any challenges I see with this plan with the nurse or doctor.    ..........................................................................................................................................  Patient/Patient Representative Signature      ..........................................................................................................................................  Patient Representative Print Name and Relationship to Patient    ..................................................               ................................................  Date                                   Time    ..........................................................................................................................................  Reviewed by Signature/Title    ...................................................              ..............................................  Date                                               Time          22EPIC Rev 08/18

## 2019-10-09 LAB
INTERPRETATION ECG - MUSE: NORMAL
INTERPRETATION ECG - MUSE: NORMAL

## 2019-10-09 NOTE — DISCHARGE INSTRUCTIONS
Discharge Instructions  Chest Pain    You have been seen today for chest pain or discomfort.  At this time, your doctor has found no signs that your chest pain is due to a serious or life-threatening condition, (or you have declined more testing and/or admission to the hospital). However, sometimes there is a serious problem that does not show up right away. Your evaluation today may not be complete and you may need further testing and evaluation.     You need to follow-up with your regular doctor within 3 days.    Return to the Emergency Department if:  Your chest pain changes, gets worse, starts to happen more often, or comes with less activity.  You are short of breath.  You get very weak or tired.  You pass out or faint.  You have any new symptoms, like fever, cough, numb legs, or you cough up blood.  You have anything else that worries you.    Until you follow-up with your regular doctor please do the following:  Take one aspirin daily unless you have an allergy or are told not to by your doctor.  If a stress test appointment has been made, go to the appointment.  If you have questions, contact your regular doctor.    If your doctor today has told you to follow-up with your regular doctor, it is very important that you make an appointment with your clinic and go to the appointment.  If you do not follow-up with your primary doctor, it may result in missing an important development which could result in permanent injury or disability and/or lasting pain.  If there is any problem keeping your appointment, call your doctor or return to the Emergency Department.    If you were given a prescription for medicine here today, be sure to read all of the information (including the package insert) that comes with your prescription.  This will include important information about the medicine, its side effects, and any warnings that you need to know about.  The pharmacist who fills the prescription can provide more  information and answer questions you may have about the medicine.  If you have questions or concerns that the pharmacist cannot address, please call or return to the Emergency Department.     Opioid Medication Information    Pain medications are among the most commonly prescribed medicines, so we are including this information for all our patients. If you did not receive pain medication or get a prescription for pain medicine, you can ignore it.     You may have been given a prescription for an opioid (narcotic) pain medicine and/or have received a pain medicine while here in the Emergency Department. These medicines can make you drowsy or impaired. You must not drive, operate dangerous equipment, or engage in any other dangerous activities while taking these medications. If you drive while taking these medications, you could be arrested for DUI, or driving under the influence. Do not drink any alcohol while you are taking these medications.     Opioid pain medications can cause addiction. If you have a history of chemical dependency of any type, you are at a higher risk of becoming addicted to pain medications.  Only take these prescribed medications to treat your pain when all other options have been tried. Take it for as short a time and as few doses as possible. Store your pain pills in a secure place, as they are frequently stolen and provide a dangerous opportunity for children or visitors in your house to start abusing these powerful medications. We will not replace any lost or stolen medicine.  As soon as your pain is better, you should flush all your remaining medication.     Many prescription pain medications contain Tylenol  (acetaminophen), including Vicodin , Tylenol #3 , Norco , Lortab , and Percocet .  You should not take any extra pills of Tylenol  if you are using these prescription medications or you can get very sick.  Do not ever take more than 3000 mg of acetaminophen in any 24 hour  period.    All opioids tend to cause constipation. Drink plenty of water and eat foods that have a lot of fiber, such as fruits, vegetables, prune juice, apple juice and high fiber cereal.  Take a laxative if you don t move your bowels at least every other day. Miralax , Milk of Magnesia, Colace , or Senna  can be used to keep you regular.      Remember that you can always come back to the Emergency Department if you are not able to see your regular doctor in the amount of time listed above, if you get any new symptoms, or if there is anything that worries you.    Discharge Instructions  Hypertension - High Blood Pressure    During you visit to the Emergency Department, your blood pressure was higher than the recommended blood pressure.  This may be related to stress, pain, medication or other temporary conditions. In these cases, your blood pressure may return to normal on its own. If you have a history of high blood pressure, you may need to have your doctor adjust your medications. Sometimes, your high measurement here may indicate that you have developed high blood pressure that will stay high unless it is treated. Sudden very high blood pressure can cause problems, but usually high blood pressure causes problems over months to years.      Blood pressure is almost never lowered in the Emergency Department, because studies have shown that lowering blood pressure too quickly is much more dangerous than leaving it alone.    You need to follow up with your doctor in 1-3 days to get your blood pressure rechecked.     Return to the Emergency Department if you start to have:  A severe headache.  Chest pain.  Shortness of breath.  Weakness or numbness that affects one part of the body.  Confusion.  Vision changes.  Significant swelling of legs and/or eyes.  A reaction to any medication started in the Emergency Department.    What can I do to help myself?  Avoid alcohol.  Take any blood pressure medicine that you are  prescribed.  Get a good night s sleep.  Lower your salt intake.  Exercise.  Lose weight.  Manage stress.    If blood pressure medication was started in the Emergency Department:  The medicine may not have an immediate effect. The body and brain determine what blood pressure you have. The medicine s job is to retrain the body s  thermostat  to a lower blood pressure.  You will need to follow up with your doctor to see how this medicine is working for you.  If you were given a prescription for medicine here today, be sure to read all of the information (including the package insert) that comes with your prescription.  This will include important information about the medicine, its side effects, and any warnings that you need to know about.  The pharmacist who fills the prescription can provide more information and answer questions you may have about the medicine.  If you have questions or concerns that the pharmacist cannot address, please call or return to the Emergency Department.   Opioid Medication Information    Pain medications are among the most commonly prescribed medicines, so we are including this information for all our patients. If you did not receive pain medication or get a prescription for pain medicine, you can ignore it.     You may have been given a prescription for an opioid (narcotic) pain medicine and/or have received a pain medicine while here in the Emergency Department. These medicines can make you drowsy or impaired. You must not drive, operate dangerous equipment, or engage in any other dangerous activities while taking these medications. If you drive while taking these medications, you could be arrested for DUI, or driving under the influence. Do not drink any alcohol while you are taking these medications.     Opioid pain medications can cause addiction. If you have a history of chemical dependency of any type, you are at a higher risk of becoming addicted to pain medications.  Only take  these prescribed medications to treat your pain when all other options have been tried. Take it for as short a time and as few doses as possible. Store your pain pills in a secure place, as they are frequently stolen and provide a dangerous opportunity for children or visitors in your house to start abusing these powerful medications. We will not replace any lost or stolen medicine.  As soon as your pain is better, you should flush all your remaining medication.     Many prescription pain medications contain Tylenol  (acetaminophen), including Vicodin , Tylenol #3 , Norco , Lortab , and Percocet .  You should not take any extra pills of Tylenol  if you are using these prescription medications or you can get very sick.  Do not ever take more than 3000 mg of acetaminophen in any 24 hour period.    All opioids tend to cause constipation. Drink plenty of water and eat foods that have a lot of fiber, such as fruits, vegetables, prune juice, apple juice and high fiber cereal.  Take a laxative if you don t move your bowels at least every other day. Miralax , Milk of Magnesia, Colace , or Senna  can be used to keep you regular.      Remember that you can always come back to the Emergency Department if you are not able to see your regular doctor in the amount of time listed above, if you get any new symptoms, or if there is anything that worries you.

## 2019-10-09 NOTE — ED NOTES
Pt says his pain has improved since being in ED, rates it 1/10 and denies any associated symptoms. Ambulatory to bathroom independently.

## 2019-10-15 ENCOUNTER — OFFICE VISIT (OUTPATIENT)
Dept: FAMILY MEDICINE | Facility: CLINIC | Age: 54
End: 2019-10-15
Payer: OTHER GOVERNMENT

## 2019-10-15 VITALS
WEIGHT: 215.6 LBS | DIASTOLIC BLOOD PRESSURE: 70 MMHG | HEART RATE: 88 BPM | TEMPERATURE: 97.8 F | RESPIRATION RATE: 16 BRPM | OXYGEN SATURATION: 98 % | SYSTOLIC BLOOD PRESSURE: 120 MMHG | HEIGHT: 74 IN | BODY MASS INDEX: 27.67 KG/M2

## 2019-10-15 DIAGNOSIS — F41.9 ANXIETY: Primary | ICD-10-CM

## 2019-10-15 DIAGNOSIS — F41.9 ANXIETY: ICD-10-CM

## 2019-10-15 LAB — TSH SERPL DL<=0.005 MIU/L-ACNC: 1.1 MU/L (ref 0.4–4)

## 2019-10-15 PROCEDURE — 99214 OFFICE O/P EST MOD 30 MIN: CPT | Performed by: PHYSICIAN ASSISTANT

## 2019-10-15 PROCEDURE — 36415 COLL VENOUS BLD VENIPUNCTURE: CPT | Performed by: PHYSICIAN ASSISTANT

## 2019-10-15 PROCEDURE — 84443 ASSAY THYROID STIM HORMONE: CPT | Performed by: PHYSICIAN ASSISTANT

## 2019-10-15 RX ORDER — DULOXETIN HYDROCHLORIDE 20 MG/1
20 CAPSULE, DELAYED RELEASE ORAL 2 TIMES DAILY
Qty: 15 CAPSULE | Refills: 0 | Status: SHIPPED | OUTPATIENT
Start: 2019-10-15 | End: 2019-12-19 | Stop reason: SINTOL

## 2019-10-15 ASSESSMENT — ANXIETY QUESTIONNAIRES
GAD7 TOTAL SCORE: 8
5. BEING SO RESTLESS THAT IT IS HARD TO SIT STILL: NOT AT ALL
6. BECOMING EASILY ANNOYED OR IRRITABLE: MORE THAN HALF THE DAYS
IF YOU CHECKED OFF ANY PROBLEMS ON THIS QUESTIONNAIRE, HOW DIFFICULT HAVE THESE PROBLEMS MADE IT FOR YOU TO DO YOUR WORK, TAKE CARE OF THINGS AT HOME, OR GET ALONG WITH OTHER PEOPLE: SOMEWHAT DIFFICULT
3. WORRYING TOO MUCH ABOUT DIFFERENT THINGS: SEVERAL DAYS
2. NOT BEING ABLE TO STOP OR CONTROL WORRYING: SEVERAL DAYS
1. FEELING NERVOUS, ANXIOUS, OR ON EDGE: MORE THAN HALF THE DAYS
7. FEELING AFRAID AS IF SOMETHING AWFUL MIGHT HAPPEN: SEVERAL DAYS

## 2019-10-15 ASSESSMENT — MIFFLIN-ST. JEOR: SCORE: 1877.71

## 2019-10-15 ASSESSMENT — PATIENT HEALTH QUESTIONNAIRE - PHQ9
5. POOR APPETITE OR OVEREATING: SEVERAL DAYS
SUM OF ALL RESPONSES TO PHQ QUESTIONS 1-9: 4

## 2019-10-15 NOTE — PROGRESS NOTES
Subjective     Naif Howell Jr. is a 54 year old male who presents to clinic today for the following health issues:    Naif is here with chief complaint of increased anxiety in the last few months.  He states he has had longtime issues with anxiety and had a fairly under control.  He was put on Cymbalta a couple months ago to help with his body pain.  He states it is not helped with this at all and he does not like the way he feels on Cymbalta as he feels kind of fuzzy.  He would like to stop this and try something else.    He was also told that his thyroid was enlarged and to have follow-up on this    HPI   Medication Followup of Cymbalta     Taking Medication as prescribed: yes    Side Effects:  None    Medication Helping Symptoms:  NO, medication was given for larica but doesn't seem to help with anxiety          Current Outpatient Medications   Medication Sig Dispense Refill     DULoxetine (CYMBALTA) 20 MG capsule Take 1 capsule (20 mg) by mouth 2 times daily 15 capsule 0     DULoxetine (CYMBALTA) 30 MG capsule Take 1 capsule (30 mg) by mouth daily 30 capsule 1     losartan (COZAAR) 100 MG tablet Take 1 tablet (100 mg) by mouth daily 90 tablet 3     order for DME Equipment being ordered: home blood pressure monitor with extra large cuff 1 Units 0     sertraline (ZOLOFT) 50 MG tablet Take 1 tablet (50 mg) by mouth daily 30 tablet 1     simvastatin (ZOCOR) 40 MG tablet Take 1 tablet (40 mg) by mouth At Bedtime 90 tablet 3     Recent Labs   Lab Test 10/08/19  1749 02/21/19  0942 12/21/18  1853 02/26/18  1942  06/22/17  0902  09/08/15  1231 09/08/15  1230  05/01/13  0751   A1C  --  5.5  --   --   --  5.3  --   --   --   --   --    LDL  --  141*  --   --   --  143*  --  121  --   --  121   HDL  --  34*  --   --   --  32*  --  33*  --   --  44   TRIG  --  108  --   --   --  107  --  99  --   --  81   ALT 25  --  44 39   < >  --    < >  --   --   --  19   CR 0.94 1.08 0.82 0.78   < > 0.67   < >  --  0.85   < > 0.85  "  GFRESTIMATED 89 76 >90 >90   < > >90  Non  GFR Calc     < >  --  >90  Non  GFR Calc     < > >90   GFRESTBLACK >90 88 >90 >90   < > >90  African American GFR Calc     < >  --  >90   GFR Calc     < > >90   POTASSIUM 4.0 4.5 3.9 3.9   < > 3.9   < >  --  4.0   < > 4.3   TSH  --   --   --   --   --   --   --   --  1.26  --  2.23    < > = values in this interval not displayed.      BP Readings from Last 3 Encounters:   10/15/19 120/70   10/08/19 (!) 141/88   08/14/19 117/82    Wt Readings from Last 3 Encounters:   10/15/19 97.8 kg (215 lb 9.6 oz)   10/08/19 95.3 kg (210 lb)   08/14/19 103 kg (227 lb)                      Reviewed and updated as needed this visit by Provider         Review of Systems   ROS COMP: Constitutional, HEENT, cardiovascular, pulmonary, gi and gu systems are negative, except as otherwise noted.      Objective    /70 (BP Location: Right arm, Patient Position: Chair, Cuff Size: Adult Regular)   Pulse 88   Temp 97.8  F (36.6  C) (Oral)   Resp 16   Ht 1.88 m (6' 2\")   Wt 97.8 kg (215 lb 9.6 oz)   SpO2 98%   BMI 27.68 kg/m    Body mass index is 27.68 kg/m .  Physical Exam   GENERAL: healthy, alert and no distress  NECK: no adenopathy, no asymmetry, masses, or scars and thyroid enlarged  to palpation with possible nodule on left   RESP: lungs clear to auscultation - no rales, rhonchi or wheezes  CV: regular rate and rhythm, normal S1 S2, no S3 or S4, no murmur, click or rub, no peripheral edema and peripheral pulses strong  MS: no gross musculoskeletal defects noted, no edema  PSYCH: mentation appears normal and anxious    Diagnostic Test Results:  Labs reviewed in Epic        Assessment & Plan     1. Anxiety  We will check thyroid function today.  On palpation his thyroid does appear to be enlarged and may have nodule on the left.  Will wean down from 30 to 20 mg of Cymbalta and told to take 1 tablet daily while adding 25 mg of Zoloft for " "the first week, then increase the Zoloft to 50 mg for the second week.  And stopped Cymbalta on week 3  - TSH with free T4 reflex  - DULoxetine (CYMBALTA) 20 MG capsule; Take 1 capsule (20 mg) by mouth 2 times daily  Dispense: 15 capsule; Refill: 0  - sertraline (ZOLOFT) 50 MG tablet; Take 1 tablet (50 mg) by mouth daily  Dispense: 30 tablet; Refill: 1     Tobacco Cessation:   reports that he has been smoking cigarettes. He has been smoking about 1.00 pack per day. He has never used smokeless tobacco.        BMI:   Estimated body mass index is 27.68 kg/m  as calculated from the following:    Height as of this encounter: 1.88 m (6' 2\").    Weight as of this encounter: 97.8 kg (215 lb 9.6 oz).           Patient Instructions   (F41.9) Anxiety  (primary encounter diagnosis)  Comment:   Plan: TSH with free T4 reflex, DULoxetine (CYMBALTA)         20 MG capsule, sertraline (ZOLOFT) 50 MG tablet              Well stop cymbalta as dislikes the foggyness it causes.  Well reduce cymbalta to 20 mg daily x 2 weeks.    At same time well start zoloft 50 mg.  Take 1/2 tablet  For the first week then increase to whole tablet .  Follow-up in 3-4 weeks           No follow-ups on file.    Ramona Ann Aaseby-Aguilera, PA-C  [facility]        "

## 2019-10-15 NOTE — PATIENT INSTRUCTIONS
(F41.9) Anxiety  (primary encounter diagnosis)  Comment:   Plan: TSH with free T4 reflex, DULoxetine (CYMBALTA)         20 MG capsule, sertraline (ZOLOFT) 50 MG tablet              Well stop cymbalta as dislikes the foggyness it causes.  Well reduce cymbalta to 20 mg daily x 2 weeks.    At same time well start zoloft 50 mg.  Take 1/2 tablet  For the first week then increase to whole tablet .  Follow-up in 3-4 weeks

## 2019-10-15 NOTE — LETTER
October 15, 2019      Naif Howell Jr.  [address] [address]        Dear ,    We are writing to inform you of your test results.    labs are within acceptable limits along with lab result.       Please follow-up if symptoms fail to resolve or worsen.     As always, please let us know if you have questions.  Our clinic number is [phone]     Resulted Orders   TSH with free T4 reflex   Result Value Ref Range    TSH 1.10 0.40 - 4.00 mU/L       If you have any questions or concerns, please call the clinic at the number listed above.       Sincerely,        Ramona Ann Aaseby-Aguilera, PA-C

## 2019-10-16 RX ORDER — DULOXETIN HYDROCHLORIDE 20 MG/1
CAPSULE, DELAYED RELEASE ORAL
Qty: 192 CAPSULE | Refills: 0 | OUTPATIENT
Start: 2019-10-16

## 2019-10-16 ASSESSMENT — ANXIETY QUESTIONNAIRES: GAD7 TOTAL SCORE: 8

## 2019-10-16 NOTE — TELEPHONE ENCOUNTER
"Medication(s) may not be due for refill    Requested Prescriptions   Pending Prescriptions Disp Refills     DULoxetine (CYMBALTA) 20 MG capsule [Pharmacy Med Name: DULOXETINE DR 20MG CAPSULES]  Last Written Prescription Date:  10/15/2019  Last Fill Quantity: 15 capsule,  # refills: 0   Last office visit: 10/15/2019 with prescribing provider:  Aaseby-Aguilera   Future Office Visit:     192 capsule 0     Sig: TAKE 1 CAPSULE(20 MG) BY MOUTH TWICE DAILY       Serotonin-Norepinephrine Reuptake Inhibitors  Passed - 10/15/2019  6:31 PM        Passed - Blood pressure under 140/90 in past 12 months     BP Readings from Last 3 Encounters:   10/15/19 120/70   10/08/19 (!) 141/88   08/14/19 117/82                 Passed - Recent (12 mo) or future (30 days) visit within the authorizing provider's specialty     Patient has had an office visit with the authorizing provider or a provider within the authorizing providers department within the previous 12 mos or has a future within next 30 days. See \"Patient Info\" tab in inbasket, or \"Choose Columns\" in Meds & Orders section of the refill encounter.              Passed - Medication is active on med list        Passed - Patient is age 18 or older        sertraline (ZOLOFT) 50 MG tablet [Pharmacy Med Name: SERTRALINE 50MG TABLETS]  Last Written Prescription Date:  10/15/2019  Last Fill Quantity: 30 tablet,  # refills: 1   Last office visit: 10/15/2019 with prescribing provider:  Aaseby-Aguilera   Future Office Visit:     90 tablet 1     Sig: TAKE 1 TABLET(50 MG) BY MOUTH DAILY       SSRIs Protocol Passed - 10/15/2019  6:31 PM        Passed - Recent (12 mo) or future (30 days) visit within the authorizing provider's specialty     Patient has had an office visit with the authorizing provider or a provider within the authorizing providers department within the previous 12 mos or has a future within next 30 days. See \"Patient Info\" tab in inbasket, or \"Choose Columns\" in Meds & Orders " section of the refill encounter.              Passed - Medication is active on med list        Passed - Patient is age 18 or older

## 2019-11-08 ENCOUNTER — HEALTH MAINTENANCE LETTER (OUTPATIENT)
Age: 54
End: 2019-11-08

## 2019-12-10 DIAGNOSIS — F41.9 ANXIETY: ICD-10-CM

## 2019-12-10 NOTE — TELEPHONE ENCOUNTER
Prescription approved per [facility] Refill Protocol.  [email or web address] sent requesting f/u  Kenisha Goodwin RN, BSN

## 2019-12-17 NOTE — PROGRESS NOTES
Pre procedure Diagnosis: Myofascial pain   Post procedure Diagnosis: Same  Procedure performed: Trigger point injections  Anesthesia: None  Complications: None  Operators: Gail Berry MD     Indications:   Naif Howell Jr. is a 54 year old male with a history of chronic neck pain. Exam shows myofascial pain of the muscle groups listed below and they have tried conservative treatment including medications and physical therapy.     Options/alternatives, benefits and risks were discussed with the patient including bleeding, infection, tissue trauma and pnuemothorax.  Questions were answered to his satisfaction and he agrees to proceed. Voluntary informed consent was obtained and signed.     Vitals were reviewed: Yes  Allergies were reviewed:  Yes   Medications were reviewed: Yes   Pre-procedure pain score: 2/10    Procedure:  After getting informed consent, a Pause for the Cause was performed.    Trigger points were identified by patient, and marked when appropriate.  The area was prepped with Chloroprep.    Using clean technique, injections were completed using a 25G, 1.5 inch needle.  After negative aspiration, injection was completed.  A total of 4 locations were injected.  When possible, tissue was retracted from the chest wall to avoid lung injury.    Muscle groups injected:  Right cervical paraspinals x 2 sites  Right trapezius x 1 site  Right rhomboid major x 1 site    Injection solution contained:  2 ml of 1% lidocaine and 2ml of 0.5% bupivacaine and 20 mg of kenalog. 1 ml was injected at each site.     Hemostasis was achieved, the area was cleaned, and bandaids were placed when appropriate.  The patient tolerated the procedure well.    Post-procedure pain score: 0/10    Follow-up includes:   - Naif can follow up with me for repeat injections if these are helpful    Gail Berry MD  [facility]

## 2019-12-18 ENCOUNTER — OFFICE VISIT (OUTPATIENT)
Dept: PALLIATIVE MEDICINE | Facility: CLINIC | Age: 54
End: 2019-12-18
Payer: OTHER GOVERNMENT

## 2019-12-18 VITALS — DIASTOLIC BLOOD PRESSURE: 88 MMHG | OXYGEN SATURATION: 98 % | SYSTOLIC BLOOD PRESSURE: 133 MMHG | HEART RATE: 85 BPM

## 2019-12-18 DIAGNOSIS — M79.18 MYOFASCIAL PAIN: Primary | ICD-10-CM

## 2019-12-18 PROCEDURE — 20553 NJX 1/MLT TRIGGER POINTS 3/>: CPT | Performed by: PHYSICAL MEDICINE & REHABILITATION

## 2019-12-18 RX ORDER — BUPIVACAINE HYDROCHLORIDE 5 MG/ML
2 INJECTION, SOLUTION EPIDURAL; INTRACAUDAL ONCE
Status: COMPLETED | OUTPATIENT
Start: 2019-12-18 | End: 2019-12-18

## 2019-12-18 RX ORDER — TRIAMCINOLONE ACETONIDE 40 MG/ML
20 INJECTION, SUSPENSION INTRA-ARTICULAR; INTRAMUSCULAR ONCE
Status: COMPLETED | OUTPATIENT
Start: 2019-12-18 | End: 2019-12-18

## 2019-12-18 RX ADMIN — TRIAMCINOLONE ACETONIDE 20 MG: 40 INJECTION, SUSPENSION INTRA-ARTICULAR; INTRAMUSCULAR at 09:51

## 2019-12-18 RX ADMIN — BUPIVACAINE HYDROCHLORIDE 10 MG: 5 INJECTION, SOLUTION EPIDURAL; INTRACAUDAL at 09:50

## 2019-12-18 ASSESSMENT — PAIN SCALES - GENERAL: PAINLEVEL: MILD PAIN (2)

## 2019-12-18 NOTE — PATIENT INSTRUCTIONS
[facility]   Post Procedure Instructions    Today you had:  trigger point injections      Medications used:  lidocaine   bupivicaine   kenolog   dexamethasone          Go to the emergency room if you develop any shortness of breath    Monitor the injection sites for signs and symptoms of infection-fever, chills, redness, swelling, warmth, or drainage to areas.    You may have soreness at injection sites for up to 24 hours.    You may apply ice to the painful areas to help minimize the discomfort of the needle pokes.    Do not apply heat to sites for at least 12 hours.    You may use anti-inflammatory medications or Tylenol for pain control if necessary    Pain Clinic phone number during work hours Monday-Friday:  [phone]    After hours provider line: [phone]

## 2019-12-19 ENCOUNTER — OFFICE VISIT (OUTPATIENT)
Dept: FAMILY MEDICINE | Facility: CLINIC | Age: 54
End: 2019-12-19
Payer: OTHER GOVERNMENT

## 2019-12-19 VITALS
DIASTOLIC BLOOD PRESSURE: 66 MMHG | HEART RATE: 91 BPM | HEIGHT: 74 IN | OXYGEN SATURATION: 98 % | BODY MASS INDEX: 27.98 KG/M2 | SYSTOLIC BLOOD PRESSURE: 118 MMHG | TEMPERATURE: 98.7 F | WEIGHT: 218 LBS

## 2019-12-19 DIAGNOSIS — F41.9 ANXIETY: Primary | ICD-10-CM

## 2019-12-19 DIAGNOSIS — G47.30 SLEEP APNEA, UNSPECIFIED TYPE: ICD-10-CM

## 2019-12-19 DIAGNOSIS — Z13.1 SCREENING FOR DIABETES MELLITUS: ICD-10-CM

## 2019-12-19 DIAGNOSIS — I10 BENIGN ESSENTIAL HYPERTENSION: ICD-10-CM

## 2019-12-19 PROCEDURE — 96127 BRIEF EMOTIONAL/BEHAV ASSMT: CPT | Performed by: PHYSICIAN ASSISTANT

## 2019-12-19 PROCEDURE — 99214 OFFICE O/P EST MOD 30 MIN: CPT | Performed by: PHYSICIAN ASSISTANT

## 2019-12-19 ASSESSMENT — ANXIETY QUESTIONNAIRES
5. BEING SO RESTLESS THAT IT IS HARD TO SIT STILL: NOT AT ALL
3. WORRYING TOO MUCH ABOUT DIFFERENT THINGS: SEVERAL DAYS
IF YOU CHECKED OFF ANY PROBLEMS ON THIS QUESTIONNAIRE, HOW DIFFICULT HAVE THESE PROBLEMS MADE IT FOR YOU TO DO YOUR WORK, TAKE CARE OF THINGS AT HOME, OR GET ALONG WITH OTHER PEOPLE: SOMEWHAT DIFFICULT
6. BECOMING EASILY ANNOYED OR IRRITABLE: SEVERAL DAYS
2. NOT BEING ABLE TO STOP OR CONTROL WORRYING: NOT AT ALL
1. FEELING NERVOUS, ANXIOUS, OR ON EDGE: SEVERAL DAYS

## 2019-12-19 ASSESSMENT — MIFFLIN-ST. JEOR: SCORE: 1888.59

## 2019-12-19 ASSESSMENT — PATIENT HEALTH QUESTIONNAIRE - PHQ9: 5. POOR APPETITE OR OVEREATING: SEVERAL DAYS

## 2019-12-19 NOTE — LETTER
December 19, 2019      Naif Howell Jr.  [address] [facility] [address]        To Whom It May Concern,       Naif is under my care and was seen in clinic today.  Due to a medical condition he would benefit from having tinted windows in hid vehicle.     Sincerely,        Ramona Ann Aaseby-Aguilera, PA-C

## 2019-12-19 NOTE — PROGRESS NOTES
Subjective     Naif Howell Jr. is a 54 year old male who presents to clinic today for the following health issues:    HPI   Anxiety Follow-Up and he has sleep study question with some paperwork, also note for tinted windows    How are you doing with your anxiety since your last visit? No change    Are you having other symptoms that might be associated with anxiety? No    Have you had a significant life event? No     Are you feeling depressed? No    Do you have any concerns with your use of alcohol or other drugs? No    Social History     Tobacco Use     Smoking status: Current Every Day Smoker     Packs/day: 1.00     Types: Cigarettes     Smokeless tobacco: Never Used     Tobacco comment: trying   Substance Use Topics     Alcohol use: No     Drug use: No     BEATRICE-7 SCORE 10/3/2017 3/8/2019 10/15/2019   Total Score - - -   Total Score 4 2 8     PHQ 10/3/2017 3/8/2019 10/15/2019   PHQ-9 Total Score 4 4 4   Q9: Thoughts of better off dead/self-harm past 2 weeks Not at all Not at all Not at all     BEATRICE-7  12/19/2019   1. Feeling nervous, anxious, or on edge 1   2. Not being able to stop or control worrying 0   3. Worrying too much about different things 1   4. Trouble relaxing 1   5. Being so restless that it is hard to sit still 0   6. Becoming easily annoyed or irritable 1   7. Feeling afraid, as if something awful might happen -   BEATRICE-7 Total Score -   If you checked any problems, how difficult have they made it for you to do your work, take care of things at home, or get along with other people? Somewhat difficult         How many servings of fruits and vegetables do you eat daily?  2-3    On average, how many sweetened beverages do you drink each day (Examples: soda, juice, sweet tea, etc.  Do NOT count diet or artificially sweetened beverages)?   1    How many days per week do you miss taking your medication? 0        Current Outpatient Medications   Medication Sig Dispense Refill     losartan (COZAAR) 100 MG  tablet Take 1 tablet (100 mg) by mouth daily 90 tablet 3     order for DME Equipment being ordered: home blood pressure monitor with extra large cuff 1 Units 0     sertraline (ZOLOFT) 50 MG tablet TAKE 1 TABLET(50 MG) BY MOUTH DAILY 30 tablet 9     simvastatin (ZOCOR) 40 MG tablet Take 1 tablet (40 mg) by mouth At Bedtime 90 tablet 3         Reviewed and updated as needed this visit by Provider         Review of Systems   ROS COMP: Constitutional, HEENT, cardiovascular, pulmonary, gi and gu systems are negative, except as otherwise noted.      Objective    There were no vitals taken for this visit.  There is no height or weight on file to calculate BMI.  Physical Exam   GENERAL: healthy, alert and no distress  HENT: ear canals and TM's normal, nose and mouth without ulcers or lesions  RESP: lungs clear to auscultation - no rales, rhonchi or wheezes  CV: regular rate and rhythm, normal S1 S2, no S3 or S4, no murmur, click or rub, no peripheral edema and peripheral pulses strong  PSYCH: mentation appears normal, affect normal/bright    Diagnostic Test Results:  Labs reviewed in Epic        Assessment & Plan     1. Screening for diabetes mellitus    - Comprehensive metabolic panel; Future    2. Benign essential hypertension    - CBC with platelets; Future  - Comprehensive metabolic panel; Future  - Lipid panel reflex to direct LDL Fasting; Future    3. Anxiety  Stable and doing well   - EMOTIONAL / BEHAVIORAL ASSESSMENT    4. Sleep apnea, unspecified type  [facility] and [facility].  Well refer to sleep Study    - SLEEP EVALUATION & MANAGEMENT REFERRAL - ADULT -[facility] [place]  [phone] (Age 18 and up); Future     Tobacco Cessation:   reports that he has been smoking cigarettes. He has been smoking about 1.00 pack per day. He has never used smokeless tobacco.              No follow-ups on file.    Ramona Ann Aaseby-Aguilera, PA-C  [facility]

## 2020-02-06 DIAGNOSIS — I10 BENIGN ESSENTIAL HYPERTENSION: ICD-10-CM

## 2020-02-06 RX ORDER — LOSARTAN POTASSIUM 100 MG/1
100 TABLET ORAL DAILY
Qty: 90 TABLET | Refills: 3 | Status: SHIPPED | OUTPATIENT
Start: 2020-02-06 | End: 2021-02-11

## 2020-02-06 NOTE — TELEPHONE ENCOUNTER
Prescription approved per [facility] Refill Protocol.  Kenisha Goodwin RN, BSN

## 2020-03-26 ENCOUNTER — HOSPITAL ENCOUNTER (EMERGENCY)
Facility: CLINIC | Age: 55
Discharge: HOME | End: 2020-03-26
Attending: EMERGENCY MEDICINE | Admitting: EMERGENCY MEDICINE
Payer: OTHER GOVERNMENT

## 2020-03-26 VITALS
TEMPERATURE: 97.2 F | BODY MASS INDEX: 27.6 KG/M2 | WEIGHT: 215 LBS | HEART RATE: 78 BPM | OXYGEN SATURATION: 96 % | SYSTOLIC BLOOD PRESSURE: 144 MMHG | DIASTOLIC BLOOD PRESSURE: 95 MMHG | RESPIRATION RATE: 18 BRPM

## 2020-03-26 DIAGNOSIS — R00.2 PALPITATIONS: ICD-10-CM

## 2020-03-26 DIAGNOSIS — R42 DIZZINESS: ICD-10-CM

## 2020-03-26 DIAGNOSIS — R07.89 ATYPICAL CHEST PAIN: ICD-10-CM

## 2020-03-26 LAB
ANION GAP SERPL CALCULATED.3IONS-SCNC: 5 MMOL/L (ref 3–14)
BASOPHILS # BLD AUTO: 0.1 10E9/L (ref 0–0.2)
BASOPHILS NFR BLD AUTO: 1 %
BUN SERPL-MCNC: 14 MG/DL (ref 7–30)
CALCIUM SERPL-MCNC: 9 MG/DL (ref 8.5–10.1)
CHLORIDE SERPL-SCNC: 109 MMOL/L (ref 94–109)
CO2 SERPL-SCNC: 25 MMOL/L (ref 20–32)
CREAT SERPL-MCNC: 0.86 MG/DL (ref 0.66–1.25)
DIFFERENTIAL METHOD BLD: NORMAL
EOSINOPHIL # BLD AUTO: 0.3 10E9/L (ref 0–0.7)
EOSINOPHIL NFR BLD AUTO: 3 %
ERYTHROCYTE [DISTWIDTH] IN BLOOD BY AUTOMATED COUNT: 12.3 % (ref 10–15)
GFR SERPL CREATININE-BSD FRML MDRD: >90 ML/MIN/{1.73_M2}
GLUCOSE SERPL-MCNC: 106 MG/DL (ref 70–99)
HCT VFR BLD AUTO: 49 % (ref 40–53)
HGB BLD-MCNC: 16.8 G/DL (ref 13.3–17.7)
IMM GRANULOCYTES # BLD: 0 10E9/L (ref 0–0.4)
IMM GRANULOCYTES NFR BLD: 0.2 %
LYMPHOCYTES # BLD AUTO: 2.9 10E9/L (ref 0.8–5.3)
LYMPHOCYTES NFR BLD AUTO: 34.4 %
MCH RBC QN AUTO: 28.6 PG (ref 26.5–33)
MCHC RBC AUTO-ENTMCNC: 34.3 G/DL (ref 31.5–36.5)
MCV RBC AUTO: 84 FL (ref 78–100)
MONOCYTES # BLD AUTO: 0.6 10E9/L (ref 0–1.3)
MONOCYTES NFR BLD AUTO: 7.3 %
NEUTROPHILS # BLD AUTO: 4.5 10E9/L (ref 1.6–8.3)
NEUTROPHILS NFR BLD AUTO: 54.1 %
NRBC # BLD AUTO: 0 10*3/UL
NRBC BLD AUTO-RTO: 0 /100
PLATELET # BLD AUTO: 244 10E9/L (ref 150–450)
POTASSIUM SERPL-SCNC: 3.7 MMOL/L (ref 3.4–5.3)
RBC # BLD AUTO: 5.87 10E12/L (ref 4.4–5.9)
SODIUM SERPL-SCNC: 139 MMOL/L (ref 133–144)
TROPONIN I SERPL-MCNC: <0.015 UG/L (ref 0–0.04)
WBC # BLD AUTO: 8.4 10E9/L (ref 4–11)

## 2020-03-26 PROCEDURE — 25800030 ZZH RX IP 258 OP 636: Performed by: EMERGENCY MEDICINE

## 2020-03-26 PROCEDURE — 93005 ELECTROCARDIOGRAM TRACING: CPT

## 2020-03-26 PROCEDURE — 85025 COMPLETE CBC W/AUTO DIFF WBC: CPT | Performed by: EMERGENCY MEDICINE

## 2020-03-26 PROCEDURE — 96361 HYDRATE IV INFUSION ADD-ON: CPT

## 2020-03-26 PROCEDURE — 25000128 H RX IP 250 OP 636: Performed by: EMERGENCY MEDICINE

## 2020-03-26 PROCEDURE — 84484 ASSAY OF TROPONIN QUANT: CPT | Performed by: EMERGENCY MEDICINE

## 2020-03-26 PROCEDURE — 96374 THER/PROPH/DIAG INJ IV PUSH: CPT

## 2020-03-26 PROCEDURE — 99284 EMERGENCY DEPT VISIT MOD MDM: CPT | Mod: 25

## 2020-03-26 PROCEDURE — 96376 TX/PRO/DX INJ SAME DRUG ADON: CPT

## 2020-03-26 PROCEDURE — 80048 BASIC METABOLIC PNL TOTAL CA: CPT | Performed by: EMERGENCY MEDICINE

## 2020-03-26 RX ORDER — KETOROLAC TROMETHAMINE 15 MG/ML
15 INJECTION, SOLUTION INTRAMUSCULAR; INTRAVENOUS ONCE
Status: COMPLETED | OUTPATIENT
Start: 2020-03-26 | End: 2020-03-26

## 2020-03-26 RX ORDER — SODIUM CHLORIDE 9 MG/ML
1000 INJECTION, SOLUTION INTRAVENOUS CONTINUOUS
Status: DISCONTINUED | OUTPATIENT
Start: 2020-03-26 | End: 2020-03-27 | Stop reason: HOSPADM

## 2020-03-26 RX ADMIN — KETOROLAC TROMETHAMINE 15 MG: 15 INJECTION, SOLUTION INTRAMUSCULAR; INTRAVENOUS at 21:46

## 2020-03-26 RX ADMIN — KETOROLAC TROMETHAMINE 15 MG: 15 INJECTION, SOLUTION INTRAMUSCULAR; INTRAVENOUS at 21:00

## 2020-03-26 RX ADMIN — SODIUM CHLORIDE 1000 ML: 9 INJECTION, SOLUTION INTRAVENOUS at 21:00

## 2020-03-26 ASSESSMENT — ENCOUNTER SYMPTOMS
PALPITATIONS: 1
DIZZINESS: 1

## 2020-03-26 NOTE — ED AVS SNAPSHOT
[facility] Emergency Department  [address]  [facility] [address]  Phone:  [phone]  Fax:  [phone]                                    Naif Howell Jr.   MRN: [id number]    Department:  [facility] Emergency Department   Date of Visit:  3/26/2020           After Visit Summary Signature Page    I have received my discharge instructions, and my questions have been answered. I have discussed any challenges I see with this plan with the nurse or doctor.    ..........................................................................................................................................  Patient/Patient Representative Signature      ..........................................................................................................................................  Patient Representative Print Name and Relationship to Patient    ..................................................               ................................................  Date                                   Time    ..........................................................................................................................................  Reviewed by Signature/Title    ...................................................              ..............................................  Date                                               Time          22EPIC Rev 08/18

## 2020-03-27 LAB — INTERPRETATION ECG - MUSE: NORMAL

## 2020-03-27 NOTE — DISCHARGE INSTRUCTIONS
Please follow up with your regular physician for reassessment of your recurrent chest and back pain as well as your vestibular issue. Your heart rate was normal here and your heart evaluation and lab work is all normal.  Please return with new or worsening condition

## 2020-03-27 NOTE — ED TRIAGE NOTES
Pt presents to ER clutching chest reporting fluttering sensation, chest pain, that goes to his back and nausea.

## 2020-03-27 NOTE — ED PROVIDER NOTES
History     Chief Complaint:  Chest pain     HPI   Naif Howell Jr. is a 55 year old male with a history of costochondritis and chronic back pain who presents with chest pain. The patient developed palpitations, chest pain, blurry vision, and dizziness 2 hours prior to arrival. He reports that the palpitations feels like his heart is fluttering. He attributed his palpitations to anxiety, but states that the palpitations were getting worse. He also notes that his chest pain is mid sternum and radiates to his back. He denies having any leg pain or leg swelling.     Cardiac/PE/DVT Risk Factors:  History of hypertension - yes  History of hyperlipidemia - yes  History of diabetes - no  History of smoking - former  Personal history of PE/DVT - no  Family history of PE/DVT - no  Family history of heart complications - no  Recent travel - no  Recent surgery - no  Other immobilizations - no  Cancer - no     Allergies:  Cefdinir  Cefuroxime  Flagyl [Metronidazole]     Medications:    losartan   sertraline   simvastatin     Past Medical History:    Anxiety   Aortic regurgitation    Basal cell carcinoma   Chronic neck pain   Degenerative disc disease, cervical   GERD (gastroesophageal reflux disease)   Hypertension   Migraines   PTSD (post-traumatic stress disorder)   SBO (small bowel obstruction) (H)   Vestibular migraine   Dyslipidemia   Aortic root dilatation   Costochondritis   Chronic back pain     Past Surgical History:    Herniorrhaphy incisional   Cholecystectomy   Lysis adhesions  Lap diagnostic   Resect small bowel without ostomy   Resection of basal cell carcinoma     Family History:    Hypertension   Cancer     Social History:  Smoking Status: former  Smokeless Tobacco: Never Used  Alcohol Use: No  Drug Use: No  PCP: Josefina Rogers       Review of Systems   Eyes: Positive for visual disturbance (blurry).   Cardiovascular: Positive for chest pain and palpitations. Negative for leg swelling.   Neurological:  Positive for dizziness.   All other systems reviewed and are negative.      Physical Exam     Patient Vitals for the past 24 hrs:   BP Temp Temp src Pulse Heart Rate Resp SpO2 Weight   03/26/20 2130 138/83 -- -- 78 79 22 95 % --   03/26/20 2115 138/84 -- -- 78 78 14 95 % --   03/26/20 2102 -- -- -- -- -- -- -- 97.5 kg (215 lb)   03/26/20 2100 (!) 140/97 -- -- 85 82 19 96 % --   03/26/20 2042 (!) 172/104 97.2  F (36.2  C) Oral 86 86 22 97 % --        Physical Exam  Vitals signs and nursing note reviewed.   HENT:      Head: Normocephalic.   Cardiovascular:      Rate and Rhythm: Normal rate and regular rhythm.      Heart sounds: Normal heart sounds.   Pulmonary:      Effort: Pulmonary effort is normal.      Breath sounds: Normal breath sounds.   Musculoskeletal: Normal range of motion.   Skin:     General: Skin is warm.   Neurological:      General: No focal deficit present.      Mental Status: He is alert.   Psychiatric:         Mood and Affect: Mood is anxious.           Emergency Department Course   ECG:  ECG taken at 2043, ECG read at 2044  Normal sinus rhythm  Inferior infarct, age undetermined   Abnormal ECG  Rate 87 bpm. CA interval 172 ms. QRS duration 98 ms. QT/QTc 364/438 ms. P-R-T axes 56 -17 57.     Laboratory:  Laboratory findings were communicated with the patient who voiced understanding of the findings.    CBC:  WBC 8.4, HGB 16.8, , o/w WNL     BMP: Glucose 106 (H), o/w WNL (Creatinine: 0.86)     Troponin(2051): <0.015      Interventions:  2100 0.9% sodium chloride BOLUS 1000 mL IV   2100 Toradol 15 mg IV     Emergency Department Course:  Past medical records, nursing notes, and vitals reviewed.    2039 I performed an exam of the patient as documented above.    IV was inserted and blood was drawn for laboratory testing, results above.      2130 Patient rechecked and updated.       Findings and plan explained to the Patient. Patient discharged home with instructions regarding supportive care,  medications, and reasons to return. The importance of close follow-up was reviewed.       Impression & Plan     Medical Decision Making:  Naif Howell Jr. is a 55 year old male who presents to the emergency department today with vague chest pain and back pain.  Patient has a history of similar presentations with chest and back pain has been work-up for dissection PE and multiple other causes him to date have been negative.  Patient reports with similar episodes of pain patient has a blue care plan though the specific specifics of which are not available to me.  Patient is well-appearing and aside describes very atypical chest pain admits that he has a history of costochondritis.  EKG and troponin are negative.  Patient's not at risk for PE and therefore this was not investigated.  No clinical concerns for dissection.  Patient was given Toradol with some improvement suspect recurrent costochondritis and acute exacerbation of chronic pain.  Patient is encouraged to follow-up with primary care no need for admission or further work-up required at this time.      Discharge Diagnosis:    ICD-10-CM    1. Palpitations  R00.2    2. Atypical chest pain  R07.89    3. Dizziness  R42        Disposition:  Discharged to home.    Discharge Medications:  Discharge Medication List as of 3/26/2020 10:50 PM          Scribe Disclosure:  I, Vince Jaramillo, am serving as a scribe at 8:39 PM on 3/26/2020 to document services personally performed by Carlos Bennett MD based on my observations and the provider's statements to me.      3/26/2020   Carlos Bennett MD Goodman, Brian Samuel, MD  03/28/20 1804

## 2020-04-07 ENCOUNTER — VIRTUAL VISIT (OUTPATIENT)
Dept: FAMILY MEDICINE | Facility: CLINIC | Age: 55
End: 2020-04-07
Payer: OTHER GOVERNMENT

## 2020-04-07 DIAGNOSIS — H60.502 ACUTE OTITIS EXTERNA OF LEFT EAR, UNSPECIFIED TYPE: Primary | ICD-10-CM

## 2020-04-07 PROCEDURE — 99441 ZZC PHYSICIAN TELEPHONE EVALUATION 5-10 MIN: CPT | Performed by: PHYSICIAN ASSISTANT

## 2020-04-07 RX ORDER — NEOMYCIN SULFATE, POLYMYXIN B SULFATE AND HYDROCORTISONE 10; 3.5; 1 MG/ML; MG/ML; [USP'U]/ML
SUSPENSION/ DROPS AURICULAR (OTIC)
Qty: 10 ML | Refills: 0 | Status: SHIPPED | OUTPATIENT
Start: 2020-04-07

## 2020-04-07 NOTE — PROGRESS NOTES
"Subjective     Naif Howell Jr. is a 55 year old male who is being evaluated via a billable telephone visit.      The patient has been notified of following:     \"This telephone visit will be conducted via a call between you and your physician/provider. We have found that certain health care needs can be provided without the need for a physical exam.  This service lets us provide the care you need with a short phone conversation.  If a prescription is necessary we can send it directly to your pharmacy.  If lab work is needed we can place an order for that and you can then stop by our lab to have the test done at a later time.    If during the course of the call the physician/provider feels a telephone visit is not appropriate, you will not be charged for this service.\"     Patient has given verbal consent for Telephone visit?  Yes    Naif Howell Jr. complains of No chief complaint on file.    Gets frequent outer ear infections and needs an antibiotic.  Was diagnosed with a otitis media a few months ago.     Chronic ear pain. 2/10. No hearing loss. Does have tinnitus. otc not help in the past        ALLERGIES  Cefdinir; Cefuroxime; and Flagyl [metronidazole]                  Current Outpatient Medications   Medication Sig Dispense Refill     losartan (COZAAR) 100 MG tablet Take 1 tablet (100 mg) by mouth daily 90 tablet 3     neomycin-polymyxin-hydrocortisone (CORTISPORIN) 3.5-64769-3 otic suspension 4 DROPS IN AFFECTED EAR CANAL 4 TIMES A DAY for 5-7 days 10 mL 0     sertraline (ZOLOFT) 50 MG tablet TAKE 1 TABLET(50 MG) BY MOUTH DAILY 30 tablet 9     simvastatin (ZOCOR) 40 MG tablet Take 1 tablet (40 mg) by mouth At Bedtime 90 tablet 3     Allergies   Allergen Reactions     Cefdinir Diarrhea     Cefuroxime Rash     Flagyl [Metronidazole] Swelling and Rash     Mild throat swelling     Recent Labs   Lab Test 03/26/20  2051 10/15/19  0805 10/08/19  1749 02/21/19  0942 12/21/18  1853 02/26/18  1942  06/22/17  0902  " 09/08/15  1231 09/08/15  1230   A1C  --   --   --  5.5  --   --   --  5.3  --   --   --    LDL  --   --   --  141*  --   --   --  143*  --  121  --    HDL  --   --   --  34*  --   --   --  32*  --  33*  --    TRIG  --   --   --  108  --   --   --  107  --  99  --    ALT  --   --  25  --  44 39   < >  --    < >  --   --    CR 0.86  --  0.94 1.08 0.82 0.78   < > 0.67   < >  --  0.85   GFRESTIMATED >90  --  89 76 >90 >90   < > >90  Non  GFR Calc     < >  --  >90  Non  GFR Calc     GFRESTBLACK >90  --  >90 88 >90 >90   < > >90  African American GFR Calc     < >  --  >90   GFR Calc     POTASSIUM 3.7  --  4.0 4.5 3.9 3.9   < > 3.9   < >  --  4.0   TSH  --  1.10  --   --   --   --   --   --   --   --  1.26    < > = values in this interval not displayed.        Reviewed and updated as needed this visit by Provider         Review of Systems   ROS COMP: Constitutional, HEENT, cardiovascular, pulmonary, gi and gu systems are negative, except as otherwise noted.                 Assessment/Plan:    2. Acute otitis externa of left ear, unspecified type  Advised to be seen if this fails to improve symptoms   - neomycin-polymyxin-hydrocortisone (CORTISPORIN) 3.5-60260-2 otic suspension; 4 DROPS IN AFFECTED EAR CANAL 4 TIMES A DAY for 5-7 days  Dispense: 10 mL; Refill: 0    No follow-ups on file.      Phone call duration:  6 minutes    Ramona Ann Aaseby-Aguilera, PA-C

## 2020-05-03 DIAGNOSIS — E78.2 MIXED HYPERLIPIDEMIA: ICD-10-CM

## 2020-05-04 NOTE — TELEPHONE ENCOUNTER
Routing refill request to provider for review/approval because:  Labs not current:  DONNELL Keyes RN, BSN

## 2020-05-05 RX ORDER — SIMVASTATIN 40 MG
40 TABLET ORAL AT BEDTIME
Qty: 90 TABLET | Refills: 0 | Status: SHIPPED | OUTPATIENT
Start: 2020-05-05 | End: 2020-08-17

## 2020-07-13 ENCOUNTER — HOSPITAL ENCOUNTER (EMERGENCY)
Facility: CLINIC | Age: 55
Discharge: HOME | End: 2020-07-13
Attending: EMERGENCY MEDICINE | Admitting: EMERGENCY MEDICINE
Payer: COMMERCIAL

## 2020-07-13 ENCOUNTER — APPOINTMENT (OUTPATIENT)
Dept: GENERAL RADIOLOGY | Facility: CLINIC | Age: 55
End: 2020-07-13
Attending: EMERGENCY MEDICINE
Payer: COMMERCIAL

## 2020-07-13 VITALS
OXYGEN SATURATION: 94 % | RESPIRATION RATE: 13 BRPM | DIASTOLIC BLOOD PRESSURE: 84 MMHG | TEMPERATURE: 97.6 F | SYSTOLIC BLOOD PRESSURE: 142 MMHG | HEART RATE: 73 BPM

## 2020-07-13 DIAGNOSIS — R07.9 CHEST PAIN, UNSPECIFIED TYPE: ICD-10-CM

## 2020-07-13 LAB
ANION GAP SERPL CALCULATED.3IONS-SCNC: 5 MMOL/L (ref 3–14)
BASOPHILS # BLD AUTO: 0.1 10E9/L (ref 0–0.2)
BASOPHILS NFR BLD AUTO: 1 %
BUN SERPL-MCNC: 18 MG/DL (ref 7–30)
CALCIUM SERPL-MCNC: 8.7 MG/DL (ref 8.5–10.1)
CHLORIDE SERPL-SCNC: 108 MMOL/L (ref 94–109)
CO2 SERPL-SCNC: 25 MMOL/L (ref 20–32)
CREAT SERPL-MCNC: 0.78 MG/DL (ref 0.66–1.25)
DIFFERENTIAL METHOD BLD: ABNORMAL
EOSINOPHIL # BLD AUTO: 0.2 10E9/L (ref 0–0.7)
EOSINOPHIL NFR BLD AUTO: 2.5 %
ERYTHROCYTE [DISTWIDTH] IN BLOOD BY AUTOMATED COUNT: 12.6 % (ref 10–15)
GFR SERPL CREATININE-BSD FRML MDRD: >90 ML/MIN/{1.73_M2}
GLUCOSE SERPL-MCNC: 119 MG/DL (ref 70–99)
HCT VFR BLD AUTO: 52.9 % (ref 40–53)
HGB BLD-MCNC: 17.5 G/DL (ref 13.3–17.7)
IMM GRANULOCYTES # BLD: 0 10E9/L (ref 0–0.4)
IMM GRANULOCYTES NFR BLD: 0.3 %
LYMPHOCYTES # BLD AUTO: 2.2 10E9/L (ref 0.8–5.3)
LYMPHOCYTES NFR BLD AUTO: 31.2 %
MCH RBC QN AUTO: 28.5 PG (ref 26.5–33)
MCHC RBC AUTO-ENTMCNC: 33.1 G/DL (ref 31.5–36.5)
MCV RBC AUTO: 86 FL (ref 78–100)
MONOCYTES # BLD AUTO: 0.4 10E9/L (ref 0–1.3)
MONOCYTES NFR BLD AUTO: 4.9 %
NEUTROPHILS # BLD AUTO: 4.3 10E9/L (ref 1.6–8.3)
NEUTROPHILS NFR BLD AUTO: 60.1 %
NRBC # BLD AUTO: 0 10*3/UL
NRBC BLD AUTO-RTO: 0 /100
PLATELET # BLD AUTO: 234 10E9/L (ref 150–450)
POTASSIUM SERPL-SCNC: 3.9 MMOL/L (ref 3.4–5.3)
RBC # BLD AUTO: 6.15 10E12/L (ref 4.4–5.9)
SODIUM SERPL-SCNC: 138 MMOL/L (ref 133–144)
TROPONIN I SERPL-MCNC: <0.015 UG/L (ref 0–0.04)
WBC # BLD AUTO: 7.1 10E9/L (ref 4–11)

## 2020-07-13 PROCEDURE — 93005 ELECTROCARDIOGRAM TRACING: CPT

## 2020-07-13 PROCEDURE — 84484 ASSAY OF TROPONIN QUANT: CPT | Performed by: EMERGENCY MEDICINE

## 2020-07-13 PROCEDURE — 80048 BASIC METABOLIC PNL TOTAL CA: CPT | Performed by: EMERGENCY MEDICINE

## 2020-07-13 PROCEDURE — 25000128 H RX IP 250 OP 636: Performed by: EMERGENCY MEDICINE

## 2020-07-13 PROCEDURE — 99285 EMERGENCY DEPT VISIT HI MDM: CPT | Mod: 25

## 2020-07-13 PROCEDURE — 71045 X-RAY EXAM CHEST 1 VIEW: CPT

## 2020-07-13 PROCEDURE — 85025 COMPLETE CBC W/AUTO DIFF WBC: CPT | Performed by: EMERGENCY MEDICINE

## 2020-07-13 PROCEDURE — 96374 THER/PROPH/DIAG INJ IV PUSH: CPT

## 2020-07-13 RX ORDER — KETOROLAC TROMETHAMINE 15 MG/ML
15 INJECTION, SOLUTION INTRAMUSCULAR; INTRAVENOUS ONCE
Status: COMPLETED | OUTPATIENT
Start: 2020-07-13 | End: 2020-07-13

## 2020-07-13 RX ADMIN — KETOROLAC TROMETHAMINE 15 MG: 15 INJECTION, SOLUTION INTRAMUSCULAR; INTRAVENOUS at 09:14

## 2020-07-13 ASSESSMENT — ENCOUNTER SYMPTOMS
VOMITING: 0
BACK PAIN: 1
SORE THROAT: 0
FEVER: 0
SHORTNESS OF BREATH: 1
COUGH: 0
DIARRHEA: 0
NERVOUS/ANXIOUS: 1
MYALGIAS: 1

## 2020-07-13 NOTE — ED TRIAGE NOTES
Chest pain, sob, headache, nausea. Hx of costochondritis, feels the same, but pain is much worse than normal.

## 2020-07-13 NOTE — ED PROVIDER NOTES
History   Chief Complaint  Chest Pain and Shortness of Breath      HPI   Naif Howell Jr. is a 55 year old male with a history of costochondritis, among others, who presents to the emergency department for evaluation of chest pain and shortness of breath. The patient reports his symptoms have been present for a few days and abruptly worsened last night, prompting his visit here to the emergency department. He states his symptoms feels similar to past flare ups of costochondritis. And actually thinks that he has had chest pain for years. He denies fever, cough, sore throat, vomiting, or diarrhea. She does report feeling anxious currently. To note, he also has a history of chronic neck and back pain, which he states is at baseline.     Allergies  Cefdinir  Cefuroxime  Flagyl     Medications  Losartan  Simvastatin    Zoloft     Past Medical History  Anxiety   Aortic regurgitation    Basal cell carcinoma  Chronic back pain   Chronic neck pain  Costochondritis   Gastroesophageal reflux   Hypertension   Migraines   Post traumatic stress disorder   Small bowel obstruction     Past Surgical History  Cholecystectomy   Herniorrhaphy   Lysis adhesions   Small bowel resection without ostomy     Family History  Cancer   Hypertension     Social History:  Smoking Status: Current Smoker  Smokeless Tobacco: Never Used  Alcohol Use: No  Drug Use: No  PCP: Josefina Rogers    Review of Systems   Constitutional: Negative for fever.   HENT: Negative for sore throat.    Respiratory: Positive for shortness of breath. Negative for cough.    Cardiovascular: Positive for chest pain.   Gastrointestinal: Negative for diarrhea and vomiting.   Musculoskeletal: Positive for back pain and myalgias.   Psychiatric/Behavioral: The patient is nervous/anxious.    All other systems reviewed and are negative.    Physical Exam     Patient Vitals for the past 24 hrs:   BP Temp Temp src Pulse Heart Rate Resp SpO2   07/13/20 0930 (!) 148/85 -- -- 76 78  24 93 %   07/13/20 0900 (!) 156/99 -- -- -- 86 23 --   07/13/20 0856 (!) 158/101 97.6  F (36.4  C) Oral 105 105 24 100 %       Physical Exam    General: Resting comfortably on the gurney  Eyes:  The pupils are equal and round    Conjunctivae and sclerae are normal  ENT:    Difficult to see TMs. Canals unremarkable.  Neck:  Normal range of motion  CV:  Regular rate     Skin warm and well perfused     Tender on anterior chest wall  Resp:  Hyperventilating    Mild tachypnea  GI:  Abdomen is soft, there is no rigidity    No distension    No rebound tenderness     No abdominal tenderness  MS:  Normal muscular tone  Skin:  No rash or acute skin lesions noted  Neuro:   Awake, alert.      Speech is normal and fluent.    Face is symmetric.     Moves all extremities equally  Psych: Appears very anxious    Emergency Department Course     ECG:  ECG taken at 0911, ECG read at 0913  Sinus rhythm  Inferior infarct (cited on or before 08-OCT-2019)  Abnormal ECG  No significant change compared to EKG dated 03/26/2020.    Rate 80 bpm. IN interval 174 ms. QRS duration 102 ms. QT/QTc 378/435 ms. P-R-T axes 56 -13 50.    Imaging:  Radiology findings were communicated with the patient who voiced understanding of the findings.    XR Chest Port 1 View  No acute pulmonary disease.    Reading per radiology.      Laboratory:  Laboratory findings were communicated with the patient who voiced understanding of the findings.    Troponin(0915): <0.015    CBC: WBC: 7.1, HGB: 17.5, PLT: 234    BMP: Glucose 119 (H), o/w WNL (Creatinine: 0.78)    Interventions:  0914 Toradol 15 mg IV    Emergency Department Course:  Past medical records, nursing notes, and vitals reviewed.    0857 I performed an exam of the patient as documented above.     0913 EKG obtained in the ED, see results above.     0914 IV was inserted and blood was drawn for laboratory testing, results above.    1009 The patient was sent for radiographs while in the emergency department,  results above.     1110 I rechecked the patient and discussed the results of the workup thus far.     Findings and plan explained to the Patient. Patient discharged home with instructions regarding supportive care, medications, and reasons to return. The importance of close follow-up was reviewed.    I personally reviewed the laboratory and imaging results with the Patient and answered all related questions prior to discharge.     Impression & Plan     Medical Decision Making:  Naif Howell Jr. is a 55 year old male who presented to the ED with chest pain and shortness of breath. Patient with chest pain and shortness of breath. Patient is hyperventilating on arrival to ED and anxious. After toradol, patient much improved and felt much better. His breathing is back to normal. Has had multiple workups in past for chest pain and pain today is similar to prior costochrondritis. Troponin negative and EKG with no acute ischemic changes. Doubt PE, dissection and don't think workup for these is indicated in ED today. Doubt referred pain from abdomen. Doubt ACS. Recommended f/u with PCP.    Diagnosis:    ICD-10-CM    1. Chest pain, unspecified type  R07.9        Disposition:  Discharged to home.    Discharge Medications:  New Prescriptions    No medications on file       Scribe Disclosure:  I, Stacy Nolasco, am serving as a scribe at 9:18 AM on 7/13/2020 to document services personally performed by Nelly Burgess MD based on my observations and the provider's statements to me.      Nelly Burgess MD  07/13/20 9177

## 2020-07-13 NOTE — ED AVS SNAPSHOT
[facility] Emergency Department  [address]  [facility] [address]  Phone:  [phone]  Fax:  [phone]                                    Naif Howell Jr.   MRN: [id number]    Department:  [facility] Emergency Department   Date of Visit:  7/13/2020           After Visit Summary Signature Page    I have received my discharge instructions, and my questions have been answered. I have discussed any challenges I see with this plan with the nurse or doctor.    ..........................................................................................................................................  Patient/Patient Representative Signature      ..........................................................................................................................................  Patient Representative Print Name and Relationship to Patient    ..................................................               ................................................  Date                                   Time    ..........................................................................................................................................  Reviewed by Signature/Title    ...................................................              ..............................................  Date                                               Time          22EPIC Rev 08/18

## 2020-07-13 NOTE — DISCHARGE INSTRUCTIONS
Try lidocaine patches    Discharge Instructions  Chest Pain    You have been seen today for chest pain or discomfort.  At this time, your provider has found no signs that your chest pain is due to a serious or life-threatening condition, (or you have declined more testing and/or admission to the hospital). However, sometimes there is a serious problem that does not show up right away. Your evaluation today may not be complete and you may need further testing and evaluation.     Generally, every Emergency Department visit should have a follow-up clinic visit with either a primary or a specialty clinic/provider. Please follow-up as instructed by your emergency provider today.  Return to the Emergency Department if:  Your chest pain changes, gets worse, starts to happen more often, or comes with less activity.  You are newly short of breath.  You get very weak or tired.  You pass out or faint.  You have any new symptoms, like fever, cough, numb legs, or you cough up blood.  You have anything else that worries you.    Until you follow-up with your regular provider, please do the following:  Take one aspirin daily unless you have an allergy or are told not to by your provider.  If a stress test appointment has been made, go to the appointment.  If you have questions, contact your regular provider.  Follow-up with your regular provider/clinic as directed; this is very important.    If you were given a prescription for medicine here today, be sure to read all of the information (including the package insert) that comes with your prescription.  This will include important information about the medicine, its side effects, and any warnings that you need to know about.  The pharmacist who fills the prescription can provide more information and answer questions you may have about the medicine.  If you have questions or concerns that the pharmacist cannot address, please call or return to the Emergency Department.       Remember  that you can always come back to the Emergency Department if you are not able to see your regular provider in the amount of time listed above, if you get any new symptoms, or if there is anything that worries you.

## 2020-07-14 LAB — INTERPRETATION ECG - MUSE: NORMAL

## 2020-08-15 DIAGNOSIS — E78.2 MIXED HYPERLIPIDEMIA: ICD-10-CM

## 2020-08-17 RX ORDER — SIMVASTATIN 40 MG
40 TABLET ORAL AT BEDTIME
Qty: 90 TABLET | Refills: 0 | Status: SHIPPED | OUTPATIENT
Start: 2020-08-17 | End: 2020-11-13

## 2020-08-17 NOTE — TELEPHONE ENCOUNTER
Routing refill request to provider for review/approval because:  Labs not current:  ldl  Kenisha Goodwin RN, BSN

## 2020-11-13 DIAGNOSIS — E78.2 MIXED HYPERLIPIDEMIA: ICD-10-CM

## 2020-11-13 RX ORDER — SIMVASTATIN 40 MG
40 TABLET ORAL AT BEDTIME
Qty: 90 TABLET | Refills: 0 | Status: SHIPPED | OUTPATIENT
Start: 2020-11-13

## 2020-12-06 ENCOUNTER — HEALTH MAINTENANCE LETTER (OUTPATIENT)
Age: 55
End: 2020-12-06

## 2020-12-22 ENCOUNTER — VIRTUAL VISIT (OUTPATIENT)
Dept: FAMILY MEDICINE | Facility: OTHER | Age: 55
End: 2020-12-22

## 2020-12-22 NOTE — PROGRESS NOTES
"Date: 2020 16:49:12  Clinician: Rohini Daniel  Clinician NPI: [id number]  Patient: vivien connolly  Patient : 1965  Patient Address: [address] [place] [address]  Patient Phone: [phone]  Visit Protocol: Ear pain  Patient Summary:  vivien is a 55 year old ( : 1965 ) male who initiated a [facility] Visit for swimmer's ear (outer ear infection). When asked the question \"Please sign me up to receive news, health information and promotions. \", vivien responded \"No\".   A synchronous visit is necessary because the patient reported the following abnormal symptoms:   Bilateral ear pain (requires clarification)   vivien reports that his ear pain started 5-7 days ago. The ear pain is located inside both ears.   In addition to the ear pain, vivien is experiencing itchiness and a feeling of fullness in the ear(s). vivien reports having fluid draining from the ear(s).   Symptom Details     Pain: vivien is experiencing mild pain (1-3 on a 10 point pain scale). It does not get worse when he eats or chews and gently pulls on the earlobe(s).     Drainage: The color of the fluid coming out of his ear(s) is clear. The fluid is malodorous.      vivien denies tenderness and redness in the ear(s). He also denies the possibility of a foreign object in the ear(s), ever having ear tubes, recent injuries near the ear(s), and feeling feverish.   Precipitating events   vivien denies flying and swimming within the past week.   Pertinent medical history  He   vivien denies having immunosuppressive conditions (e.g., chemotherapy, HIV, organ transplant, long-term use of steroids or other immunosuppressive medications, splenectomy).   vivien does not have diabetes.   Weight: 200 lbs   vivien smokes or uses smokeless tobacco.   Weight: 200 lbs    MEDICATIONS: sertraline oral, atorvastatin oral, losartan oral, ALLERGIES: NKDA  Clinician Response:  Dear vivien,   Based upon the information you provided, you may have otitis externa. External " otitis, also known as swimmer's ear, is a condition that occurs when the ear canal becomes irritated or infected.   Medication information  I am prescribing:     Ciprodex 0.3-0.1% otic ear drops. Instill 4 drops into affected ear(s) 2 times per day for 7 days. There are no refills with this prescription.   Instructions for using eardrops:     Lie on your side or tilt your head    Insert the drops into your ear canal    Lie on your side or insert a cotton ball in the ear canal for 5 minutes    Use the medication for the number of days recommended, even if you begin to feel better within a few days after starting the drops     Self care  Avoid getting water inside your ear while you are being treated for swimmer's ear.  Use a cotton ball coated with petroleum jelly to protect your ears when bathing and showering.  Do not go swimming or diving for the next 7-10 days.  Do not wear headphones or hearing aid in the infected ears until your symptoms improve.  Becoming tobacco-free is one of the best things you can do to improve your health. For help with quitting tobacco, you can call 1-169[employer]IT[employer]NOW (1-701.260.7738) or visit [employer].  When to seek care  Please make an appointment to be seen in a clinic or urgent care if any of the following occur:     Symptoms do not improve within 2 days    New symptoms develop or symptoms becomes worse     Additional treatment plan   start amoxicillin twice daily for 10 days. schedule clinic visit or go to urgent care if symptoms worsen or do not improve.&nbsp;    Diagnosis: Diffuse otitis externa, bilateral  Diagnosis ICD: H60.313  Triage Notes: I reviewed the patient's history, verified their identity, and explained the [facility] Visit process.    usually gets 1-2 outer ear infections per year. has seen numerous ENT providers and they usually prescribe drops and oral antibiotics. this feels exactly the same. has some mild drainage  Synchronous Triage: phone, status: completed,  duration: 349 seconds  Prescription: Ciprodex 0.3-0.1 % otic (ear) drops,suspension 1 7.5 ml dropper bottle, 7 days supply. Instill 4 drops into affected ear(s) 2 times per day for 7 days. Refills: 0, Refill as needed: no, Allow substitutions: yes  Pharmacy: [id number] - [phone] - [address] [place] [id number]

## 2021-02-11 ENCOUNTER — TELEPHONE (OUTPATIENT)
Dept: FAMILY MEDICINE | Facility: CLINIC | Age: 56
End: 2021-02-11

## 2021-02-11 DIAGNOSIS — I10 BENIGN ESSENTIAL HYPERTENSION: ICD-10-CM

## 2021-02-11 RX ORDER — LOSARTAN POTASSIUM 100 MG/1
100 TABLET ORAL DAILY
Qty: 30 TABLET | Refills: 0 | Status: SHIPPED | OUTPATIENT
Start: 2021-02-11

## 2021-02-11 NOTE — TELEPHONE ENCOUNTER
Routing refill request to provider for review/approval because:  Labs out of range:    BP Readings from Last 3 Encounters:   07/13/20 (!) 142/84   03/26/20 (!) 144/95   12/19/19 118/66     Kenisha Goodwin RN, BSN

## 2021-02-11 NOTE — TELEPHONE ENCOUNTER
Pharmacy requesting 90 day supply.    [id number] - [place], [place] - [address] [place] [facility] [place] [address]  [phone]    Order  losartan (COZAAR) 100 MG tablet [[id number]] (Order [id number])  Order Information    Ordered Status Priority Ordering User Department   02/11/21 Sent (none) Josefina Rogers MD  [facility]   Order History  Outpatient  Date/Time Action Taken User Additional Information   02/11/21 0712 Pend Aj Campbell    02/11/21 1106 Sign Josefina Rogers MD Reorder from Order:[id number]   02/11/21 1106 Taking Flag Checked Josefina Rogers MD    Outpatient Medication Detail     Disp Refills Start End EMILIA   losartan (COZAAR) 100 MG tablet 30 tablet 0 2/11/2021  No   Sig - Route: Take 1 tablet (100 mg) by mouth daily - Oral   Sent to pharmacy as: Losartan Potassium 100 MG Oral Tablet (COZAAR)   Class: E-Prescribe   Order: [id number]   E-Prescribing Status: Receipt confirmed by pharmacy (2/11/2021 11:06 AM CST)   Printout Tracking    External Result Report   Pharmacy    [id number] - [place], [place] - [address] [place] [facility] [place] [address]   Associated Diagnoses    Benign essential hypertension [I10]           Aj Campbell XRT

## 2021-05-19 ENCOUNTER — APPOINTMENT (OUTPATIENT)
Dept: MRI IMAGING | Facility: CLINIC | Age: 56
End: 2021-05-19
Attending: EMERGENCY MEDICINE
Payer: OTHER GOVERNMENT

## 2021-05-19 ENCOUNTER — HOSPITAL ENCOUNTER (EMERGENCY)
Facility: CLINIC | Age: 56
Discharge: HOME | End: 2021-05-19
Attending: EMERGENCY MEDICINE | Admitting: EMERGENCY MEDICINE
Payer: OTHER GOVERNMENT

## 2021-05-19 VITALS
HEART RATE: 88 BPM | TEMPERATURE: 97.7 F | SYSTOLIC BLOOD PRESSURE: 126 MMHG | RESPIRATION RATE: 18 BRPM | OXYGEN SATURATION: 98 % | DIASTOLIC BLOOD PRESSURE: 89 MMHG

## 2021-05-19 DIAGNOSIS — H53.8 BLURRED VISION: ICD-10-CM

## 2021-05-19 DIAGNOSIS — R42 DIZZINESS: ICD-10-CM

## 2021-05-19 DIAGNOSIS — R51.9 ACUTE NONINTRACTABLE HEADACHE, UNSPECIFIED HEADACHE TYPE: ICD-10-CM

## 2021-05-19 LAB
ANION GAP SERPL CALCULATED.3IONS-SCNC: 6 MMOL/L (ref 3–14)
BASOPHILS # BLD AUTO: 0.1 10E9/L (ref 0–0.2)
BASOPHILS NFR BLD AUTO: 1 %
BUN SERPL-MCNC: 22 MG/DL (ref 7–30)
CALCIUM SERPL-MCNC: 9 MG/DL (ref 8.5–10.1)
CHLORIDE SERPL-SCNC: 109 MMOL/L (ref 94–109)
CO2 SERPL-SCNC: 24 MMOL/L (ref 20–32)
CREAT SERPL-MCNC: 0.86 MG/DL (ref 0.66–1.25)
DIFFERENTIAL METHOD BLD: NORMAL
EOSINOPHIL # BLD AUTO: 0.3 10E9/L (ref 0–0.7)
EOSINOPHIL NFR BLD AUTO: 3.8 %
ERYTHROCYTE [DISTWIDTH] IN BLOOD BY AUTOMATED COUNT: 12.4 % (ref 10–15)
GFR SERPL CREATININE-BSD FRML MDRD: >90 ML/MIN/{1.73_M2}
GLUCOSE SERPL-MCNC: 98 MG/DL (ref 70–99)
HCT VFR BLD AUTO: 48.1 % (ref 40–53)
HGB BLD-MCNC: 16.5 G/DL (ref 13.3–17.7)
IMM GRANULOCYTES # BLD: 0 10E9/L (ref 0–0.4)
IMM GRANULOCYTES NFR BLD: 0.3 %
LYMPHOCYTES # BLD AUTO: 2.4 10E9/L (ref 0.8–5.3)
LYMPHOCYTES NFR BLD AUTO: 35.2 %
MCH RBC QN AUTO: 29.5 PG (ref 26.5–33)
MCHC RBC AUTO-ENTMCNC: 34.3 G/DL (ref 31.5–36.5)
MCV RBC AUTO: 86 FL (ref 78–100)
MONOCYTES # BLD AUTO: 0.5 10E9/L (ref 0–1.3)
MONOCYTES NFR BLD AUTO: 7.1 %
NEUTROPHILS # BLD AUTO: 3.6 10E9/L (ref 1.6–8.3)
NEUTROPHILS NFR BLD AUTO: 52.6 %
NRBC # BLD AUTO: 0 10*3/UL
NRBC BLD AUTO-RTO: 0 /100
PLATELET # BLD AUTO: 233 10E9/L (ref 150–450)
POTASSIUM SERPL-SCNC: 3.5 MMOL/L (ref 3.4–5.3)
RBC # BLD AUTO: 5.59 10E12/L (ref 4.4–5.9)
SODIUM SERPL-SCNC: 139 MMOL/L (ref 133–144)
WBC # BLD AUTO: 6.9 10E9/L (ref 4–11)

## 2021-05-19 PROCEDURE — 96374 THER/PROPH/DIAG INJ IV PUSH: CPT | Mod: 59

## 2021-05-19 PROCEDURE — 99285 EMERGENCY DEPT VISIT HI MDM: CPT | Mod: 25

## 2021-05-19 PROCEDURE — 85025 COMPLETE CBC W/AUTO DIFF WBC: CPT | Performed by: EMERGENCY MEDICINE

## 2021-05-19 PROCEDURE — 96375 TX/PRO/DX INJ NEW DRUG ADDON: CPT

## 2021-05-19 PROCEDURE — A9585 GADOBUTROL INJECTION: HCPCS | Performed by: EMERGENCY MEDICINE

## 2021-05-19 PROCEDURE — 250N000011 HC RX IP 250 OP 636: Performed by: EMERGENCY MEDICINE

## 2021-05-19 PROCEDURE — 70553 MRI BRAIN STEM W/O & W/DYE: CPT

## 2021-05-19 PROCEDURE — 80048 BASIC METABOLIC PNL TOTAL CA: CPT | Performed by: EMERGENCY MEDICINE

## 2021-05-19 PROCEDURE — 93005 ELECTROCARDIOGRAM TRACING: CPT

## 2021-05-19 PROCEDURE — 255N000002 HC RX 255 OP 636: Performed by: EMERGENCY MEDICINE

## 2021-05-19 RX ORDER — DIPHENHYDRAMINE HYDROCHLORIDE 50 MG/ML
25 INJECTION INTRAMUSCULAR; INTRAVENOUS ONCE
Status: COMPLETED | OUTPATIENT
Start: 2021-05-19 | End: 2021-05-19

## 2021-05-19 RX ORDER — KETOROLAC TROMETHAMINE 15 MG/ML
15 INJECTION, SOLUTION INTRAMUSCULAR; INTRAVENOUS ONCE
Status: COMPLETED | OUTPATIENT
Start: 2021-05-19 | End: 2021-05-19

## 2021-05-19 RX ORDER — GADOBUTROL 604.72 MG/ML
10 INJECTION INTRAVENOUS ONCE
Status: COMPLETED | OUTPATIENT
Start: 2021-05-19 | End: 2021-05-19

## 2021-05-19 RX ADMIN — PROCHLORPERAZINE EDISYLATE 10 MG: 5 INJECTION INTRAMUSCULAR; INTRAVENOUS at 17:20

## 2021-05-19 RX ADMIN — GADOBUTROL 10 ML: 604.72 INJECTION INTRAVENOUS at 17:45

## 2021-05-19 RX ADMIN — KETOROLAC TROMETHAMINE 15 MG: 15 INJECTION, SOLUTION INTRAMUSCULAR; INTRAVENOUS at 17:20

## 2021-05-19 RX ADMIN — DIPHENHYDRAMINE HYDROCHLORIDE 25 MG: 50 INJECTION, SOLUTION INTRAMUSCULAR; INTRAVENOUS at 17:19

## 2021-05-19 ASSESSMENT — ENCOUNTER SYMPTOMS
WEAKNESS: 0
DIZZINESS: 1
VOMITING: 0
PHOTOPHOBIA: 1
NUMBNESS: 0
HEADACHES: 1
SPEECH DIFFICULTY: 0
NAUSEA: 1
FEVER: 0
EYE PAIN: 1
NECK PAIN: 0

## 2021-05-19 NOTE — ED TRIAGE NOTES
56 year old male present with dizziness, headache, poor balance, blurred vision, palpitations, nausea. Denies chest pain or SOB. ABCs intact.

## 2021-05-19 NOTE — ED PROVIDER NOTES
"  History     Chief Complaint:  Dizziness     HPI   Naif Howell Jr. is a 56 year old male with history of hypertension, hyperlipidemia, SBO, vestibular migraines, aortic regurgitation, who presents for evaluation of dizziness. The patient reports that yesterday morning between 7252-9700, he started to develop dizziness with balance issues, describing that he is \"listing to one side\", and blurry vision. He endorses associated bilateral eye pain and feeling that he cannot focus. He states that his symptoms were minor until they worsened 1-2 hours ago which prompted his presentation. Since onset, he has also develped a headache with nausea and photophobia but states that these were not present with onset of dizziness. His dizziness reportedly worsens when he stops moving and does not worsen when he turns his head. The patient is currently on antibiotics ear drops for an ear infection, which he saw ENT for yesterday. He denies chest pain, fevers, vomiting, neck stiffness, loss of vision, speech difficulty, weakness, numbness, or recent falls or head injury. He denies recent drug or alcohol use, or other changes in medications. He took ibuprofen this morning for symptom control. Lastly, he reports a history of migraines in the past but has not had one for several years.     Review of Systems   Constitutional: Negative for fever.   Eyes: Positive for photophobia, pain and visual disturbance (Blurry vision; no vision loss).   Cardiovascular: Negative for chest pain.   Gastrointestinal: Positive for nausea. Negative for vomiting.   Musculoskeletal: Negative for neck pain.   Neurological: Positive for dizziness and headaches. Negative for speech difficulty, weakness and numbness.   All other systems reviewed and are negative.      Allergies:  Cefdinir  Cephalexin  Simvastatin  Cefuroxime  Flagyl    Medications:  losartan  sertraline   simvastatin    Past Medical History:    Anxiety   Aortic regurgitation   Back pain   Basal " cell carcinoma   Chronic neck pain   Degenerative disc disease, cervical   GERD   Hypertension   Migraines   PTSD    SBO   Vestibular migraine  Hyperlipidemia   Aortic root dilation     Past Surgical History:    herniorrhaphy incisional   Choleystectomy with cholangiograms   laparoscopic lysis adhesions   laparoscopy diagnostic   Resect SBO w/o ostomy   Resection of basal cell carcinoma     Family History:    Mother: hypertension   Father: chest cancer   Sister: hypertension   Maternal grandmother: hypertension     Social History:  The patient was unaccompanied to the ED.  Denies alcohol or illicit drug use.    Physical Exam     Patient Vitals for the past 24 hrs:   BP Temp Temp src Pulse Resp SpO2   21 1545 126/89 97.7  F (36.5  C) Temporal 88 18 98 %       Physical Exam  General: Alert, no acute distress  Neuro:    Alert and oriented x 3.    Speech is normal and fluent.   Face is symmetric without droop. CN's II-XII grossly intact. Negative pronator drift. Finger to nose symmetric and grossly normal bilaterally.   Right Arm: Good  strength. 5/5 elbow flexion. 5/5 elbow extension. Sensation intact to light touch.   Left Arm: Good  strength. 5/5 elbow flexion. 5/5 elbow extension. Sensation intact to light touch.   Right Le/5 straight leg raise, 5/5 ankle dorsiflexion, 5/5 ankle plantar flexion. Sensation intact to light touch.   Left Le/5 straight leg raise, 5/5 ankle dorsiflexion, 5/5 ankle plantar flexion. Sensation intact to light touch.            Romberg and gait: Deferred  HEENT:  Moist mucous membranes.   Conjunctiva normal.  No meningismus. No temporal artery tenderness.   CV:  RRR, no m/r/g, skin warm and well perfused  Pulm:  CTAB, no wheezes/ronchi/rales.  No acute distress, breathing comfortably  GI:  Soft, nontender, nondistended.  No rebound or guarding.  Normal bowel sounds  MSK:  Moving all extremities.  No focal areas of edema, erythema, or tenderness  Skin:  WWP, no rashes,  no lower extremity edema, skin color normal, no diaphoresis  Psych:  Well-appearing, normal affect, regular speech    Emergency Department Course     ECG:  ECG taken at 1816, ECG read at 1818  Normal sinus rhythm  Inferior infarct, age undetermined  Abnormal ECG  Rate 72 bpm. AK interval 188 ms. QRS duration 100 ms. QT/QTc 388/424 ms. P-R-T axes 60 -11 51.    Imaging:    MR Brain w/o & w Contrast  1.  No acute intracranial abnormality.  Reading per radiology     Laboratory:    CBC: WBC 6.9, HGB 16.5,   BMP: WNL (Creatinine 0.86)    Emergency Department Course:    Reviewed:    I reviewed the patient's nursing notes, vitals, past medical records, Care Everywhere.     Assessments:    1547 I performed an exam of the patient as documented above.     1840 Patient rechecked and updated. He feels much improved     Interventions:  1719 Benadryl 25 mg IV   1720 Toradol 15 mg IV  1720 Compazine 10 mg IV    Disposition:  The patient was discharged to home.     Impression & Plan      Medical Decision Making:  Naif Howell Jr. is a 56 year old male history of hypertension, hyperlipidemia, vestibular migraines who presents to the emergency department today for evaluation of dizziness, blurred vision, photophobia, headache.  Please see above for details of HPI and exam.  Patient initially called for neuro evaluation in triage.  On initial evaluation, patient is otherwise well-appearing and has a nonfocal neurologic exam.  LKN was yesterday at 1000.  Sudden onset of dizziness argues against central etiology for dizziness although this was also considered.  No historical symptoms or signs suggestive of infectious etiology like meningitis/encephalitis and no red flag symptomatology to suggest SAH/ICH, temporal arteritis, cerebral venous thrombosis.  Screening EKG is unremarkable.  He denies any chest pain.  Basic lab work-up is normal.  MRI brain without acute intracranial abnormality.  He did have significant improvement with  the above interventions suggesting complex/vestibular migraine as cause for his overall symptoms.  Given his resolution of symptoms and reassuring work-up, he safe to discharge home to follow-up with his PCP and he understands and agrees.  Discussed signs symptoms that should prompt his return return to the ER.  All questions answered prior to discharge.    Diagnosis:    ICD-10-CM    1. Acute nonintractable headache, unspecified headache type  R51.9    2. Dizziness  R42    3. Blurred vision  H53.8      Discharge Medications:  Discharge Medication List as of 5/19/2021  7:11 PM        Scribe Disclosure:  I, Orla Severson, am serving as a scribe at 3:57 PM on 5/19/2021 to document services personally performed by Rene Berry MD based on my observations and the provider's statements to me.     [facility] EMERGENCY DEPT         Rene Berry MD  05/20/21 0150       Rene Berry MD  05/20/21 0151

## 2021-05-20 LAB — INTERPRETATION ECG - MUSE: NORMAL

## 2021-06-03 ENCOUNTER — RECORDS - HEALTHEAST (OUTPATIENT)
Dept: ADMINISTRATIVE | Facility: CLINIC | Age: 56
End: 2021-06-03

## 2021-06-04 ENCOUNTER — RECORDS - HEALTHEAST (OUTPATIENT)
Dept: ADMINISTRATIVE | Facility: CLINIC | Age: 56
End: 2021-06-04

## 2021-07-15 ENCOUNTER — APPOINTMENT (OUTPATIENT)
Dept: GENERAL RADIOLOGY | Facility: CLINIC | Age: 56
End: 2021-07-15
Attending: EMERGENCY MEDICINE
Payer: OTHER GOVERNMENT

## 2021-07-15 ENCOUNTER — HOSPITAL ENCOUNTER (EMERGENCY)
Facility: CLINIC | Age: 56
Discharge: HOME | End: 2021-07-15
Attending: EMERGENCY MEDICINE | Admitting: EMERGENCY MEDICINE
Payer: OTHER GOVERNMENT

## 2021-07-15 VITALS
HEART RATE: 71 BPM | SYSTOLIC BLOOD PRESSURE: 138 MMHG | RESPIRATION RATE: 18 BRPM | BODY MASS INDEX: 25.68 KG/M2 | TEMPERATURE: 97.9 F | OXYGEN SATURATION: 98 % | DIASTOLIC BLOOD PRESSURE: 84 MMHG | WEIGHT: 200 LBS

## 2021-07-15 DIAGNOSIS — R07.9 CHEST PAIN, UNSPECIFIED TYPE: ICD-10-CM

## 2021-07-15 LAB
ANION GAP SERPL CALCULATED.3IONS-SCNC: 4 MMOL/L (ref 3–14)
ATRIAL RATE - MUSE: 80 BPM
BASOPHILS # BLD AUTO: 0.1 10E3/UL (ref 0–0.2)
BASOPHILS NFR BLD AUTO: 1 %
BUN SERPL-MCNC: 13 MG/DL (ref 7–30)
CALCIUM SERPL-MCNC: 8.8 MG/DL (ref 8.5–10.1)
CHLORIDE BLD-SCNC: 109 MMOL/L (ref 94–109)
CO2 SERPL-SCNC: 26 MMOL/L (ref 20–32)
CREAT SERPL-MCNC: 0.9 MG/DL (ref 0.66–1.25)
D DIMER PPP FEU-MCNC: 0.39 UG/ML FEU (ref 0–0.5)
DIASTOLIC BLOOD PRESSURE - MUSE: NORMAL MMHG
EOSINOPHIL # BLD AUTO: 0.2 10E3/UL (ref 0–0.7)
EOSINOPHIL NFR BLD AUTO: 3 %
ERYTHROCYTE [DISTWIDTH] IN BLOOD BY AUTOMATED COUNT: 12.3 % (ref 10–15)
GFR SERPL CREATININE-BSD FRML MDRD: >90 ML/MIN/1.73M2
GLUCOSE BLD-MCNC: 108 MG/DL (ref 70–99)
HCT VFR BLD AUTO: 50.7 % (ref 40–53)
HGB BLD-MCNC: 17.2 G/DL (ref 13.3–17.7)
HOLD SPECIMEN: NORMAL
IMM GRANULOCYTES # BLD: 0 10E3/UL
IMM GRANULOCYTES NFR BLD: 0 %
INTERPRETATION ECG - MUSE: NORMAL
LYMPHOCYTES # BLD AUTO: 2.2 10E3/UL (ref 0.8–5.3)
LYMPHOCYTES NFR BLD AUTO: 29 %
MCH RBC QN AUTO: 29.2 PG (ref 26.5–33)
MCHC RBC AUTO-ENTMCNC: 33.9 G/DL (ref 31.5–36.5)
MCV RBC AUTO: 86 FL (ref 78–100)
MONOCYTES # BLD AUTO: 0.5 10E3/UL (ref 0–1.3)
MONOCYTES NFR BLD AUTO: 6 %
NEUTROPHILS # BLD AUTO: 4.7 10E3/UL (ref 1.6–8.3)
NEUTROPHILS NFR BLD AUTO: 61 %
NRBC # BLD AUTO: 0 10E3/UL
NRBC BLD AUTO-RTO: 0 /100
P AXIS - MUSE: 67 DEGREES
PLATELET # BLD AUTO: 209 10E3/UL (ref 150–450)
POTASSIUM BLD-SCNC: 4 MMOL/L (ref 3.4–5.3)
PR INTERVAL - MUSE: 174 MS
QRS DURATION - MUSE: 98 MS
QT - MUSE: 372 MS
QTC - MUSE: 429 MS
R AXIS - MUSE: -14 DEGREES
RBC # BLD AUTO: 5.9 10E6/UL (ref 4.4–5.9)
SODIUM SERPL-SCNC: 139 MMOL/L (ref 133–144)
SYSTOLIC BLOOD PRESSURE - MUSE: NORMAL MMHG
T AXIS - MUSE: 57 DEGREES
TROPONIN I SERPL-MCNC: <0.015 UG/L (ref 0–0.04)
TROPONIN I SERPL-MCNC: <0.015 UG/L (ref 0–0.04)
VENTRICULAR RATE- MUSE: 80 BPM
WBC # BLD AUTO: 7.7 10E3/UL (ref 4–11)

## 2021-07-15 PROCEDURE — 99285 EMERGENCY DEPT VISIT HI MDM: CPT | Mod: 25

## 2021-07-15 PROCEDURE — 36592 COLLECT BLOOD FROM PICC: CPT | Performed by: EMERGENCY MEDICINE

## 2021-07-15 PROCEDURE — 71046 X-RAY EXAM CHEST 2 VIEWS: CPT

## 2021-07-15 PROCEDURE — 85379 FIBRIN DEGRADATION QUANT: CPT | Performed by: EMERGENCY MEDICINE

## 2021-07-15 PROCEDURE — 84484 ASSAY OF TROPONIN QUANT: CPT | Performed by: EMERGENCY MEDICINE

## 2021-07-15 PROCEDURE — 36415 COLL VENOUS BLD VENIPUNCTURE: CPT | Performed by: EMERGENCY MEDICINE

## 2021-07-15 PROCEDURE — 93005 ELECTROCARDIOGRAM TRACING: CPT

## 2021-07-15 PROCEDURE — 85025 COMPLETE CBC W/AUTO DIFF WBC: CPT | Performed by: EMERGENCY MEDICINE

## 2021-07-15 PROCEDURE — 80048 BASIC METABOLIC PNL TOTAL CA: CPT | Performed by: EMERGENCY MEDICINE

## 2021-07-15 ASSESSMENT — ENCOUNTER SYMPTOMS
FEVER: 0
NECK PAIN: 1
PALPITATIONS: 1
SHORTNESS OF BREATH: 1
COUGH: 0

## 2021-07-15 NOTE — ED PROVIDER NOTES
History   Chief Complaint:  Chest Pain       HPI   Naif Howell Jr. is a 56 year old male with history of hypertension, dyslipidemia, tobacco use and anxiety who presents with chest pain and shortness of breath. Patient claims that his initial thought was a costochondritis attack but feels much worse. He was watching TV two hours ago and felt pressure and numbness in his chest with palpitations.  He took Tylenol an hour ago but it did not relieve his symptoms. He was short of breath but this has since resolved. Denies radiation of the pain. Denies any arm pain, fevers, coughing, leg swelling, or exertional chest pain. No history of heart issues or diabetes.      Review of Systems   Constitutional: Negative for fever.   Eyes: Positive for visual disturbance.   Respiratory: Positive for shortness of breath. Negative for cough.    Cardiovascular: Positive for chest pain and palpitations. Negative for leg swelling.   Musculoskeletal: Positive for neck pain.   All other systems reviewed and are negative.      Allergies:  Cefdinir  Cephalexin  Simvastatin  Cefuroxime  Flagyl [Metronidazole]    Medications:  losartan   neomycin-polymyxin-hydrocortisone   sertraline   simvastatin   Nortriptyline  Duloxetine  atorvastatine    Past Medical History:    Anxiety   Aortic regurgitation  Basal cell carcinoma  Chronic neck pain  Degenerative disc disease  GERD  Hypertension  Migraines  PTSD  SBO  Cervical and thoracic segment dysfunction  kyphosis  cervicalgia  ventral hernia  Chronic midline back pain  dyslipidemia  Aortic root dilatation    Past Surgical History:    herniorrhaphy incisional   IR thoracic epidural injection  Laparoscopic cholecystectomy   Resect small bowel without ostomy  Resection of basal cell carcinoma    Family History:    Hypertension  Cancer    Social History:  Patient presents alone   Smoking status current     Physical Exam     Patient Vitals for the past 24 hrs:   BP Temp Pulse Resp SpO2 Weight    07/15/21 1245 138/84 -- 71 18 98 % --   07/15/21 1228 -- -- 73 9 98 % --   07/15/21 1200 129/81 -- 65 18 99 % --   07/15/21 1150 124/77 -- 68 14 99 % --   07/15/21 1140 -- -- 65 14 99 % --   07/15/21 1120 (!) 143/83 -- -- -- -- --   07/15/21 1110 -- -- 66 13 98 % --   07/15/21 1100 (!) 126/94 -- 68 20 97 % --   07/15/21 1050 122/80 -- 67 21 97 % --   07/15/21 1040 -- -- 67 22 97 % --   07/15/21 1030 135/75 -- 66 16 97 % --   07/15/21 1020 (!) 124/92 -- 68 12 97 % --   07/15/21 1010 -- -- 67 13 97 % --   07/15/21 1000 (!) 141/81 -- 70 28 96 % --   07/15/21 0950 138/79 -- 68 20 96 % --   07/15/21 0940 -- -- 66 18 96 % --   07/15/21 0930 137/84 -- 70 12 99 % --   07/15/21 0920 137/84 -- 68 17 96 % --   07/15/21 0910 -- -- 68 12 96 % --   07/15/21 0900 132/85 -- 74 12 100 % --   07/15/21 0858 (!) 154/107 97.9  F (36.6  C) 79 18 100 % 90.7 kg (200 lb)       Physical Exam   General: Alert, no acute distress  HEENT:  Moist mucous membranes.  Conjunctiva normal.   CV:  RRR, no m/r/g, skin warm and well perfused  Pulm:  CTAB, no wheezes/ronchi/rales.  No acute distress, breathing comfortably  GI:  Soft, nontender, nondistended.  No rebound or guarding.  Normal bowel sounds  MSK:  Moving all extremities.  No focal areas of edema, erythema, or tenderness  Skin:  WWP, no rashes, no lower extremity edema, skin color normal, no diaphoresis  Psych:  Well-appearing, normal affect, regular speech    Emergency Department Course   ECG  ECG taken at 8:55:52, ECG read at 0855  Normal sinus rhythm     No significant change as compared to prior, dated 05/19/2021.  Rate 80 bpm. DE interval 174 ms. QRS duration 98 ms. QT/QTc 372/429 ms. P-R-T axes 67 -14 57.     Imaging:  XR Chest  Since July 13, 2020, heart size remains normal. No pleural  effusion, pneumothorax, or abnormal area of consolidation.  Per radiology.    Laboratory:  CBC: WBC 7.7, HGB 17.2,    BMP: Glucose 108 (H), o/w WNL (creatinine 0.90)  Troponin (Collected 0900):  <0.015  Troponin (Collected 1113): <0.015  D dimer: 0.39    Emergency Department Course:    Reviewed:  I reviewed nursing notes, vitals, past medical history and care everywhere    Assessments:  0920 I obtained history and examined the patient as noted above.   1220 I rechecked the patient and explained findings.     Disposition:  The patient was discharged to home.     Impression & Plan     Medical Decision Making:  Naif Howell Jr. is a 56 year old male who presents to the ER for evaluation of chest pain.  Please see above for details of HPI and exam.  He is vitally stable.  Broad differential considered included ACS, PE, acute aortic dissection, pneumothorax, pneumonia effusion, MSK etiology, anxiety, referred GI etiology amongst other things.  Fortunately, workup in the ER is reassuring.  EKG shows no signs of acute ischemia.  Troponin and delta troponin are normal.  Patient's HEART score is 3 (age, risk factors).  He is low risk for PE and d dimer is negative making this unlikely.  The rest of his labs are normal.  CXR without concerning findings including mediastinal widening; doubt dissection based on history and exam.  Given reassuring workup, with reasonable clinical certainty, patient is safe to discharge home with close outpatient PCP follow up and he understands and agrees.  I discussed the signs and symptoms that should prompt his urgent ER return.  All questions were answered prior to discharge.    Covid-19  Naif Howell Jr. was evaluated during a global COVID-19 pandemic, which necessitated consideration that the patient might be at risk for infection with the SARS-CoV-2 virus that causes COVID-19.   Applicable protocols for evaluation were followed during the patient's care.     Diagnosis:    ICD-10-CM    1. Chest pain, unspecified type  R07.9        Discharge Medications:  Discharge Medication List as of 7/15/2021 12:47 PM          Scribe Disclosure:  Lakesha FORRESTER, am serving as a scribe at 9:20  AM on 7/15/2021 to document services personally performed by Rene Berry MD based on my observations and the provider's statements to me.       Scribe Disclosure:  I, Maria Eugenia Raygoza, am serving as a scribe at 12:20 PM on 7/15/2021 to document services personally performed by Rene Berry MD based on my observations and the provider's statements to me.       Maria Eugenia Raygoza  7/15/2021       Rene Berry MD  07/15/21 1837

## 2021-07-15 NOTE — ED TRIAGE NOTES
Patient presents with complaints of chest pain and SOB. Patient states that pain has been ongoing for the past few days but has gotten worse over the past two hours. ABC intact without need for intervention at this time.

## 2021-09-25 ENCOUNTER — HEALTH MAINTENANCE LETTER (OUTPATIENT)
Age: 56
End: 2021-09-25

## 2021-10-11 ENCOUNTER — HOSPITAL ENCOUNTER (EMERGENCY)
Facility: CLINIC | Age: 56
Discharge: HOME | End: 2021-10-11
Admitting: EMERGENCY MEDICINE
Payer: OTHER GOVERNMENT

## 2021-10-11 VITALS
TEMPERATURE: 97.9 F | OXYGEN SATURATION: 99 % | RESPIRATION RATE: 16 BRPM | HEART RATE: 97 BPM | SYSTOLIC BLOOD PRESSURE: 133 MMHG | DIASTOLIC BLOOD PRESSURE: 81 MMHG

## 2021-10-11 LAB
ANION GAP SERPL CALCULATED.3IONS-SCNC: 8 MMOL/L (ref 3–14)
ATRIAL RATE - MUSE: 89 BPM
BASOPHILS # BLD AUTO: 0.1 10E3/UL (ref 0–0.2)
BASOPHILS NFR BLD AUTO: 1 %
BUN SERPL-MCNC: 18 MG/DL (ref 7–30)
CALCIUM SERPL-MCNC: 8.8 MG/DL (ref 8.5–10.1)
CHLORIDE BLD-SCNC: 107 MMOL/L (ref 94–109)
CO2 SERPL-SCNC: 23 MMOL/L (ref 20–32)
CREAT SERPL-MCNC: 0.95 MG/DL (ref 0.66–1.25)
DIASTOLIC BLOOD PRESSURE - MUSE: NORMAL MMHG
EOSINOPHIL # BLD AUTO: 0.2 10E3/UL (ref 0–0.7)
EOSINOPHIL NFR BLD AUTO: 3 %
ERYTHROCYTE [DISTWIDTH] IN BLOOD BY AUTOMATED COUNT: 12.5 % (ref 10–15)
GFR SERPL CREATININE-BSD FRML MDRD: 88 ML/MIN/1.73M2
GLUCOSE BLD-MCNC: 108 MG/DL (ref 70–99)
HCT VFR BLD AUTO: 49 % (ref 40–53)
HGB BLD-MCNC: 16.8 G/DL (ref 13.3–17.7)
HOLD SPECIMEN: NORMAL
IMM GRANULOCYTES # BLD: 0 10E3/UL
IMM GRANULOCYTES NFR BLD: 0 %
INTERPRETATION ECG - MUSE: NORMAL
LYMPHOCYTES # BLD AUTO: 2.5 10E3/UL (ref 0.8–5.3)
LYMPHOCYTES NFR BLD AUTO: 33 %
MCH RBC QN AUTO: 28.5 PG (ref 26.5–33)
MCHC RBC AUTO-ENTMCNC: 34.3 G/DL (ref 31.5–36.5)
MCV RBC AUTO: 83 FL (ref 78–100)
MONOCYTES # BLD AUTO: 0.4 10E3/UL (ref 0–1.3)
MONOCYTES NFR BLD AUTO: 5 %
NEUTROPHILS # BLD AUTO: 4.4 10E3/UL (ref 1.6–8.3)
NEUTROPHILS NFR BLD AUTO: 58 %
NRBC # BLD AUTO: 0 10E3/UL
NRBC BLD AUTO-RTO: 0 /100
P AXIS - MUSE: 69 DEGREES
PLATELET # BLD AUTO: 236 10E3/UL (ref 150–450)
POTASSIUM BLD-SCNC: 3.6 MMOL/L (ref 3.4–5.3)
PR INTERVAL - MUSE: 170 MS
QRS DURATION - MUSE: 100 MS
QT - MUSE: 356 MS
QTC - MUSE: 433 MS
R AXIS - MUSE: -5 DEGREES
RBC # BLD AUTO: 5.89 10E6/UL (ref 4.4–5.9)
SODIUM SERPL-SCNC: 138 MMOL/L (ref 133–144)
SYSTOLIC BLOOD PRESSURE - MUSE: NORMAL MMHG
T AXIS - MUSE: 64 DEGREES
TROPONIN I SERPL-MCNC: <0.015 UG/L (ref 0–0.04)
VENTRICULAR RATE- MUSE: 89 BPM
WBC # BLD AUTO: 7.6 10E3/UL (ref 4–11)

## 2021-10-11 PROCEDURE — 85025 COMPLETE CBC W/AUTO DIFF WBC: CPT | Performed by: EMERGENCY MEDICINE

## 2021-10-11 PROCEDURE — 84484 ASSAY OF TROPONIN QUANT: CPT | Performed by: EMERGENCY MEDICINE

## 2021-10-11 PROCEDURE — 999N000104 HC STATISTIC NO CHARGE

## 2021-10-11 PROCEDURE — 36415 COLL VENOUS BLD VENIPUNCTURE: CPT | Performed by: EMERGENCY MEDICINE

## 2021-10-11 PROCEDURE — 80048 BASIC METABOLIC PNL TOTAL CA: CPT | Performed by: EMERGENCY MEDICINE

## 2021-10-11 NOTE — ED TRIAGE NOTES
Sudden onset of SOB, chest pain, dizziness, blurred vision bilaterally and palpitations. VSS on RA.

## 2022-01-15 ENCOUNTER — HEALTH MAINTENANCE LETTER (OUTPATIENT)
Age: 57
End: 2022-01-15

## 2022-10-15 NOTE — ED NOTES
Pt c/o abd pain for 2-3 hrs.    Pt A&O x 3, CMS x 3, ABCD's adequate in triage     EMS handoff received. BIBA for pt c/o fall. pt fell backward during football game. denies loc or vomiting. pt currently c/o chest pain and epigastric pain. clear breath sounds b/l noted. no pmh, IUTD. apical HR auscultated.

## 2023-01-07 ENCOUNTER — HEALTH MAINTENANCE LETTER (OUTPATIENT)
Age: 58
End: 2023-01-07

## 2023-04-03 NOTE — TELEPHONE ENCOUNTER
[facility] Referral      1. Confirmed address with patient? Yes  2. Confirmed phone number with patient? Yes  3. Confirmed referring provider? Yes  4. Is the PCP the same as the referring provider? Yes  5. Has the patient been to any previous pain clinics? No  (If yes, send ASHLEY with welcome letter)  6. Which insurance are we to bill for this appointment?  [employer]    7. Informed pt of cancellation (48 hour) policy? Yes    REGARDING OPIOID MEDICATIONS: We will always address appropriateness of opioid pain medications, but we generally will not automatically take on a prescribing role. When we do take on prescribing of opioids for chronic pain, it is in collaboration with the referring physician for an intermediate period of time (months), with an expectation that the primary physician or provider will assume the prescribing role if medications are effective at stable doses with demonstrated compliance. Therefore, please do not assume that your prescribing responsibilities end on the day of pain clinic consultation.  8. Informed pt of prescribing policy? Yes    9.Please be aware that once you are established with a pain provider and location, you will need to continue have all future visits with that provider and location. It is best to determine what location is the most convenient for you and schedule with that one.    ** PATIENT INFORMED OF THIS POLICY Yes      9. Referring Provider: Josefina Rogers          English

## 2023-04-22 ENCOUNTER — HEALTH MAINTENANCE LETTER (OUTPATIENT)
Age: 58
End: 2023-04-22

## 2023-06-21 NOTE — MR AVS SNAPSHOT
After Visit Summary   1/9/2018    Naif Howell Jr.    MRN: [id number]           Patient Information     Date Of Birth          1965        Visit Information        Provider Department      1/9/2018 1:00 PM Josefina Rogers MD [facility]        Today's Diagnoses     Preop general physical exam    -  1    Incisional hernia, without obstruction or gangrene          Care Instructions      Before Your Surgery      Call your surgeon if there is any change in your health. This includes signs of a cold or flu (such as a sore throat, runny nose, cough, rash or fever).    Do not smoke, drink alcohol or take over the counter medicine (unless your surgeon or primary care doctor tells you to) for the 24 hours before and after surgery.    If you take prescribed drugs: Follow your doctor s orders about which medicines to take and which to stop until after surgery.    Eating and drinking prior to surgery: follow the instructions from your surgeon    Take a shower or bath the night before surgery. Use the soap your surgeon gave you to gently clean your skin. If you do not have soap from your surgeon, use your regular soap. Do not shave or scrub the surgery site.  Wear clean pajamas and have clean sheets on your bed.           Follow-ups after your visit        Your next 10 appointments already scheduled     Jan 11, 2018  7:30 AM CST   [facility] Same Day Surgery with Corey Vang MD, Shantell Benson PA-C   [facility]    [facility] ([facility])   [phone]            Jan 11, 2018   Procedure with Corey Vang MD   [facility] PeriOp Services (--)    [address] [facility] [address]   [phone]              Who to contact     If you have questions or need follow up information about today's clinic visit or your schedule please contact [facility]  Spoke with patient and she is aware that PCP would give her a short term supply of hydrocodone to use for severe pain. Aware PCP will not manage long term pain medication and any concerns should be directed back to orthopedics. Patient verbalizes understanding and has no further questions at this time. Advised to call back with any questions or concerns.     "[facility] directly at [phone].  Normal or non-critical lab and imaging results will be communicated to you by MyChart, letter or phone within 4 business days after the clinic has received the results. If you do not hear from us within 7 days, please contact the clinic through [facility] or phone. If you have a critical or abnormal lab result, we will notify you by phone as soon as possible.  Submit refill requests through [employer] or call your pharmacy and they will forward the refill request to us. Please allow 3 business days for your refill to be completed.          Additional Information About Your Visit        [employer] Information     [facility] lets you send messages to your doctor, view your test results, renew your prescriptions, schedule appointments and more. To sign up, go to [email or web address] . Click on \"Log in\" on the left side of the screen, which will take you to the Welcome page. Then click on \"Sign up Now\" on the right side of the page.     You will be asked to enter the access code listed below, as well as some personal information. Please follow the directions to create your username and password.     Your access code is: [email or web address]  Expires: 4/3/2018  8:06 AM     Your access code will  in 90 days. If you need help or a new code, please call your [place] clinic or [phone].        Care EveryWhere ID     This is your Care EveryWhere ID. This could be used by other organizations to access your [place] medical records  [id number]        Your Vitals Were     Pulse Temperature Height Pulse Oximetry BMI (Body Mass Index)       90 98.3  F (36.8  C) (Oral) 6' 2\" (1.88 m) 96% 27 kg/m2        Blood Pressure from Last 3 Encounters:   18 120/68   18 (!) 150/91   18 129/86    Weight from Last 3 Encounters:   18 210 lb 4.8 oz (95.4 kg)   18 200 lb (90.7 kg)   17 208 lb (94.3 kg)              We Performed the Following     Basic metabolic panel  (Ca, Cl, " CO2, Creat, Gluc, K, Na, BUN)     CBC with platelets     EKG 12-lead complete w/read - Clinics          Today's Medication Changes          These changes are accurate as of: 1/9/18  1:23 PM.  If you have any questions, ask your nurse or doctor.               Stop taking these medicines if you haven't already. Please contact your care team if you have questions.     methylPREDNISolone 4 MG tablet   Commonly known as:  MEDROL DOSEPAK   Stopped by:  Josefina Rogers MD                    Primary Care Provider Office Phone # Fax #    Josefina Rogers [phone] [id number] [facility] [id number]        Equal Access to Services     [facility] : Hadii carl salmeron hadasho Soadrian, waaxda luqadaha, qaybta kaalmada adekamyada, tres greenberg . So [facility] [phone].    ATENCIÓN: Si habla español, tiene a morocho disposición servicios gratuitos de asistencia lingüística. Llame al [phone].    We comply with applicable federal civil rights laws and [place] laws. We do not discriminate on the basis of race, color, national origin, age, disability, sex, sexual orientation, or gender identity.            Thank you!     Thank you for choosing [facility]  for your care. Our goal is always to provide you with excellent care. Hearing back from our patients is one way we can continue to improve our services. Please take a few minutes to complete the written survey that you may receive in the mail after your visit with us. Thank you!             Your Updated Medication List - Protect others around you: Learn how to safely use, store and throw away your medicines at www.disposemymeds.org.          This list is accurate as of: 1/9/18  1:23 PM.  Always use your most recent med list.                   Brand Name Dispense Instructions for use Diagnosis    ibuprofen 800 MG tablet    ADVIL/MOTRIN    24 tablet    Take 1 tablet (800 mg) by mouth every 8 hours as needed  for moderate pain        losartan 50 MG tablet    COZAAR    90 tablet    Take 1 tablet (50 mg) by mouth daily    Benign essential hypertension       multivitamin, therapeutic with minerals Tabs tablet      Take 1 tablet by mouth daily        oxyCODONE IR 5 MG tablet    ROXICODONE    20 tablet    Take 1 tablet (5 mg) by mouth every 4 hours as needed for pain        sildenafil 20 MG tablet    REVATIO    9 tablet    Take 1 tablet (20 mg) by mouth three times daily for pulmonary hypertension.  Never use with nitroglycerin, terazosin or doxazosin.    Vasculogenic erectile dysfunction, unspecified vasculogenic erectile dysfunction type       simvastatin 40 MG tablet    ZOCOR    90 tablet    Take 1 tablet (40 mg) by mouth At Bedtime    Mixed hyperlipidemia

## 2024-06-17 PROBLEM — Z76.89 HEALTH CARE HOME: Status: RESOLVED | Noted: 2017-05-25 | Resolved: 2024-06-17

## 2024-06-29 ENCOUNTER — HEALTH MAINTENANCE LETTER (OUTPATIENT)
Age: 59
End: 2024-06-29

## (undated) DEVICE — TUBING SUCTION 12"X1/4" N612

## (undated) DEVICE — BLADE CLIPPER 3M 9670

## (undated) DEVICE — ESU CORD MONOPOLAR 10'  E0510

## (undated) DEVICE — PREP SKIN SCRUB TRAY 4461A

## (undated) DEVICE — ENDO TROCAR 05MM VERSAONE BLADELESS W/STD FIX CAN NONB5STF

## (undated) DEVICE — SPONGE LAP 18X18" X8435

## (undated) DEVICE — SOL NACL 0.9% IRRIG 1000ML BOTTLE 2F7124

## (undated) DEVICE — DRSG STERI STRIP 1/2X4" R1547

## (undated) DEVICE — STPL 80 X 3.8MM GIA8038S

## (undated) DEVICE — GLOVE PROTEXIS BLUE W/NEU-THERA 6.5  2D73EB65

## (undated) DEVICE — GLOVE PROTEXIS W/NEU-THERA 7.5  2D73TE75

## (undated) DEVICE — PREP SCRUB SOL EXIDINE 4% CHG 4OZ 29002-404

## (undated) DEVICE — SU VICRYL 2-0 TIE 12X18" J905T

## (undated) DEVICE — SUCTION TIP YANKAUER W/O VENT K86

## (undated) DEVICE — BAG CLEAR TRASH 1.3M 39X33" P4040C

## (undated) DEVICE — DECANTER BAG 2002S

## (undated) DEVICE — STPL RELOAD 80 X 3.8MM GIA8038L

## (undated) DEVICE — GLOVE PROTEXIS BLUE W/NEU-THERA 8.0  2D73EB80

## (undated) DEVICE — ESU GROUND PAD ADULT W/CORD E7507

## (undated) DEVICE — DRAPE LEGGINGS 8421

## (undated) DEVICE — PACK MINOR CUSTOM RIDGES SBA32RMRMA

## (undated) DEVICE — GOWN XLG DISP 9545

## (undated) DEVICE — SU VICRYL 4-0 PS-2 18" UND J496H

## (undated) DEVICE — SOL NACL 0.9% IRRIG 3000ML BAG 2B7477

## (undated) DEVICE — ENDO CANNULA 05MM VERSAONE UNIVERSAL UNVCA5STF

## (undated) DEVICE — LINEN POUCH DBL 5427

## (undated) DEVICE — CLIP APPLIER ENDO 05MM MED/LG 176630

## (undated) DEVICE — SUCTION IRR STRYKERFLOW II W/TIP 250-070-520

## (undated) DEVICE — SU VICRYL 1 CT-2 27" J335H

## (undated) DEVICE — SYR BULB IRRIG 50ML LATEX FREE 0035280

## (undated) DEVICE — Device

## (undated) DEVICE — LINEN HALF SHEET 5512

## (undated) DEVICE — ESU ELEC BLADE 2.75" COATED/INSULATED E1455

## (undated) DEVICE — LINEN TOWEL PACK X10 5473

## (undated) DEVICE — TUBING IV EXTENSION SET ANESTHESIA 34" MLL 2C6227

## (undated) DEVICE — DRSG GAUZE 4X4" TRAY

## (undated) DEVICE — ESU PENCIL W/HOLSTER E2350H

## (undated) DEVICE — DRAPE LAP W/ARMBOARD 29410

## (undated) DEVICE — SOL ADH LIQUID BENZOIN SWAB 0.6ML C1544

## (undated) DEVICE — LINEN FULL SHEET 5511

## (undated) DEVICE — SU VICRYL 3-0 SH 27" UND J416H

## (undated) DEVICE — GLOVE PROTEXIS BLUE W/NEU-THERA 7.5  2D73EB75

## (undated) DEVICE — SUCTION TIP POOLE K770

## (undated) DEVICE — DRAIN JACKSON PRATT 15FR ROUND SIL LF JP-2229

## (undated) DEVICE — SUCTION CANISTER STRAW 65652-008

## (undated) DEVICE — RAD RX ISOVUE 300 (50ML) 61% IOPAMIDOL CHARGE PER ML

## (undated) DEVICE — SOL NACL 0.9% INJ 1000ML BAG 2B1324X

## (undated) DEVICE — STPL LINEAR 60 X 3.5MM TA6035S

## (undated) DEVICE — CLEANSER WOUND IRRISEPT 0.05% CHG IRRISEPT-403

## (undated) DEVICE — SUCTION CANISTER MEDIVAC LINER 3000ML W/LID 65651-530

## (undated) DEVICE — SOL NACL 0.9% INJ 250ML BAG 2B1322Q

## (undated) DEVICE — GLOVE PROTEXIS W/NEU-THERA 7.0  2D73TE70

## (undated) DEVICE — NDL 18GA 1.5" 305196

## (undated) DEVICE — BARRIER SEPRAFILM 5X6" SINGLE SHEET 4301-02

## (undated) DEVICE — DRAIN JACKSON PRATT RESERVOIR 100ML SU130-1305

## (undated) DEVICE — SU VICRYL 3-0 SH 27" J316H

## (undated) DEVICE — ENDO TROCAR BLUNT 100MM W/THRD ANCHOR BLUNTPORT BPT12STS

## (undated) DEVICE — BNDG ABDOMINAL BINDER 9X45-62" 79-89071

## (undated) DEVICE — SU PDS II 1 CTX 36" Z371T

## (undated) DEVICE — ESU LIGASURE LAPAROSCOPIC BLUNT TIP SEALER 5MMX37CM LF1637

## (undated) DEVICE — SYR 30ML LL W/O NDL 302832

## (undated) DEVICE — SU ETHILON 2-0 PS 18" 585H

## (undated) DEVICE — CONNECTOR STOPCOCK 3 WAY MALE LL HI-FLO MX9311L

## (undated) DEVICE — DRAPE C-ARM 60X42" 1013

## (undated) DEVICE — BLADE CLIPPER SGL USE 9680

## (undated) DEVICE — GOWN IMPERVIOUS ZONED XLG 9041

## (undated) DEVICE — STPL SKIN 35W APPOSE 8886803712

## (undated) DEVICE — DRSG GAUZE 4X4" 8044

## (undated) DEVICE — SU PDS II 0 CT-2 27" Z334H

## (undated) DEVICE — CATH CHOLANGIOGRAM KUMAR CC-019

## (undated) RX ORDER — OXYCODONE HYDROCHLORIDE 5 MG/1
TABLET ORAL
Status: DISPENSED
Start: 2018-02-22

## (undated) RX ORDER — HYDROMORPHONE HYDROCHLORIDE 1 MG/ML
INJECTION, SOLUTION INTRAMUSCULAR; INTRAVENOUS; SUBCUTANEOUS
Status: DISPENSED
Start: 2017-05-16

## (undated) RX ORDER — GLYCOPYRROLATE 0.2 MG/ML
INJECTION INTRAMUSCULAR; INTRAVENOUS
Status: DISPENSED
Start: 2017-05-12

## (undated) RX ORDER — LIDOCAINE HYDROCHLORIDE 10 MG/ML
INJECTION, SOLUTION EPIDURAL; INFILTRATION; INTRACAUDAL; PERINEURAL
Status: DISPENSED
Start: 2017-05-16

## (undated) RX ORDER — GLYCOPYRROLATE 0.2 MG/ML
INJECTION INTRAMUSCULAR; INTRAVENOUS
Status: DISPENSED
Start: 2018-01-11

## (undated) RX ORDER — OXYCODONE HYDROCHLORIDE 5 MG/1
TABLET ORAL
Status: DISPENSED
Start: 2017-05-12

## (undated) RX ORDER — LIDOCAINE HYDROCHLORIDE 10 MG/ML
INJECTION, SOLUTION EPIDURAL; INFILTRATION; INTRACAUDAL; PERINEURAL
Status: DISPENSED
Start: 2018-01-11

## (undated) RX ORDER — ENALAPRILAT 1.25 MG/ML
INJECTION INTRAVENOUS
Status: DISPENSED
Start: 2017-05-16

## (undated) RX ORDER — ONDANSETRON 2 MG/ML
INJECTION INTRAMUSCULAR; INTRAVENOUS
Status: DISPENSED
Start: 2017-05-12

## (undated) RX ORDER — FENTANYL CITRATE 50 UG/ML
INJECTION, SOLUTION INTRAMUSCULAR; INTRAVENOUS
Status: DISPENSED
Start: 2017-05-16

## (undated) RX ORDER — DEXAMETHASONE SODIUM PHOSPHATE 4 MG/ML
INJECTION, SOLUTION INTRA-ARTICULAR; INTRALESIONAL; INTRAMUSCULAR; INTRAVENOUS; SOFT TISSUE
Status: DISPENSED
Start: 2017-05-12

## (undated) RX ORDER — KETOROLAC TROMETHAMINE 30 MG/ML
INJECTION, SOLUTION INTRAMUSCULAR; INTRAVENOUS
Status: DISPENSED
Start: 2017-05-12

## (undated) RX ORDER — HYDRALAZINE HYDROCHLORIDE 20 MG/ML
INJECTION INTRAMUSCULAR; INTRAVENOUS
Status: DISPENSED
Start: 2018-02-22

## (undated) RX ORDER — BUPIVACAINE HYDROCHLORIDE AND EPINEPHRINE 5; 5 MG/ML; UG/ML
INJECTION, SOLUTION EPIDURAL; INTRACAUDAL; PERINEURAL
Status: DISPENSED
Start: 2018-02-22

## (undated) RX ORDER — PROPOFOL 10 MG/ML
INJECTION, EMULSION INTRAVENOUS
Status: DISPENSED
Start: 2018-01-11

## (undated) RX ORDER — ONDANSETRON 2 MG/ML
INJECTION INTRAMUSCULAR; INTRAVENOUS
Status: DISPENSED
Start: 2017-05-16

## (undated) RX ORDER — KETOROLAC TROMETHAMINE 30 MG/ML
INJECTION, SOLUTION INTRAMUSCULAR; INTRAVENOUS
Status: DISPENSED
Start: 2017-05-16

## (undated) RX ORDER — PROPOFOL 10 MG/ML
INJECTION, EMULSION INTRAVENOUS
Status: DISPENSED
Start: 2017-05-16

## (undated) RX ORDER — NEOSTIGMINE METHYLSULFATE 5 MG/5 ML
SYRINGE (ML) INTRAVENOUS
Status: DISPENSED
Start: 2017-05-16

## (undated) RX ORDER — HYDROMORPHONE HYDROCHLORIDE 1 MG/ML
INJECTION, SOLUTION INTRAMUSCULAR; INTRAVENOUS; SUBCUTANEOUS
Status: DISPENSED
Start: 2018-02-22

## (undated) RX ORDER — LEVOFLOXACIN 5 MG/ML
INJECTION, SOLUTION INTRAVENOUS
Status: DISPENSED
Start: 2017-05-16

## (undated) RX ORDER — FENTANYL CITRATE 50 UG/ML
INJECTION, SOLUTION INTRAMUSCULAR; INTRAVENOUS
Status: DISPENSED
Start: 2017-05-12

## (undated) RX ORDER — FENTANYL CITRATE 50 UG/ML
INJECTION, SOLUTION INTRAMUSCULAR; INTRAVENOUS
Status: DISPENSED
Start: 2018-01-11

## (undated) RX ORDER — ONDANSETRON 2 MG/ML
INJECTION INTRAMUSCULAR; INTRAVENOUS
Status: DISPENSED
Start: 2018-01-11

## (undated) RX ORDER — CEFAZOLIN SODIUM 2 G/100ML
INJECTION, SOLUTION INTRAVENOUS
Status: DISPENSED
Start: 2018-01-11

## (undated) RX ORDER — LEVOFLOXACIN 5 MG/ML
INJECTION, SOLUTION INTRAVENOUS
Status: DISPENSED
Start: 2017-05-12

## (undated) RX ORDER — GLYCOPYRROLATE 0.2 MG/ML
INJECTION INTRAMUSCULAR; INTRAVENOUS
Status: DISPENSED
Start: 2017-05-16

## (undated) RX ORDER — DEXAMETHASONE SODIUM PHOSPHATE 4 MG/ML
INJECTION, SOLUTION INTRA-ARTICULAR; INTRALESIONAL; INTRAMUSCULAR; INTRAVENOUS; SOFT TISSUE
Status: DISPENSED
Start: 2018-01-11

## (undated) RX ORDER — FENTANYL CITRATE 50 UG/ML
INJECTION, SOLUTION INTRAMUSCULAR; INTRAVENOUS
Status: DISPENSED
Start: 2018-02-22

## (undated) RX ORDER — DEXAMETHASONE SODIUM PHOSPHATE 4 MG/ML
INJECTION, SOLUTION INTRA-ARTICULAR; INTRALESIONAL; INTRAMUSCULAR; INTRAVENOUS; SOFT TISSUE
Status: DISPENSED
Start: 2017-05-16

## (undated) RX ORDER — LIDOCAINE HYDROCHLORIDE 10 MG/ML
INJECTION, SOLUTION EPIDURAL; INFILTRATION; INTRACAUDAL; PERINEURAL
Status: DISPENSED
Start: 2017-05-12

## (undated) RX ORDER — PROPOFOL 10 MG/ML
INJECTION, EMULSION INTRAVENOUS
Status: DISPENSED
Start: 2017-05-12

## (undated) RX ORDER — LABETALOL HYDROCHLORIDE 5 MG/ML
INJECTION, SOLUTION INTRAVENOUS
Status: DISPENSED
Start: 2017-05-16

## (undated) RX ORDER — KETOROLAC TROMETHAMINE 30 MG/ML
INJECTION, SOLUTION INTRAMUSCULAR; INTRAVENOUS
Status: DISPENSED
Start: 2018-02-22

## (undated) RX ORDER — NEOSTIGMINE METHYLSULFATE 5 MG/5 ML
SYRINGE (ML) INTRAVENOUS
Status: DISPENSED
Start: 2017-05-12

## (undated) RX ORDER — CEFAZOLIN SODIUM 2 G/100ML
INJECTION, SOLUTION INTRAVENOUS
Status: DISPENSED
Start: 2018-02-22

## (undated) RX ORDER — PHENYLEPHRINE HCL IN 0.9% NACL 1 MG/10 ML
SYRINGE (ML) INTRAVENOUS
Status: DISPENSED
Start: 2017-05-16

## (undated) RX ORDER — PHENYLEPHRINE HCL IN 0.9% NACL 1 MG/10 ML
SYRINGE (ML) INTRAVENOUS
Status: DISPENSED
Start: 2017-05-12

## (undated) RX ORDER — BUPIVACAINE HYDROCHLORIDE AND EPINEPHRINE 2.5; 5 MG/ML; UG/ML
INJECTION, SOLUTION EPIDURAL; INFILTRATION; INTRACAUDAL; PERINEURAL
Status: DISPENSED
Start: 2017-05-12